# Patient Record
Sex: FEMALE | Race: WHITE | NOT HISPANIC OR LATINO | URBAN - METROPOLITAN AREA
[De-identification: names, ages, dates, MRNs, and addresses within clinical notes are randomized per-mention and may not be internally consistent; named-entity substitution may affect disease eponyms.]

---

## 2023-08-28 ENCOUNTER — INPATIENT (INPATIENT)
Facility: HOSPITAL | Age: 78
LOS: 10 days | Discharge: NURSING, REHAB OR LONG-TERM CARE | End: 2023-09-08
Attending: INTERNAL MEDICINE | Admitting: INTERNAL MEDICINE
Payer: MEDICARE

## 2023-08-28 VITALS
WEIGHT: 89.95 LBS | HEIGHT: 65 IN | HEART RATE: 73 BPM | OXYGEN SATURATION: 98 % | RESPIRATION RATE: 18 BRPM | DIASTOLIC BLOOD PRESSURE: 97 MMHG | SYSTOLIC BLOOD PRESSURE: 152 MMHG | TEMPERATURE: 98 F

## 2023-08-28 DIAGNOSIS — R91.8 OTHER NONSPECIFIC ABNORMAL FINDING OF LUNG FIELD: ICD-10-CM

## 2023-08-28 DIAGNOSIS — E87.6 HYPOKALEMIA: ICD-10-CM

## 2023-08-28 DIAGNOSIS — N39.0 URINARY TRACT INFECTION, SITE NOT SPECIFIED: ICD-10-CM

## 2023-08-28 DIAGNOSIS — E83.52 HYPERCALCEMIA: ICD-10-CM

## 2023-08-28 DIAGNOSIS — R74.01 ELEVATION OF LEVELS OF LIVER TRANSAMINASE LEVELS: ICD-10-CM

## 2023-08-28 DIAGNOSIS — S72.002A FRACTURE OF UNSPECIFIED PART OF NECK OF LEFT FEMUR, INITIAL ENCOUNTER FOR CLOSED FRACTURE: ICD-10-CM

## 2023-08-28 DIAGNOSIS — E87.0 HYPEROSMOLALITY AND HYPERNATREMIA: ICD-10-CM

## 2023-08-28 LAB
ALBUMIN SERPL ELPH-MCNC: 3.5 G/DL — SIGNIFICANT CHANGE UP (ref 3.3–5)
ALP SERPL-CCNC: 127 U/L — HIGH (ref 40–120)
ALT FLD-CCNC: 51 U/L — SIGNIFICANT CHANGE UP (ref 12–78)
ANION GAP SERPL CALC-SCNC: 8 MMOL/L — SIGNIFICANT CHANGE UP (ref 5–17)
APAP SERPL-MCNC: < 2 UG/ML (ref 10–30)
APPEARANCE UR: ABNORMAL
APTT BLD: 27.6 SEC — SIGNIFICANT CHANGE UP (ref 24.5–35.6)
AST SERPL-CCNC: 66 U/L — HIGH (ref 15–37)
BACTERIA # UR AUTO: ABNORMAL
BASOPHILS # BLD AUTO: 0.06 K/UL — SIGNIFICANT CHANGE UP (ref 0–0.2)
BASOPHILS NFR BLD AUTO: 0.4 % — SIGNIFICANT CHANGE UP (ref 0–2)
BILIRUB SERPL-MCNC: 0.8 MG/DL — SIGNIFICANT CHANGE UP (ref 0.2–1.2)
BILIRUB UR-MCNC: ABNORMAL
BUN SERPL-MCNC: 33 MG/DL — HIGH (ref 7–23)
CALCIUM SERPL-MCNC: 11.2 MG/DL — HIGH (ref 8.5–10.1)
CHLORIDE SERPL-SCNC: 117 MMOL/L — HIGH (ref 96–108)
CK SERPL-CCNC: 701 U/L — HIGH (ref 26–192)
CO2 SERPL-SCNC: 25 MMOL/L — SIGNIFICANT CHANGE UP (ref 22–31)
COLOR SPEC: YELLOW — SIGNIFICANT CHANGE UP
CREAT SERPL-MCNC: 0.74 MG/DL — SIGNIFICANT CHANGE UP (ref 0.5–1.3)
DIFF PNL FLD: ABNORMAL
EGFR: 84 ML/MIN/1.73M2 — SIGNIFICANT CHANGE UP
EOSINOPHIL # BLD AUTO: 0.08 K/UL — SIGNIFICANT CHANGE UP (ref 0–0.5)
EOSINOPHIL NFR BLD AUTO: 0.5 % — SIGNIFICANT CHANGE UP (ref 0–6)
EPI CELLS # UR: ABNORMAL
ETHANOL SERPL-MCNC: 12 MG/DL — HIGH (ref 0–10)
FLUAV AG NPH QL: SIGNIFICANT CHANGE UP
FLUBV AG NPH QL: SIGNIFICANT CHANGE UP
GLUCOSE SERPL-MCNC: 123 MG/DL — HIGH (ref 70–99)
GLUCOSE UR QL: NEGATIVE MG/DL — SIGNIFICANT CHANGE UP
HCT VFR BLD CALC: 47.4 % — HIGH (ref 34.5–45)
HGB BLD-MCNC: 15.3 G/DL — SIGNIFICANT CHANGE UP (ref 11.5–15.5)
IMM GRANULOCYTES NFR BLD AUTO: 0.5 % — SIGNIFICANT CHANGE UP (ref 0–0.9)
INR BLD: 0.9 RATIO — SIGNIFICANT CHANGE UP (ref 0.85–1.18)
KETONES UR-MCNC: ABNORMAL
LEUKOCYTE ESTERASE UR-ACNC: ABNORMAL
LYMPHOCYTES # BLD AUTO: 1.35 K/UL — SIGNIFICANT CHANGE UP (ref 1–3.3)
LYMPHOCYTES # BLD AUTO: 8 % — LOW (ref 13–44)
MAGNESIUM SERPL-MCNC: 2.6 MG/DL — SIGNIFICANT CHANGE UP (ref 1.6–2.6)
MCHC RBC-ENTMCNC: 28.4 PG — SIGNIFICANT CHANGE UP (ref 27–34)
MCHC RBC-ENTMCNC: 32.3 G/DL — SIGNIFICANT CHANGE UP (ref 32–36)
MCV RBC AUTO: 88.1 FL — SIGNIFICANT CHANGE UP (ref 80–100)
MONOCYTES # BLD AUTO: 1.47 K/UL — HIGH (ref 0–0.9)
MONOCYTES NFR BLD AUTO: 8.7 % — SIGNIFICANT CHANGE UP (ref 2–14)
NEUTROPHILS # BLD AUTO: 13.86 K/UL — HIGH (ref 1.8–7.4)
NEUTROPHILS NFR BLD AUTO: 81.9 % — HIGH (ref 43–77)
NITRITE UR-MCNC: NEGATIVE — SIGNIFICANT CHANGE UP
NRBC # BLD: 0 /100 WBCS — SIGNIFICANT CHANGE UP (ref 0–0)
PH UR: 6 — SIGNIFICANT CHANGE UP (ref 5–8)
PLATELET # BLD AUTO: 346 K/UL — SIGNIFICANT CHANGE UP (ref 150–400)
POTASSIUM SERPL-MCNC: 3.2 MMOL/L — LOW (ref 3.5–5.3)
POTASSIUM SERPL-SCNC: 3.2 MMOL/L — LOW (ref 3.5–5.3)
PROT SERPL-MCNC: 8.6 GM/DL — HIGH (ref 6–8.3)
PROT UR-MCNC: 100 MG/DL
PROTHROM AB SERPL-ACNC: 10.8 SEC — SIGNIFICANT CHANGE UP (ref 9.5–13)
RBC # BLD: 5.38 M/UL — HIGH (ref 3.8–5.2)
RBC # FLD: 13.8 % — SIGNIFICANT CHANGE UP (ref 10.3–14.5)
RBC CASTS # UR COMP ASSIST: ABNORMAL /HPF (ref 0–4)
SALICYLATES SERPL-MCNC: 3.3 MG/DL — SIGNIFICANT CHANGE UP (ref 2.8–20)
SARS-COV-2 RNA SPEC QL NAA+PROBE: SIGNIFICANT CHANGE UP
SODIUM SERPL-SCNC: 150 MMOL/L — HIGH (ref 135–145)
SP GR SPEC: 1.02 — SIGNIFICANT CHANGE UP (ref 1.01–1.02)
TROPONIN I, HIGH SENSITIVITY RESULT: 38.3 NG/L — SIGNIFICANT CHANGE UP
UROBILINOGEN FLD QL: 1 MG/DL
WBC # BLD: 16.9 K/UL — HIGH (ref 3.8–10.5)
WBC # FLD AUTO: 16.9 K/UL — HIGH (ref 3.8–10.5)
WBC UR QL: ABNORMAL

## 2023-08-28 PROCEDURE — 99285 EMERGENCY DEPT VISIT HI MDM: CPT

## 2023-08-28 PROCEDURE — 70450 CT HEAD/BRAIN W/O DYE: CPT | Mod: 26,MA

## 2023-08-28 PROCEDURE — 73502 X-RAY EXAM HIP UNI 2-3 VIEWS: CPT | Mod: 26,LT

## 2023-08-28 PROCEDURE — 27125 PARTIAL HIP REPLACEMENT: CPT | Mod: LT

## 2023-08-28 PROCEDURE — 99222 1ST HOSP IP/OBS MODERATE 55: CPT

## 2023-08-28 PROCEDURE — 93970 EXTREMITY STUDY: CPT | Mod: 26

## 2023-08-28 PROCEDURE — 72125 CT NECK SPINE W/O DYE: CPT | Mod: 26,MA

## 2023-08-28 PROCEDURE — 72192 CT PELVIS W/O DYE: CPT | Mod: 26,MA

## 2023-08-28 PROCEDURE — 73552 X-RAY EXAM OF FEMUR 2/>: CPT | Mod: 26,LT

## 2023-08-28 PROCEDURE — 27125 PARTIAL HIP REPLACEMENT: CPT | Mod: AS,LT

## 2023-08-28 PROCEDURE — 93010 ELECTROCARDIOGRAM REPORT: CPT

## 2023-08-28 PROCEDURE — 71045 X-RAY EXAM CHEST 1 VIEW: CPT | Mod: 26

## 2023-08-28 RX ORDER — POTASSIUM CHLORIDE 20 MEQ
40 PACKET (EA) ORAL ONCE
Refills: 0 | Status: COMPLETED | OUTPATIENT
Start: 2023-08-28 | End: 2023-08-28

## 2023-08-28 RX ORDER — CEFTRIAXONE 500 MG/1
1000 INJECTION, POWDER, FOR SOLUTION INTRAMUSCULAR; INTRAVENOUS ONCE
Refills: 0 | Status: COMPLETED | OUTPATIENT
Start: 2023-08-28 | End: 2023-08-28

## 2023-08-28 RX ORDER — SODIUM CHLORIDE 9 MG/ML
1000 INJECTION, SOLUTION INTRAVENOUS
Refills: 0 | Status: DISCONTINUED | OUTPATIENT
Start: 2023-08-28 | End: 2023-08-28

## 2023-08-28 RX ORDER — THIAMINE MONONITRATE (VIT B1) 100 MG
100 TABLET ORAL ONCE
Refills: 0 | Status: COMPLETED | OUTPATIENT
Start: 2023-08-28 | End: 2023-08-28

## 2023-08-28 RX ORDER — SODIUM CHLORIDE 9 MG/ML
1000 INJECTION INTRAMUSCULAR; INTRAVENOUS; SUBCUTANEOUS ONCE
Refills: 0 | Status: COMPLETED | OUTPATIENT
Start: 2023-08-28 | End: 2023-08-28

## 2023-08-28 RX ORDER — SODIUM CHLORIDE 9 MG/ML
1000 INJECTION, SOLUTION INTRAVENOUS
Refills: 0 | Status: DISCONTINUED | OUTPATIENT
Start: 2023-08-28 | End: 2023-08-29

## 2023-08-28 RX ORDER — MULTIVIT-MIN/FERROUS GLUCONATE 9 MG/15 ML
1 LIQUID (ML) ORAL DAILY
Refills: 0 | Status: DISCONTINUED | OUTPATIENT
Start: 2023-08-28 | End: 2023-09-08

## 2023-08-28 RX ORDER — FOLIC ACID 0.8 MG
1 TABLET ORAL DAILY
Refills: 0 | Status: DISCONTINUED | OUTPATIENT
Start: 2023-08-28 | End: 2023-09-08

## 2023-08-28 RX ORDER — MORPHINE SULFATE 50 MG/1
4 CAPSULE, EXTENDED RELEASE ORAL ONCE
Refills: 0 | Status: DISCONTINUED | OUTPATIENT
Start: 2023-08-28 | End: 2023-08-28

## 2023-08-28 RX ORDER — THIAMINE MONONITRATE (VIT B1) 100 MG
100 TABLET ORAL DAILY
Refills: 0 | Status: DISCONTINUED | OUTPATIENT
Start: 2023-08-29 | End: 2023-09-08

## 2023-08-28 RX ORDER — MORPHINE SULFATE 50 MG/1
2 CAPSULE, EXTENDED RELEASE ORAL EVERY 6 HOURS
Refills: 0 | Status: DISCONTINUED | OUTPATIENT
Start: 2023-08-28 | End: 2023-08-29

## 2023-08-28 RX ORDER — HEPARIN SODIUM 5000 [USP'U]/ML
5000 INJECTION INTRAVENOUS; SUBCUTANEOUS ONCE
Refills: 0 | Status: COMPLETED | OUTPATIENT
Start: 2023-08-28 | End: 2023-08-28

## 2023-08-28 RX ORDER — CEFTRIAXONE 500 MG/1
1000 INJECTION, POWDER, FOR SOLUTION INTRAMUSCULAR; INTRAVENOUS EVERY 24 HOURS
Refills: 0 | Status: DISCONTINUED | OUTPATIENT
Start: 2023-08-29 | End: 2023-08-30

## 2023-08-28 RX ORDER — ACETAMINOPHEN 500 MG
600 TABLET ORAL ONCE
Refills: 0 | Status: COMPLETED | OUTPATIENT
Start: 2023-08-28 | End: 2023-08-28

## 2023-08-28 RX ADMIN — HEPARIN SODIUM 5000 UNIT(S): 5000 INJECTION INTRAVENOUS; SUBCUTANEOUS at 21:14

## 2023-08-28 RX ADMIN — SODIUM CHLORIDE 1000 MILLILITER(S): 9 INJECTION INTRAMUSCULAR; INTRAVENOUS; SUBCUTANEOUS at 17:05

## 2023-08-28 RX ADMIN — Medication 240 MILLIGRAM(S): at 16:49

## 2023-08-28 RX ADMIN — Medication 600 MILLIGRAM(S): at 18:03

## 2023-08-28 RX ADMIN — MORPHINE SULFATE 4 MILLIGRAM(S): 50 CAPSULE, EXTENDED RELEASE ORAL at 17:05

## 2023-08-28 RX ADMIN — SODIUM CHLORIDE 1000 MILLILITER(S): 9 INJECTION INTRAMUSCULAR; INTRAVENOUS; SUBCUTANEOUS at 16:30

## 2023-08-28 RX ADMIN — CEFTRIAXONE 1000 MILLIGRAM(S): 500 INJECTION, POWDER, FOR SOLUTION INTRAMUSCULAR; INTRAVENOUS at 18:04

## 2023-08-28 RX ADMIN — Medication 600 MILLIGRAM(S): at 18:00

## 2023-08-28 RX ADMIN — SODIUM CHLORIDE 1000 MILLILITER(S): 9 INJECTION INTRAMUSCULAR; INTRAVENOUS; SUBCUTANEOUS at 18:04

## 2023-08-28 RX ADMIN — MORPHINE SULFATE 4 MILLIGRAM(S): 50 CAPSULE, EXTENDED RELEASE ORAL at 18:00

## 2023-08-28 RX ADMIN — Medication 40 MILLIEQUIVALENT(S): at 17:05

## 2023-08-28 RX ADMIN — CEFTRIAXONE 100 MILLIGRAM(S): 500 INJECTION, POWDER, FOR SOLUTION INTRAMUSCULAR; INTRAVENOUS at 17:00

## 2023-08-28 RX ADMIN — SODIUM CHLORIDE 70 MILLILITER(S): 9 INJECTION, SOLUTION INTRAVENOUS at 23:26

## 2023-08-28 RX ADMIN — SODIUM CHLORIDE 50 MILLILITER(S): 9 INJECTION, SOLUTION INTRAVENOUS at 18:55

## 2023-08-28 RX ADMIN — Medication 100 MILLIGRAM(S): at 23:20

## 2023-08-28 NOTE — ED PROVIDER NOTE - CLINICAL SUMMARY MEDICAL DECISION MAKING FREE TEXT BOX
Patient with weakness, FTT, left hip pain, not fit to reside alone.  VSS.  Pending labs, EKG, CT and xray imaging, plan for admission. Patient with weakness, FTT, left hip pain, not fit to reside alone.  VSS.  Pending labs, EKG, CT and xray imaging, plan for admission.    Admit note:  Patient with left hip fracture, ortho involved.  Not presently in withdrawal.  Needs medicine admission, labs suggestive of dehydration, needs further work up of AMS.  Patient is to be admitted to the hospital and the case was discussed with the admitting physician.  Any changes in plan, additional imaging/labs, and further work up will be at the discretion of the admitting physician. Per discussion with admitting physician, all necessary consults for admitted patients to be obtained by morning team unless patient requires emergent intervention or medical advice by specialist overnight.

## 2023-08-28 NOTE — ED PROVIDER NOTE - OBJECTIVE STATEMENT
Pertinent PMH/PSH/FHx/SHx and Review of Systems contained within:  Patient presents to the ED for altered mental status and failure to thrive.  Patient with daughter at bedside.  Per daughter, patient lives alone and she had not heard from her mother in 2 days.  Today she came to check on her mother and found her on the floor, patient unable to stand without assistance.  Patient was telling daughter about things that could not have happened and acting confused.  Per daughter patient is long time daily alcohol drinker but still functional.      Relevant PMHx/SHx/SOCHx/FAMH:  No other pmh per daughter or patient, does not go to physicians  +Smoker, +Daily alcohol use, no other drug use

## 2023-08-28 NOTE — ED ADULT NURSE NOTE - CHIEF COMPLAINT QUOTE
BIBEMS, as per ems staff states pt is disoriented when found on the floor today, last known well was Friday when found on the floor and since has been on the floor till today. as per patient, states slid and fell and hit head on Friday. poor historian. as per ems, staff unknown pmh

## 2023-08-28 NOTE — ED ADULT NURSE NOTE - OBJECTIVE STATEMENT
Patient is alert and oriented x2, daughter at bedside. As per daughter, patient is AOx4 at baseline. Daughter states she last spoke with patient on Saturday and patient did not seem confused. Patient lives alone. Daughter asked house staff to check on patient and was found on the floor. Patient states that she slipped and fell on Friday, hit back of the head. Denies LOC. Noted bilateral hip pain when patient was turned. S1 pressure ulcer on sacrum. Multiple abrasions on bilateral arms. Patient appears unkempt, dress soaked in urine. Daughter also reports that patient is a chronic smoker and has a history of ETOH abuse, stopped and resumed "lately". Daughter denies any PMH, says patient does not go to the doctor. Placed on cardiac monitor. Fall precautions in place. Safety maintained. Patient is alert and oriented x2, daughter at bedside. As per daughter, patient is AOx4 at baseline. Daughter states she last spoke with patient on Saturday and patient did not seem confused. Patient lives alone. Daughter asked house staff to check on patient and was found on the floor today. Patient states that she slipped and fell on Friday, hit back of the head. Denies LOC. Noted bilateral hip pain when patient was turned. S1 pressure ulcer on sacrum. Multiple abrasions on bilateral arms. Patient appears unkempt, dress soaked in urine. Daughter also reports that patient is a chronic smoker and has a history of ETOH abuse, stopped and resumed "lately". Daughter denies any PMH, says patient does not go to the doctor. Placed on cardiac monitor. Fall precautions in place. Safety maintained.

## 2023-08-28 NOTE — ED PROVIDER NOTE - PHYSICAL EXAMINATION
Gen: Alert, NAD, frail appearing  Head: NC, AT, EOMI, normal lids/conjunctiva  ENT: normal hearing, dry mucus membranes  Neck: +supple, no cervical tenderness, +Trachea midline  Pulm: Bilateral BS, normal resp effort, no wheeze/stridor/retractions  CV: RRR, no M/R/G, +dist pulses  Abd: soft, NT/ND, Negative [place] signs, +BS, no palpable masses  Mskel: no edema/erythema/cyanosis, left hip pain on movement   Skin: no rash, warm/dry  Neuro: AAOx2, no apparent sensory/motor deficits, coordination intact Gen: Alert, NAD, frail appearing  Head: NC, AT, EOMI, normal lids/conjunctiva  ENT: normal hearing, dry mucus membranes  Neck: +supple, no cervical tenderness, +Trachea midline  Pulm: Bilateral BS, normal resp effort, no wheeze/stridor/retractions  CV: RRR, no M/R/G, +dist pulses  Abd: soft, NT/ND, Negative [place] signs, +BS, no palpable masses  Mskel: no edema/erythema/cyanosis, left hip pain on movement   Skin: no rash, warm/dry  Neuro: AAOx2, no apparent sensory/motor deficits, coordination intact Gen: Alert, NAD, frail appearing  Head: NC, AT, EOMI, normal lids/conjunctiva  ENT: normal hearing, dry mucus membranes  Neck: +supple, no cervical tenderness, +Trachea midline  Pulm: Bilateral BS, normal resp effort, no wheeze/stridor/retractions  CV: RRR, no M/R/G, +dist pulses  Abd: soft, NT/ND, Negative [place] signs, +BS, no palpable masses  Mskel: no edema/erythema/cyanosis, left hip pain on movement   Skin: no rash, warm/dry  Neuro: AAOx2, no apparent sensory/motor deficits, coordination intact Gen: Alert, NAD, frail appearing  Head: NC, AT, EOMI, normal lids/conjunctiva  ENT: normal hearing, dry mucus membranes  Neck: +supple, no cervical tenderness, +Trachea midline  Pulm: Bilateral BS, normal resp effort, no wheeze/stridor/retractions  CV: RRR, no M/R/G, +dist pulses  Abd: soft, NT/ND, Negative [place] signs, +BS, no palpable masses  Mskel: no edema/erythema/cyanosis, left hip pain on movement   Skin: no rash, warm/dry  Neuro: AAOx2, no apparent sensory/motor deficits, coordination intact  CIWA 0 Gen: Alert, NAD, frail appearing  Head: NC, AT, EOMI, normal lids/conjunctiva  ENT: normal hearing, dry mucus membranes  Neck: +supple, no cervical tenderness, +Trachea midline  Pulm: Bilateral BS, normal resp effort, no wheeze/stridor/retractions  CV: RRR, no M/R/G, +dist pulses  Abd: soft, NT/ND, Negative [place] signs, +BS, no palpable masses  Mskel: no edema/erythema/cyanosis, left hip pain on movement   Skin: no rash, warm/dry  Neuro: AAOx2, no apparent sensory/motor deficits, coordination intact  CIWA 0 Gen: Alert, NAD, frail appearing  Head: NC, AT, EOMI, normal lids/conjunctiva  ENT: normal hearing, dry mucus membranes  Neck: +supple, no cervical tenderness, +Trachea midline  Pulm: Bilateral BS, normal resp effort, no wheeze/stridor/retractions  CV: RRR, no M/R/G, +dist pulses  Abd: soft, NT/ND, Negative [place] signs, +BS, no palpable masses  Mskel: no edema/erythema/cyanosis, left hip pain on movement   Skin: no rash, warm/dry  Neuro: AAOx2, no apparent sensory/motor deficits, coordination intact  CIWA 0

## 2023-08-28 NOTE — CONSULT NOTE ADULT - SUBJECTIVE AND OBJECTIVE BOX
Patient is a 77y female alcoholic, home ambulator without assistive devices who presents to [facility] ED w/ a c/o of L hip pain. Pt is an unreliable historian and was accompanied by her daughter/HCP. Patient's daughter confirms that the patient had a mechanical fall 3 days ago and was found by neighbor after the fall, after an indeterminate amount of time. Denies HT/LOC. States inability to walk immediately following the injury. Denies any numbness or tingling. Denies having any other pain elsewhere. Denies orthopedic history. No other orthopedic concerns at this time.    T(C): 36.5 (08-28-23 @ 19:37), Max: 36.7 (08-28-23 @ 12:26)  HR: 95 (08-28-23 @ 19:37) (73 - 95)  BP: 144/72 (08-28-23 @ 19:37) (125/51 - 152/97)  RR: 15 (08-28-23 @ 19:37) (15 - 18)  SpO2: 99% (08-28-23 @ 19:37) (98% - 99%)        FOUND ON FLR, WEAKNESS, DISORIENTED    FX OF HIP, CLOSED, LEFT, INITIAL ENCOUNTER; DEHYDRATION/ AMS    MEWS Score    Fracture of hip, closed, left, initial encounter    Dehydration    Altered mental status      Gen: NAD, Resting comfortably    L LE:  Skin intact, no erythema or ecchymosis  Externally and shortened leg  TTP by hip, nowhere else along RLE  Motor: + EHL/FHL/TA/GSC  +SILT: SPN/DPN/Renita/Saph/Tib  + DP  Compartments soft and compressible  No calf tenderness    Secondary Assessment:  NC/AT, NTTP of clavicles, NTTP of C-,T-,L-Spine,  UEs: NTTP of Shoulders, Elbows, Wrists, Hands; NT with AROM/PROM of Shoulders, Elbows, Wrists, Hands; AIN/PIN/Med/Uln/Msc/Rad/Ax intact  R LE: Able to SLR, NT with Log Roll, NT with Heel Strike, NTTP of Hip, Knee, Ankle, foot; NT with AROM/PROM of Hip, Knee, Ankle, foot; Q/H/Gsc/TA/EHL/FHL intact    Imaging:  XR L Hip: displaced subcapital femoral neck fracture  CT Pelvis: Left subcapital femoral neck fracture with displacement    A/P:     77F with a displaced L femoral neck fracture s/p mechanical fall 3 days ago.    Plan for L mikey vs. ISI tomorrow  NPO after midnight, except medications  IVF while NPO  One dose of Heparin tonight, then hold chemical DVT ppx for surgery  Please document necessary medical optimizations for surgery   FU BLLE US dopplers  Analgesia   CIWA protocol  NWB  Ice hip as tolerated  Medical recommendations appreciated   Discussed with Dr. Barragan, who is in agreement with above plan Patient is a 77y female alcoholic, home ambulator without assistive devices who presents to [facility] ED w/ a c/o of L hip pain. Pt is an unreliable historian and was accompanied by her daughter/HCP. Patient's daughter confirms that the patient had a mechanical fall 3 days ago and was found by neighbor after the fall, after an indeterminate amount of time. Denies HT/LOC. States inability to walk immediately following the injury. Denies any numbness or tingling. Denies having any other pain elsewhere. Denies orthopedic history. No other orthopedic concerns at this time.    T(C): 36.5 (08-28-23 @ 19:37), Max: 36.7 (08-28-23 @ 12:26)  HR: 95 (08-28-23 @ 19:37) (73 - 95)  BP: 144/72 (08-28-23 @ 19:37) (125/51 - 152/97)  RR: 15 (08-28-23 @ 19:37) (15 - 18)  SpO2: 99% (08-28-23 @ 19:37) (98% - 99%)        FOUND ON FLR, WEAKNESS, DISORIENTED    FX OF HIP, CLOSED, LEFT, INITIAL ENCOUNTER; DEHYDRATION/ AMS    MEWS Score    Fracture of hip, closed, left, initial encounter    Dehydration    Altered mental status      Gen: NAD, Resting comfortably    L LE:  Skin intact, no erythema or ecchymosis  Externally and shortened leg  TTP by hip, nowhere else along RLE  Motor: + EHL/FHL/TA/GSC  +SILT: SPN/DPN/Renita/Saph/Tib  + DP  Compartments soft and compressible  No calf tenderness    Secondary Assessment:  NC/AT, NTTP of clavicles, NTTP of C-,T-,L-Spine,  UEs: NTTP of Shoulders, Elbows, Wrists, Hands; NT with AROM/PROM of Shoulders, Elbows, Wrists, Hands; AIN/PIN/Med/Uln/Msc/Rad/Ax intact  R LE: Able to SLR, NT with Log Roll, NT with Heel Strike, NTTP of Hip, Knee, Ankle, foot; NT with AROM/PROM of Hip, Knee, Ankle, foot; Q/H/Gsc/TA/EHL/FHL intact    Imaging:  XR L Hip: displaced subcapital femoral neck fracture  CT Pelvis: Left subcapital femoral neck fracture with displacement    A/P:     77F with a displaced L femoral neck fracture s/p mechanical fall 3 days ago.    Plan for L mikey vs. ISI tomorrow  NPO after midnight, except medications  IVF while NPO  One dose of Heparin tonight, then hold chemical DVT ppx for surgery  Please document necessary medical optimizations for surgery   FU BLLE US dopplers  Analgesia   CIWA protocol  NWB  Ice hip as tolerated  Medical recommendations appreciated   Discussed with Dr. Barragan, who is in agreement with above plan Patient is a 77y female alcoholic, home ambulator without assistive devices who presents to [facility] ED w/ a c/o of L hip pain. Pt is an unreliable historian and was accompanied by her daughter/HCP. Patient's daughter confirms that the patient had a mechanical fall approximately 3 days ago and was found by neighbor after the fall, after an indeterminate amount of time. Denies Head strike/LOC. Denies AC use. States inability to walk immediately following the injury. Daughter says that in the last 6 months the patient has become more unsteady on her feet and has had falls though no fractures. Denies any numbness or tingling. Denies having any other pain elsewhere. Denies previous orthopedic history. No other orthopedic concerns at this time.    T(C): 36.5 (08-28-23 @ 19:37), Max: 36.7 (08-28-23 @ 12:26)  HR: 95 (08-28-23 @ 19:37) (73 - 95)  BP: 144/72 (08-28-23 @ 19:37) (125/51 - 152/97)  RR: 15 (08-28-23 @ 19:37) (15 - 18)  SpO2: 99% (08-28-23 @ 19:37) (98% - 99%)        FOUND ON FLR, WEAKNESS, DISORIENTED    FX OF HIP, CLOSED, LEFT, INITIAL ENCOUNTER; DEHYDRATION/ AMS    MEWS Score    Fracture of hip, closed, left, initial encounter    Dehydration    Altered mental status      Gen: Thin, NAD, Resting comfortably  LLE:  Skin intact, no erythema or ecchymosis  Externally and shortened leg  TTP by hip  NTTP at knee, ankle, or foot  Motor: + EHL/FHL/TA/GSC  +SILT: SPN/DPN/Renita/Saph/Tib  + DP  Compartments soft and compressible  No calf tenderness    Secondary Assessment:  NC/AT, NTTP of clavicles, NTTP of C-,T-,L-Spine,  UEs: NTTP of Shoulders, Elbows, Wrists, Hands; NT with AROM/PROM of Shoulders, Elbows, Wrists, Hands; AIN/PIN/Med/Uln/Msc/Rad/Ax intact  RLE: Able to SLR, NT with Log Roll, NT with Heel Strike, NTTP of Hip, Knee, Ankle, foot; NT with AROM/PROM of Hip, Knee, Ankle, foot; Q/H/Gsc/TA/EHL/FHL intact    Imaging:  XR L Hip: displaced subcapital femoral neck fracture  CT Pelvis: Left subcapital femoral neck fracture with displacement    A/P:     77F with a displaced L femoral neck fracture s/p mechanical fall 3 days ago.    Plan for L mikey vs. ISI tomorrow  NPO after midnight, except medications  IVF while NPO  One dose of Heparin tonight, then hold chemical DVT ppx for surgery  Please document necessary medical optimizations for surgery   FU BLLE US dopplers  Analgesia   CIWA protocol  NWB LLE  Ice hip as tolerated  Primary care per medical team   Discussed risk versus benefit of surgery with daughter and patient who voiced full understanding and wished to proceed with operative fixation   Discussed with Dr. Barragan, who is in agreement with above plan Patient is a 77y female alcoholic, home ambulator without assistive devices who presents to [facility] ED w/ a c/o of L hip pain. Pt is an unreliable historian and was accompanied by her daughter/HCP. Patient's daughter confirms that the patient had a mechanical fall approximately 3 days ago and was found by neighbor after the fall, after an indeterminate amount of time. Denies Head strike/LOC. Denies AC use. States inability to walk immediately following the injury. Daughter says that in the last 6 months the patient has become more unsteady on her feet and has had falls though no fractures. Denies any numbness or tingling. Denies having any other pain elsewhere. Denies previous orthopedic history. No other orthopedic concerns at this time.    T(C): 36.5 (08-28-23 @ 19:37), Max: 36.7 (08-28-23 @ 12:26)  HR: 95 (08-28-23 @ 19:37) (73 - 95)  BP: 144/72 (08-28-23 @ 19:37) (125/51 - 152/97)  RR: 15 (08-28-23 @ 19:37) (15 - 18)  SpO2: 99% (08-28-23 @ 19:37) (98% - 99%)        FOUND ON FLR, WEAKNESS, DISORIENTED    FX OF HIP, CLOSED, LEFT, INITIAL ENCOUNTER; DEHYDRATION/ AMS    MEWS Score    Fracture of hip, closed, left, initial encounter    Dehydration    Altered mental status      Gen: Thin, NAD, Resting comfortably  LLE:  Skin intact, no erythema or ecchymosis  Externally and shortened leg  TTP by hip  NTTP at knee, ankle, or foot  Motor: + EHL/FHL/TA/GSC  +SILT: SPN/DPN/Renita/Saph/Tib  + DP  Compartments soft and compressible  No calf tenderness    Secondary Assessment:  NC/AT, NTTP of clavicles, NTTP of C-,T-,L-Spine,  UEs: NTTP of Shoulders, Elbows, Wrists, Hands; NT with AROM/PROM of Shoulders, Elbows, Wrists, Hands; AIN/PIN/Med/Uln/Msc/Rad/Ax intact  RLE: Able to SLR, NT with Log Roll, NT with Heel Strike, NTTP of Hip, Knee, Ankle, foot; NT with AROM/PROM of Hip, Knee, Ankle, foot; Q/H/Gsc/TA/EHL/FHL intact    Imaging:  XR L Hip: displaced subcapital femoral neck fracture  CT Pelvis: Left subcapital femoral neck fracture with displacement    A/P:     77F with a displaced L femoral neck fracture s/p mechanical fall 3 days ago.    Plan for L mikey vs. ISI tomorrow  NPO after midnight, except medications  IVF while NPO  One dose of Heparin tonight, then hold chemical DVT ppx for surgery  Please document necessary medical optimizations for surgery   FU BLLE US dopplers  Analgesia   CIWA protocol  NWB LLE  Ice hip as tolerated  Primary care per medical team   Discussed risk versus benefit of surgery with daughter and patient who voiced full understanding and wished to proceed with operative fixation   Discussed with Dr. Barragan, who is in agreement with above plan Patient is a 77y female alcoholic, home ambulator without assistive devices who presents to [facility] ED w/ a c/o of L hip pain. Pt is an unreliable historian and was accompanied by her daughter/HCP. Patient's daughter confirms that the patient had a mechanical fall approximately 3 days ago and was found by neighbor after the fall, after an indeterminate amount of time. Denies Head strike/LOC. Denies AC use. States inability to walk immediately following the injury. Daughter says that in the last 6 months the patient has become more unsteady on her feet and has had falls though no fractures. Denies any numbness or tingling. Denies having any other pain elsewhere. Denies previous orthopedic history. No other orthopedic concerns at this time.    T(C): 36.5 (08-28-23 @ 19:37), Max: 36.7 (08-28-23 @ 12:26)  HR: 95 (08-28-23 @ 19:37) (73 - 95)  BP: 144/72 (08-28-23 @ 19:37) (125/51 - 152/97)  RR: 15 (08-28-23 @ 19:37) (15 - 18)  SpO2: 99% (08-28-23 @ 19:37) (98% - 99%)        FOUND ON FLR, WEAKNESS, DISORIENTED    FX OF HIP, CLOSED, LEFT, INITIAL ENCOUNTER; DEHYDRATION/ AMS    MEWS Score    Fracture of hip, closed, left, initial encounter    Dehydration    Altered mental status      Gen: Thin, NAD, Resting comfortably  LLE:  Skin intact, no erythema or ecchymosis  Externally and shortened leg  TTP by hip  NTTP at knee, ankle, or foot  Motor: + EHL/FHL/TA/GSC  +SILT: SPN/DPN/Renita/Saph/Tib  + DP  Compartments soft and compressible  No calf tenderness    Secondary Assessment:  NC/AT, NTTP of clavicles, NTTP of C-,T-,L-Spine,  UEs: NTTP of Shoulders, Elbows, Wrists, Hands; NT with AROM/PROM of Shoulders, Elbows, Wrists, Hands; AIN/PIN/Med/Uln/Msc/Rad/Ax intact  RLE: Able to SLR, NT with Log Roll, NT with Heel Strike, NTTP of Hip, Knee, Ankle, foot; NT with AROM/PROM of Hip, Knee, Ankle, foot; Q/H/Gsc/TA/EHL/FHL intact    Imaging:  XR L Hip: displaced subcapital femoral neck fracture  CT Pelvis: Left subcapital femoral neck fracture with displacement    A/P:     77F with a displaced L femoral neck fracture s/p mechanical fall 3 days ago.    Plan for L mikey vs. ISI tomorrow  NPO after midnight, except medications  IVF while NPO  One dose of Heparin tonight, then hold chemical DVT ppx for surgery  Please document necessary medical optimizations for surgery   FU BLLE US dopplers  Analgesia   CIWA protocol  NWB LLE  Ice hip as tolerated  Primary care per medical team   Discussed risk versus benefit of surgery with daughter and patient who voiced full understanding and wished to proceed with operative fixation   Discussed with Dr. Barragan, who is in agreement with above plan

## 2023-08-28 NOTE — ED ADULT NURSE NOTE - NSFALLRISKINTERV_ED_ALL_ED
Assistance OOB with selected safe patient handling equipment if applicable/Assistance with ambulation/Communicate fall risk and risk factors to all staff, patient, and family/Monitor gait and stability/Monitor for mental status changes and reorient to person, place, and time, as needed/Move patient closer to nursing station/within visual sight of ED staff/Provide visual cue: yellow wristband, yellow gown, etc/Reinforce activity limits and safety measures with patient and family/Toileting schedule using arm’s reach rule for commode and bathroom/Use of alarms - bed, stretcher, chair and/or video monitoring/Call bell, personal items and telephone in reach/Instruct patient to call for assistance before getting out of bed/chair/stretcher/Non-slip footwear applied when patient is off stretcher/[place] to call system/Physically safe environment - no spills, clutter or unnecessary equipment/Purposeful Proactive Rounding/Room/bathroom lighting operational, light cord in reach Assistance OOB with selected safe patient handling equipment if applicable/Assistance with ambulation/Communicate fall risk and risk factors to all staff, patient, and family/Monitor gait and stability/Monitor for mental status changes and reorient to person, place, and time, as needed/Move patient closer to nursing station/within visual sight of ED staff/Provide visual cue: yellow wristband, yellow gown, etc/Reinforce activity limits and safety measures with patient and family/Toileting schedule using arm’s reach rule for commode and bathroom/Use of alarms - bed, stretcher, chair and/or video monitoring/Call bell, personal items and telephone in reach/Instruct patient to call for assistance before getting out of bed/chair/stretcher/Non-slip footwear applied when patient is off stretcher/[place] to call system/Physically safe environment - no spills, clutter or unnecessary equipment/Purposeful Proactive Rounding/Room/bathroom lighting operational, light cord in reach Assistance OOB with selected safe patient handling equipment if applicable/Assistance with ambulation/Communicate fall risk and risk factors to all staff, patient, and family/Monitor gait and stability/Monitor for mental status changes and reorient to person, place, and time, as needed/Move patient closer to nursing station/within visual sight of ED staff/Provide visual cue: yellow wristband, yellow gown, etc/Reinforce activity limits and safety measures with patient and family/Toileting schedule using arm’s reach rule for commode and bathroom/Use of alarms - bed, stretcher, chair and/or video monitoring/Call bell, personal items and telephone in reach/Instruct patient to call for assistance before getting out of bed/chair/stretcher/Non-slip footwear applied when patient is off stretcher/[place] to call system/Physically safe environment - no spills, clutter or unnecessary equipment/Purposeful Proactive Rounding/Room/bathroom lighting operational, light cord in reach

## 2023-08-28 NOTE — ED ADULT NURSE REASSESSMENT NOTE - NS ED NURSE REASSESS COMMENT FT1
patient provided with applesauce and milk. Patient aware of NPO after midnight. NAD noted. Denies pain/discomfort at this time
Report received from GAYLE Nuñez. patient is a&ox3 with moments of confusion, npo after midnight. Patient with daughter and surgical team at bedside.  Patient denies pain/discomfort NAD noted. IV fluids in progress. Awaiting tele bed

## 2023-08-28 NOTE — H&P ADULT - HISTORY OF PRESENT ILLNESS
Patient is a 77F with no reported PMH who presents to the ED s/p fall.  Patient is currently AAOx2-3, unable to provide history.  History provided by daughter - Rica - at the bedside.  Patient brought to the ED s/p fall.  Patient lives alone and does not have an HHA.  Per daughter - she hadnt spoke to the patient for about two days and became worried when she would not answer the phone.  Patient found on the floor when daughter went to check on her.  Unclear how long patient was down for.  Per daughter - patient has had a significant change in her functional status over the last six months.  Patient much more homebound - noted to have ~30 lb wt loss, poor appetite, worsening gait.  Patient reportedly has refused MD evaluation in the past.  Patient also known to be a chronic alcoholic - unable to describe how much she drinks per day or when her last drink was.  Daughter denies history of seizures or prior hospitalizations related to alcohol.  Patient also smokes ~1 pack per day and has smoked for >25 years.  Patient complains of pain to the L hip, no other complaints.  Vitals stable, labs show hypernatremia and hypercalcemia.  Will admit to med surg.

## 2023-08-28 NOTE — ED PROVIDER NOTE - CARE PLAN
1 Principal Discharge DX:	Fracture of hip, closed, left, initial encounter  Secondary Diagnosis:	Dehydration  Secondary Diagnosis:	Altered mental status

## 2023-08-28 NOTE — H&P ADULT - NSHPLABSRESULTS_GEN_ALL_CORE
Recent Vitals  T(C): 36.5 (23 @ 19:37), Max: 36.7 (23 @ 12:26)  HR: 95 (23 @ 19:37) (73 - 95)  BP: 144/72 (23 @ 19:37) (125/51 - 152/97)  RR: 15 (23 @ 19:37) (15 - 18)  SpO2: 99% (23 @ 19:37) (98% - 99%)                        15.3   16.90 )-----------( 346      ( 28 Aug 2023 15:23 )             47.4         150<H>  |  117<H>  |  33<H>  ----------------------------<  123<H>  3.2<L>   |  25  |  0.74    Ca    11.2<H>      28 Aug 2023 15:23  Mg     2.6         TPro  8.6<H>  /  Alb  3.5  /  TBili  0.8  /  DBili  x   /  AST  66<H>  /  ALT  51  /  AlkPhos  127<H>      PT/INR - ( 28 Aug 2023 19:20 )   PT: 10.8 sec;   INR: 0.90 ratio         PTT - ( 28 Aug 2023 19:20 )  PTT:27.6 sec  LIVER FUNCTIONS - ( 28 Aug 2023 15:23 )  Alb: 3.5 g/dL / Pro: 8.6 gm/dL / ALK PHOS: 127 U/L / ALT: 51 U/L / AST: 66 U/L / GGT: x           Urinalysis Basic - ( 28 Aug 2023 21:02 )    Color: Yellow / Appearance: Slightly Turbid / S.020 / pH: x  Gluc: x / Ketone: Moderate  / Bili: Small / Urobili: 1 mg/dL   Blood: x / Protein: 100 mg/dL / Nitrite: Negative   Leuk Esterase: Moderate / RBC: 6-10 /HPF / WBC 11-25   Sq Epi: x / Non Sq Epi: x / Bacteria: Many        Home Medications:

## 2023-08-28 NOTE — H&P ADULT - NSHPPHYSICALEXAM_GEN_ALL_CORE
Physical exam:  General: frail female in no acute distress, resting comfortably  Head:  Atraumatic, Normocephalic  Eyes: EOMI, PERRLA, clear sclera  Neck: Supple, thyroid nontender, non enlarged  Cardio: S1/S2 +ve, regular rate and rhythm, no M/G/R  Resp: clear to ausculation bilaterally,   GI: abdomen soft, nontender, non distended, no guarding, BS +ve x 4  Ext: no significant pedal edema, refuses examination of LLE  Neuro: CN 2-12 intact, no significant motor or sensory deficits.  Skin: No lesions

## 2023-08-28 NOTE — H&P ADULT - PROBLEM SELECTOR PLAN 1
CT reveals L hip fracture.    Ortho on board - plan for procedure in the AM  morphine prn pain  DVT prop as per ortho     Due to age and comorbidities (significant smoking history, new lung mass, homebound, frailty) patient is a moderate risk for perioperative cardiovascular mortality but is likely currently optimized for procedure.

## 2023-08-28 NOTE — H&P ADULT - PROBLEM SELECTOR PLAN 7
likely due to chronic alcohol use  Thiamine, Folate, MVI daily   monitor for CIWA symptoms - currently 0  Consider wernicke encephalopathy ? - given IV thiamine

## 2023-08-28 NOTE — ED PROVIDER NOTE - NS ED MD DISPO DIVISION
[facility]

## 2023-08-28 NOTE — H&P ADULT - NSHPREVIEWOFSYSTEMS_GEN_ALL_CORE
Constitutional: no fever, chills, night sweats  Ears: no hearing changes or ear pain,   Nose: no nasal congestion, sinus pain, or rhinorrhea  Cardio: no chest pain, orthopnea, edema, or palpitations  Resp: no dyspnea, cough, wheezing  GI: no nausea, vomiting, diarrhea, constipation, hematochezia, or melena  : no dysuria, urinary frequency, hematuria  MSK: L leg pain positive.  No back pain, neck pain  Skin: no rash, pruritis   Neuro: no weakness, dizziness, lightheadedness, syncope   Heme/Lymph: no bruising or bleeding

## 2023-08-29 ENCOUNTER — TRANSCRIPTION ENCOUNTER (OUTPATIENT)
Age: 78
End: 2023-08-29

## 2023-08-29 LAB
ANION GAP SERPL CALC-SCNC: 3 MMOL/L — LOW (ref 5–17)
ANION GAP SERPL CALC-SCNC: 6 MMOL/L — SIGNIFICANT CHANGE UP (ref 5–17)
APTT BLD: 26.4 SEC — SIGNIFICANT CHANGE UP (ref 24.5–35.6)
APTT BLD: 28.4 SEC — SIGNIFICANT CHANGE UP (ref 24.5–35.6)
BUN SERPL-MCNC: 25 MG/DL — HIGH (ref 7–23)
BUN SERPL-MCNC: 33 MG/DL — HIGH (ref 7–23)
CALCIUM SERPL-MCNC: 10.2 MG/DL — HIGH (ref 8.5–10.1)
CALCIUM SERPL-MCNC: 8.8 MG/DL — SIGNIFICANT CHANGE UP (ref 8.5–10.1)
CHLORIDE SERPL-SCNC: 115 MMOL/L — HIGH (ref 96–108)
CHLORIDE SERPL-SCNC: 120 MMOL/L — HIGH (ref 96–108)
CO2 SERPL-SCNC: 22 MMOL/L — SIGNIFICANT CHANGE UP (ref 22–31)
CO2 SERPL-SCNC: 26 MMOL/L — SIGNIFICANT CHANGE UP (ref 22–31)
CREAT SERPL-MCNC: 0.46 MG/DL — LOW (ref 0.5–1.3)
CREAT SERPL-MCNC: 0.58 MG/DL — SIGNIFICANT CHANGE UP (ref 0.5–1.3)
CULTURE RESULTS: SIGNIFICANT CHANGE UP
EGFR: 94 ML/MIN/1.73M2 — SIGNIFICANT CHANGE UP
EGFR: 99 ML/MIN/1.73M2 — SIGNIFICANT CHANGE UP
GLUCOSE SERPL-MCNC: 102 MG/DL — HIGH (ref 70–99)
GLUCOSE SERPL-MCNC: 120 MG/DL — HIGH (ref 70–99)
HCT VFR BLD CALC: 38.1 % — SIGNIFICANT CHANGE UP (ref 34.5–45)
HCT VFR BLD CALC: 44.8 % — SIGNIFICANT CHANGE UP (ref 34.5–45)
HCV AB S/CO SERPL IA: 0.09 S/CO — SIGNIFICANT CHANGE UP (ref 0–0.99)
HCV AB SERPL-IMP: SIGNIFICANT CHANGE UP
HGB BLD-MCNC: 12.7 G/DL — SIGNIFICANT CHANGE UP (ref 11.5–15.5)
HGB BLD-MCNC: 14.3 G/DL — SIGNIFICANT CHANGE UP (ref 11.5–15.5)
INR BLD: 0.9 RATIO — SIGNIFICANT CHANGE UP (ref 0.85–1.18)
INR BLD: 0.94 RATIO — SIGNIFICANT CHANGE UP (ref 0.85–1.18)
MAGNESIUM SERPL-MCNC: 1.8 MG/DL — SIGNIFICANT CHANGE UP (ref 1.6–2.6)
MCHC RBC-ENTMCNC: 28.5 PG — SIGNIFICANT CHANGE UP (ref 27–34)
MCHC RBC-ENTMCNC: 29.4 PG — SIGNIFICANT CHANGE UP (ref 27–34)
MCHC RBC-ENTMCNC: 31.9 G/DL — LOW (ref 32–36)
MCHC RBC-ENTMCNC: 33.3 G/DL — SIGNIFICANT CHANGE UP (ref 32–36)
MCV RBC AUTO: 88.2 FL — SIGNIFICANT CHANGE UP (ref 80–100)
MCV RBC AUTO: 89.2 FL — SIGNIFICANT CHANGE UP (ref 80–100)
NRBC # BLD: 0 /100 WBCS — SIGNIFICANT CHANGE UP (ref 0–0)
PLATELET # BLD AUTO: 290 K/UL — SIGNIFICANT CHANGE UP (ref 150–400)
PLATELET # BLD AUTO: 333 K/UL — SIGNIFICANT CHANGE UP (ref 150–400)
POTASSIUM SERPL-MCNC: 2.8 MMOL/L — CRITICAL LOW (ref 3.5–5.3)
POTASSIUM SERPL-MCNC: 3.4 MMOL/L — LOW (ref 3.5–5.3)
POTASSIUM SERPL-SCNC: 2.8 MMOL/L — CRITICAL LOW (ref 3.5–5.3)
POTASSIUM SERPL-SCNC: 3.4 MMOL/L — LOW (ref 3.5–5.3)
PROTHROM AB SERPL-ACNC: 10.8 SEC — SIGNIFICANT CHANGE UP (ref 9.5–13)
PROTHROM AB SERPL-ACNC: 11.3 SEC — SIGNIFICANT CHANGE UP (ref 9.5–13)
RBC # BLD: 4.32 M/UL — SIGNIFICANT CHANGE UP (ref 3.8–5.2)
RBC # BLD: 5.02 M/UL — SIGNIFICANT CHANGE UP (ref 3.8–5.2)
RBC # FLD: 13.7 % — SIGNIFICANT CHANGE UP (ref 10.3–14.5)
RBC # FLD: 13.8 % — SIGNIFICANT CHANGE UP (ref 10.3–14.5)
SODIUM SERPL-SCNC: 144 MMOL/L — SIGNIFICANT CHANGE UP (ref 135–145)
SODIUM SERPL-SCNC: 148 MMOL/L — HIGH (ref 135–145)
SPECIMEN SOURCE: SIGNIFICANT CHANGE UP
WBC # BLD: 12.14 K/UL — HIGH (ref 3.8–10.5)
WBC # BLD: 15.58 K/UL — HIGH (ref 3.8–10.5)
WBC # FLD AUTO: 12.14 K/UL — HIGH (ref 3.8–10.5)
WBC # FLD AUTO: 15.58 K/UL — HIGH (ref 3.8–10.5)

## 2023-08-29 PROCEDURE — 73502 X-RAY EXAM HIP UNI 2-3 VIEWS: CPT | Mod: 26,LT

## 2023-08-29 PROCEDURE — 99233 SBSQ HOSP IP/OBS HIGH 50: CPT

## 2023-08-29 DEVICE — HEAD FEM 12/14 28MM PLUS5MM: Type: IMPLANTABLE DEVICE | Site: LEFT | Status: FUNCTIONAL

## 2023-08-29 DEVICE — STEM FEM ACTIS TAPER STD COLLAR 12/14 SZ 3: Type: IMPLANTABLE DEVICE | Site: LEFT | Status: FUNCTIONAL

## 2023-08-29 DEVICE — IMPLANTABLE DEVICE: Type: IMPLANTABLE DEVICE | Site: LEFT | Status: FUNCTIONAL

## 2023-08-29 RX ORDER — SODIUM CHLORIDE 9 MG/ML
1000 INJECTION, SOLUTION INTRAVENOUS
Refills: 0 | Status: DISCONTINUED | OUTPATIENT
Start: 2023-08-29 | End: 2023-08-29

## 2023-08-29 RX ORDER — CEFAZOLIN SODIUM 1 G
2000 VIAL (EA) INJECTION EVERY 8 HOURS
Refills: 0 | Status: COMPLETED | OUTPATIENT
Start: 2023-08-29 | End: 2023-08-30

## 2023-08-29 RX ORDER — POTASSIUM CHLORIDE 20 MEQ
20 PACKET (EA) ORAL
Refills: 0 | Status: DISCONTINUED | OUTPATIENT
Start: 2023-08-29 | End: 2023-08-29

## 2023-08-29 RX ORDER — POTASSIUM CHLORIDE 20 MEQ
20 PACKET (EA) ORAL
Refills: 0 | Status: COMPLETED | OUTPATIENT
Start: 2023-08-29 | End: 2023-08-29

## 2023-08-29 RX ORDER — HYDROMORPHONE HYDROCHLORIDE 2 MG/ML
0.5 INJECTION INTRAMUSCULAR; INTRAVENOUS; SUBCUTANEOUS
Refills: 0 | Status: DISCONTINUED | OUTPATIENT
Start: 2023-08-29 | End: 2023-08-29

## 2023-08-29 RX ORDER — MAGNESIUM SULFATE 500 MG/ML
1 VIAL (ML) INJECTION ONCE
Refills: 0 | Status: COMPLETED | OUTPATIENT
Start: 2023-08-29 | End: 2023-08-29

## 2023-08-29 RX ORDER — POTASSIUM CHLORIDE 20 MEQ
40 PACKET (EA) ORAL EVERY 4 HOURS
Refills: 0 | Status: COMPLETED | OUTPATIENT
Start: 2023-08-29 | End: 2023-08-29

## 2023-08-29 RX ORDER — ENOXAPARIN SODIUM 100 MG/ML
40 INJECTION SUBCUTANEOUS EVERY 24 HOURS
Refills: 0 | Status: DISCONTINUED | OUTPATIENT
Start: 2023-08-30 | End: 2023-09-08

## 2023-08-29 RX ORDER — POTASSIUM CHLORIDE 20 MEQ
40 PACKET (EA) ORAL EVERY 4 HOURS
Refills: 0 | Status: DISCONTINUED | OUTPATIENT
Start: 2023-08-29 | End: 2023-08-29

## 2023-08-29 RX ADMIN — Medication 40 MILLIEQUIVALENT(S): at 21:34

## 2023-08-29 RX ADMIN — Medication 50 MILLIEQUIVALENT(S): at 13:08

## 2023-08-29 RX ADMIN — Medication 100 MILLIGRAM(S): at 23:14

## 2023-08-29 RX ADMIN — SODIUM CHLORIDE 75 MILLILITER(S): 9 INJECTION, SOLUTION INTRAVENOUS at 18:02

## 2023-08-29 RX ADMIN — Medication 100 MILLIGRAM(S): at 11:09

## 2023-08-29 RX ADMIN — SODIUM CHLORIDE 100 MILLILITER(S): 9 INJECTION, SOLUTION INTRAVENOUS at 11:17

## 2023-08-29 RX ADMIN — Medication 20 MILLIEQUIVALENT(S): at 11:09

## 2023-08-29 RX ADMIN — Medication 40 MILLIEQUIVALENT(S): at 18:02

## 2023-08-29 RX ADMIN — Medication 20 MILLIEQUIVALENT(S): at 06:40

## 2023-08-29 RX ADMIN — Medication 50 MILLIEQUIVALENT(S): at 18:05

## 2023-08-29 RX ADMIN — Medication 100 GRAM(S): at 18:03

## 2023-08-29 NOTE — PATIENT PROFILE ADULT - FUNCTIONAL ASSESSMENT - BASIC MOBILITY 6.
2-calculated by average/Not able to assess (calculate score using [facility] averaging method)  2-calculated by average/Not able to assess (calculate score using [facility] averaging method)  2-calculated by average/Not able to assess (calculate score using [facility] averaging method)

## 2023-08-29 NOTE — PATIENT PROFILE ADULT - HOME ACCESSIBILITY CONCERNS
Was the patient seen in the last year in this department? Yes LOV 12/19/2018    Does patient have an active prescription for medications requested? Yes    Received Request Via: Pharmacy     VITAMIN D2 50,000 UNIT CAPSULE     none

## 2023-08-29 NOTE — PROGRESS NOTE ADULT - ASSESSMENT
77F with no reported PMH who presents to the ED s/p fall, admitted with left hip fracture and dehydration with electrolyte abnormalisties      # Hip fracture, left.    CT reveals L hip fracture.    ORIF today  morphine prn pain  DVT prop as per ortho       # UTI (urinary tract infection).   ·  Plan: WBCs and leuk esterase in urine, also pt c/o burning and frequency  c/w ceftriaxone  for now  Deescalate antibiotic when cultures return.     # Mass of upper lobe of right lung.   ·  Plan: CT chest to eval R apical mass after surgery or outpatient    # Hypernatremia.    Na 150--?144  likely due to dehydration (elevated CK as well)  c/w  half NS  trend labs.    # Hypercalcemia.    likely due to dehydration - trend labs.    # Hypokalemia.   replete    #: Transaminitis.   ·  Plan: likely due to chronic alcohol use  Thiamine, Folate, MVI daily   monitor for CIWA symptoms - currently 0   given IV thiamine, c/w PO thiamine and MVT/FA    dvt PPX post op

## 2023-08-29 NOTE — PROGRESS NOTE ADULT - SUBJECTIVE AND OBJECTIVE BOX
pt seen and examined by the bedside, c/o LLE pain, denied any other complaints.    Vital Signs Last 24 Hrs  T(C): 36.8 (29 Aug 2023 17:10), Max: 36.9 (29 Aug 2023 08:01)  T(F): 98.2 (29 Aug 2023 17:10), Max: 98.5 (29 Aug 2023 08:01)  HR: 93 (29 Aug 2023 17:17) (75 - 103)  BP: 158/67 (29 Aug 2023 17:10) (94/76 - 169/79)  RR: 18 (29 Aug 2023 17:10) (10 - 22)  SpO2: 96% (29 Aug 2023 17:10) (95% - 100%)   room air      Physical Exam:   General: frail female in no acute distress, resting comfortably  Head:  Atraumatic, Normocephalic  Eyes: EOMI, PERRLA, clear sclera  Neck: Supple, thyroid nontender, non enlarged  Cardio: S1/S2 +ve, regular rate and rhythm, no M/G/R  Resp: clear to ausculation bilaterally,   GI: abdomen soft, nontender, non distended, no guarding, BS +ve x 4  Ext: no significant pedal edema, ROM limited in LLE, RLE normal ROM  Neuro: CN 2-12 intact, no significant motor or sensory deficits.  Skin: No lesions      CBC:            12.7   12.14 )-----------( 290      ( 08-29-23 @ 10:10 )             38.1         Chem:         ( 08-29-23 @ 10:10 )    144  |  115<H>  |  25<H>  ----------------------------<  102<H>  2.8<LL>   |  26  |  0.46<L>        Liver Functions: ( 08-28-23 @ 15:23 )  Alb: 3.5 g/dL / Pro: 8.6 gm/dL / ALK PHOS: 127 U/L / ALT: 51 U/L / AST: 66 U/L / GGT: x              Type & Screen:

## 2023-08-29 NOTE — PROGRESS NOTE ADULT - ATTENDING COMMENTS
Agree with resident note and plan: 77F p/w L displaced femoral neck fx. Patient confused on interview but requires urgent fixation due to need to mobilize. Given patient alcohol use she is at higher risk of infection and dislocation, other risks include fx, failure of hardware, thromboembolic events, MI, stroke, loss of life or limb. Will plan to proceed with L mikey arthroplasty today

## 2023-08-29 NOTE — PROGRESS NOTE ADULT - SUBJECTIVE AND OBJECTIVE BOX
This is a 76y/o Female s/p Left hip hemiarthroplasty.  Pain is controlled. Pt feeling well. No nausea or vomiting.    LABS:                        12.7   12.14 )-----------( 290      ( 29 Aug 2023 10:10 )             38.1     08-29    144  |  115<H>  |  25<H>  ----------------------------<  102<H>  2.8<LL>   |  26  |  0.46<L>    Ca    8.8      29 Aug 2023 10:10  Mg     1.8     08-29    TPro  8.6<H>  /  Alb  3.5  /  TBili  0.8  /  DBili  x   /  AST  66<H>  /  ALT  51  /  AlkPhos  127<H>  08-28    PT/INR - ( 29 Aug 2023 10:10 )   PT: 11.3 sec;   INR: 0.94 ratio         PTT - ( 29 Aug 2023 10:10 )  PTT:26.4 sec  Urinalysis Basic - ( 29 Aug 2023 10:10 )    Color: x / Appearance: x / SG: x / pH: x  Gluc: 102 mg/dL / Ketone: x  / Bili: x / Urobili: x   Blood: x / Protein: x / Nitrite: x   Leuk Esterase: x / RBC: x / WBC x   Sq Epi: x / Non Sq Epi: x / Bacteria: x        VITAL SIGNS:  T(C): 36.8 (08-29-23 @ 17:10), Max: 36.9 (08-29-23 @ 08:01)  HR: 93 (08-29-23 @ 17:17) (75 - 96)  BP: 158/67 (08-29-23 @ 17:10) (94/76 - 169/79)  RR: 18 (08-29-23 @ 17:10) (10 - 22)  SpO2: 96% (08-29-23 @ 17:10) (95% - 100%)    Exam:  NAD AAOx1.  Dressing clean, dry and intact.  SCDs in place.  Calves are soft and nontender.  Moving all toes and ankle, +EHL/FHL/TA/GS.  SILT throughout.  DP pulses +.  abduction pillow in place    A/P:  Stable POD 0 s/p Left hip hemiarthroplasty.  -Analgesia  -Posterior precautions  -Ice application  -Prophylactic antibiotics  -DVT PE prophylaxis per primary, recommend lovenox POD1  -Incentive spirometry  -OOB PT WBAT         This is a 78y/o Female s/p Left hip hemiarthroplasty.  Pain is controlled. Pt feeling well. No nausea or vomiting.    LABS:                        12.7   12.14 )-----------( 290      ( 29 Aug 2023 10:10 )             38.1     08-29    144  |  115<H>  |  25<H>  ----------------------------<  102<H>  2.8<LL>   |  26  |  0.46<L>    Ca    8.8      29 Aug 2023 10:10  Mg     1.8     08-29    TPro  8.6<H>  /  Alb  3.5  /  TBili  0.8  /  DBili  x   /  AST  66<H>  /  ALT  51  /  AlkPhos  127<H>  08-28    PT/INR - ( 29 Aug 2023 10:10 )   PT: 11.3 sec;   INR: 0.94 ratio         PTT - ( 29 Aug 2023 10:10 )  PTT:26.4 sec  Urinalysis Basic - ( 29 Aug 2023 10:10 )    Color: x / Appearance: x / SG: x / pH: x  Gluc: 102 mg/dL / Ketone: x  / Bili: x / Urobili: x   Blood: x / Protein: x / Nitrite: x   Leuk Esterase: x / RBC: x / WBC x   Sq Epi: x / Non Sq Epi: x / Bacteria: x        VITAL SIGNS:  T(C): 36.8 (08-29-23 @ 17:10), Max: 36.9 (08-29-23 @ 08:01)  HR: 93 (08-29-23 @ 17:17) (75 - 96)  BP: 158/67 (08-29-23 @ 17:10) (94/76 - 169/79)  RR: 18 (08-29-23 @ 17:10) (10 - 22)  SpO2: 96% (08-29-23 @ 17:10) (95% - 100%)    Exam:  NAD AAOx1.  Dressing clean, dry and intact.  SCDs in place.  Calves are soft and nontender.  Moving all toes and ankle, +EHL/FHL/TA/GS.  SILT throughout.  DP pulses +.  abduction pillow in place    A/P:  Stable POD 0 s/p Left hip hemiarthroplasty.  -Analgesia  -Posterior precautions  -Ice application  -Prophylactic antibiotics  -DVT PE prophylaxis per primary, recommend lovenox POD1  -Incentive spirometry  -OOB PT WBAT

## 2023-08-29 NOTE — PROGRESS NOTE ADULT - SUBJECTIVE AND OBJECTIVE BOX
Subjective: Patient was seen and examined at bedside. Denies any new numbness or tingling. Denies any headaches, SOB, or n/v/d/c. Denies having any other pain elsewhere. No other orthopedic concerns at this time.    Vital Signs (24 Hrs):  T(C): 36.7 (08-29-23 @ 04:30), Max: 36.7 (08-28-23 @ 12:26)  HR: 84 (08-29-23 @ 04:30) (73 - 103)  BP: 151/65 (08-29-23 @ 04:30) (125/51 - 152/97)  RR: 18 (08-29-23 @ 04:30) (15 - 19)  SpO2: 96% (08-29-23 @ 04:30) (96% - 99%)  Wt(kg): --    LABS:                          14.3   15.58 )-----------( 333      ( 29 Aug 2023 01:25 )             44.8     08-29    148<H>  |  120<H>  |  33<H>  ----------------------------<  120<H>  3.4<L>   |  22  |  0.58    Ca    10.2<H>      29 Aug 2023 01:25  Mg     2.6     08-28    TPro  8.6<H>  /  Alb  3.5  /  TBili  0.8  /  DBili  x   /  AST  66<H>  /  ALT  51  /  AlkPhos  127<H>  08-28    LIVER FUNCTIONS - ( 28 Aug 2023 15:23 )  Alb: 3.5 g/dL / Pro: 8.6 gm/dL / ALK PHOS: 127 U/L / ALT: 51 U/L / AST: 66 U/L / GGT: x           PT/INR - ( 29 Aug 2023 01:25 )   PT: 10.8 sec;   INR: 0.90 ratio        PTT - ( 29 Aug 2023 01:25 )  PTT:28.4 sec    Gen: NAD, Resting comfortably    LLE:  Skin intact, no erythema or ecchymosis  Externally and shortened leg  TTP by hip, nowhere else along RLE  Motor: + EHL/FHL/TA/GSC  +SILT: SPN/DPN/Renita/Saph/Tib  + DP  Compartments soft and compressible  No calf tenderness    A/P:     77yFemale with L Femoral neck fracture s/p fall. Plan for OR today.    Plan for OR today with Dr. Barragan for a [facility] v [facility]  NPO after midnight, except medications  IVF while NPO  Medically optimized for surgery   Analgesia   NWB  Ice hip as tolerated  Medical recommendations appreciated   Discussed with Dr. Barragan, who is in agreement with above plan

## 2023-08-29 NOTE — PATIENT PROFILE ADULT - FALL HARM RISK - HARM RISK INTERVENTIONS
Assistance with ambulation/Assistance OOB with selected safe patient handling equipment/Communicate Risk of Fall with Harm to all staff/Reinforce activity limits and safety measures with patient and family/Tailored Fall Risk Interventions/Visual Cue: Yellow wristband and red socks/Bed in lowest position, wheels locked, appropriate side rails in place/Call bell, personal items and telephone in reach/Instruct patient to call for assistance before getting out of bed or chair/Non-slip footwear when patient is out of bed/[place] to call system/Physically safe environment - no spills, clutter or unnecessary equipment/Purposeful Proactive Rounding/Room/bathroom lighting operational, light cord in reach Assistance with ambulation/Assistance OOB with selected safe patient handling equipment/Communicate Risk of Fall with Harm to all staff/Reinforce activity limits and safety measures with patient and family/Tailored Fall Risk Interventions/Visual Cue: Yellow wristband and red socks/Bed in lowest position, wheels locked, appropriate side rails in place/Call bell, personal items and telephone in reach/Instruct patient to call for assistance before getting out of bed or chair/Non-slip footwear when patient is out of bed/[place] to call system/Physically safe environment - no spills, clutter or unnecessary equipment/Purposeful Proactive Rounding/Room/bathroom lighting operational, light cord in reach Assistance with ambulation/Assistance OOB with selected safe patient handling equipment/Communicate Risk of Fall with Harm to all staff/Reinforce activity limits and safety measures with patient and family/Tailored Fall Risk Interventions/Visual Cue: Yellow wristband and red socks/Bed in lowest position, wheels locked, appropriate side rails in place/Call bell, personal items and telephone in reach/Instruct patient to call for assistance before getting out of bed or chair/Non-slip footwear when patient is out of bed/[place] to call system/Physically safe environment - no spills, clutter or unnecessary equipment/Purposeful Proactive Rounding/Room/bathroom lighting operational, light cord in reach

## 2023-08-30 ENCOUNTER — TRANSCRIPTION ENCOUNTER (OUTPATIENT)
Age: 78
End: 2023-08-30

## 2023-08-30 LAB
ANION GAP SERPL CALC-SCNC: 4 MMOL/L — LOW (ref 5–17)
ANION GAP SERPL CALC-SCNC: 6 MMOL/L — SIGNIFICANT CHANGE UP (ref 5–17)
APTT BLD: 27 SEC — SIGNIFICANT CHANGE UP (ref 24.5–35.6)
BASOPHILS # BLD AUTO: 0.04 K/UL — SIGNIFICANT CHANGE UP (ref 0–0.2)
BASOPHILS NFR BLD AUTO: 0.3 % — SIGNIFICANT CHANGE UP (ref 0–2)
BUN SERPL-MCNC: 16 MG/DL — SIGNIFICANT CHANGE UP (ref 7–23)
BUN SERPL-MCNC: 19 MG/DL — SIGNIFICANT CHANGE UP (ref 7–23)
CALCIUM SERPL-MCNC: 8.8 MG/DL — SIGNIFICANT CHANGE UP (ref 8.5–10.1)
CALCIUM SERPL-MCNC: 9.2 MG/DL — SIGNIFICANT CHANGE UP (ref 8.5–10.1)
CHLORIDE SERPL-SCNC: 108 MMOL/L — SIGNIFICANT CHANGE UP (ref 96–108)
CHLORIDE SERPL-SCNC: 115 MMOL/L — HIGH (ref 96–108)
CO2 SERPL-SCNC: 25 MMOL/L — SIGNIFICANT CHANGE UP (ref 22–31)
CREAT SERPL-MCNC: 0.56 MG/DL — SIGNIFICANT CHANGE UP (ref 0.5–1.3)
CREAT SERPL-MCNC: 0.57 MG/DL — SIGNIFICANT CHANGE UP (ref 0.5–1.3)
EGFR: 94 ML/MIN/1.73M2 — SIGNIFICANT CHANGE UP
EGFR: 95 ML/MIN/1.73M2 — SIGNIFICANT CHANGE UP
EOSINOPHIL # BLD AUTO: 0.53 K/UL — HIGH (ref 0–0.5)
EOSINOPHIL NFR BLD AUTO: 4.2 % — SIGNIFICANT CHANGE UP (ref 0–6)
GLUCOSE BLDC GLUCOMTR-MCNC: 143 MG/DL — HIGH (ref 70–99)
GLUCOSE SERPL-MCNC: 103 MG/DL — HIGH (ref 70–99)
GLUCOSE SERPL-MCNC: 141 MG/DL — HIGH (ref 70–99)
HCT VFR BLD CALC: 39.5 % — SIGNIFICANT CHANGE UP (ref 34.5–45)
HGB BLD-MCNC: 12.6 G/DL — SIGNIFICANT CHANGE UP (ref 11.5–15.5)
HGB BLD-MCNC: 12.9 G/DL — SIGNIFICANT CHANGE UP (ref 11.5–15.5)
IMM GRANULOCYTES NFR BLD AUTO: 1.2 % — HIGH (ref 0–0.9)
INR BLD: 0.96 RATIO — SIGNIFICANT CHANGE UP (ref 0.85–1.18)
LACTATE SERPL-SCNC: 2.6 MMOL/L — HIGH (ref 0.7–2)
LYMPHOCYTES # BLD AUTO: 17.8 % — SIGNIFICANT CHANGE UP (ref 13–44)
LYMPHOCYTES # BLD AUTO: 2.24 K/UL — SIGNIFICANT CHANGE UP (ref 1–3.3)
MAGNESIUM SERPL-MCNC: 1.9 MG/DL — SIGNIFICANT CHANGE UP (ref 1.6–2.6)
MCHC RBC-ENTMCNC: 28.4 PG — SIGNIFICANT CHANGE UP (ref 27–34)
MCHC RBC-ENTMCNC: 28.7 PG — SIGNIFICANT CHANGE UP (ref 27–34)
MCHC RBC-ENTMCNC: 31.9 G/DL — LOW (ref 32–36)
MCHC RBC-ENTMCNC: 32.7 G/DL — SIGNIFICANT CHANGE UP (ref 32–36)
MCV RBC AUTO: 87.8 FL — SIGNIFICANT CHANGE UP (ref 80–100)
MCV RBC AUTO: 89.2 FL — SIGNIFICANT CHANGE UP (ref 80–100)
MONOCYTES # BLD AUTO: 1.37 K/UL — HIGH (ref 0–0.9)
MONOCYTES NFR BLD AUTO: 10.9 % — SIGNIFICANT CHANGE UP (ref 2–14)
NEUTROPHILS # BLD AUTO: 8.25 K/UL — HIGH (ref 1.8–7.4)
NEUTROPHILS NFR BLD AUTO: 65.6 % — SIGNIFICANT CHANGE UP (ref 43–77)
NRBC # BLD: 0 /100 WBCS — SIGNIFICANT CHANGE UP (ref 0–0)
PHOSPHATE SERPL-MCNC: 2 MG/DL — LOW (ref 2.5–4.5)
PLATELET # BLD AUTO: 272 K/UL — SIGNIFICANT CHANGE UP (ref 150–400)
PLATELET # BLD AUTO: 273 K/UL — SIGNIFICANT CHANGE UP (ref 150–400)
POTASSIUM SERPL-MCNC: 3.8 MMOL/L — SIGNIFICANT CHANGE UP (ref 3.5–5.3)
POTASSIUM SERPL-MCNC: 4.3 MMOL/L — SIGNIFICANT CHANGE UP (ref 3.5–5.3)
POTASSIUM SERPL-SCNC: 3.8 MMOL/L — SIGNIFICANT CHANGE UP (ref 3.5–5.3)
POTASSIUM SERPL-SCNC: 4.3 MMOL/L — SIGNIFICANT CHANGE UP (ref 3.5–5.3)
PROTHROM AB SERPL-ACNC: 11.4 SEC — SIGNIFICANT CHANGE UP (ref 9.5–13)
RBC # BLD: 4.43 M/UL — SIGNIFICANT CHANGE UP (ref 3.8–5.2)
RBC # BLD: 4.5 M/UL — SIGNIFICANT CHANGE UP (ref 3.8–5.2)
RBC # FLD: 13.4 % — SIGNIFICANT CHANGE UP (ref 10.3–14.5)
RBC # FLD: 13.5 % — SIGNIFICANT CHANGE UP (ref 10.3–14.5)
SODIUM SERPL-SCNC: 139 MMOL/L — SIGNIFICANT CHANGE UP (ref 135–145)
SODIUM SERPL-SCNC: 144 MMOL/L — SIGNIFICANT CHANGE UP (ref 135–145)
TROPONIN I, HIGH SENSITIVITY RESULT: 14.6 NG/L — SIGNIFICANT CHANGE UP
TSH SERPL-MCNC: 0.22 UIU/ML — LOW (ref 0.36–3.74)
WBC # BLD: 12.58 K/UL — HIGH (ref 3.8–10.5)
WBC # BLD: 13.92 K/UL — HIGH (ref 3.8–10.5)
WBC # FLD AUTO: 12.58 K/UL — HIGH (ref 3.8–10.5)
WBC # FLD AUTO: 13.92 K/UL — HIGH (ref 3.8–10.5)

## 2023-08-30 PROCEDURE — 93010 ELECTROCARDIOGRAM REPORT: CPT

## 2023-08-30 PROCEDURE — 99232 SBSQ HOSP IP/OBS MODERATE 35: CPT

## 2023-08-30 RX ORDER — METOPROLOL TARTRATE 50 MG
5 TABLET ORAL ONCE
Refills: 0 | Status: COMPLETED | OUTPATIENT
Start: 2023-08-30 | End: 2023-08-30

## 2023-08-30 RX ORDER — SODIUM CHLORIDE 9 MG/ML
1000 INJECTION INTRAMUSCULAR; INTRAVENOUS; SUBCUTANEOUS ONCE
Refills: 0 | Status: COMPLETED | OUTPATIENT
Start: 2023-08-30 | End: 2023-08-30

## 2023-08-30 RX ORDER — OXYCODONE AND ACETAMINOPHEN 5; 325 MG/1; MG/1
1 TABLET ORAL EVERY 6 HOURS
Refills: 0 | Status: DISCONTINUED | OUTPATIENT
Start: 2023-08-30 | End: 2023-09-06

## 2023-08-30 RX ORDER — ACETAMINOPHEN 500 MG
650 TABLET ORAL EVERY 6 HOURS
Refills: 0 | Status: DISCONTINUED | OUTPATIENT
Start: 2023-08-30 | End: 2023-09-08

## 2023-08-30 RX ORDER — CEFTRIAXONE 500 MG/1
1000 INJECTION, POWDER, FOR SOLUTION INTRAMUSCULAR; INTRAVENOUS EVERY 24 HOURS
Refills: 0 | Status: COMPLETED | OUTPATIENT
Start: 2023-08-30 | End: 2023-09-01

## 2023-08-30 RX ADMIN — Medication 100 MILLIGRAM(S): at 12:12

## 2023-08-30 RX ADMIN — ENOXAPARIN SODIUM 40 MILLIGRAM(S): 100 INJECTION SUBCUTANEOUS at 12:13

## 2023-08-30 RX ADMIN — OXYCODONE AND ACETAMINOPHEN 1 TABLET(S): 5; 325 TABLET ORAL at 22:37

## 2023-08-30 RX ADMIN — Medication 1 TABLET(S): at 12:13

## 2023-08-30 RX ADMIN — OXYCODONE AND ACETAMINOPHEN 1 TABLET(S): 5; 325 TABLET ORAL at 13:47

## 2023-08-30 RX ADMIN — OXYCODONE AND ACETAMINOPHEN 1 TABLET(S): 5; 325 TABLET ORAL at 14:45

## 2023-08-30 RX ADMIN — SODIUM CHLORIDE 1000 MILLILITER(S): 9 INJECTION INTRAMUSCULAR; INTRAVENOUS; SUBCUTANEOUS at 21:45

## 2023-08-30 RX ADMIN — OXYCODONE AND ACETAMINOPHEN 1 TABLET(S): 5; 325 TABLET ORAL at 21:38

## 2023-08-30 RX ADMIN — Medication 100 MILLIGRAM(S): at 05:56

## 2023-08-30 RX ADMIN — CEFTRIAXONE 100 MILLIGRAM(S): 500 INJECTION, POWDER, FOR SOLUTION INTRAMUSCULAR; INTRAVENOUS at 10:45

## 2023-08-30 RX ADMIN — Medication 1 MILLIGRAM(S): at 12:12

## 2023-08-30 RX ADMIN — Medication 5 MILLIGRAM(S): at 21:29

## 2023-08-30 NOTE — DISCHARGE NOTE PROVIDER - PROVIDER TOKENS
PROVIDER:[TOKEN:[[id number]],FOLLOWUP:[2 weeks]] PROVIDER:[TOKEN:[[id number]],FOLLOWUP:[2 weeks]] PROVIDER:[TOKEN:[[id number]],FOLLOWUP:[2 weeks]] PROVIDER:[TOKEN:[[id number]],FOLLOWUP:[2 weeks]],PROVIDER:[TOKEN:[[id number]]] PROVIDER:[TOKEN:[[id number]],FOLLOWUP:[2 weeks]],PROVIDER:[TOKEN:[[id number]]] PROVIDER:[TOKEN:[[id number]],FOLLOWUP:[2 weeks]],PROVIDER:[TOKEN:[[id number]]]

## 2023-08-30 NOTE — DISCHARGE NOTE PROVIDER - NSDCMRMEDTOKEN_GEN_ALL_CORE_FT
folic acid 1 mg oral tablet: 1 tab(s) orally once a day  Multiple Vitamins with Minerals oral tablet: 1 tab(s) orally once a day  oxyCODONE 5 mg oral tablet: 1 tab(s) orally every 4 hours As needed Moderate Pain (4 - 6)  polyethylene glycol 3350 oral powder for reconstitution: 17 gram(s) orally once a day  senna leaf extract oral tablet: 2 tab(s) orally once a day (at bedtime)  thiamine 100 mg oral tablet: 1 tab(s) orally once a day

## 2023-08-30 NOTE — PROGRESS NOTE ADULT - ASSESSMENT
77F with no reported PMH who presents to the ED s/p fall, admitted with left hip fracture and dehydration with electrolyte abnormalities      # Hip fracture, left.    CT reveals L hip fracture.    s/p left hemiarthroplasty on 08/29/23, POD #1  PERCOCET added prn for sever pain  tylenol prn for mild pain  follow up orthopedics  DVT prop with lovenox      # UTI (urinary tract infection).   ·  Plan: WBCs and leuk esterase in urine, also pt c/o burning and frequency  c/w ceftriaxone  for now  Deescalate antibiotic when cultures return.     # Mass of upper lobe of right lung.   ·  Plan: CT chest to eval R apical mass after surgery or outpatient    # Hypernatremia. RESOLVED  #Hypokalemia RESOLVED      # Hypercalcemia. RESOLVED      #: Transaminitis.   ·  Plan: likely due to chronic alcohol use  Thiamine, Folate, MVI daily   monitor for CIWA symptoms - currently 0   c/w PO thiamine and MVT/FA    dvt PPX with lovenox    pt refused PT eval today--> PENDING PT for placement

## 2023-08-30 NOTE — DISCHARGE NOTE PROVIDER - DETAILS OF MALNUTRITION DIAGNOSIS/DIAGNOSES
This patient has been assessed with a concern for Malnutrition and was treated during this hospitalization for the following Nutrition diagnosis/diagnoses:     -  08/31/2023: Severe protein-calorie malnutrition   -  08/31/2023: Underweight (BMI < 19)

## 2023-08-30 NOTE — DISCHARGE NOTE PROVIDER - NSDCCPCAREPLAN_GEN_ALL_CORE_FT
PRINCIPAL DISCHARGE DIAGNOSIS  Diagnosis: Fracture of hip, closed, left, initial encounter  Assessment and Plan of Treatment:       SECONDARY DISCHARGE DIAGNOSES  Diagnosis: Dehydration  Assessment and Plan of Treatment:     Diagnosis: Altered mental status  Assessment and Plan of Treatment:      PRINCIPAL DISCHARGE DIAGNOSIS  Diagnosis: Fracture of hip, closed, left, initial encounter  Assessment and Plan of Treatment: 77F with no reported PMH who presents to the ED s/p fall, admitted with left hip fracture and dehydration with electrolyte abnormalities  # Hip fracture, left.  s/p Left hip hemiarthroplasty-  morphine prn pain. DVT prop as per ortho   # UTI (urinary tract infection). s/p ceftriaxone     # Mass of upper lobe of right lung. Mets to Liver probably  ·  Plan: CT chest to eval R apical mass;   1. Right upper lobe nodules a 1.9 x 1.4 cm (spiculated) and 0.9 cm, suspicious for malignancy/metastases.  2. Emphysema.  3. Multiple hepatic hypodense lesions and masses suspicious for malignancy/metastases.  4. Indeterminate left adrenal nodule (2 cm).  pulmonary/onc o/b  pt and pt's dtrMohamud Strauss [phone] wishe to follow up w/oncology and pulmonary for further work up as outpt while in rehab.   # Hypernatremia. resolved. trend labs.  # Hypercalcemia.  likely due to dehydration - trend labs.  # Hypokalemia. replete  Severe protein-calorie malnutrition and Underweight (BMI < 19).  Diet Regular-Ensure Plus High Protein Cans or Servings Per Day:  1       Frequency:  Three Times a day  #: Transaminitis.   Plan: likely due to chronic alcohol use. Thiamine, Folate, MVI daily   Acute L ankle pain- XR neg. pain mgmt.   dvt PPX post op  Dispo- [facility]      SECONDARY DISCHARGE DIAGNOSES  Diagnosis: Dehydration  Assessment and Plan of Treatment:     Diagnosis: Altered mental status  Assessment and Plan of Treatment:      PRINCIPAL DISCHARGE DIAGNOSIS  Diagnosis: Fracture of hip, closed, left, initial encounter  Assessment and Plan of Treatment: 77F with no reported PMH who presents to the ED s/p fall, admitted with left hip fracture and dehydration with electrolyte abnormalities  # Hip fracture, left.  s/p Left hip hemiarthroplasty-  morphine prn pain. DVT prop as per ortho   # UTI (urinary tract infection). s/p ceftriaxone     # Mass of upper lobe of right lung. Mets to Liver probably  ·  Plan: CT chest to eval R apical mass;   1. Right upper lobe nodules a 1.9 x 1.4 cm (spiculated) and 0.9 cm, suspicious for malignancy/metastases.  2. Emphysema.  3. Multiple hepatic hypodense lesions and masses suspicious for malignancy/metastases.  4. Indeterminate left adrenal nodule (2 cm).  pulmonary/onc o/b  pt and pt's dtrMohamud Strauss [phone] wishe to follow up w/oncology and pulmonary for further work up as outpt while in rehab.   # Hypernatremia. resolved. trend labs.  # Hypercalcemia.  likely due to dehydration - trend labs.  # Hypokalemia. replete  Severe protein-calorie malnutrition and Underweight (BMI < 19).  Diet Regular-Ensure Plus High Protein Cans or Servings Per Day:  1       Frequency:  Three Times a day  #: Transaminitis.   Plan: likely due to chronic alcohol use. Thiamine, Folate, MVI daily   Acute L ankle pain- XR neg. pain mgmt.   dvt PPX post op  Dispo- [facility]      SECONDARY DISCHARGE DIAGNOSES  Diagnosis: Dehydration  Assessment and Plan of Treatment:     Diagnosis: Altered mental status  Assessment and Plan of Treatment:      PRINCIPAL DISCHARGE DIAGNOSIS  Diagnosis: Fracture of hip, closed, left, initial encounter  Assessment and Plan of Treatment: 77F with no reported PMH who presents to the ED s/p fall, admitted with left hip fracture and dehydration with electrolyte abnormalities  # Hip fracture, left.  s/p Left hip hemiarthroplasty-  morphine prn pain. DVT prop as per ortho   # UTI (urinary tract infection). s/p ceftriaxone     # Mass of upper lobe of right lung. Mets to Liver probably  ·  Plan: CT chest to eval R apical mass;   1. Right upper lobe nodules a 1.9 x 1.4 cm (spiculated) and 0.9 cm, suspicious for malignancy/metastases.  2. Emphysema.  3. Multiple hepatic hypodense lesions and masses suspicious for malignancy/metastases.  4. Indeterminate left adrenal nodule (2 cm).  pulmonary/onc o/b  pt and pt's dtrMohamud Strauss [phone] wishe to follow up w/oncology and pulmonary for further work up as outpt while in rehab.   # Hypernatremia. resolved. trend labs.  # Hypercalcemia.  likely due to dehydration - trend labs.  # Hypokalemia. replete  Severe protein-calorie malnutrition and Underweight (BMI < 19).  Diet Regular-Ensure Plus High Protein Cans or Servings Per Day:  1       Frequency:  Three Times a day  #: Transaminitis.   Plan: likely due to chronic alcohol use. Thiamine, Folate, MVI daily   Acute L ankle pain- XR neg. pain mgmt.   dvt PPX post op  Dispo- [facility]      SECONDARY DISCHARGE DIAGNOSES  Diagnosis: Dehydration  Assessment and Plan of Treatment:     Diagnosis: Altered mental status  Assessment and Plan of Treatment:

## 2023-08-30 NOTE — OCCUPATIONAL THERAPY INITIAL EVALUATION ADULT - LIVES WITH, PROFILE
Pt lives alone in a private house with 4 steps to enter equipped with bilateral hand rails that cannot be reached simultaneously . All living amenities are located on one level. The bathroom has a tub/shower combination, fixed shower head, grab bars, fixed shower head and standard toilet.

## 2023-08-30 NOTE — OCCUPATIONAL THERAPY INITIAL EVALUATION ADULT - PLANNED THERAPY INTERVENTIONS, OT EVAL
energy conservation techniques/ADL retraining/balance training/bed mobility training/parent/caregiver training.../ROM/strengthening/transfer training

## 2023-08-30 NOTE — PHYSICAL THERAPY INITIAL EVALUATION ADULT - ADDITIONAL COMMENTS
pt lives in a senior citizen housing, PLOF is indep in ADL and ambulation, pt has a fall and s/p left hip hemiarthroplasty. pt require max a x 2 for bed mob, pt declined functional assessment at this time. pt left comfortably in bed.

## 2023-08-30 NOTE — PROVIDER CONTACT NOTE (CHANGE IN STATUS NOTIFICATION) - SITUATION
Patient noted with SVT 's sustaining, patient is currently alert, oriented X2, denies chest pain, and palpitations, afebrile T99.6F rectal /72 RR21 K9Err15% on room air Patient noted with SVT 's sustaining, patient is currently alert, oriented X2, denies chest pain, and palpitations, afebrile T99.6F rectal /72 RR21 K3Neb49% on room air Patient noted with SVT 's sustaining, patient is currently alert, oriented X2, denies chest pain, and palpitations, afebrile T99.6F rectal /72 RR21 E5Qfq56% on room air

## 2023-08-30 NOTE — OCCUPATIONAL THERAPY INITIAL EVALUATION ADULT - ADDITIONAL COMMENTS
Prior to admission, pt was functioning in her roles, self sufficient & ambulating independently without any assistive devices. Presently, pt needs assistance functional mobility and self care tasks due to pain, weakness, stiffness and decreased ROM from left hip fracture /ISI.  Pt is right hand dominant and wears glasses for reading.

## 2023-08-30 NOTE — DISCHARGE NOTE PROVIDER - NSDCFUADDINST_GEN_ALL_CORE_FT
1.	Pain Control  2.	Walking with full weight bearing as tolerated, with assistive devices (walker/Cane as Needed)  3.	DVT Prophylaxis, per primary team.  4.	PT as needed  5.	Please call the office and make an appointment for staple removal in 14 days.  6.	Remove Dressing Post-Op Day 10-14, with Dry dressing and Daily Dressing Changes as Need.  7.	Ice/Elevate affected area as Needed  8.	Keep Dressing Clean and dry.

## 2023-08-30 NOTE — OCCUPATIONAL THERAPY INITIAL EVALUATION ADULT - PRECAUTIONS/LIMITATIONS, REHAB EVAL
Pt's mobility and ADL management is limited due to pain in left hip/cardiac precautions/fall precautions

## 2023-08-30 NOTE — OCCUPATIONAL THERAPY INITIAL EVALUATION ADULT - PERTINENT HX OF CURRENT PROBLEM, REHAB EVAL
Pt is a 78 y/o female who presented to ER due to s/p fall in the street.  Pt is diagnosed with displaced left subcapital femoral neck fracture with moderate adjacent swelling. as confirmed by imaging  Pt underwent s/p leeft ISI on 8/29/23 ands is allowed WBAT LLE. Pt is a 76 y/o female who presented to ER due to s/p fall in the street.  Pt is diagnosed with displaced left subcapital femoral neck fracture with moderate adjacent swelling. as confirmed by imaging  Pt underwent s/p leeft ISI on 8/29/23 ands is allowed WBAT LLE. Pt is a 78 y/o female who presented to ER due to s/p fall in the street.  Pt is diagnosed with displaced left subcapital femoral neck fracture with moderate adjacent swelling. as confirmed by imaging  Pt underwent s/p left ISI on 8/29/23 ands is allowed WBAT LLE. Pt is a 76 y/o female who presented to ER due to s/p fall in the street.  Pt is diagnosed with displaced left subcapital femoral neck fracture with moderate adjacent swelling. as confirmed by imaging  Pt underwent s/p left ISI on 8/29/23 ands is allowed WBAT LLE.

## 2023-08-30 NOTE — DISCHARGE NOTE PROVIDER - HOSPITAL COURSE
77F with no reported PMH who presents to the ED s/p fall, admitted with left hip fracture and dehydration with electrolyte abnormalities    # Hip fracture, left.  s/p Left hip hemiarthroplasty-  morphine prn pain. DVT prop as per ortho   # UTI (urinary tract infection). s/p ceftriaxone     # Mass of upper lobe of right lung. Mets to Liver probably  ·  Plan: CT chest to eval R apical mass;   1. Right upper lobe nodules a 1.9 x 1.4 cm (spiculated) and 0.9 cm, suspicious for malignancy/metastases.  2. Emphysema.  3. Multiple hepatic hypodense lesions and masses suspicious for malignancy/metastases.  4. Indeterminate left adrenal nodule (2 cm).  pulmonary/onc o/b  pt and pt's dtr. Rica [phone] wishe to follow up w/oncology and pulmonary for further work up as outpt while in rehab.   # Hypernatremia. resolved. trend labs.  # Hypercalcemia.  likely due to dehydration - trend labs.  # Hypokalemia. replete  Severe protein-calorie malnutrition and Underweight (BMI < 19).  Diet Regular-Ensure Plus High Protein Cans or Servings Per Day:  1       Frequency:  Three Times a day  #: Transaminitis.   Plan: likely due to chronic alcohol use. Thiamine, Folate, MVI daily   Acute L ankle pain- XR neg. pain mgmt.   dvt PPX post op  Dispo- MANPRE[facility] 77F with no reported PMH who presents to the ED s/p fall, admitted with left hip fracture and dehydration with electrolyte abnormalities    # Hip fracture, left.  s/p Left hip hemiarthroplasty-  morphine prn pain. DVT prop as per ortho   # UTI (urinary tract infection). s/p ceftriaxone     # Mass of upper lobe of right lung. Mets to Liver probably  ·  Plan: CT chest to eval R apical mass;   1. Right upper lobe nodules a 1.9 x 1.4 cm (spiculated) and 0.9 cm, suspicious for malignancy/metastases.  2. Emphysema.  3. Multiple hepatic hypodense lesions and masses suspicious for malignancy/metastases.  4. Indeterminate left adrenal nodule (2 cm).  pulmonary/onc o/b  pt and pt's dtr. Rica [phone] wishe to follow up w/oncology and pulmonary for further work up as outpt while in rehab.   # Hypernatremia. resolved. trend labs.  # Hypercalcemia.  likely due to dehydration - trend labs.  # Hypokalemia. replete  Severe protein-calorie malnutrition and Underweight (BMI < 19).  Diet Regular-Ensure Plus High Protein Cans or Servings Per Day:  1       Frequency:  Three Times a day  #: Transaminitis.   Plan: likely due to chronic alcohol use. Thiamine, Folate, MVI daily   Acute L ankle pain- XR neg. pain mgmt.   dvt PPX post op  Dispo- MANPREET 77F with no reported PMH who presents to the ED s/p fall, admitted with left hip fracture and dehydration with electrolyte abnormalities    # Hip fracture, left.  s/p Left hip hemiarthroplasty-  morphine prn pain. DVT prop as per ortho   # UTI (urinary tract infection). s/p ceftriaxone     # Mass of upper lobe of right lung. Mets to Liver probably  ·  Plan: CT chest to eval R apical mass;   1. Right upper lobe nodules a 1.9 x 1.4 cm (spiculated) and 0.9 cm, suspicious for malignancy/metastases.  2. Emphysema.  3. Multiple hepatic hypodense lesions and masses suspicious for malignancy/metastases.  4. Indeterminate left adrenal nodule (2 cm).  pulmonary/onc o/b  pt and pt's dtr. Rica [phone] wishe to follow up w/oncology and pulmonary for further work up as outpt while in rehab.   # Hypernatremia. resolved. trend labs.  # Hypercalcemia.  likely due to dehydration - trend labs.  # Hypokalemia. replete  Severe protein-calorie malnutrition and Underweight (BMI < 19).  Diet Regular-Ensure Plus High Protein Cans or Servings Per Day:  1       Frequency:  Three Times a day  #: Transaminitis.   Plan: likely due to chronic alcohol use. Thiamine, Folate, MVI daily   Acute L ankle pain- XR neg. pain mgmt.   dvt PPX post op  Dispo- [facility]

## 2023-08-30 NOTE — OCCUPATIONAL THERAPY INITIAL EVALUATION ADULT - FINE MOTOR COORDINATION, RIGHT HAND, MANIPULATE OBJECTS, OT EVAL
attempted but unable to perfrom secondary to pt. pain and severe fear of falling. Will reattempt at future session. normal performance

## 2023-08-30 NOTE — DISCHARGE NOTE PROVIDER - NPI NUMBER (FOR SYSADMIN USE ONLY) :
[[id number]] [[id number]] [[id number]] [[id number]],[[id number]] [[id number]],[[id number]] [[id number]],[[id number]]

## 2023-08-30 NOTE — OCCUPATIONAL THERAPY INITIAL EVALUATION ADULT - GENERAL OBSERVATIONS, REHAB EVAL
Pt was seen for initial OT consult, encountered in bed  on cardiac monitoring in NAD. Grade 1+ edema with stiffness and decreased ROM noted in LLE .Posterior THP were reviewed & maintained. Pt was AA&Ox4, cooperative with prodding  & followed commands. Pt c/o left knee pain due to left hip fracture which required ISI; this limits pt's activity tolerance ,balance, ADL management and functional mobility

## 2023-08-30 NOTE — PROGRESS NOTE ADULT - SUBJECTIVE AND OBJECTIVE BOX
pt seen and examined by the bedside, c/o LLE pain, denied any other complaints.    Vital Signs Last 24 Hrs  T(C): 36.1 (30 Aug 2023 10:47), Max: 37 (30 Aug 2023 09:41)  T(F): 97 (30 Aug 2023 10:47), Max: 98.6 (30 Aug 2023 09:41)  HR: 94 (30 Aug 2023 12:00) (76 - 100)  BP: 116/70 (30 Aug 2023 12:00) (94/76 - 168/125)  BP(mean): 97 (29 Aug 2023 17:10) (97 - 97)  RR: 17 (30 Aug 2023 10:47) (10 - 20)  SpO2: 96% (30 Aug 2023 12:00) (95% - 100%)  room air      Physical Exam:   General: frail female in no acute distress, resting comfortably  Head:  Atraumatic, Normocephalic  Eyes: EOMI, PERRLA, clear sclera  Neck: Supple, thyroid nontender, non enlarged  Cardio: S1/S2 +ve, regular rate and rhythm, no M/G/R  Resp: clear to ausculation bilaterally,   GI: abdomen soft, nontender, non distended, no guarding, BS +ve x 4  Ext: no significant pedal edema, LLE dressing in place, RLE normal ROM  Neuro: CN 2-12 intact, no significant motor or sensory deficits.  Skin: No lesions      Labs:  CBC:            12.6   13.92 )-----------( 273      ( 08-30-23 @ 09:29 )             39.5         Chem:         ( 08-30-23 @ 09:29 )    144  |  115<H>  |  19  ----------------------------<  103<H>  4.3   |  25  |  0.56

## 2023-08-30 NOTE — DISCHARGE NOTE PROVIDER - CARE PROVIDER_API CALL
Raheem Day  Orthopaedic Surgery  [address] [place] [address], [address]  [place] [address]  Phone: [phone]  Fax: [phone]  Follow Up Time: 2 weeks   Raheem Day  Orthopaedic Surgery  [address] [place] [address], [address]  [place] [address]  Phone: [phone]  Fax: [phone]  Follow Up Time: 2 weeks   Raheem Day  Orthopaedic Surgery  [address] [place] [address], [address]  [place] [address]  Phone: [phone]  Fax: [phone]  Follow Up Time: 2 weeks   Raheem Day  Orthopaedic Surgery  [address] [facility], [address]  [place] [address]  Phone: [phone]  Fax: [phone]  Follow Up Time: 2 weeks    Sterling Tang  Medical Oncology  [address] [facility], [address]  [place] [address]  Phone: [phone]  Fax: [phone]  Follow Up Time:    Raheem Day  Orthopaedic Surgery  [address] [facility], [address]  [place] [address]  Phone: [phone]  Fax: [phone]  Follow Up Time: 2 weeks    Sterling Tang  Medical Oncology  [address] [facility], [address]  [place] [address]  Phone: [phone]  Fax: [phone]  Follow Up Time:    Raheem Day  Orthopaedic Surgery  [address] [facility], [address]  [place] [address]  Phone: [phone]  Fax: [phone]  Follow Up Time: 2 weeks    Sterling Tang  Medical Oncology  [address] [facility], [address]  [place] [address]  Phone: [phone]  Fax: [phone]  Follow Up Time:

## 2023-08-30 NOTE — PROGRESS NOTE ADULT - SUBJECTIVE AND OBJECTIVE BOX
This is a 78y/o Female s/p Left hip hemiarthroplasty.  Pain is controlled. Pt feeling well. No nausea or vomiting.    LABS:                        12.7   12.14 )-----------( 290      ( 29 Aug 2023 10:10 )             38.1     08-29    144  |  115<H>  |  25<H>  ----------------------------<  102<H>  2.8<LL>   |  26  |  0.46<L>    Ca    8.8      29 Aug 2023 10:10  Mg     1.8     08-29    TPro  8.6<H>  /  Alb  3.5  /  TBili  0.8  /  DBili  x   /  AST  66<H>  /  ALT  51  /  AlkPhos  127<H>  08-28    PT/INR - ( 29 Aug 2023 10:10 )   PT: 11.3 sec;   INR: 0.94 ratio         PTT - ( 29 Aug 2023 10:10 )  PTT:26.4 sec  Urinalysis Basic - ( 29 Aug 2023 10:10 )    Color: x / Appearance: x / SG: x / pH: x  Gluc: 102 mg/dL / Ketone: x  / Bili: x / Urobili: x   Blood: x / Protein: x / Nitrite: x   Leuk Esterase: x / RBC: x / WBC x   Sq Epi: x / Non Sq Epi: x / Bacteria: x        VITAL SIGNS:  T(C): 36.8 (08-29-23 @ 17:10), Max: 36.9 (08-29-23 @ 08:01)  HR: 93 (08-29-23 @ 17:17) (75 - 96)  BP: 158/67 (08-29-23 @ 17:10) (94/76 - 169/79)  RR: 18 (08-29-23 @ 17:10) (10 - 22)  SpO2: 96% (08-29-23 @ 17:10) (95% - 100%)    Exam:  NAD AAOx1.  Dressing clean, dry and intact.  SCDs in place.  Calves are soft and nontender.  Moving all toes and ankle, +EHL/FHL/TA/GS.  SILT throughout.  DP pulses +.  abduction pillow in place    A/P:  Stable POD 0 s/p Left hip hemiarthroplasty.  -Analgesia  -Posterior precautions  -Ice application  -Prophylactic antibiotics  -DVT PE prophylaxis per primary, recommend lovenox POD1  -Incentive spirometry  -OOB PT WBAT  -DC dispo pending PT recs         This is a 76y/o Female s/p Left hip hemiarthroplasty.  Pain is controlled. Pt feeling well. No nausea or vomiting.    LABS:                        12.7   12.14 )-----------( 290      ( 29 Aug 2023 10:10 )             38.1     08-29    144  |  115<H>  |  25<H>  ----------------------------<  102<H>  2.8<LL>   |  26  |  0.46<L>    Ca    8.8      29 Aug 2023 10:10  Mg     1.8     08-29    TPro  8.6<H>  /  Alb  3.5  /  TBili  0.8  /  DBili  x   /  AST  66<H>  /  ALT  51  /  AlkPhos  127<H>  08-28    PT/INR - ( 29 Aug 2023 10:10 )   PT: 11.3 sec;   INR: 0.94 ratio         PTT - ( 29 Aug 2023 10:10 )  PTT:26.4 sec  Urinalysis Basic - ( 29 Aug 2023 10:10 )    Color: x / Appearance: x / SG: x / pH: x  Gluc: 102 mg/dL / Ketone: x  / Bili: x / Urobili: x   Blood: x / Protein: x / Nitrite: x   Leuk Esterase: x / RBC: x / WBC x   Sq Epi: x / Non Sq Epi: x / Bacteria: x        VITAL SIGNS:  T(C): 36.8 (08-29-23 @ 17:10), Max: 36.9 (08-29-23 @ 08:01)  HR: 93 (08-29-23 @ 17:17) (75 - 96)  BP: 158/67 (08-29-23 @ 17:10) (94/76 - 169/79)  RR: 18 (08-29-23 @ 17:10) (10 - 22)  SpO2: 96% (08-29-23 @ 17:10) (95% - 100%)    Exam:  NAD AAOx1.  Dressing clean, dry and intact.  SCDs in place.  Calves are soft and nontender.  Moving all toes and ankle, +EHL/FHL/TA/GS.  SILT throughout.  DP pulses +.  abduction pillow in place    A/P:  Stable POD 0 s/p Left hip hemiarthroplasty.  -Analgesia  -Posterior precautions  -Ice application  -Prophylactic antibiotics  -DVT PE prophylaxis per primary, recommend lovenox POD1  -Incentive spirometry  -OOB PT WBAT  -DC dispo pending PT recs

## 2023-08-31 LAB
ANION GAP SERPL CALC-SCNC: 6 MMOL/L — SIGNIFICANT CHANGE UP (ref 5–17)
BUN SERPL-MCNC: 11 MG/DL — SIGNIFICANT CHANGE UP (ref 7–23)
CALCIUM SERPL-MCNC: 8.1 MG/DL — LOW (ref 8.5–10.1)
CHLORIDE SERPL-SCNC: 106 MMOL/L — SIGNIFICANT CHANGE UP (ref 96–108)
CO2 SERPL-SCNC: 24 MMOL/L — SIGNIFICANT CHANGE UP (ref 22–31)
CREAT SERPL-MCNC: 0.37 MG/DL — LOW (ref 0.5–1.3)
EGFR: 104 ML/MIN/1.73M2 — SIGNIFICANT CHANGE UP
FT4I SERPL CALC-MCNC: 17.1 INDEX — HIGH (ref 4.4–11.4)
FT4I SERPL CALC-MCNC: 4.9 FTI% — HIGH (ref 1.4–4.8)
GLUCOSE SERPL-MCNC: 113 MG/DL — HIGH (ref 70–99)
HCT VFR BLD CALC: 35.7 % — SIGNIFICANT CHANGE UP (ref 34.5–45)
HGB BLD-MCNC: 11.7 G/DL — SIGNIFICANT CHANGE UP (ref 11.5–15.5)
LACTATE SERPL-SCNC: 1.4 MMOL/L — SIGNIFICANT CHANGE UP (ref 0.7–2)
MCHC RBC-ENTMCNC: 28.5 PG — SIGNIFICANT CHANGE UP (ref 27–34)
MCHC RBC-ENTMCNC: 32.8 G/DL — SIGNIFICANT CHANGE UP (ref 32–36)
MCV RBC AUTO: 87.1 FL — SIGNIFICANT CHANGE UP (ref 80–100)
NRBC # BLD: 0 /100 WBCS — SIGNIFICANT CHANGE UP (ref 0–0)
PLATELET # BLD AUTO: 244 K/UL — SIGNIFICANT CHANGE UP (ref 150–400)
POTASSIUM SERPL-MCNC: 3.4 MMOL/L — LOW (ref 3.5–5.3)
POTASSIUM SERPL-SCNC: 3.4 MMOL/L — LOW (ref 3.5–5.3)
RBC # BLD: 4.1 M/UL — SIGNIFICANT CHANGE UP (ref 3.8–5.2)
RBC # FLD: 13.4 % — SIGNIFICANT CHANGE UP (ref 10.3–14.5)
SODIUM SERPL-SCNC: 136 MMOL/L — SIGNIFICANT CHANGE UP (ref 135–145)
T3/T3 UPTAKE INDEX SERPL-RTO: 46 % — HIGH (ref 28–41)
T4 AB SER-ACNC: 10.7 UG/DL — SIGNIFICANT CHANGE UP (ref 4.6–12)
T4/T3 UPTAKE INDEX SERPL: 0.6 TBI — LOW (ref 0.8–1.3)
WBC # BLD: 11.6 K/UL — HIGH (ref 3.8–10.5)
WBC # FLD AUTO: 11.6 K/UL — HIGH (ref 3.8–10.5)

## 2023-08-31 PROCEDURE — 93010 ELECTROCARDIOGRAM REPORT: CPT

## 2023-08-31 PROCEDURE — 99232 SBSQ HOSP IP/OBS MODERATE 35: CPT

## 2023-08-31 RX ORDER — POTASSIUM CHLORIDE 20 MEQ
40 PACKET (EA) ORAL
Refills: 0 | Status: DISCONTINUED | OUTPATIENT
Start: 2023-08-31 | End: 2023-08-31

## 2023-08-31 RX ORDER — POTASSIUM CHLORIDE 20 MEQ
40 PACKET (EA) ORAL
Refills: 0 | Status: COMPLETED | OUTPATIENT
Start: 2023-08-31 | End: 2023-08-31

## 2023-08-31 RX ADMIN — OXYCODONE AND ACETAMINOPHEN 1 TABLET(S): 5; 325 TABLET ORAL at 17:44

## 2023-08-31 RX ADMIN — Medication 40 MILLIEQUIVALENT(S): at 17:46

## 2023-08-31 RX ADMIN — CEFTRIAXONE 100 MILLIGRAM(S): 500 INJECTION, POWDER, FOR SOLUTION INTRAMUSCULAR; INTRAVENOUS at 05:36

## 2023-08-31 RX ADMIN — Medication 1 MILLIGRAM(S): at 11:37

## 2023-08-31 RX ADMIN — OXYCODONE AND ACETAMINOPHEN 1 TABLET(S): 5; 325 TABLET ORAL at 18:44

## 2023-08-31 RX ADMIN — Medication 100 MILLIGRAM(S): at 11:37

## 2023-08-31 RX ADMIN — Medication 1 TABLET(S): at 11:47

## 2023-08-31 RX ADMIN — ENOXAPARIN SODIUM 40 MILLIGRAM(S): 100 INJECTION SUBCUTANEOUS at 11:37

## 2023-08-31 RX ADMIN — Medication 40 MILLIEQUIVALENT(S): at 11:36

## 2023-08-31 NOTE — DIETITIAN INITIAL EVALUATION ADULT - PERTINENT LABORATORY DATA
08-31    136  |  106  |  11  ----------------------------<  113<H>  3.4<L>   |  24  |  0.37<L>    Ca    8.1<L>      31 Aug 2023 07:55  Phos  2.0     08-30  Mg     1.9     08-30    POCT Blood Glucose.: 143 mg/dL (08-30-23)

## 2023-08-31 NOTE — RAPID RESPONSE TEAM SUMMARY - NSSITUATIONBACKGROUNDRRT_GEN_ALL_CORE
This is a 78yo F with no reported PMH who presented to the ED s/p fall, admitted with left hip fracture and dehydration with electrolyte abnormalities.  Pt is now s/p left hemiarthroplasty on 08/29/23.  Hospital course also notable for symptomatic UTI on cftx; metabolic disarray and transaminitis likely 2/2 h/o chronic alcohol use and incidental finding of RUL lung mass that will require further evaluation.    Gatitoigt RRT called by RN for sudden onset tachycardia to hr 190s.  Pt denied fever, chills, cp, sob, palpitations, dizziness or vision changes.  She did admit to pain at left hip surgical site.  ROS otherwise negative.

## 2023-08-31 NOTE — PROGRESS NOTE ADULT - SUBJECTIVE AND OBJECTIVE BOX
Patient was seen and examined at bedside. Patient is in mild/moderate pain. Denies any new numbness or tingling. Denies any headaches, SOB, or n/v/d/c. Denies having any other pain elsewhere.     Vital Signs (24 Hrs):  T(C): 36.6 (08-31-23 @ 04:15), Max: 37.6 (08-30-23 @ 21:28)  HR: 89 (08-30-23 @ 23:22) (88 - 190)  BP: 113/64 (08-31-23 @ 04:15) (113/64 - 150/72)  RR: 18 (08-31-23 @ 04:15) (17 - 21)  SpO2: 97% (08-31-23 @ 04:15) (94% - 98%)  Wt(kg): --    LABS:                          12.9   12.58 )-----------( 272      ( 30 Aug 2023 21:46 )             39.5     08-30    139  |  108  |  16  ----------------------------<  141<H>  3.8   |  25  |  0.57    Ca    8.8      30 Aug 2023 21:46  Phos  2.0     08-30  Mg     1.9     08-30        PT/INR - ( 30 Aug 2023 21:46 )   PT: 11.4 sec;   INR: 0.96 ratio         PTT - ( 30 Aug 2023 21:46 )  PTT:27.0 sec    Exam:  NAD AAOx1.  Dressing clean, dry and intact.  SCDs in place.  Calves are soft and nontender.  *Patient refused motor physical exam due to fatigue and pain, could not assess movement of toes or ankle at this time.  SILT throughout.  DP pulses +.  abduction pillow in place    A/p: A/P:  Stable POD 1 s/p Left hip hemiarthroplasty.  -Analgesia  -Posterior precautions  -Ice application  -Prophylactic antibiotics  -DVT PE prophylaxis per primary, lovenox 40 on POD1 today  -Incentive spirometry  -OOB PT WBAT  -DC dispo pending PT recs

## 2023-08-31 NOTE — DIETITIAN INITIAL EVALUATION ADULT - NSFNSGIIOFT_GEN_A_CORE
08-30-23 @ 07:01  -  08-31-23 @ 07:00  --------------------------------------------------------  OUT:    Stool (mL): 1 mL  Total OUT: 1 mL    Total NET: -1 mL

## 2023-08-31 NOTE — DIETITIAN NUTRITION RISK NOTIFICATION - TREATMENT: THE FOLLOWING DIET HAS BEEN RECOMMENDED
Diet, Regular:   Supplement Feeding Modality:  Oral  Ensure Plus High Protein Cans or Servings Per Day:  1       Frequency:  Three Times a day (08-31-23 @ 11:42) [Pending Verification By Attending]  Diet, Regular (08-29-23 @ 16:08) [Active]  Diet, NPO after Midnight:      NPO Start Date: 28-Aug-2023,   NPO Start Time: 23:59  Except Medications (08-28-23 @ 18:53) [Active]

## 2023-08-31 NOTE — PROGRESS NOTE ADULT - ASSESSMENT
77F with no reported PMH who presents to the ED s/p fall, admitted with left hip fracture and dehydration with electrolyte abnormalities      # Hip fracture, left.    CT reveals L hip fracture.    s/p left hemiarthroplasty on 08/29/23, POD #1  PERCOCET added prn for sever pain  tylenol prn for mild pain  follow up orthopedics  DVT prop with lovenox      # UTI (urinary tract infection).   ·  Plan: WBCs and leuk esterase in urine, also pt c/o burning and frequency  c/w ceftriaxone for now (started on 08/28/23)  urine cx negative, can dc on PO abx to complete the course     # Mass of upper lobe of right lung.    CT chest to eval R apical mass PENDING  Can f/u pulm outpatient based on CT results      #SVT episode x1 OVERNIGHT?   sinus tach on EKG  replete electrolyes adequately  obtain ECHO, if abnormal or repeated episodes of SVT, consider Cardiology eval then    #Hypokalemia 3.4-- repleted      # Hypernatremia. RESOLVED    # Hypercalcemia. RESOLVED      #: Transaminitis.   ·  Plan: likely due to chronic alcohol use  Thiamine, Folate, MVI daily   monitor for CIWA symptoms - currently 0   c/w PO thiamine and MVT/FA    dvt PPX with lovenox     PT eval -> PENDING PT for placement

## 2023-08-31 NOTE — CHART NOTE - NSCHARTNOTEFT_GEN_A_CORE
CC: Abnormal heart rate  This is a 78yo F with no reported PMH who presented to the ED s/p fall, admitted with left hip fracture and dehydration with electrolyte abnormalities.  Pt is now s/p left hemiarthroplasty on 08/29/23.  Hospital course also notable for symptomatic UTI on cftx; metabolic disarray and transaminitis likely 2/2 h/o chronic alcohol use and incidental finding of RUL lung mass that will require further evaluation.    Gatitoigt RRT called by RN for sudden onset tachycardia to hr 190s.  Pt denied fever, chills, cp, sob, palpitations, dizziness or vision changes.  She did admit to pain at left hip surgical site.      ROS otherwise negative.    T(C): , Max: 37.6 (08-30-23 @ 21:28)  HR: 89 (08-30-23 @ 23:22) (88 - 190)  BP: 126/81 (08-30-23 @ 23:22) (116/70 - 150/72)  RR: 18 (08-30-23 @ 23:22) (17 - 21)  SpO2: 94% (08-30-23 @ 23:22) (94% - 98%)    gen: pt afebrile, supine in bed, hesitant to move 2/2 pain.   CV: reg, tachy, occasional extra beat  lungs: cta bilat  abd: soft, ntnd, bsx4  gu: +primafit  skin: poor turgor, appears somewhat dehydrated  ext: left hip site c/d/i, no erythema or calor  neuro: aaox2    EKG (my read) Sinus tach 130bpm with frequent APCs, no ischemic changes    LABS:                        12.9   12.58 )-----------( 272      ( 30 Aug 2023 21:46 )             39.5     08-30    139  |  108  |  16  ----------------------------<  141<H>  3.8   |  25  |  0.57    Ca    8.8      30 Aug 2023 21:46  Phos  2.0     08-30  Mg     1.9     08-30    Urinalysis Basic - ( 30 Aug 2023 21:46 )    Color: x / Appearance: x / SG: x / pH: x  Gluc: 141 mg/dL / Ketone: x  / Bili: x / Urobili: x   Blood: x / Protein: x / Nitrite: x   Leuk Esterase: x / RBC: x / WBC x   Sq Epi: x / Non Sq Epi: x / Bacteria: x    PT/INR - ( 30 Aug 2023 21:46 )   PT: 11.4 sec;   INR: 0.96 ratio    PTT - ( 30 Aug 2023 21:46 )  PTT:27.0 sec    - TroponinI hsT: <-14.6, <-38.3    Lactate Trend  08-31 @ 00:55 Lactate:1.4   08-30 @ 21:46 Lactate:2.6     Thyroid Stimulating Hormone, Serum: 0.217 uIU/mL (08.30.23 @ 21:46)    CAPILLARY BLOOD GLUCOSE  POCT Blood Glucose.: 143 mg/dL (30 Aug 2023 21:23)      A/P:  This is a 75yoF h/o etoh admitted s/p fall now pod #1 left hip hemiarthroplasty. Tonight pt with episode of SVT to 190s, afebrile, asymptomatic.  - pt was tx with metoprolol 5mg ivpx 1 with NS ivf bolus x 1 and percocet as ordered for pain.   - subsequent tele monitoring c/w NSR 85bpm with frequent apcs  - tsh low, will check t3/t4 with aml  - will continue to monitor    Total time spent to complete patient's bedside assessment, physical examination, review medical chart including labs & imaging was more than 35 minutes CC: Abnormal heart rate  This is a 76yo F with no reported PMH who presented to the ED s/p fall, admitted with left hip fracture and dehydration with electrolyte abnormalities.  Pt is now s/p left hemiarthroplasty on 08/29/23.  Hospital course also notable for symptomatic UTI on cftx; metabolic disarray and transaminitis likely 2/2 h/o chronic alcohol use and incidental finding of RUL lung mass that will require further evaluation.    Gatitoigt RRT called by RN for sudden onset tachycardia to hr 190s.  Pt denied fever, chills, cp, sob, palpitations, dizziness or vision changes.  She did admit to pain at left hip surgical site.      ROS otherwise negative.    T(C): , Max: 37.6 (08-30-23 @ 21:28)  HR: 89 (08-30-23 @ 23:22) (88 - 190)  BP: 126/81 (08-30-23 @ 23:22) (116/70 - 150/72)  RR: 18 (08-30-23 @ 23:22) (17 - 21)  SpO2: 94% (08-30-23 @ 23:22) (94% - 98%)    gen: pt afebrile, supine in bed, hesitant to move 2/2 pain.   CV: reg, tachy, occasional extra beat  lungs: cta bilat  abd: soft, ntnd, bsx4  gu: +primafit  skin: poor turgor, appears somewhat dehydrated  ext: left hip site c/d/i, no erythema or calor  neuro: aaox2    EKG (my read) Sinus tach 130bpm with frequent APCs, no ischemic changes    LABS:                        12.9   12.58 )-----------( 272      ( 30 Aug 2023 21:46 )             39.5     08-30    139  |  108  |  16  ----------------------------<  141<H>  3.8   |  25  |  0.57    Ca    8.8      30 Aug 2023 21:46  Phos  2.0     08-30  Mg     1.9     08-30    Urinalysis Basic - ( 30 Aug 2023 21:46 )    Color: x / Appearance: x / SG: x / pH: x  Gluc: 141 mg/dL / Ketone: x  / Bili: x / Urobili: x   Blood: x / Protein: x / Nitrite: x   Leuk Esterase: x / RBC: x / WBC x   Sq Epi: x / Non Sq Epi: x / Bacteria: x    PT/INR - ( 30 Aug 2023 21:46 )   PT: 11.4 sec;   INR: 0.96 ratio    PTT - ( 30 Aug 2023 21:46 )  PTT:27.0 sec    - TroponinI hsT: <-14.6, <-38.3    Lactate Trend  08-31 @ 00:55 Lactate:1.4   08-30 @ 21:46 Lactate:2.6     Thyroid Stimulating Hormone, Serum: 0.217 uIU/mL (08.30.23 @ 21:46)    CAPILLARY BLOOD GLUCOSE  POCT Blood Glucose.: 143 mg/dL (30 Aug 2023 21:23)      A/P:  This is a 75yoF h/o etoh admitted s/p fall now pod #1 left hip hemiarthroplasty. Tonight pt with episode of SVT to 190s, afebrile, asymptomatic.  - pt was tx with metoprolol 5mg ivpx 1 with NS ivf bolus x 1 and percocet as ordered for pain.   - subsequent tele monitoring c/w NSR 85bpm with frequent apcs  - tsh low, will check t3/t4 with aml  - will continue to monitor    Total time spent to complete patient's bedside assessment, physical examination, review medical chart including labs & imaging was more than 35 minutes

## 2023-08-31 NOTE — RAPID RESPONSE TEAM SUMMARY - NSADDTLFINDINGSRRT_GEN_ALL_CORE
gen: pt afebrile, supine in bed, hesitant to move 2/2 pain.   CV: reg, tachy, occasional extra beat  lungs: cta bilat  abd: soft, ntnd, bsx4  gu: +primafit  skin: poor turgor, appears somewhat dehydrated  ext: left hip site c/d/i, no erythema or calor  neuro: aaox2  EKG (my read) Sinus tach 130bpm with frequent APCs, no ischemic changes

## 2023-08-31 NOTE — DIETITIAN INITIAL EVALUATION ADULT - OTHER INFO
Pt lives alone in senior housing apartment; has a supportive daughter who does the shopping.  Pt s/p left hip arthroplasty (8/29) due to fall at home. Per CT chest right lung mass found; recommend outpatient f/u. Pt denies any V/C/D; states she is a bit nauseas at time since the surgery; recommended she let RN/MD know. Pt confirms present wt of 90# is usual for her. Pt receptive to nutritional supplement; she is handling regular consistency food despite poor dentition.

## 2023-08-31 NOTE — PROGRESS NOTE ADULT - SUBJECTIVE AND OBJECTIVE BOX
Overnight pt had RRT for SVT of 190, resolved S/P metoprolol push   pt seen and examined by the bedside,  denied any other complaints.    Vital Signs Last 24 Hrs  T(C): 36.7 (31 Aug 2023 16:01), Max: 37.6 (30 Aug 2023 21:28)  T(F): 98.1 (31 Aug 2023 16:01), Max: 99.6 (30 Aug 2023 21:28)  HR: 91 (31 Aug 2023 16:01) (88 - 190)  BP: 107/72 (31 Aug 2023 16:01) (94/61 - 150/72)  RR: 17 (31 Aug 2023 16:01) (17 - 21)  SpO2: 96% (31 Aug 2023 16:01) (94% - 98%)   room air      Physical Exam:   General: frail female in no acute distress, resting comfortably  Head:  Atraumatic, Normocephalic  Eyes: EOMI, PERRLA, clear sclera  Neck: Supple, thyroid nontender, non enlarged  Cardio: S1/S2 +ve, regular rate and rhythm, no M/G/R  Resp: clear to ausculation bilaterally,   GI: abdomen soft, nontender, non distended, no guarding, BS +ve x 4  Ext: no significant pedal edema, LLE dressing in place, RLE normal ROM  Neuro: CN 2-12 intact, no significant motor or sensory deficits.  Skin: No lesions      Labs:  CBC:            11.7   11.60 )-----------( 244      ( 08-31-23 @ 07:55 )             35.7         Chem:         ( 08-31-23 @ 07:55 )    136  |  106  |  11  ----------------------------<  113<H>  3.4<L>   |  24  |  0.37<L>

## 2023-09-01 LAB
ANION GAP SERPL CALC-SCNC: 6 MMOL/L — SIGNIFICANT CHANGE UP (ref 5–17)
BUN SERPL-MCNC: 10 MG/DL — SIGNIFICANT CHANGE UP (ref 7–23)
CALCIUM SERPL-MCNC: 8.8 MG/DL — SIGNIFICANT CHANGE UP (ref 8.5–10.1)
CHLORIDE SERPL-SCNC: 107 MMOL/L — SIGNIFICANT CHANGE UP (ref 96–108)
CO2 SERPL-SCNC: 23 MMOL/L — SIGNIFICANT CHANGE UP (ref 22–31)
CREAT SERPL-MCNC: 0.36 MG/DL — LOW (ref 0.5–1.3)
EGFR: 105 ML/MIN/1.73M2 — SIGNIFICANT CHANGE UP
GLUCOSE SERPL-MCNC: 122 MG/DL — HIGH (ref 70–99)
HCT VFR BLD CALC: 39 % — SIGNIFICANT CHANGE UP (ref 34.5–45)
HGB BLD-MCNC: 13.2 G/DL — SIGNIFICANT CHANGE UP (ref 11.5–15.5)
MAGNESIUM SERPL-MCNC: 1.9 MG/DL — SIGNIFICANT CHANGE UP (ref 1.6–2.6)
MCHC RBC-ENTMCNC: 29.3 PG — SIGNIFICANT CHANGE UP (ref 27–34)
MCHC RBC-ENTMCNC: 33.8 G/DL — SIGNIFICANT CHANGE UP (ref 32–36)
MCV RBC AUTO: 86.7 FL — SIGNIFICANT CHANGE UP (ref 80–100)
NRBC # BLD: 0 /100 WBCS — SIGNIFICANT CHANGE UP (ref 0–0)
PHOSPHATE SERPL-MCNC: 2.2 MG/DL — LOW (ref 2.5–4.5)
PLATELET # BLD AUTO: 308 K/UL — SIGNIFICANT CHANGE UP (ref 150–400)
POTASSIUM SERPL-MCNC: 4.4 MMOL/L — SIGNIFICANT CHANGE UP (ref 3.5–5.3)
POTASSIUM SERPL-SCNC: 4.4 MMOL/L — SIGNIFICANT CHANGE UP (ref 3.5–5.3)
RBC # BLD: 4.5 M/UL — SIGNIFICANT CHANGE UP (ref 3.8–5.2)
RBC # FLD: 13.4 % — SIGNIFICANT CHANGE UP (ref 10.3–14.5)
SODIUM SERPL-SCNC: 136 MMOL/L — SIGNIFICANT CHANGE UP (ref 135–145)
WBC # BLD: 12.84 K/UL — HIGH (ref 3.8–10.5)
WBC # FLD AUTO: 12.84 K/UL — HIGH (ref 3.8–10.5)

## 2023-09-01 PROCEDURE — 71250 CT THORAX DX C-: CPT | Mod: 26

## 2023-09-01 PROCEDURE — 99233 SBSQ HOSP IP/OBS HIGH 50: CPT

## 2023-09-01 PROCEDURE — 93306 TTE W/DOPPLER COMPLETE: CPT | Mod: 26

## 2023-09-01 RX ORDER — SODIUM,POTASSIUM PHOSPHATES 278-250MG
1 POWDER IN PACKET (EA) ORAL ONCE
Refills: 0 | Status: DISCONTINUED | OUTPATIENT
Start: 2023-09-01 | End: 2023-09-08

## 2023-09-01 RX ADMIN — Medication 650 MILLIGRAM(S): at 13:38

## 2023-09-01 RX ADMIN — CEFTRIAXONE 100 MILLIGRAM(S): 500 INJECTION, POWDER, FOR SOLUTION INTRAMUSCULAR; INTRAVENOUS at 06:10

## 2023-09-01 RX ADMIN — ENOXAPARIN SODIUM 40 MILLIGRAM(S): 100 INJECTION SUBCUTANEOUS at 13:38

## 2023-09-01 RX ADMIN — Medication 1 MILLIGRAM(S): at 13:39

## 2023-09-01 RX ADMIN — OXYCODONE AND ACETAMINOPHEN 1 TABLET(S): 5; 325 TABLET ORAL at 08:55

## 2023-09-01 RX ADMIN — OXYCODONE AND ACETAMINOPHEN 1 TABLET(S): 5; 325 TABLET ORAL at 23:18

## 2023-09-01 RX ADMIN — OXYCODONE AND ACETAMINOPHEN 1 TABLET(S): 5; 325 TABLET ORAL at 17:16

## 2023-09-01 RX ADMIN — OXYCODONE AND ACETAMINOPHEN 1 TABLET(S): 5; 325 TABLET ORAL at 23:48

## 2023-09-01 RX ADMIN — Medication 650 MILLIGRAM(S): at 14:38

## 2023-09-01 RX ADMIN — Medication 100 MILLIGRAM(S): at 13:40

## 2023-09-01 RX ADMIN — OXYCODONE AND ACETAMINOPHEN 1 TABLET(S): 5; 325 TABLET ORAL at 09:55

## 2023-09-01 NOTE — PROGRESS NOTE ADULT - SUBJECTIVE AND OBJECTIVE BOX
*********incomplete************    Patient was seen and examined at bedside. Patient is in mild/moderate pain. Denies any new numbness or tingling. Denies any headaches, SOB, or n/v/d/c. Denies having any other pain elsewhere.     Vital Signs (24 Hrs):  T(C): 36.8 (08-31-23 @ 23:43), Max: 36.9 (08-31-23 @ 11:38)  HR: 88 (08-31-23 @ 23:43) (88 - 94)  BP: 108/70 (08-31-23 @ 23:43) (94/61 - 108/70)  RR: 17 (08-31-23 @ 23:43) (17 - 17)  SpO2: 95% (08-31-23 @ 23:43) (95% - 97%)  Wt(kg): --    LABS:                          11.7   11.60 )-----------( 244      ( 31 Aug 2023 07:55 )             35.7     08-31    136  |  106  |  11  ----------------------------<  113<H>  3.4<L>   |  24  |  0.37<L>    Ca    8.1<L>      31 Aug 2023 07:55  Phos  2.0     08-30  Mg     1.9     08-30        PT/INR - ( 30 Aug 2023 21:46 )   PT: 11.4 sec;   INR: 0.96 ratio         PTT - ( 30 Aug 2023 21:46 )  PTT:27.0 sec    Exam:  NAD AAOx1.  Dressing clean, dry and intact.  SCDs in place.  Calves are soft and nontender.  *Patient refused motor physical exam due to fatigue and pain, could not assess movement of toes or ankle at this time.  SILT throughout.  DP pulses +.  abduction pillow in place    A/p: A/P:  Stable POD 3 s/p Left hip hemiarthroplasty.  -Analgesia  -Posterior precautions  -Ice application  -Prophylactic antibiotics  -DVT PE prophylaxis per primary, lovenox 40  -Incentive spirometry  -OOB PT WBAT  -DC dispo: PT recs MANPREET Patient was seen and examined at bedside. Patient is in mild/moderate pain. Denies any new numbness or tingling. Denies any headaches, SOB, or n/v/d/c. Denies having any other pain elsewhere.     Vital Signs (24 Hrs):  T(C): 36.8 (08-31-23 @ 23:43), Max: 36.9 (08-31-23 @ 11:38)  HR: 88 (08-31-23 @ 23:43) (88 - 94)  BP: 108/70 (08-31-23 @ 23:43) (94/61 - 108/70)  RR: 17 (08-31-23 @ 23:43) (17 - 17)  SpO2: 95% (08-31-23 @ 23:43) (95% - 97%)  Wt(kg): --    LABS:                          11.7   11.60 )-----------( 244      ( 31 Aug 2023 07:55 )             35.7     08-31    136  |  106  |  11  ----------------------------<  113<H>  3.4<L>   |  24  |  0.37<L>    Ca    8.1<L>      31 Aug 2023 07:55  Phos  2.0     08-30  Mg     1.9     08-30        PT/INR - ( 30 Aug 2023 21:46 )   PT: 11.4 sec;   INR: 0.96 ratio         PTT - ( 30 Aug 2023 21:46 )  PTT:27.0 sec    Exam:  NAD AAOx1.  Dressing clean, dry and intact.  SCDs in place.  Calves are soft and nontender.  Moving all toes and ankle, +EHL/FHL/TA/GS. ROM of LLE limited due to pain  SILT throughout.  DP pulses +.  abduction pillow in place    A/p:  Stable POD 3 s/p Left hip hemiarthroplasty.  -Analgesia  -Posterior precautions  -Ice application  -Prophylactic antibiotics  -DVT PE prophylaxis per primary, lovenox 40  -Incentive spirometry  -OOB PT WBAT  -DC dispo: PT recs MANPREET

## 2023-09-02 LAB
ANION GAP SERPL CALC-SCNC: 6 MMOL/L — SIGNIFICANT CHANGE UP (ref 5–17)
BUN SERPL-MCNC: 11 MG/DL — SIGNIFICANT CHANGE UP (ref 7–23)
CALCIUM SERPL-MCNC: 8.8 MG/DL — SIGNIFICANT CHANGE UP (ref 8.5–10.1)
CHLORIDE SERPL-SCNC: 106 MMOL/L — SIGNIFICANT CHANGE UP (ref 96–108)
CO2 SERPL-SCNC: 24 MMOL/L — SIGNIFICANT CHANGE UP (ref 22–31)
CREAT SERPL-MCNC: 0.4 MG/DL — LOW (ref 0.5–1.3)
EGFR: 103 ML/MIN/1.73M2 — SIGNIFICANT CHANGE UP
GLUCOSE SERPL-MCNC: 111 MG/DL — HIGH (ref 70–99)
HCT VFR BLD CALC: 37.2 % — SIGNIFICANT CHANGE UP (ref 34.5–45)
HGB BLD-MCNC: 12.1 G/DL — SIGNIFICANT CHANGE UP (ref 11.5–15.5)
MCHC RBC-ENTMCNC: 28.6 PG — SIGNIFICANT CHANGE UP (ref 27–34)
MCHC RBC-ENTMCNC: 32.5 G/DL — SIGNIFICANT CHANGE UP (ref 32–36)
MCV RBC AUTO: 87.9 FL — SIGNIFICANT CHANGE UP (ref 80–100)
NRBC # BLD: 0 /100 WBCS — SIGNIFICANT CHANGE UP (ref 0–0)
PLATELET # BLD AUTO: 312 K/UL — SIGNIFICANT CHANGE UP (ref 150–400)
POTASSIUM SERPL-MCNC: 4.1 MMOL/L — SIGNIFICANT CHANGE UP (ref 3.5–5.3)
POTASSIUM SERPL-SCNC: 4.1 MMOL/L — SIGNIFICANT CHANGE UP (ref 3.5–5.3)
RBC # BLD: 4.23 M/UL — SIGNIFICANT CHANGE UP (ref 3.8–5.2)
RBC # FLD: 13.5 % — SIGNIFICANT CHANGE UP (ref 10.3–14.5)
SODIUM SERPL-SCNC: 136 MMOL/L — SIGNIFICANT CHANGE UP (ref 135–145)
WBC # BLD: 12.97 K/UL — HIGH (ref 3.8–10.5)
WBC # FLD AUTO: 12.97 K/UL — HIGH (ref 3.8–10.5)

## 2023-09-02 PROCEDURE — 99233 SBSQ HOSP IP/OBS HIGH 50: CPT

## 2023-09-02 RX ADMIN — OXYCODONE AND ACETAMINOPHEN 1 TABLET(S): 5; 325 TABLET ORAL at 18:16

## 2023-09-02 RX ADMIN — ENOXAPARIN SODIUM 40 MILLIGRAM(S): 100 INJECTION SUBCUTANEOUS at 13:07

## 2023-09-02 RX ADMIN — Medication 650 MILLIGRAM(S): at 20:14

## 2023-09-02 RX ADMIN — Medication 650 MILLIGRAM(S): at 13:58

## 2023-09-02 RX ADMIN — OXYCODONE AND ACETAMINOPHEN 1 TABLET(S): 5; 325 TABLET ORAL at 17:42

## 2023-09-02 RX ADMIN — OXYCODONE AND ACETAMINOPHEN 1 TABLET(S): 5; 325 TABLET ORAL at 10:22

## 2023-09-02 RX ADMIN — Medication 1 MILLIGRAM(S): at 13:07

## 2023-09-02 RX ADMIN — Medication 650 MILLIGRAM(S): at 12:58

## 2023-09-02 RX ADMIN — Medication 1 TABLET(S): at 13:07

## 2023-09-02 RX ADMIN — Medication 100 MILLIGRAM(S): at 13:06

## 2023-09-02 RX ADMIN — OXYCODONE AND ACETAMINOPHEN 1 TABLET(S): 5; 325 TABLET ORAL at 11:22

## 2023-09-02 RX ADMIN — OXYCODONE AND ACETAMINOPHEN 1 TABLET(S): 5; 325 TABLET ORAL at 18:42

## 2023-09-02 NOTE — PROGRESS NOTE ADULT - ASSESSMENT
77F with no reported PMH who presents to the ED s/p fall, admitted with left hip fracture and dehydration with electrolyte abnormalisties      # Hip fracture, left.  s/p Left hip hemiarthroplasty  morphine prn pain  DVT prop as per ortho     # UTI (urinary tract infection).   ·  Plan: WBCs and leuk esterase in urine, also pt c/o burning and frequency  c/w ceftriaxone  for now  Deescalate antibiotic when cultures return.     # Mass of upper lobe of right lung. Mets to Liver probably  ·  Plan: CT chest to eval R apical mass;   1. Right upper lobe nodules a 1.9 x 1.4 cm (spiculated) and 0.9 cm, suspicious for malignancy/metastases.  2. Emphysema.  3. Multiple hepatic hypodense lesions and masses suspicious for malignancy/metastases.  4. Indeterminate left adrenal nodule (2 cm).  pulmonary eval    # Hypernatremia.    Na 150--?144  likely due to dehydration (elevated CK as well)  c/w  half NS  trend labs.    # Hypercalcemia.    likely due to dehydration - trend labs.    # Hypokalemia.   replete    #: Transaminitis.   ·  Plan: likely due to chronic alcohol use  Thiamine, Folate, MVI daily   monitor for CIWA symptoms - currently 0   given IV thiamine, c/w PO thiamine and MVT/FA    dvt PPX post op     77F with no reported PMH who presents to the ED s/p fall, admitted with left hip fracture and dehydration with electrolyte abnormalisties      # Hip fracture, left.  s/p Left hip hemiarthroplasty  morphine prn pain  DVT prop as per ortho     # UTI (urinary tract infection).   ·  Plan: WBCs and leuk esterase in urine, also pt c/o burning and frequency  c/w ceftriaxone  for now  Deescalate antibiotic when cultures return.     # Mass of upper lobe of right lung. Mets to Liver probably  ·  Plan: CT chest to eval R apical mass;   1. Right upper lobe nodules a 1.9 x 1.4 cm (spiculated) and 0.9 cm, suspicious for malignancy/metastases.  2. Emphysema.  3. Multiple hepatic hypodense lesions and masses suspicious for malignancy/metastases.  4. Indeterminate left adrenal nodule (2 cm).  consider pulmonary/onc eval  I discussed w/pt and explained CT chest results and asked to speak with her dtr. But pt does not want me to call her dtr at this time as dtr is in [facility] for her vacation. also pt does not want to pursue any biopsy or other imaging studies at this time. Wants to think and discuss with her family.     # Hypernatremia.    Na 150--?144  likely due to dehydration (elevated CK as well)  c/w  half NS  trend labs.    # Hypercalcemia.    likely due to dehydration - trend labs.    # Hypokalemia.   replete    #: Transaminitis.   ·  Plan: likely due to chronic alcohol use  Thiamine, Folate, MVI daily   monitor for CIWA symptoms - currently 0   given IV thiamine, c/w PO thiamine and MVT/FA    dvt PPX post op     77F with no reported PMH who presents to the ED s/p fall, admitted with left hip fracture and dehydration with electrolyte abnormalisties      # Hip fracture, left.  s/p Left hip hemiarthroplasty  morphine prn pain  DVT prop as per ortho     # UTI (urinary tract infection).   ·  Plan: WBCs and leuk esterase in urine, also pt c/o burning and frequency  c/w ceftriaxone  for now  Deescalate antibiotic when cultures return.     # Mass of upper lobe of right lung. Mets to Liver probably  ·  Plan: CT chest to eval R apical mass;   1. Right upper lobe nodules a 1.9 x 1.4 cm (spiculated) and 0.9 cm, suspicious for malignancy/metastases.  2. Emphysema.  3. Multiple hepatic hypodense lesions and masses suspicious for malignancy/metastases.  4. Indeterminate left adrenal nodule (2 cm).  consider pulmonary/onc eval  I discussed w/pt and explained CT chest results and asked to speak with her dtr. But pt does not want me to call her dtr at this time as dtr is in [facility] for her vacation. also pt does not want to pursue any biopsy or other imaging studies at this time. Wants to think and discuss with her family.     # Hypernatremia.    Na 150--?144  likely due to dehydration (elevated CK as well)  c/w  half NS  trend labs.    # Hypercalcemia.    likely due to dehydration - trend labs.    # Hypokalemia.   replete    #: Transaminitis.   ·  Plan: likely due to chronic alcohol use  Thiamine, Folate, MVI daily   monitor for CIWA symptoms - currently 0   given IV thiamine, c/w PO thiamine and MVT/FA    dvt PPX post op     77F with no reported PMH who presents to the ED s/p fall, admitted with left hip fracture and dehydration with electrolyte abnormalisties      # Hip fracture, left.  s/p Left hip hemiarthroplasty  morphine prn pain  DVT prop as per ortho     # UTI (urinary tract infection).   ·  Plan: WBCs and leuk esterase in urine, also pt c/o burning and frequency  c/w ceftriaxone  for now  Deescalate antibiotic when cultures return.     # Mass of upper lobe of right lung. Mets to Liver probably  ·  Plan: CT chest to eval R apical mass;   1. Right upper lobe nodules a 1.9 x 1.4 cm (spiculated) and 0.9 cm, suspicious for malignancy/metastases.  2. Emphysema.  3. Multiple hepatic hypodense lesions and masses suspicious for malignancy/metastases.  4. Indeterminate left adrenal nodule (2 cm).  consider pulmonary/onc eval  I discussed w/pt and explained CT chest results and asked to speak with her dtr. But pt does not want me to call her dtr at this time as dtr is in [facility] for her vacation. also pt does not want to pursue any biopsy or other imaging studies at this time. Wants to think and discuss with her family.     # Hypernatremia.    Na 150--?144  likely due to dehydration (elevated CK as well)  c/w  half NS  trend labs.    # Hypercalcemia.    likely due to dehydration - trend labs.    # Hypokalemia.   replete    #: Transaminitis.   ·  Plan: likely due to chronic alcohol use  Thiamine, Folate, MVI daily   monitor for CIWA symptoms - currently 0   given IV thiamine, c/w PO thiamine and MVT/FA    dvt PPX post op

## 2023-09-02 NOTE — PROGRESS NOTE ADULT - SUBJECTIVE AND OBJECTIVE BOX
Patient is a 77y old  Female who presents with a chief complaint of s/p fall (02 Sep 2023 07:31)      OVERNIGHT EVENTS:  Patients seen and examined at bedside this morning. No acute events overnight.    REVIEW OF SYSTEMS: denies chest pain/SOB, diaphoresis, no F/C, cough, dizziness, headache, blurry vision, nausea, vomiting, abdominal pain. All others review of systems negative     MEDICATIONS  (STANDING):  enoxaparin Injectable 40 milliGRAM(s) SubCutaneous every 24 hours  folic acid 1 milliGRAM(s) Oral daily  multivitamin/minerals 1 Tablet(s) Oral daily  potassium phosphate / sodium phosphate Powder (PHOS-NaK) 1 Packet(s) Oral once  thiamine 100 milliGRAM(s) Oral daily    MEDICATIONS  (PRN):  acetaminophen     Tablet .. 650 milliGRAM(s) Oral every 6 hours PRN Mild Pain (1 - 3)  oxycodone    5 mG/acetaminophen 325 mG 1 Tablet(s) Oral every 6 hours PRN Moderate Pain (4 - 6)      Allergies    No Known Allergies    Intolerances        T(F): 98 (09-02-23 @ 12:21), Max: 98.9 (09-01-23 @ 16:13)  HR: 86 (09-02-23 @ 12:21) (86 - 100)  BP: 110/64 (09-02-23 @ 12:21) (110/64 - 124/72)  RR: 17 (09-02-23 @ 12:21) (17 - 18)  SpO2: 96% (09-02-23 @ 12:21) (95% - 99%)  Wt(kg): --    PHYSICAL EXAM:  GENERAL: NAD  HEAD:  Atraumatic, Normocephalic  EYES: PERRLA, conjunctiva and sclera clear  ENMT: Moist mucous membranes  NECK: Supple, No JVD, Normal thyroid  NERVOUS SYSTEM:  Alert & Awake  CHEST/LUNG: Clear to percussion bilaterally;   HEART: Regular rate and rhythm;   ABDOMEN: Soft, Nontender, Nondistended; Bowel sounds present  EXTREMITIES:  no edema BL LE  SKIN: moist    LABS:                        12.1   12.97 )-----------( 312      ( 02 Sep 2023 07:05 )             37.2     09-02    136  |  106  |  11  ----------------------------<  111<H>  4.1   |  24  |  0.40<L>    Ca    8.8      02 Sep 2023 07:05  Phos  2.2     09-01  Mg     1.9     09-01        Urinalysis Basic - ( 02 Sep 2023 07:05 )    Color: x / Appearance: x / SG: x / pH: x  Gluc: 111 mg/dL / Ketone: x  / Bili: x / Urobili: x   Blood: x / Protein: x / Nitrite: x   Leuk Esterase: x / RBC: x / WBC x   Sq Epi: x / Non Sq Epi: x / Bacteria: x      Cultures;   CAPILLARY BLOOD GLUCOSE        Lipid panel:           RADIOLOGY & ADDITIONAL TESTS:    Imaging Personally Reviewed:  [x ] YES      Consultant(s) Notes Reviewed:  [x ] YES     Care Discussed with [x ] Consultants [X ] Patient [ ] Family  [x ]    [x ]  Other; RN

## 2023-09-02 NOTE — PROGRESS NOTE ADULT - SUBJECTIVE AND OBJECTIVE BOX
Patient was seen and examined at bedside. Patient is in mild/moderate pain. Denies any new numbness or tingling. Denies any headaches, SOB, or n/v/d/c. Denies having any other pain elsewhere.     Vital Signs (24 Hrs):  T(C): 37.1 (09-02-23 @ 05:15), Max: 37.2 (09-01-23 @ 16:13)  HR: 97 (09-02-23 @ 05:15) (87 - 100)  BP: 124/72 (09-02-23 @ 05:15) (102/63 - 124/72)  RR: 18 (09-02-23 @ 05:15) (18 - 18)  SpO2: 99% (09-02-23 @ 05:15) (95% - 99%)  Wt(kg): --    LABS:                          13.2   12.84 )-----------( 308      ( 01 Sep 2023 10:40 )             39.0     09-01    136  |  107  |  10  ----------------------------<  122<H>  4.4   |  23  |  0.36<L>    Ca    8.8      01 Sep 2023 05:40  Phos  2.2     09-01  Mg     1.9     09-01      Exam:  Gen: NAD AAOx1    LLE:  Dressing clean, dry and intact.  SCDs in place.  Calves are soft and nontender.  Moving all toes and ankle, +EHL/FHL/TA/GS. ROM of LLE limited due to pain  SILT throughout.  DP pulses +.  abduction pillow in place    A/p:  Stable POD 4 s/p Left hip hemiarthroplasty    -Analgesia  -Posterior hip precautions  -Ice application  -DVT PE prophylaxis per primary, lovenox 40  -Incentive spirometry  -OOB PT WBAT  -DC dispo: PT recs MANPREET

## 2023-09-03 LAB
ANION GAP SERPL CALC-SCNC: 5 MMOL/L — SIGNIFICANT CHANGE UP (ref 5–17)
ANION GAP SERPL CALC-SCNC: 9 MMOL/L — SIGNIFICANT CHANGE UP (ref 5–17)
BUN SERPL-MCNC: 12 MG/DL — SIGNIFICANT CHANGE UP (ref 7–23)
BUN SERPL-MCNC: 14 MG/DL — SIGNIFICANT CHANGE UP (ref 7–23)
CALCIUM SERPL-MCNC: 8.7 MG/DL — SIGNIFICANT CHANGE UP (ref 8.5–10.1)
CALCIUM SERPL-MCNC: 8.9 MG/DL — SIGNIFICANT CHANGE UP (ref 8.5–10.1)
CHLORIDE SERPL-SCNC: 103 MMOL/L — SIGNIFICANT CHANGE UP (ref 96–108)
CO2 SERPL-SCNC: 23 MMOL/L — SIGNIFICANT CHANGE UP (ref 22–31)
CO2 SERPL-SCNC: 29 MMOL/L — SIGNIFICANT CHANGE UP (ref 22–31)
CREAT SERPL-MCNC: 0.44 MG/DL — LOW (ref 0.5–1.3)
CREAT SERPL-MCNC: 0.46 MG/DL — LOW (ref 0.5–1.3)
EGFR: 100 ML/MIN/1.73M2 — SIGNIFICANT CHANGE UP
EGFR: 99 ML/MIN/1.73M2 — SIGNIFICANT CHANGE UP
GLUCOSE SERPL-MCNC: 116 MG/DL — HIGH (ref 70–99)
GLUCOSE SERPL-MCNC: 135 MG/DL — HIGH (ref 70–99)
HCT VFR BLD CALC: 36.4 % — SIGNIFICANT CHANGE UP (ref 34.5–45)
HGB BLD-MCNC: 11.9 G/DL — SIGNIFICANT CHANGE UP (ref 11.5–15.5)
MAGNESIUM SERPL-MCNC: 2 MG/DL — SIGNIFICANT CHANGE UP (ref 1.6–2.6)
MCHC RBC-ENTMCNC: 28.7 PG — SIGNIFICANT CHANGE UP (ref 27–34)
MCHC RBC-ENTMCNC: 32.7 G/DL — SIGNIFICANT CHANGE UP (ref 32–36)
MCV RBC AUTO: 87.7 FL — SIGNIFICANT CHANGE UP (ref 80–100)
NRBC # BLD: 0 /100 WBCS — SIGNIFICANT CHANGE UP (ref 0–0)
PHOSPHATE SERPL-MCNC: 3.3 MG/DL — SIGNIFICANT CHANGE UP (ref 2.5–4.5)
PLATELET # BLD AUTO: 378 K/UL — SIGNIFICANT CHANGE UP (ref 150–400)
POTASSIUM SERPL-MCNC: 3.8 MMOL/L — SIGNIFICANT CHANGE UP (ref 3.5–5.3)
POTASSIUM SERPL-MCNC: 4 MMOL/L — SIGNIFICANT CHANGE UP (ref 3.5–5.3)
POTASSIUM SERPL-SCNC: 3.8 MMOL/L — SIGNIFICANT CHANGE UP (ref 3.5–5.3)
POTASSIUM SERPL-SCNC: 4 MMOL/L — SIGNIFICANT CHANGE UP (ref 3.5–5.3)
RBC # BLD: 4.15 M/UL — SIGNIFICANT CHANGE UP (ref 3.8–5.2)
RBC # FLD: 13.4 % — SIGNIFICANT CHANGE UP (ref 10.3–14.5)
SODIUM SERPL-SCNC: 135 MMOL/L — SIGNIFICANT CHANGE UP (ref 135–145)
SODIUM SERPL-SCNC: 137 MMOL/L — SIGNIFICANT CHANGE UP (ref 135–145)
WBC # BLD: 11.98 K/UL — HIGH (ref 3.8–10.5)
WBC # FLD AUTO: 11.98 K/UL — HIGH (ref 3.8–10.5)

## 2023-09-03 PROCEDURE — 99233 SBSQ HOSP IP/OBS HIGH 50: CPT

## 2023-09-03 RX ORDER — POTASSIUM CHLORIDE 20 MEQ
20 PACKET (EA) ORAL ONCE
Refills: 0 | Status: COMPLETED | OUTPATIENT
Start: 2023-09-03 | End: 2023-09-03

## 2023-09-03 RX ADMIN — Medication 1 TABLET(S): at 12:08

## 2023-09-03 RX ADMIN — ENOXAPARIN SODIUM 40 MILLIGRAM(S): 100 INJECTION SUBCUTANEOUS at 12:07

## 2023-09-03 RX ADMIN — Medication 650 MILLIGRAM(S): at 20:40

## 2023-09-03 RX ADMIN — Medication 650 MILLIGRAM(S): at 12:12

## 2023-09-03 RX ADMIN — Medication 1 MILLIGRAM(S): at 12:08

## 2023-09-03 RX ADMIN — Medication 650 MILLIGRAM(S): at 20:01

## 2023-09-03 RX ADMIN — Medication 20 MILLIEQUIVALENT(S): at 23:51

## 2023-09-03 RX ADMIN — Medication 100 MILLIGRAM(S): at 12:08

## 2023-09-03 RX ADMIN — OXYCODONE AND ACETAMINOPHEN 1 TABLET(S): 5; 325 TABLET ORAL at 11:09

## 2023-09-03 RX ADMIN — OXYCODONE AND ACETAMINOPHEN 1 TABLET(S): 5; 325 TABLET ORAL at 23:51

## 2023-09-03 RX ADMIN — OXYCODONE AND ACETAMINOPHEN 1 TABLET(S): 5; 325 TABLET ORAL at 12:09

## 2023-09-03 NOTE — PROGRESS NOTE ADULT - ASSESSMENT
77F with no reported PMH who presents to the ED s/p fall, admitted with left hip fracture and dehydration with electrolyte abnormalisties    # Hip fracture, left.  s/p Left hip hemiarthroplasty  morphine prn pain  DVT prop as per ortho     # UTI (urinary tract infection).   ·  Plan: WBCs and leuk esterase in urine, also pt c/o burning and frequency  c/w ceftriaxone  for now  Deescalate antibiotic when cultures return.     # Mass of upper lobe of right lung. Mets to Liver probably  ·  Plan: CT chest to eval R apical mass;   1. Right upper lobe nodules a 1.9 x 1.4 cm (spiculated) and 0.9 cm, suspicious for malignancy/metastases.  2. Emphysema.  3. Multiple hepatic hypodense lesions and masses suspicious for malignancy/metastases.  4. Indeterminate left adrenal nodule (2 cm).  consider pulmonary/onc eval  I discussed w/pt and explained CT chest results and asked to speak with her dtr. But pt does not want me to call her dtr at this time as dtr is in [facility] for her vacation. also pt does not want to pursue any biopsy or other imaging studies at this time. Wants to think and discuss with her family.     # Hypernatremia.    Na 150--?144  likely due to dehydration (elevated CK as well)  c/w  half NS  trend labs.    # Hypercalcemia.    likely due to dehydration - trend labs.    # Hypokalemia.   replete    #: Transaminitis.   ·  Plan: likely due to chronic alcohol use  Thiamine, Folate, MVI daily   monitor for CIWA symptoms - currently 0   given IV thiamine, c/w PO thiamine and MVT/FA    dvt PPX post op     77F with no reported PMH who presents to the ED s/p fall, admitted with left hip fracture and dehydration with electrolyte abnormalisties    # Hip fracture, left.  s/p Left hip hemiarthroplasty  morphine prn pain  DVT prop as per ortho     # UTI (urinary tract infection).   ·  Plan: WBCs and leuk esterase in urine, also pt c/o burning and frequency  c/w ceftriaxone  for now  Deescalate antibiotic when cultures return.     # Mass of upper lobe of right lung. Mets to Liver probably  ·  Plan: CT chest to eval R apical mass;   1. Right upper lobe nodules a 1.9 x 1.4 cm (spiculated) and 0.9 cm, suspicious for malignancy/metastases.  2. Emphysema.  3. Multiple hepatic hypodense lesions and masses suspicious for malignancy/metastases.  4. Indeterminate left adrenal nodule (2 cm).  consider pulmonary/onc eval  I discussed w/pt and explained CT chest results and asked to speak with her dtr. But pt does not want me to call her dtr at this time as dtr is in [facility] for her vacation. also pt does not want to pursue any biopsy or other imaging studies at this time. Wants to think and discuss with her family.     # Hypernatremia.    Na 150--?144  likely due to dehydration (elevated CK as well)  c/w  half NS  trend labs.    # Hypercalcemia.    likely due to dehydration - trend labs.    # Hypokalemia.   replete    #: Transaminitis.   ·  Plan: likely due to chronic alcohol use  Thiamine, Folate, MVI daily   monitor for CIWA symptoms - currently 0   given IV thiamine, c/w PO thiamine and MVT/FA    dvt PPX post op     77F with no reported PMH who presents to the ED s/p fall, admitted with left hip fracture and dehydration with electrolyte abnormalisties    # Hip fracture, left.  s/p Left hip hemiarthroplasty  morphine prn pain  DVT prop as per ortho     # UTI (urinary tract infection).   ·  Plan: WBCs and leuk esterase in urine, also pt c/o burning and frequency  c/w ceftriaxone  for now  Deescalate antibiotic when cultures return.     # Mass of upper lobe of right lung. Mets to Liver probably  ·  Plan: CT chest to eval R apical mass;   1. Right upper lobe nodules a 1.9 x 1.4 cm (spiculated) and 0.9 cm, suspicious for malignancy/metastases.  2. Emphysema.  3. Multiple hepatic hypodense lesions and masses suspicious for malignancy/metastases.  4. Indeterminate left adrenal nodule (2 cm).  consider pulmonary/onc eval  I discussed w/pt and explained CT chest results and asked to speak with her dtr. But pt does not want me to call her dtr at this time as dtr is in [facility] for her vacation. also pt does not want to pursue any biopsy or other imaging studies at this time. Wants to think and discuss with her family.     # Hypernatremia.    Na 150--?144  likely due to dehydration (elevated CK as well)  c/w  half NS  trend labs.    # Hypercalcemia.    likely due to dehydration - trend labs.    # Hypokalemia.   replete    #: Transaminitis.   ·  Plan: likely due to chronic alcohol use  Thiamine, Folate, MVI daily   monitor for CIWA symptoms - currently 0   given IV thiamine, c/w PO thiamine and MVT/FA    dvt PPX post op

## 2023-09-03 NOTE — PROGRESS NOTE ADULT - SUBJECTIVE AND OBJECTIVE BOX
Patient is a 77y old  Female who presents with a chief complaint of s/p fall (03 Sep 2023 07:27)      OVERNIGHT EVENTS:  Patients seen and examined at bedside this morning. No acute events overnight.    REVIEW OF SYSTEMS: denies chest pain/SOB, diaphoresis, no F/C, cough, dizziness, headache, blurry vision, nausea, vomiting, abdominal pain. All others review of systems negative     MEDICATIONS  (STANDING):  enoxaparin Injectable 40 milliGRAM(s) SubCutaneous every 24 hours  folic acid 1 milliGRAM(s) Oral daily  multivitamin/minerals 1 Tablet(s) Oral daily  potassium phosphate / sodium phosphate Powder (PHOS-NaK) 1 Packet(s) Oral once  thiamine 100 milliGRAM(s) Oral daily    MEDICATIONS  (PRN):  acetaminophen     Tablet .. 650 milliGRAM(s) Oral every 6 hours PRN Mild Pain (1 - 3)  oxycodone    5 mG/acetaminophen 325 mG 1 Tablet(s) Oral every 6 hours PRN Moderate Pain (4 - 6)      Allergies    No Known Allergies    Intolerances        T(F): 98.6 (09-03-23 @ 11:00), Max: 99 (09-02-23 @ 23:53)  HR: 90 (09-03-23 @ 11:00) (84 - 94)  BP: 114/70 (09-03-23 @ 11:00) (104/64 - 121/83)  RR: 18 (09-03-23 @ 11:00) (18 - 18)  SpO2: 98% (09-03-23 @ 11:00) (96% - 98%)  Wt(kg): --    PHYSICAL EXAM:  GENERAL: NAD  HEAD:  Atraumatic, Normocephalic  EYES: PERRLA, conjunctiva and sclera clear  ENMT: Moist mucous membranes  NECK: Supple, No JVD, Normal thyroid  NERVOUS SYSTEM:  Alert & Awake  CHEST/LUNG: Clear to percussion bilaterally;   HEART: Regular rate and rhythm;   ABDOMEN: Soft, Nontender, Nondistended; Bowel sounds present  EXTREMITIES:  no edema BL LE  SKIN: moist    LABS:                        11.9   11.98 )-----------( 378      ( 03 Sep 2023 07:02 )             36.4     09-03    135  |  103  |  12  ----------------------------<  116<H>  4.0   |  23  |  0.44<L>    Ca    8.7      03 Sep 2023 07:02        Urinalysis Basic - ( 03 Sep 2023 07:02 )    Color: x / Appearance: x / SG: x / pH: x  Gluc: 116 mg/dL / Ketone: x  / Bili: x / Urobili: x   Blood: x / Protein: x / Nitrite: x   Leuk Esterase: x / RBC: x / WBC x   Sq Epi: x / Non Sq Epi: x / Bacteria: x      Cultures;   CAPILLARY BLOOD GLUCOSE        Lipid panel:           RADIOLOGY & ADDITIONAL TESTS:    Imaging Personally Reviewed:  [x ] YES      Consultant(s) Notes Reviewed:  [x ] YES     Care Discussed with [x ] Consultants [X ] Patient [ ] Family  [x ]    [x ]  Other; RN

## 2023-09-03 NOTE — PROGRESS NOTE ADULT - SUBJECTIVE AND OBJECTIVE BOX
Patient was seen and examined at bedside. Patient is in mild/moderate pain. Denies any new numbness or tingling. Denies any headaches, SOB, or n/v/d/c. Continues to have left ankle pain. Pr reports unable to walk with PT 2/2 hip/ankle pain    Vital Signs Last 24 Hrs  T(C): 36.7 (03 Sep 2023 05:17), Max: 37.2 (02 Sep 2023 23:53)  T(F): 98 (03 Sep 2023 05:17), Max: 99 (02 Sep 2023 23:53)  HR: 94 (03 Sep 2023 05:17) (84 - 94)  BP: 121/83 (03 Sep 2023 05:17) (104/64 - 121/83)  BP(mean): --  RR: 18 (03 Sep 2023 05:17) (17 - 18)  SpO2: 97% (03 Sep 2023 05:17) (96% - 98%)    Parameters below as of 03 Sep 2023 05:17  Patient On (Oxygen Delivery Method): room air                          11.9   11.98 )-----------( 378      ( 03 Sep 2023 07:02 )             36.4       09-02    136  |  106  |  11  ----------------------------<  111<H>  4.1   |  24  |  0.40<L>    Ca    8.8      02 Sep 2023 07:05              Exam:  Gen: NAD    LLE:  Dressing clean, dry and intact.  SCDs in place.  Calves are soft and nontender.  Moving all toes and ankle, +EHL/FHL/GS. ROM of LLE limited due to pain  TA firing, unable full ROM dorsiflexion   SILT throughout.  DP pulses +.  abduction pillow in place    A/p:  Stable POD 5 s/p Left hip hemiarthroplasty    -Analgesia  -Posterior hip precautions  - XR L ankle  -Ice application  -DVT PE prophylaxis per primary, lovenox 40  -Incentive spirometry  -OOB PT WBAT  -DC dispo: PT recs MANPREET

## 2023-09-04 LAB
ANION GAP SERPL CALC-SCNC: 4 MMOL/L — LOW (ref 5–17)
BUN SERPL-MCNC: 14 MG/DL — SIGNIFICANT CHANGE UP (ref 7–23)
CALCIUM SERPL-MCNC: 9.1 MG/DL — SIGNIFICANT CHANGE UP (ref 8.5–10.1)
CHLORIDE SERPL-SCNC: 107 MMOL/L — SIGNIFICANT CHANGE UP (ref 96–108)
CO2 SERPL-SCNC: 26 MMOL/L — SIGNIFICANT CHANGE UP (ref 22–31)
CREAT SERPL-MCNC: 0.46 MG/DL — LOW (ref 0.5–1.3)
EGFR: 99 ML/MIN/1.73M2 — SIGNIFICANT CHANGE UP
GLUCOSE SERPL-MCNC: 115 MG/DL — HIGH (ref 70–99)
HCT VFR BLD CALC: 36.6 % — SIGNIFICANT CHANGE UP (ref 34.5–45)
HGB BLD-MCNC: 11.8 G/DL — SIGNIFICANT CHANGE UP (ref 11.5–15.5)
MCHC RBC-ENTMCNC: 28.4 PG — SIGNIFICANT CHANGE UP (ref 27–34)
MCHC RBC-ENTMCNC: 32.2 G/DL — SIGNIFICANT CHANGE UP (ref 32–36)
MCV RBC AUTO: 88 FL — SIGNIFICANT CHANGE UP (ref 80–100)
NRBC # BLD: 0 /100 WBCS — SIGNIFICANT CHANGE UP (ref 0–0)
PLATELET # BLD AUTO: 442 K/UL — HIGH (ref 150–400)
POTASSIUM SERPL-MCNC: 4.1 MMOL/L — SIGNIFICANT CHANGE UP (ref 3.5–5.3)
POTASSIUM SERPL-SCNC: 4.1 MMOL/L — SIGNIFICANT CHANGE UP (ref 3.5–5.3)
RBC # BLD: 4.16 M/UL — SIGNIFICANT CHANGE UP (ref 3.8–5.2)
RBC # FLD: 13.5 % — SIGNIFICANT CHANGE UP (ref 10.3–14.5)
SODIUM SERPL-SCNC: 137 MMOL/L — SIGNIFICANT CHANGE UP (ref 135–145)
WBC # BLD: 16.95 K/UL — HIGH (ref 3.8–10.5)
WBC # FLD AUTO: 16.95 K/UL — HIGH (ref 3.8–10.5)

## 2023-09-04 PROCEDURE — 99232 SBSQ HOSP IP/OBS MODERATE 35: CPT

## 2023-09-04 RX ADMIN — Medication 1 MILLIGRAM(S): at 11:30

## 2023-09-04 RX ADMIN — Medication 100 MILLIGRAM(S): at 11:29

## 2023-09-04 RX ADMIN — OXYCODONE AND ACETAMINOPHEN 1 TABLET(S): 5; 325 TABLET ORAL at 00:20

## 2023-09-04 RX ADMIN — Medication 650 MILLIGRAM(S): at 19:57

## 2023-09-04 RX ADMIN — ENOXAPARIN SODIUM 40 MILLIGRAM(S): 100 INJECTION SUBCUTANEOUS at 11:29

## 2023-09-04 RX ADMIN — Medication 650 MILLIGRAM(S): at 20:27

## 2023-09-04 RX ADMIN — Medication 650 MILLIGRAM(S): at 14:03

## 2023-09-04 RX ADMIN — Medication 1 TABLET(S): at 11:29

## 2023-09-04 RX ADMIN — Medication 30 MILLILITER(S): at 01:02

## 2023-09-04 RX ADMIN — Medication 650 MILLIGRAM(S): at 12:53

## 2023-09-04 NOTE — PROGRESS NOTE ADULT - SUBJECTIVE AND OBJECTIVE BOX
Patient is a 77y old  Female who presents with a chief complaint of s/p fall (03 Sep 2023 16:06)      OVERNIGHT EVENTS:  Patients seen and examined at bedside this morning. No acute events overnight.    REVIEW OF SYSTEMS: denies chest pain/SOB, diaphoresis, no F/C, cough, dizziness, headache, blurry vision, nausea, vomiting, abdominal pain. All others review of systems negative     MEDICATIONS  (STANDING):  enoxaparin Injectable 40 milliGRAM(s) SubCutaneous every 24 hours  folic acid 1 milliGRAM(s) Oral daily  multivitamin/minerals 1 Tablet(s) Oral daily  potassium phosphate / sodium phosphate Powder (PHOS-NaK) 1 Packet(s) Oral once  thiamine 100 milliGRAM(s) Oral daily    MEDICATIONS  (PRN):  acetaminophen     Tablet .. 650 milliGRAM(s) Oral every 6 hours PRN Mild Pain (1 - 3)  oxycodone    5 mG/acetaminophen 325 mG 1 Tablet(s) Oral every 6 hours PRN Moderate Pain (4 - 6)      Allergies    No Known Allergies    Intolerances        T(F): 98.7 (09-04-23 @ 11:19), Max: 98.7 (09-04-23 @ 11:19)  HR: 80 (09-04-23 @ 11:19) (80 - 90)  BP: 103/61 (09-04-23 @ 11:19) (103/61 - 112/70)  RR: 17 (09-04-23 @ 11:19) (17 - 17)  SpO2: 95% (09-04-23 @ 11:19) (95% - 97%)  Wt(kg): --    PHYSICAL EXAM:  GENERAL: NAD  HEAD:  Atraumatic, Normocephalic  EYES: PERRLA, conjunctiva and sclera clear  ENMT: Moist mucous membranes  NECK: Supple, No JVD, Normal thyroid  NERVOUS SYSTEM:  Alert & Awake  CHEST/LUNG: Clear to percussion bilaterally;   HEART: Regular rate and rhythm;   ABDOMEN: Soft, Nontender, Nondistended; Bowel sounds present  EXTREMITIES:  no edema BL LE  SKIN: moist    LABS:                        11.8   16.95 )-----------( 442      ( 04 Sep 2023 07:30 )             36.6     09-04    137  |  107  |  14  ----------------------------<  115<H>  4.1   |  26  |  0.46<L>    Ca    9.1      04 Sep 2023 07:30  Phos  3.3     09-03  Mg     2.0     09-03        Urinalysis Basic - ( 04 Sep 2023 07:30 )    Color: x / Appearance: x / SG: x / pH: x  Gluc: 115 mg/dL / Ketone: x  / Bili: x / Urobili: x   Blood: x / Protein: x / Nitrite: x   Leuk Esterase: x / RBC: x / WBC x   Sq Epi: x / Non Sq Epi: x / Bacteria: x      Cultures;   CAPILLARY BLOOD GLUCOSE        Lipid panel:           RADIOLOGY & ADDITIONAL TESTS:    Imaging Personally Reviewed:  [x ] YES      Consultant(s) Notes Reviewed:  [x ] YES     Care Discussed with [x ] Consultants [X ] Patient [ ] Family  [x ]    [x ]  Other; RN

## 2023-09-04 NOTE — PROGRESS NOTE ADULT - ASSESSMENT
77F with no reported PMH who presents to the ED s/p fall, admitted with left hip fracture and dehydration with electrolyte abnormalisties    # Hip fracture, left.  s/p Left hip hemiarthroplasty  morphine prn pain  DVT prop as per ortho     # UTI (urinary tract infection).   ·  Plan: WBCs and leuk esterase in urine, also pt c/o burning and frequency  c/w ceftriaxone  for now  Deescalate antibiotic when cultures return.     # Mass of upper lobe of right lung. Mets to Liver probably  ·  Plan: CT chest to eval R apical mass;   1. Right upper lobe nodules a 1.9 x 1.4 cm (spiculated) and 0.9 cm, suspicious for malignancy/metastases.  2. Emphysema.  3. Multiple hepatic hypodense lesions and masses suspicious for malignancy/metastases.  4. Indeterminate left adrenal nodule (2 cm).  consider pulmonary/onc eval  I discussed w/pt and explained CT chest results and asked to speak with her dtr. But pt does not want me to call her dtr at this time as dtr is in [facility] for her vacation. also pt does not want to pursue any biopsy or other imaging studies at this time. Wants to think and discuss with her family.     # Hypernatremia.    Na 150--?144  likely due to dehydration (elevated CK as well)  c/w  half NS  trend labs.    # Hypercalcemia.    likely due to dehydration - trend labs.    # Hypokalemia.   replete    #: Transaminitis.   ·  Plan: likely due to chronic alcohol use  Thiamine, Folate, MVI daily   monitor for CIWA symptoms - currently 0   given IV thiamine, c/w PO thiamine and MVT/FA    dvt PPX post op  many attempt to reach out dtr [place] [phone], failed. no options for voicemail. will try again   77F with no reported PMH who presents to the ED s/p fall, admitted with left hip fracture and dehydration with electrolyte abnormalisties    # Hip fracture, left.  s/p Left hip hemiarthroplasty  morphine prn pain  DVT prop as per ortho     # UTI (urinary tract infection).   ·  Plan: WBCs and leuk esterase in urine, also pt c/o burning and frequency  c/w ceftriaxone  for now  Deescalate antibiotic when cultures return.     # Mass of upper lobe of right lung. Mets to Liver probably  ·  Plan: CT chest to eval R apical mass;   1. Right upper lobe nodules a 1.9 x 1.4 cm (spiculated) and 0.9 cm, suspicious for malignancy/metastases.  2. Emphysema.  3. Multiple hepatic hypodense lesions and masses suspicious for malignancy/metastases.  4. Indeterminate left adrenal nodule (2 cm).  consider pulmonary/onc eval  I discussed w/pt and explained CT chest results and asked to speak with her dtr. But pt does not want me to call her dtr at this time as dtr is in [facility] for her vacation. also pt does not want to pursue any biopsy or other imaging studies at this time. Wants to think and discuss with her family.     # Hypernatremia.    Na 150--?144  likely due to dehydration (elevated CK as well)  c/w  half NS  trend labs.    # Hypercalcemia.    likely due to dehydration - trend labs.    # Hypokalemia.   replete    #: Transaminitis.   ·  Plan: likely due to chronic alcohol use  Thiamine, Folate, MVI daily   monitor for CIWA symptoms - currently 0   given IV thiamine, c/w PO thiamine and MVT/FA    dvt PPX post op  many attempt to reach out dtr [place] [phone], failed. no options for voicemail. will try again   77F with no reported PMH who presents to the ED s/p fall, admitted with left hip fracture and dehydration with electrolyte abnormalisties    # Hip fracture, left.  s/p Left hip hemiarthroplasty  morphine prn pain  DVT prop as per ortho     # UTI (urinary tract infection).   ·  Plan: WBCs and leuk esterase in urine, also pt c/o burning and frequency  c/w ceftriaxone  for now  Deescalate antibiotic when cultures return.     # Mass of upper lobe of right lung. Mets to Liver probably  ·  Plan: CT chest to eval R apical mass;   1. Right upper lobe nodules a 1.9 x 1.4 cm (spiculated) and 0.9 cm, suspicious for malignancy/metastases.  2. Emphysema.  3. Multiple hepatic hypodense lesions and masses suspicious for malignancy/metastases.  4. Indeterminate left adrenal nodule (2 cm).  consider pulmonary/onc eval  I discussed w/pt and explained CT chest results and asked to speak with her dtr. But pt does not want me to call her dtr at this time as dtr is in [facility] for her vacation. also pt does not want to pursue any biopsy or other imaging studies at this time. Wants to think and discuss with her family.     # Hypernatremia.    Na 150--?144  likely due to dehydration (elevated CK as well)  c/w  half NS  trend labs.    # Hypercalcemia.    likely due to dehydration - trend labs.    # Hypokalemia.   replete    #: Transaminitis.   ·  Plan: likely due to chronic alcohol use  Thiamine, Folate, MVI daily   monitor for CIWA symptoms - currently 0   given IV thiamine, c/w PO thiamine and MVT/FA    dvt PPX post op  many attempt to reach out dtr [place] [phone], failed. no options for voicemail. will try again

## 2023-09-05 PROBLEM — Z00.00 ENCOUNTER FOR PREVENTIVE HEALTH EXAMINATION: Status: ACTIVE | Noted: 2023-09-05

## 2023-09-05 LAB
ANION GAP SERPL CALC-SCNC: 5 MMOL/L — SIGNIFICANT CHANGE UP (ref 5–17)
BUN SERPL-MCNC: 12 MG/DL — SIGNIFICANT CHANGE UP (ref 7–23)
CALCIUM SERPL-MCNC: 9.1 MG/DL — SIGNIFICANT CHANGE UP (ref 8.5–10.1)
CHLORIDE SERPL-SCNC: 103 MMOL/L — SIGNIFICANT CHANGE UP (ref 96–108)
CO2 SERPL-SCNC: 29 MMOL/L — SIGNIFICANT CHANGE UP (ref 22–31)
CREAT SERPL-MCNC: 0.41 MG/DL — LOW (ref 0.5–1.3)
EGFR: 102 ML/MIN/1.73M2 — SIGNIFICANT CHANGE UP
GLUCOSE BLDC GLUCOMTR-MCNC: 116 MG/DL — HIGH (ref 70–99)
GLUCOSE SERPL-MCNC: 107 MG/DL — HIGH (ref 70–99)
HCT VFR BLD CALC: 35.5 % — SIGNIFICANT CHANGE UP (ref 34.5–45)
HGB BLD-MCNC: 11.5 G/DL — SIGNIFICANT CHANGE UP (ref 11.5–15.5)
MCHC RBC-ENTMCNC: 28.5 PG — SIGNIFICANT CHANGE UP (ref 27–34)
MCHC RBC-ENTMCNC: 32.4 G/DL — SIGNIFICANT CHANGE UP (ref 32–36)
MCV RBC AUTO: 87.9 FL — SIGNIFICANT CHANGE UP (ref 80–100)
NRBC # BLD: 0 /100 WBCS — SIGNIFICANT CHANGE UP (ref 0–0)
PLATELET # BLD AUTO: 504 K/UL — HIGH (ref 150–400)
POTASSIUM SERPL-MCNC: 4.1 MMOL/L — SIGNIFICANT CHANGE UP (ref 3.5–5.3)
POTASSIUM SERPL-SCNC: 4.1 MMOL/L — SIGNIFICANT CHANGE UP (ref 3.5–5.3)
RBC # BLD: 4.04 M/UL — SIGNIFICANT CHANGE UP (ref 3.8–5.2)
RBC # FLD: 13.5 % — SIGNIFICANT CHANGE UP (ref 10.3–14.5)
SODIUM SERPL-SCNC: 137 MMOL/L — SIGNIFICANT CHANGE UP (ref 135–145)
WBC # BLD: 14.19 K/UL — HIGH (ref 3.8–10.5)
WBC # FLD AUTO: 14.19 K/UL — HIGH (ref 3.8–10.5)

## 2023-09-05 PROCEDURE — 99223 1ST HOSP IP/OBS HIGH 75: CPT

## 2023-09-05 PROCEDURE — 99233 SBSQ HOSP IP/OBS HIGH 50: CPT

## 2023-09-05 RX ORDER — SENNA PLUS 8.6 MG/1
2 TABLET ORAL AT BEDTIME
Refills: 0 | Status: DISCONTINUED | OUTPATIENT
Start: 2023-09-05 | End: 2023-09-08

## 2023-09-05 RX ORDER — POLYETHYLENE GLYCOL 3350 17 G/17G
17 POWDER, FOR SOLUTION ORAL DAILY
Refills: 0 | Status: DISCONTINUED | OUTPATIENT
Start: 2023-09-05 | End: 2023-09-08

## 2023-09-05 RX ORDER — OXYCODONE HYDROCHLORIDE 5 MG/1
5 TABLET ORAL EVERY 4 HOURS
Refills: 0 | Status: DISCONTINUED | OUTPATIENT
Start: 2023-09-05 | End: 2023-09-08

## 2023-09-05 RX ADMIN — Medication 1 TABLET(S): at 11:42

## 2023-09-05 RX ADMIN — Medication 650 MILLIGRAM(S): at 19:05

## 2023-09-05 RX ADMIN — Medication 100 MILLIGRAM(S): at 11:42

## 2023-09-05 RX ADMIN — OXYCODONE AND ACETAMINOPHEN 1 TABLET(S): 5; 325 TABLET ORAL at 06:31

## 2023-09-05 RX ADMIN — OXYCODONE HYDROCHLORIDE 5 MILLIGRAM(S): 5 TABLET ORAL at 22:25

## 2023-09-05 RX ADMIN — Medication 1 MILLIGRAM(S): at 11:42

## 2023-09-05 RX ADMIN — Medication 650 MILLIGRAM(S): at 19:35

## 2023-09-05 RX ADMIN — ENOXAPARIN SODIUM 40 MILLIGRAM(S): 100 INJECTION SUBCUTANEOUS at 11:42

## 2023-09-05 RX ADMIN — OXYCODONE AND ACETAMINOPHEN 1 TABLET(S): 5; 325 TABLET ORAL at 06:01

## 2023-09-05 RX ADMIN — OXYCODONE HYDROCHLORIDE 5 MILLIGRAM(S): 5 TABLET ORAL at 22:55

## 2023-09-05 NOTE — PROGRESS NOTE ADULT - SUBJECTIVE AND OBJECTIVE BOX
Patient is a 77y old  Female who presents with a chief complaint of s/p fall (05 Sep 2023 09:49)      OVERNIGHT EVENTS:  Patients seen and examined at bedside this morning. No acute events overnight.    REVIEW OF SYSTEMS: denies chest pain/SOB, diaphoresis, no F/C, cough, dizziness, headache, blurry vision, nausea, vomiting, abdominal pain. All others review of systems negative     MEDICATIONS  (STANDING):  enoxaparin Injectable 40 milliGRAM(s) SubCutaneous every 24 hours  folic acid 1 milliGRAM(s) Oral daily  multivitamin/minerals 1 Tablet(s) Oral daily  polyethylene glycol 3350 17 Gram(s) Oral daily  potassium phosphate / sodium phosphate Powder (PHOS-NaK) 1 Packet(s) Oral once  senna 2 Tablet(s) Oral at bedtime  thiamine 100 milliGRAM(s) Oral daily    MEDICATIONS  (PRN):  acetaminophen     Tablet .. 650 milliGRAM(s) Oral every 6 hours PRN Mild Pain (1 - 3)  oxycodone    5 mG/acetaminophen 325 mG 1 Tablet(s) Oral every 6 hours PRN Moderate Pain (4 - 6)  oxyCODONE    IR 5 milliGRAM(s) Oral every 4 hours PRN Moderate Pain (4 - 6)      Allergies    No Known Allergies    Intolerances        T(F): 98.3 (09-05-23 @ 10:33), Max: 98.3 (09-05-23 @ 10:33)  HR: 82 (09-05-23 @ 10:33) (80 - 83)  BP: 112/68 (09-05-23 @ 10:33) (110/61 - 119/64)  RR: 17 (09-05-23 @ 10:33) (17 - 18)  SpO2: 96% (09-05-23 @ 10:33) (95% - 96%)  Wt(kg): --    PHYSICAL EXAM:  GENERAL: NAD  HEAD:  Atraumatic, Normocephalic  EYES: PERRLA, conjunctiva and sclera clear  ENMT: Moist mucous membranes  NECK: Supple, No JVD, Normal thyroid  NERVOUS SYSTEM:  Alert & Awake  CHEST/LUNG: Clear to percussion bilaterally;   HEART: Regular rate and rhythm;   ABDOMEN: Soft, Nontender, Nondistended; Bowel sounds present  EXTREMITIES:  no edema BL LE  SKIN: moist    LABS:                        11.5   14.19 )-----------( 504      ( 05 Sep 2023 05:32 )             35.5     09-05    137  |  103  |  12  ----------------------------<  107<H>  4.1   |  29  |  0.41<L>    Ca    9.1      05 Sep 2023 05:32  Phos  3.3     09-03  Mg     2.0     09-03        Urinalysis Basic - ( 05 Sep 2023 05:32 )    Color: x / Appearance: x / SG: x / pH: x  Gluc: 107 mg/dL / Ketone: x  / Bili: x / Urobili: x   Blood: x / Protein: x / Nitrite: x   Leuk Esterase: x / RBC: x / WBC x   Sq Epi: x / Non Sq Epi: x / Bacteria: x      Cultures;   CAPILLARY BLOOD GLUCOSE      POCT Blood Glucose.: 116 mg/dL (05 Sep 2023 07:52)    Lipid panel:           RADIOLOGY & ADDITIONAL TESTS:    Imaging Personally Reviewed:  [x ] YES      Consultant(s) Notes Reviewed:  [x ] YES     Care Discussed with [x ] Consultants [X ] Patient [ ] Family  [x ]    [x ]  Other; RN

## 2023-09-05 NOTE — CONSULT NOTE ADULT - SUBJECTIVE AND OBJECTIVE BOX
Hematology Consult Note    HPI:  Patient is a 77F with no reported PMH who presents to the ED s/p fall.  Patient is currently AAOx2-3, unable to provide history.  History provided by daughter - Rica - at the bedside.  Patient brought to the ED s/p fall.  Patient lives alone and does not have an HHA.  Per daughter - she hadnt spoke to the patient for about two days and became worried when she would not answer the phone.  Patient found on the floor when daughter went to check on her.  Unclear how long patient was down for.  Per daughter - patient has had a significant change in her functional status over the last six months.  Patient much more homebound - noted to have ~30 lb wt loss, poor appetite, worsening gait.  Patient reportedly has refused MD evaluation in the past.  Patient also known to be a chronic alcoholic - unable to describe how much she drinks per day or when her last drink was.  Daughter denies history of seizures or prior hospitalizations related to alcohol.  Patient also smokes ~1 pack per day and has smoked for >25 years.  Patient complains of pain to the L hip, no other complaints.  Vitals stable, labs show hypernatremia and hypercalcemia.  Will admit to med surg.  (28 Aug 2023 21:49)      Allergies    No Known Allergies    Intolerances        MEDICATIONS  (STANDING):  enoxaparin Injectable 40 milliGRAM(s) SubCutaneous every 24 hours  folic acid 1 milliGRAM(s) Oral daily  multivitamin/minerals 1 Tablet(s) Oral daily  potassium phosphate / sodium phosphate Powder (PHOS-NaK) 1 Packet(s) Oral once  thiamine 100 milliGRAM(s) Oral daily    MEDICATIONS  (PRN):  acetaminophen     Tablet .. 650 milliGRAM(s) Oral every 6 hours PRN Mild Pain (1 - 3)  oxycodone    5 mG/acetaminophen 325 mG 1 Tablet(s) Oral every 6 hours PRN Moderate Pain (4 - 6)      PAST MEDICAL & SURGICAL HISTORY:  Tobacco abuse          FAMILY HISTORY:  FH: HTN (hypertension)        SOCIAL HISTORY: No EtOH, no tobacco    REVIEW OF SYSTEMS:    CONSTITUTIONAL: c/o weight loss (30 lbs) and some weakness   EYES/ENT: No visual changes;  No vertigo or throat pain   NECK: No pain or stiffness  RESPIRATORY: No cough, wheezing, hemoptysis; No shortness of breath  CARDIOVASCULAR: No chest pain or palpitations  GASTROINTESTINAL: No abdominal or epigastric pain. No nausea, vomiting, or hematemesis; No diarrhea or constipation. No melena or hematochezia.  GENITOURINARY: No dysuria, frequency or hematuria  NEUROLOGICAL: No numbness or weakness  SKIN: No itching, burning, rashes, or lesions   All other review of systems is negative unless indicated above.        T(F): 98 (09-05-23 @ 05:09), Max: 98.7 (09-04-23 @ 11:19)  HR: 82 (09-05-23 @ 05:09)  BP: 119/64 (09-05-23 @ 05:09)  RR: 18 (09-05-23 @ 05:09)  SpO2: 96% (09-05-23 @ 05:09)  Wt(kg): --    GENERAL: NAD, thin female   HEAD:  Atraumatic, Normocephalic  EYES: EOMI, PERRLA, conjunctiva and sclera clear  NECK: Supple, No JVD  CHEST/LUNG: diminished b/l   HEART: Regular rate and rhythm; No murmurs, rubs, or gallops  ABDOMEN: Soft, Nontender, Nondistended; Bowel sounds present  EXTREMITIES:  2+ Peripheral Pulses, No clubbing, cyanosis, or edema  NEUROLOGY: non-focal  SKIN: left hip incision noted w/ some ecchymosis. No rashes or lesions                          11.5   14.19 )-----------( 504      ( 05 Sep 2023 05:32 )             35.5       09-05    137  |  103  |  12  ----------------------------<  107<H>  4.1   |  29  |  0.41<L>    Ca    9.1      05 Sep 2023 05:32  Phos  3.3     09-03  Mg     2.0     09-03        < from: CT Chest No Cont (09.01.23 @ 12:15) >  ACC: [id number] EXAM:  CT CHEST   ORDERED BY: DEEPALI TORRES     PROCEDURE DATE:  09/01/2023          INTERPRETATION:  CLINICAL INFORMATION: Right lung mass    COMPARISON: Cervical spine CT dated 8/28/2023    CONTRAST/COMPLICATIONS:  IV Contrast: NONE  Oral Contrast: NONE  Complications: None reported at time of study completion    PROCEDURE:  CT of the Chest was performed.  Sagittal and coronal reformats were performed.    FINDINGS:    LUNGS AND AIRWAYS: Patent central airways.  Emphysema. Right apical   spiculated nodule measuring 1.9 x 1.4 cm (images 6:22). Additional right   upper lobe nodule measuring 0.9 cm (image 6:42). Dependent atelectasis.  PLEURA: No pleural effusion.  MEDIASTINUM AND PREETHI: No lymphadenopathy.  VESSELS: Atherosclerotic calcifications of thoracic aorta and coronary   arteries.  HEART: Heart size is normal. No pericardial effusion.  CHEST WALL AND LOWER NECK: Within normal limits.  VISUALIZED UPPER ABDOMEN: Multiple hepatic hypodense lesions and masses,   largest measuring 4.2 x 3.9 cm at the hepatic dome and 4.5 x 3.5 cm in   the posterior right hepatic lobe. 2 cm indeterminate left adrenal nodule.   Right renal hypodensities, possibly cysts.  BONES: Degenerative changes.    IMPRESSION:    1. Right upper lobe nodules a 1.9 x 1.4 cm (spiculated) and 0.9 cm,   suspicious for malignancy/metastases.  2. Emphysema.  3. Multiple hepatic hypodense lesions and masses suspicious for   malignancy/metastases.  4. Indeterminate left adrenal nodule (2 cm).        --- End of Report ---             Hematology Consult Note    HPI:  Patient is a 77F with no reported PMH who presents to the ED s/p fall.  Patient is currently AAOx2-3, unable to provide history.  History provided by daughter - Rica - at the bedside.  Patient brought to the ED s/p fall.  Patient lives alone and does not have an HHA.  Per daughter - she hadnt spoke to the patient for about two days and became worried when she would not answer the phone.  Patient found on the floor when daughter went to check on her.  Unclear how long patient was down for.  Per daughter - patient has had a significant change in her functional status over the last six months.  Patient much more homebound - noted to have ~30 lb wt loss, poor appetite, worsening gait.  Patient reportedly has refused MD evaluation in the past.  Patient also known to be a chronic alcoholic - unable to describe how much she drinks per day or when her last drink was.  Daughter denies history of seizures or prior hospitalizations related to alcohol.  Patient also smokes ~1 pack per day and has smoked for >25 years.  Patient complains of pain to the L hip, no other complaints.  Vitals stable, labs show hypernatremia and hypercalcemia.  Will admit to med surg.  (28 Aug 2023 21:49)      Allergies    No Known Allergies    Intolerances        MEDICATIONS  (STANDING):  enoxaparin Injectable 40 milliGRAM(s) SubCutaneous every 24 hours  folic acid 1 milliGRAM(s) Oral daily  multivitamin/minerals 1 Tablet(s) Oral daily  potassium phosphate / sodium phosphate Powder (PHOS-NaK) 1 Packet(s) Oral once  thiamine 100 milliGRAM(s) Oral daily    MEDICATIONS  (PRN):  acetaminophen     Tablet .. 650 milliGRAM(s) Oral every 6 hours PRN Mild Pain (1 - 3)  oxycodone    5 mG/acetaminophen 325 mG 1 Tablet(s) Oral every 6 hours PRN Moderate Pain (4 - 6)      PAST MEDICAL & SURGICAL HISTORY:  Tobacco abuse          FAMILY HISTORY:  FH: HTN (hypertension)        SOCIAL HISTORY: No EtOH, no tobacco    REVIEW OF SYSTEMS:    CONSTITUTIONAL: c/o weight loss (30 lbs) and some weakness   EYES/ENT: No visual changes;  No vertigo or throat pain   NECK: No pain or stiffness  RESPIRATORY: No cough, wheezing, hemoptysis; No shortness of breath  CARDIOVASCULAR: No chest pain or palpitations  GASTROINTESTINAL: No abdominal or epigastric pain. No nausea, vomiting, or hematemesis; No diarrhea or constipation. No melena or hematochezia.  GENITOURINARY: No dysuria, frequency or hematuria  NEUROLOGICAL: No numbness or weakness  SKIN: No itching, burning, rashes, or lesions   All other review of systems is negative unless indicated above.        T(F): 98 (09-05-23 @ 05:09), Max: 98.7 (09-04-23 @ 11:19)  HR: 82 (09-05-23 @ 05:09)  BP: 119/64 (09-05-23 @ 05:09)  RR: 18 (09-05-23 @ 05:09)  SpO2: 96% (09-05-23 @ 05:09)  Wt(kg): --    GENERAL: NAD, thin female   HEAD:  Atraumatic, Normocephalic  EYES: EOMI, PERRLA, conjunctiva and sclera clear  NECK: Supple, No JVD  CHEST/LUNG: diminished b/l   HEART: Regular rate and rhythm; No murmurs, rubs, or gallops  ABDOMEN: Soft, Nontender, Nondistended; Bowel sounds present  EXTREMITIES:  2+ Peripheral Pulses, No clubbing, cyanosis, or edema  NEUROLOGY: non-focal  SKIN: left hip incision noted w/ some ecchymosis. No rashes or lesions                          11.5   14.19 )-----------( 504      ( 05 Sep 2023 05:32 )             35.5       09-05    137  |  103  |  12  ----------------------------<  107<H>  4.1   |  29  |  0.41<L>    Ca    9.1      05 Sep 2023 05:32  Phos  3.3     09-03  Mg     2.0     09-03        < from: CT Chest No Cont (09.01.23 @ 12:15) >  ACC: [id number] EXAM:  CT CHEST   ORDERED BY: DEEPALI TORRES     PROCEDURE DATE:  09/01/2023          INTERPRETATION:  CLINICAL INFORMATION: Right lung mass    COMPARISON: Cervical spine CT dated 8/28/2023    CONTRAST/COMPLICATIONS:  IV Contrast: NONE  Oral Contrast: NONE  Complications: None reported at time of study completion    PROCEDURE:  CT of the Chest was performed.  Sagittal and coronal reformats were performed.    FINDINGS:    LUNGS AND AIRWAYS: Patent central airways.  Emphysema. Right apical   spiculated nodule measuring 1.9 x 1.4 cm (images 6:22). Additional right   upper lobe nodule measuring 0.9 cm (image 6:42). Dependent atelectasis.  PLEURA: No pleural effusion.  MEDIASTINUM AND PREETHI: No lymphadenopathy.  VESSELS: Atherosclerotic calcifications of thoracic aorta and coronary   arteries.  HEART: Heart size is normal. No pericardial effusion.  CHEST WALL AND LOWER NECK: Within normal limits.  VISUALIZED UPPER ABDOMEN: Multiple hepatic hypodense lesions and masses,   largest measuring 4.2 x 3.9 cm at the hepatic dome and 4.5 x 3.5 cm in   the posterior right hepatic lobe. 2 cm indeterminate left adrenal nodule.   Right renal hypodensities, possibly cysts.  BONES: Degenerative changes.    IMPRESSION:    1. Right upper lobe nodules a 1.9 x 1.4 cm (spiculated) and 0.9 cm,   suspicious for malignancy/metastases.  2. Emphysema.  3. Multiple hepatic hypodense lesions and masses suspicious for   malignancy/metastases.  4. Indeterminate left adrenal nodule (2 cm).        --- End of Report ---             Hematology Consult Note    HPI:  Patient is a 77F with no reported PMH who presents to the ED s/p fall.  Patient is currently AAOx2-3, unable to provide history.  History provided by daughter - Rica - at the bedside.  Patient brought to the ED s/p fall.  Patient lives alone and does not have an HHA.  Per daughter - she hadnt spoke to the patient for about two days and became worried when she would not answer the phone.  Patient found on the floor when daughter went to check on her.  Unclear how long patient was down for.  Per daughter - patient has had a significant change in her functional status over the last six months.  Patient much more homebound - noted to have ~30 lb wt loss, poor appetite, worsening gait.  Patient reportedly has refused MD evaluation in the past.  Patient also known to be a chronic alcoholic - unable to describe how much she drinks per day or when her last drink was.  Daughter denies history of seizures or prior hospitalizations related to alcohol.  Patient also smokes ~1 pack per day and has smoked for >25 years.  Patient complains of pain to the L hip, no other complaints.  Vitals stable, labs show hypernatremia and hypercalcemia.  Will admit to med surg.  (28 Aug 2023 21:49)      Allergies    No Known Allergies    Intolerances        MEDICATIONS  (STANDING):  enoxaparin Injectable 40 milliGRAM(s) SubCutaneous every 24 hours  folic acid 1 milliGRAM(s) Oral daily  multivitamin/minerals 1 Tablet(s) Oral daily  potassium phosphate / sodium phosphate Powder (PHOS-NaK) 1 Packet(s) Oral once  thiamine 100 milliGRAM(s) Oral daily    MEDICATIONS  (PRN):  acetaminophen     Tablet .. 650 milliGRAM(s) Oral every 6 hours PRN Mild Pain (1 - 3)  oxycodone    5 mG/acetaminophen 325 mG 1 Tablet(s) Oral every 6 hours PRN Moderate Pain (4 - 6)      PAST MEDICAL & SURGICAL HISTORY:  Tobacco abuse          FAMILY HISTORY:  FH: HTN (hypertension)        SOCIAL HISTORY: No EtOH, no tobacco    REVIEW OF SYSTEMS:    CONSTITUTIONAL: c/o weight loss (30 lbs) and some weakness   EYES/ENT: No visual changes;  No vertigo or throat pain   NECK: No pain or stiffness  RESPIRATORY: No cough, wheezing, hemoptysis; No shortness of breath  CARDIOVASCULAR: No chest pain or palpitations  GASTROINTESTINAL: No abdominal or epigastric pain. No nausea, vomiting, or hematemesis; No diarrhea or constipation. No melena or hematochezia.  GENITOURINARY: No dysuria, frequency or hematuria  NEUROLOGICAL: No numbness or weakness  SKIN: No itching, burning, rashes, or lesions   All other review of systems is negative unless indicated above.        T(F): 98 (09-05-23 @ 05:09), Max: 98.7 (09-04-23 @ 11:19)  HR: 82 (09-05-23 @ 05:09)  BP: 119/64 (09-05-23 @ 05:09)  RR: 18 (09-05-23 @ 05:09)  SpO2: 96% (09-05-23 @ 05:09)  Wt(kg): --    GENERAL: NAD, thin female   HEAD:  Atraumatic, Normocephalic  EYES: EOMI, PERRLA, conjunctiva and sclera clear  NECK: Supple, No JVD  CHEST/LUNG: diminished b/l   HEART: Regular rate and rhythm; No murmurs, rubs, or gallops  ABDOMEN: Soft, Nontender, Nondistended; Bowel sounds present  EXTREMITIES:  2+ Peripheral Pulses, No clubbing, cyanosis, or edema  NEUROLOGY: non-focal  SKIN: left hip incision noted w/ some ecchymosis. No rashes or lesions                          11.5   14.19 )-----------( 504      ( 05 Sep 2023 05:32 )             35.5       09-05    137  |  103  |  12  ----------------------------<  107<H>  4.1   |  29  |  0.41<L>    Ca    9.1      05 Sep 2023 05:32  Phos  3.3     09-03  Mg     2.0     09-03        < from: CT Chest No Cont (09.01.23 @ 12:15) >  ACC: [id number] EXAM:  CT CHEST   ORDERED BY: DEEPALI TORRES     PROCEDURE DATE:  09/01/2023          INTERPRETATION:  CLINICAL INFORMATION: Right lung mass    COMPARISON: Cervical spine CT dated 8/28/2023    CONTRAST/COMPLICATIONS:  IV Contrast: NONE  Oral Contrast: NONE  Complications: None reported at time of study completion    PROCEDURE:  CT of the Chest was performed.  Sagittal and coronal reformats were performed.    FINDINGS:    LUNGS AND AIRWAYS: Patent central airways.  Emphysema. Right apical   spiculated nodule measuring 1.9 x 1.4 cm (images 6:22). Additional right   upper lobe nodule measuring 0.9 cm (image 6:42). Dependent atelectasis.  PLEURA: No pleural effusion.  MEDIASTINUM AND PREETHI: No lymphadenopathy.  VESSELS: Atherosclerotic calcifications of thoracic aorta and coronary   arteries.  HEART: Heart size is normal. No pericardial effusion.  CHEST WALL AND LOWER NECK: Within normal limits.  VISUALIZED UPPER ABDOMEN: Multiple hepatic hypodense lesions and masses,   largest measuring 4.2 x 3.9 cm at the hepatic dome and 4.5 x 3.5 cm in   the posterior right hepatic lobe. 2 cm indeterminate left adrenal nodule.   Right renal hypodensities, possibly cysts.  BONES: Degenerative changes.    IMPRESSION:    1. Right upper lobe nodules a 1.9 x 1.4 cm (spiculated) and 0.9 cm,   suspicious for malignancy/metastases.  2. Emphysema.  3. Multiple hepatic hypodense lesions and masses suspicious for   malignancy/metastases.  4. Indeterminate left adrenal nodule (2 cm).        --- End of Report ---

## 2023-09-05 NOTE — CONSULT NOTE ADULT - SUBJECTIVE AND OBJECTIVE BOX
HPI:  Patient is a 77F with no reported PMH who presents to the ED s/p fall.  Patient is currently AAOx2-3, unable to provide history.  History provided by daughter - Rica - at the bedside.  Patient brought to the ED s/p fall.  Patient lives alone and does not have an HHA.  Per daughter - she hadnt spoke to the patient for about two days and became worried when she would not answer the phone.  Patient found on the floor when daughter went to check on her.  Unclear how long patient was down for.  Per daughter - patient has had a significant change in her functional status over the last six months.  Patient much more homebound - noted to have ~30 lb wt loss, poor appetite, worsening gait.  Patient reportedly has refused MD evaluation in the past.  Patient also known to be a chronic alcoholic - unable to describe how much she drinks per day or when her last drink was.  Daughter denies history of seizures or prior hospitalizations related to alcohol.  Patient also smokes ~1 pack per day and has smoked for >25 years.  Patient complains of pain to the L hip, no other complaints.  Vitals stable, labs show hypernatremia and hypercalcemia.  Will admit to med surg.  (28 Aug 2023 21:49)  ----------------------------    Found to have L hip fracture s/p L hip hemiarthroplasty with ortho on 8/29.  Hypernatremia improved s/p IVF hydration  Incidentally noted on imaging performed for fall work up to have underlying emphysema with Right apical spiculated nodule measuring 1.9 x 1.4 cm  and additional right upper lobe nodule measuring 0.9 cm. There is also multiple hepatic lesions and masses, largest measuring 4.2 x 3.9 cm at the hepatic dome and 4.5 x 3.5 cm in the posterior right hepatic lobe. Of note there is also a 2 cm indeterminate left adrenal nodule      Pt is an active smoker approx 1PPD  No cough, no SOB, no hemoptysis reported  + weight loss and poor appetite last few months    complaining of left leg and hip pain  she reports she is afraid to move the leg due to pain      Allergies  No Known Allergies      MEDICATIONS  (STANDING):  enoxaparin Injectable 40 milliGRAM(s) SubCutaneous every 24 hours  folic acid 1 milliGRAM(s) Oral daily  multivitamin/minerals 1 Tablet(s) Oral daily  polyethylene glycol 3350 17 Gram(s) Oral daily  potassium phosphate / sodium phosphate Powder (PHOS-NaK) 1 Packet(s) Oral once  senna 2 Tablet(s) Oral at bedtime  thiamine 100 milliGRAM(s) Oral daily    MEDICATIONS  (PRN):  acetaminophen     Tablet .. 650 milliGRAM(s) Oral every 6 hours PRN Mild Pain (1 - 3)  oxyCODONE    IR 5 milliGRAM(s) Oral every 4 hours PRN Moderate Pain (4 - 6)        Drug Dosing Weight  Height (cm): 165.1 (28 Aug 2023 12:26)  Weight (kg): 40.8 (29 Aug 2023 12:40)  BMI (kg/m2): 15 (29 Aug 2023 12:40)  BSA (m2): 1.41 (29 Aug 2023 12:40)    PAST MEDICAL & SURGICAL HISTORY:  Tobacco abuse          FAMILY HISTORY:  FH: HTN (hypertension)        SOCIAL HISTORY:  1PPD smoker x25 years  + ETOH  lives alone          REVIEW OF SYSTEMS:  CONSTITUTIONAL: No fever, + weight loss, no chills  EYES: No eye pain, visual disturbances, or discharge  ENMT:  No difficulty hearing, tinnitus, vertigo; No sinus or throat pain  NECK: No pain or stiffness  BREASTS: No pain, masses, or nipple discharge  RESPIRATORY: No cough, wheezing, hemoptysis; No shortness of breath  CARDIOVASCULAR: No chest pain, palpitations, dizziness, or leg swelling  GASTROINTESTINAL: No abdominal or epigastric pain. No nausea, vomiting, or hematemesis; No diarrhea. No melena or hematochezia.  GENITOURINARY: No dysuria, frequency, hematuria, or incontinence  NEUROLOGICAL: No headaches, memory loss, loss of strength, numbness, or tremors  SKIN: No itching, burning, rashes, or lesions   LYMPH NODES: No enlarged glands  ENDOCRINE: No heat or cold intolerance; No hair loss  MUSCULOSKELETAL: No joint pain or swelling; + left leg/hip pain  HEME/LYMPH: No easy bruising, or bleeding gums  ALLERGY AND IMMUNOLOGIC: No hives or eczema          Vital Signs Last 24 Hrs  T(F): 98.3 (09-05-23 @ 10:33), Max: 98.3 (09-05-23 @ 10:33)  HR: 82 (09-05-23 @ 10:33) (80 - 83)  BP: 112/68 (09-05-23 @ 10:33) (110/61 - 119/64)  RR: 17 (09-05-23 @ 10:33) (17 - 18)  SpO2: 96% (09-05-23 @ 10:33) (95% - 96%)    Patient On (Oxygen Delivery Method): room air          PHYSICAL EXAM:  GENERAL: NAD, thin, cachectic elderly female  HEAD:  Atraumatic, Normocephalic  EYES: EOMI, conjunctiva and sclera clear  ENMT: No tonsillar erythema, exudates, Moist mucous membranes, No lesions, upper dentures  NECK: Supple, No JVD  NERVOUS SYSTEM:  Alert & Oriented X3, Good concentration  CHEST/LUNG: Clear to auscultation bilaterally; No rales, rhonchi, wheezing  HEART: Regular rate and rhythm  ABDOMEN: Soft, Nontender, Nondistended; Bowel sounds present  EXTREMITIES:  2+ Peripheral Pulses, No clubbing, cyanosis, or edema, L foot/leg internally rotated        LABS:                 11.5   14.19 )-----------( 504      ( 05 Sep 2023 05:32 )             35.5     09-05    137  |  103  |  12  ----------------------------<  107<H>  4.1   |  29  |  0.41<L>    Ca    9.1      05 Sep 2023 05:32  Phos  3.3     09-03  Mg     2.0     09-03      ECHO, US:  < from: TTE Echo Complete w/ Contrast w/ Doppler (09.01.23 @ 12:20) >  Left Ventricle: Endocardial visualization was enhanced with intravenous   echo contrast. Normal left ventricular size and wall thicknesses, with   normal systolic and diastolic function. The left ventricle was not well   visualized.  Left ventricular ejection fraction, by visual estimation, is 60 to 65%.  Right Ventricle: Normal right ventricular size and function.  Left Atrium: The left atrium is normal in size.  Right Atrium: The right atrium is normal in size.  Pericardium: There is no evidence of pericardial effusion.  Mitral Valve: Structurally normal mitral valve, with normal leaflet   excursion. The mitral valve is not well seen.  Tricuspid Valve: The tricuspid valve is not well seen. No tricuspid   regurgitation is visualized.  Aortic Valve: The aortic valve was not well visualized. No evidence of   aortic valve regurgitation is seen.  Pulmonic Valve: The pulmonic valve was not well visualized. No indication   of pulmonic valve regurgitation.  Aorta: The aortic root and ascending aorta are structurally normal, with   no evidence of dilitation. The aorta was not well visualized.  Pulmonary Artery: The pulmonary artery is not well seen.      Summary:   1. Left ventricular ejection fraction, by visual estimation, is 60 to 65%.   2. Endocardial visualization was enhanced with intravenous echo contrast.    ---------------------------  < from: US Duplex Venous Lower Ext Complete, Bilateral (08.28.23 @ 20:44) >  No evidence of deep venous thrombosis in either lower extremity                RADIOLOGY:      < from: CT Chest No Cont (09.01.23 @ 12:15) >  1. Right upper lobe nodules a 1.9 x 1.4 cm (spiculated) and 0.9 cm,   suspicious for malignancy/metastases.  2. Emphysema.  3. Multiple hepatic hypodense lesions and masses suspicious for   malignancy/metastases.  4. Indeterminate left adrenal nodule (2 cm).  ------------------------------  < from: Xray Pelvis AP only (08.28.23 @ 18:34) >  There is a displaced left subcapital femoral neck fracture with moderate   adjacent swelling    ----------------------------------  < from: Xray Hip 2-3 Views Intraoperative, Left (08.29.23 @ 17:07) >  Status post left hip hemiarthroplasty with anatomic alignment   postsurgicalchanges.

## 2023-09-05 NOTE — CONSULT NOTE ADULT - TIME BILLING
review chart, records, blood work, vitals, medications, imaging, direct patient care, d/w hospitalist

## 2023-09-05 NOTE — CONSULT NOTE ADULT - ASSESSMENT
77F active smoker 1/2PPD presents s/p fall with L hip fracture. s/p Left hip hemiarthroplasty 8/29. Incidentally found during trauma/fall work up to have RUL spiculated lung lesion and RUL nodule and liver lesions concerning for metastatic disease    - CT finding results discussed with patient  - she is amendable for work up of underlying lung lesions and hepatic lesions  - she has been informed that she may have underlying cancer with spread and we do not have tissue diagnosis to know what kind of cancer she may have  - pt able to verbally reiterate what was discussed with her regarding abnormal findings in her lung and concern for cancer "I know, I have spots on my lung and I know what it is, it's cancer"  - pt to discuss with daughter how she would like to proceed with obtaining dx  - will need tissue bx  - at the present time pt states she "just wants to recover from surgery and the fall"  - d/w hospitalist who will reach out to daughter as well  - can follow at [address] [place] [address] [address] [place] Tel [phone] outpatient  - she also has underlying emphysema likely from her long smoking history  - nicotine patch for smoking hx  - albuterol prn SOB, she has no respiratory complaints of SOB at present time  - pain control, PT, ortho follow up     77F active smoker 1/2PPD presents s/p fall with L hip fracture. s/p Left hip hemiarthroplasty 8/29. Incidentally found during trauma/fall work up to have RUL spiculated lung lesion and RUL nodule and liver lesions concerning for metastatic disease    - CT finding results discussed with patient  - she is amendable for work up of underlying lung lesions and hepatic lesions  - she has been informed that she may have underlying cancer with spread and we do not have tissue diagnosis to know what kind of cancer she may have  - pt able to verbally reiterate what was discussed with her regarding abnormal findings in her lung and concern for cancer "I know, I have spots on my lung and I know what it is, it's cancer"  - pt to discuss with daughter how she would like to proceed with obtaining dx  - will need tissue bx  - at the present time pt states she "just wants to recover from surgery and the fall"  - d/w hospitalist who will reach out to daughter as well  - can follow at [address] [place] [address] [address] [place] Tel [phone] outpatient  - she also has underlying emphysema likely from her long smoking history  - nicotine patch for smoking hx  - albuterol prn SOB, she has no respiratory complaints of SOB at present time  - pain control, PT, ortho follow up     77F active smoker 1/2PPD presents s/p fall with L hip fracture. s/p Left hip hemiarthroplasty 8/29. Incidentally found during trauma/fall work up to have RUL spiculated lung lesion and RUL nodule and liver lesions concerning for metastatic disease    - CT finding results discussed with patient  - she is amendable for work up of underlying lung lesions and hepatic lesions  - she has been informed that she may have underlying cancer with spread and we do not have tissue diagnosis to know what kind of cancer she may have  - pt able to verbally reiterate what was discussed with her regarding abnormal findings in her lung and concern for cancer "I know, I have spots on my lung and I know what it is, it's cancer"  - pt to discuss with daughter how she would like to proceed with obtaining dx  - will need tissue bx  - at the present time pt states she "just wants to recover from surgery and the fall"  - d/w hospitalist who will reach out to daughter as well  - can follow at [address] [place] [address] [address] [place] Tel [phone] outpatient  - she also has underlying emphysema likely from her long smoking history  - nicotine patch for smoking hx  - albuterol prn SOB, she has no respiratory complaints of SOB at present time  - pain control, PT, ortho follow up

## 2023-09-05 NOTE — CONSULT NOTE ADULT - ASSESSMENT
77F with no reported PMH (no OP f/.u) who presents to the ED s/p fall. Found to have L hip fx s/p Hemiarthroplasty 8/29. Upon w/u for OR clearance found to have lung nodules.    #Lung Nodules  -patient is present smoker 1 PPD/ 20+ years, hx ETOH, no outside following. Patient admits to 30 lbs weight loss w/ some night sweats, denies fever or chills.  -patient presneted to [facility] ER when found down for unknown time s/p fall w/ hip FX. Upon OR clearance patient found to have lung nodules.  -CT C (9/1) showing Right upper lobe nodules a 1.9 x 1.4 cm (spiculated) and 0.9 cm, suspicious for malignancy/metastases, Emphysema, Multiple hepatic hypodense lesions and masses suspicious for malignancy/metastases, Indeterminate left adrenal nodule (2 cm).  -discussed findings w/ patient as well as Hospitalist Dr. Atkinson patient states she wishes to pursue further imagining/ w/u OP, stated she knows its likely cancer but does not wish to pursue BX inpatient, but states she would be open to tx if it doesn't make her feel to ill.   -patient currently recovering from L hip fx s/p Hemiarthroplasty 8/29.  -Rec palliative eval.   -patient will need heme/onc f/u OP for pet scan/ BX.  -referred to [facility] [address] [place] [address]· [phone] who will contact patient for f/u       Thank you for the referral.   Please call with any questions [phone]  Above reviewed with Attending Dr. Clyde CHEN/[facility]  [address] [facility], [address], [place], [place]  [phone]  *Note not finalized until signed by Attending Physician         77F with no reported PMH (no OP f/.u) who presents to the ED s/p fall. Found to have L hip fx s/p Hemiarthroplasty 8/29. Upon w/u for OR clearance found to have lung nodules.    #Lung Nodules  -patient is present smoker 1 PPD/ 20+ years, hx ETOH, no outside following. Patient admits to 30 lbs weight loss w/ some night sweats, denies fever or chills.  -patient presneted to [facility] ER when found down for unknown time s/p fall w/ hip FX. Upon OR clearance patient found to have lung nodules.  -CT C (9/1) showing Right upper lobe nodules a 1.9 x 1.4 cm (spiculated) and 0.9 cm, suspicious for malignancy/metastases, Emphysema, Multiple hepatic hypodense lesions and masses suspicious for malignancy/metastases, Indeterminate left adrenal nodule (2 cm).  -discussed findings w/ patient as well as Hospitalist Dr. Atkinson patient states she wishes to pursue further imagining/ w/u OP, stated she knows its likely cancer but does not wish to pursue BX inpatient, but states she would be open to tx if it doesn't make her feel to ill.   -patient currently recovering from L hip fx s/p Hemiarthroplasty 8/29.  -Rec palliative eval.   -patient will need heme/onc f/u OP for pet scan/ BX.  -referred to [facility] [address] [place] [address]· [phone] who will contact patient for f/u       Thank you for the referral.   Please call with any questions [phone]  Above reviewed with Attending Dr. Clyde CHEN/[facility]  [address] [facility], [address], [place], [place]  [phone]  *Note not finalized until signed by Attending Physician         77F with no reported PMH (no OP f/.u) who presents to the ED s/p fall. Found to have L hip fx s/p Hemiarthroplasty 8/29. Upon w/u for OR clearance found to have lung nodules.    #Lung Nodules  -patient is present smoker 1 PPD/ 20+ years, hx ETOH, no outside following. Patient admits to 30 lbs weight loss w/ some night sweats, denies fever or chills.  -patient presneted to [facility] ER when found down for unknown time s/p fall w/ hip FX. Upon OR clearance patient found to have lung nodules.  -CT C (9/1) showing Right upper lobe nodules a 1.9 x 1.4 cm (spiculated) and 0.9 cm, suspicious for malignancy/metastases, Emphysema, Multiple hepatic hypodense lesions and masses suspicious for malignancy/metastases, Indeterminate left adrenal nodule (2 cm).  -discussed findings w/ patient as well as Hospitalist Dr. Atkinson patient states she wishes to pursue further imagining/ w/u OP, stated she knows its likely cancer but does not wish to pursue BX inpatient, but states she would be open to tx if it doesn't make her feel to ill.   -patient currently recovering from L hip fx s/p Hemiarthroplasty 8/29.  -Rec palliative eval.   -patient will need heme/onc f/u OP for pet scan/ BX.  -referred to [facility] [address] [place] [address]· [phone] who will contact patient for f/u       Thank you for the referral.   Please call with any questions [phone]  Above reviewed with Attending Dr. Clyde CHEN/[facility]On[facility]  [address] [facility], [address], [place], [place]  [phone]  *Note not finalized until signed by Attending Physician         77F with no reported PMH (no OP f/.u) who presents to the ED s/p fall. Found to have L hip fx s/p Hemiarthroplasty 8/29. Upon w/u for OR clearance found to have lung nodules.    #Lung Nodules  -patient is present smoker 1 PPD/ 20+ years, hx ETOH, no outside following. Patient admits to 30 lbs weight loss w/ some night sweats, denies fever or chills.  -patient presneted to [facility] ER when found down for unknown time s/p fall w/ hip FX. Upon OR clearance patient found to have lung nodules.  -CT C (9/1) showing Right upper lobe nodules a 1.9 x 1.4 cm (spiculated) and 0.9 cm, suspicious for malignancy/metastases, Emphysema, Multiple hepatic hypodense lesions and masses suspicious for malignancy/metastases, Indeterminate left adrenal nodule (2 cm).  -discussed findings w/ patient as well as Hospitalist Dr. Atkinson patient states she wishes to pursue further imagining/ w/u OP, stated she knows its likely cancer but does not wish to pursue BX inpatient, but states she would be open to tx if it doesn't make her feel to ill.   -patient currently recovering from L hip fx s/p Hemiarthroplasty 8/29.  -if patient agrees to inpatient w/u would rec CT A/P for staging and IR consult for liver BX otherwise can f/u OP  -Rec palliative eval.   -patient will need heme/onc f/u OP for pet scan/ BX.  -referred to [facility] [address] [place] [address]· [phone] who will contact patient for f/u       Thank you for the referral.   Please call with any questions [phone]  Above reviewed with Attending Dr. Clyde CHEN[facility]On[facility]  [address] [facility], [address], [place], [place]  [phone]  *Note not finalized until signed by Attending Physician         77F with no reported PMH (no OP f/.u) who presents to the ED s/p fall. Found to have L hip fx s/p Hemiarthroplasty 8/29. Upon w/u for OR clearance found to have lung nodules.    #Lung Nodules  -patient is present smoker 1 PPD/ 20+ years, hx ETOH, no outside following. Patient admits to 30 lbs weight loss w/ some night sweats, denies fever or chills.  -patient presneted to [facility] ER when found down for unknown time s/p fall w/ hip FX. Upon OR clearance patient found to have lung nodules.  -CT C (9/1) showing Right upper lobe nodules a 1.9 x 1.4 cm (spiculated) and 0.9 cm, suspicious for malignancy/metastases, Emphysema, Multiple hepatic hypodense lesions and masses suspicious for malignancy/metastases, Indeterminate left adrenal nodule (2 cm).  -discussed findings w/ patient as well as Hospitalist Dr. Atkinson patient states she wishes to pursue further imagining/ w/u OP, stated she knows its likely cancer but does not wish to pursue BX inpatient, but states she would be open to tx if it doesn't make her feel to ill.   -patient currently recovering from L hip fx s/p Hemiarthroplasty 8/29.  -if patient agrees to inpatient w/u would rec CT A/P for staging and IR consult for liver BX otherwise can f/u OP  -Rec palliative eval.   -patient will need heme/onc f/u OP for pet scan/ BX.  -referred to [facility] [address] [place] [address]· [phone] who will contact patient for f/u       Thank you for the referral.   Please call with any questions [phone]  Above reviewed with Attending Dr. Clyde CHEN[facility]  [address] [facility], [address], [place], [place]  [phone]  *Note not finalized until signed by Attending Physician         77F with no reported PMH (no OP f/.u) who presents to the ED s/p fall. Found to have L hip fx s/p Hemiarthroplasty 8/29. Upon w/u for OR clearance found to have lung nodules.    #Lung Nodules  -patient is present smoker 1 PPD/ 20+ years, hx ETOH, no outside following. Patient admits to 30 lbs weight loss w/ some night sweats, denies fever or chills.  -patient presneted to [facility] ER when found down for unknown time s/p fall w/ hip FX. Upon OR clearance patient found to have lung nodules.  -CT C (9/1) showing Right upper lobe nodules a 1.9 x 1.4 cm (spiculated) and 0.9 cm, suspicious for malignancy/metastases, Emphysema, Multiple hepatic hypodense lesions and masses suspicious for malignancy/metastases, Indeterminate left adrenal nodule (2 cm).  -discussed findings w/ patient as well as Hospitalist Dr. Atkinson patient states she wishes to pursue further imagining/ w/u OP, stated she knows its likely cancer but does not wish to pursue BX inpatient, but states she would be open to tx if it doesn't make her feel to ill.   -patient currently recovering from L hip fx s/p Hemiarthroplasty 8/29.  -if patient agrees to inpatient w/u would rec CT A/P for staging and IR consult for liver BX otherwise can f/u OP  -Rec palliative eval.   -patient will need heme/onc f/u OP for pet scan/ BX.  -referred to [facility] [address] [place] [address]· [phone] who will contact patient for f/u       Thank you for the referral.   Please call with any questions [phone]  Above reviewed with Attending Dr. Clyde CHEN/[facility] Hem[facility]On[facility]  [address] [facility], [address], [place], [place]  [phone]  *Note not finalized until signed by Attending Physician         77F with no reported PMH (no OP f/.u) who presents to the ED s/p fall. Found to have L hip fx s/p Hemiarthroplasty 8/29. Upon w/u for OR clearance found to have lung nodules.    #Lung Nodules  -patient is present smoker 1 PPD/ 20+ years, hx ETOH, no outside following. Patient admits to 30 lbs weight loss w/ some night sweats, denies fever or chills.  -patient presneted to [facility] ER when found down for unknown time s/p fall w/ hip FX. Upon OR clearance patient found to have lung nodules.  -CT C (9/1) showing Right upper lobe nodules a 1.9 x 1.4 cm (spiculated) and 0.9 cm, suspicious for malignancy/metastases, Emphysema, Multiple hepatic hypodense lesions and masses suspicious for malignancy/metastases, Indeterminate left adrenal nodule (2 cm).  -discussed findings w/ patient as well as Hospitalist Dr. Atkinson patient states she wishes to pursue further imagining/ w/u OP, stated she knows its likely cancer but does not wish to pursue BX inpatient, but states she would be open to tx if it doesn't make her feel to ill.   -patient currently recovering from L hip fx s/p Hemiarthroplasty 8/29.  -if patient changes her mind and agrees to inpatient w/u would rec CT A/P for staging and IR consult for liver BX otherwise can f/u OP  -Rec palliative eval.   -patient will need heme/onc f/u OP for pet scan/ BX.  -[facility] [address] [address]· [phone] who will contact patient for f/u       Thank you for the referral.   Please call with any questions [phone]  Above reviewed with Attending Dr. Clyde CHEN/[facility] Hem/Onc  [address] [facility], [address], [place], [place]  [phone]  *Note not finalized until signed by Attending Physician         77F with no reported PMH (no OP f/.u) who presents to the ED s/p fall. Found to have L hip fx s/p Hemiarthroplasty 8/29. Upon w/u for OR clearance found to have lung nodules.    #Lung Nodules  -patient is present smoker 1 PPD/ 20+ years, hx ETOH, no outside following. Patient admits to 30 lbs weight loss w/ some night sweats, denies fever or chills.  -patient presneted to [facility] ER when found down for unknown time s/p fall w/ hip FX. Upon OR clearance patient found to have lung nodules.  -CT C (9/1) showing Right upper lobe nodules a 1.9 x 1.4 cm (spiculated) and 0.9 cm, suspicious for malignancy/metastases, Emphysema, Multiple hepatic hypodense lesions and masses suspicious for malignancy/metastases, Indeterminate left adrenal nodule (2 cm).  -discussed findings w/ patient as well as Hospitalist Dr. Atkinson patient states she wishes to pursue further imagining/ w/u OP, stated she knows its likely cancer but does not wish to pursue BX inpatient, but states she would be open to tx if it doesn't make her feel to ill.   -patient currently recovering from L hip fx s/p Hemiarthroplasty 8/29.  -if patient changes her mind and agrees to inpatient w/u would rec CT A/P for staging and IR consult for liver BX otherwise can f/u OP  -Rec palliative eval.   -patient will need heme/onc f/u OP for pet scan/ BX.  -referred to [facility] [address] [place] [address]· [phone] who will contact patient for f/u       Thank you for the referral.   Please call with any questions [phone]  Above reviewed with Attending Dr. Clyde CHEN/[facility] Hem/Onc  [address] [facility], [address], [place], [place]  [phone]  *Note not finalized until signed by Attending Physician         77F with no reported PMH (no OP f/.u) who presents to the ED s/p fall. Found to have L hip fx s/p Hemiarthroplasty 8/29. Upon w/u for OR clearance found to have lung nodules.    #Lung Nodules  -patient is present smoker 1 PPD/ 20+ years, hx ETOH, no outside following. Patient admits to 30 lbs weight loss w/ some night sweats, denies fever or chills.  -patient presneted to [facility] ER when found down for unknown time s/p fall w/ hip FX. Upon OR clearance patient found to have lung nodules.  -CT C (9/1) showing Right upper lobe nodules a 1.9 x 1.4 cm (spiculated) and 0.9 cm, suspicious for malignancy/metastases, Emphysema, Multiple hepatic hypodense lesions and masses suspicious for malignancy/metastases, Indeterminate left adrenal nodule (2 cm).  -discussed findings w/ patient as well as Hospitalist Dr. Atkinson patient states she wishes to pursue further imagining/ w/u OP, stated she knows its likely cancer but does not wish to pursue BX inpatient, but states she would be open to tx if it doesn't make her feel to ill.   -patient currently recovering from L hip fx s/p Hemiarthroplasty 8/29.  -if patient changes her mind and agrees to inpatient w/u would rec CT A/P for staging and IR consult for liver BX otherwise can f/u OP  -Rec palliative eval.   -patient will need heme/onc f/u OP for pet scan/ BX.  -referred to [facility] [address] [place] [address]· [phone] who will contact patient for f/u       Thank you for the referral.   Please call with any questions [phone]  Above reviewed with Attending Dr. Clyde CHEN/[facility] Hem/Onc  [address] [facility], [address], [place], [place]  [phone]  *Note not finalized until signed by Attending Physician

## 2023-09-05 NOTE — PROGRESS NOTE ADULT - ASSESSMENT
77F with no reported PMH who presents to the ED s/p fall, admitted with left hip fracture and dehydration with electrolyte abnormalisties    # Hip fracture, left.  s/p Left hip hemiarthroplasty  morphine prn pain  DVT prop as per ortho     # UTI (urinary tract infection).   ·  Plan: WBCs and leuk esterase in urine, also pt c/o burning and frequency  c/w ceftriaxone  for now  Deescalate antibiotic when cultures return.     # Mass of upper lobe of right lung. Mets to Liver probably  ·  Plan: CT chest to eval R apical mass;   1. Right upper lobe nodules a 1.9 x 1.4 cm (spiculated) and 0.9 cm, suspicious for malignancy/metastases.  2. Emphysema.  3. Multiple hepatic hypodense lesions and masses suspicious for malignancy/metastases.  4. Indeterminate left adrenal nodule (2 cm).  consider pulmonary/onc eval  I asked the pt again today to speak with her dtr. But pt declined. seen by oncology- wants to pursue work up as outpt    # Hypernatremia.    Na 150--?144  likely due to dehydration (elevated CK as well)  c/w  half NS  trend labs.    # Hypercalcemia.    likely due to dehydration - trend labs.    # Hypokalemia.   replete    #: Transaminitis.   ·  Plan: likely due to chronic alcohol use  Thiamine, Folate, MVI daily   monitor for CIWA symptoms - currently 0   given IV thiamine, c/w PO thiamine and MVT/FA    Acute L ankle pain- refusing XR. pain mgmt. risk/benefit and alternate discussed.     dvt PPX post op  many attempt to reach out dtr [place] [phone], failed. no options for voicemail. will try again   77F with no reported PMH who presents to the ED s/p fall, admitted with left hip fracture and dehydration with electrolyte abnormalisties    # Hip fracture, left.  s/p Left hip hemiarthroplasty  morphine prn pain  DVT prop as per ortho     # UTI (urinary tract infection).   ·  Plan: WBCs and leuk esterase in urine, also pt c/o burning and frequency  c/w ceftriaxone  for now  Deescalate antibiotic when cultures return.     # Mass of upper lobe of right lung. Mets to Liver probably  ·  Plan: CT chest to eval R apical mass;   1. Right upper lobe nodules a 1.9 x 1.4 cm (spiculated) and 0.9 cm, suspicious for malignancy/metastases.  2. Emphysema.  3. Multiple hepatic hypodense lesions and masses suspicious for malignancy/metastases.  4. Indeterminate left adrenal nodule (2 cm).  consider pulmonary/onc eval  I asked the pt again today to speak with her dtr. But pt declined. seen by oncology- wants to pursue work up as outpt    # Hypernatremia.    Na 150--?144  likely due to dehydration (elevated CK as well)  c/w  half NS  trend labs.    # Hypercalcemia.    likely due to dehydration - trend labs.    # Hypokalemia.   replete    #: Transaminitis.   ·  Plan: likely due to chronic alcohol use  Thiamine, Folate, MVI daily   monitor for CIWA symptoms - currently 0   given IV thiamine, c/w PO thiamine and MVT/FA    Acute L ankle pain- refusing XR. pain mgmt. risk/benefit and alternate discussed.     dvt PPX post op  many attempt to reach out dtr [place] [phone], failed. no options for voicemail. will try again   77F with no reported PMH who presents to the ED s/p fall, admitted with left hip fracture and dehydration with electrolyte abnormalisties    # Hip fracture, left.  s/p Left hip hemiarthroplasty  morphine prn pain  DVT prop as per ortho     # UTI (urinary tract infection).   ·  Plan: WBCs and leuk esterase in urine, also pt c/o burning and frequency  c/w ceftriaxone  for now  Deescalate antibiotic when cultures return.     # Mass of upper lobe of right lung. Mets to Liver probably  ·  Plan: CT chest to eval R apical mass;   1. Right upper lobe nodules a 1.9 x 1.4 cm (spiculated) and 0.9 cm, suspicious for malignancy/metastases.  2. Emphysema.  3. Multiple hepatic hypodense lesions and masses suspicious for malignancy/metastases.  4. Indeterminate left adrenal nodule (2 cm).  consider pulmonary/onc eval  I asked the pt again today to speak with her dtr. But pt declined. seen by oncology- wants to pursue work up as outpt    # Hypernatremia.    Na 150--?144  likely due to dehydration (elevated CK as well)  c/w  half NS  trend labs.    # Hypercalcemia.    likely due to dehydration - trend labs.    # Hypokalemia.   replete    #: Transaminitis.   ·  Plan: likely due to chronic alcohol use  Thiamine, Folate, MVI daily   monitor for CIWA symptoms - currently 0   given IV thiamine, c/w PO thiamine and MVT/FA    Acute L ankle pain- refusing XR. pain mgmt. risk/benefit and alternate discussed.     dvt PPX post op  many attempt to reach out dtr [place] [phone], failed. no options for voicemail. will try again

## 2023-09-06 LAB
ANION GAP SERPL CALC-SCNC: 8 MMOL/L — SIGNIFICANT CHANGE UP (ref 5–17)
BUN SERPL-MCNC: 12 MG/DL — SIGNIFICANT CHANGE UP (ref 7–23)
CALCIUM SERPL-MCNC: 9.2 MG/DL — SIGNIFICANT CHANGE UP (ref 8.5–10.1)
CHLORIDE SERPL-SCNC: 104 MMOL/L — SIGNIFICANT CHANGE UP (ref 96–108)
CO2 SERPL-SCNC: 26 MMOL/L — SIGNIFICANT CHANGE UP (ref 22–31)
CREAT SERPL-MCNC: 0.45 MG/DL — LOW (ref 0.5–1.3)
EGFR: 100 ML/MIN/1.73M2 — SIGNIFICANT CHANGE UP
GLUCOSE SERPL-MCNC: 101 MG/DL — HIGH (ref 70–99)
HCT VFR BLD CALC: 35.5 % — SIGNIFICANT CHANGE UP (ref 34.5–45)
HGB BLD-MCNC: 11.4 G/DL — LOW (ref 11.5–15.5)
MCHC RBC-ENTMCNC: 28.1 PG — SIGNIFICANT CHANGE UP (ref 27–34)
MCHC RBC-ENTMCNC: 32.1 G/DL — SIGNIFICANT CHANGE UP (ref 32–36)
MCV RBC AUTO: 87.7 FL — SIGNIFICANT CHANGE UP (ref 80–100)
NRBC # BLD: 0 /100 WBCS — SIGNIFICANT CHANGE UP (ref 0–0)
PLATELET # BLD AUTO: 524 K/UL — HIGH (ref 150–400)
POTASSIUM SERPL-MCNC: 3.8 MMOL/L — SIGNIFICANT CHANGE UP (ref 3.5–5.3)
POTASSIUM SERPL-SCNC: 3.8 MMOL/L — SIGNIFICANT CHANGE UP (ref 3.5–5.3)
RBC # BLD: 4.05 M/UL — SIGNIFICANT CHANGE UP (ref 3.8–5.2)
RBC # FLD: 13.6 % — SIGNIFICANT CHANGE UP (ref 10.3–14.5)
SODIUM SERPL-SCNC: 138 MMOL/L — SIGNIFICANT CHANGE UP (ref 135–145)
WBC # BLD: 13.22 K/UL — HIGH (ref 3.8–10.5)
WBC # FLD AUTO: 13.22 K/UL — HIGH (ref 3.8–10.5)

## 2023-09-06 PROCEDURE — 99233 SBSQ HOSP IP/OBS HIGH 50: CPT

## 2023-09-06 PROCEDURE — 99222 1ST HOSP IP/OBS MODERATE 55: CPT

## 2023-09-06 PROCEDURE — 99232 SBSQ HOSP IP/OBS MODERATE 35: CPT

## 2023-09-06 RX ORDER — ALPRAZOLAM 0.25 MG
1 TABLET ORAL ONCE
Refills: 0 | Status: DISCONTINUED | OUTPATIENT
Start: 2023-09-06 | End: 2023-09-06

## 2023-09-06 RX ORDER — OXYCODONE HYDROCHLORIDE 5 MG/1
10 TABLET ORAL ONCE
Refills: 0 | Status: DISCONTINUED | OUTPATIENT
Start: 2023-09-06 | End: 2023-09-06

## 2023-09-06 RX ADMIN — Medication 1 TABLET(S): at 12:17

## 2023-09-06 RX ADMIN — POLYETHYLENE GLYCOL 3350 17 GRAM(S): 17 POWDER, FOR SOLUTION ORAL at 12:19

## 2023-09-06 RX ADMIN — OXYCODONE HYDROCHLORIDE 10 MILLIGRAM(S): 5 TABLET ORAL at 16:43

## 2023-09-06 RX ADMIN — OXYCODONE AND ACETAMINOPHEN 1 TABLET(S): 5; 325 TABLET ORAL at 09:35

## 2023-09-06 RX ADMIN — ENOXAPARIN SODIUM 40 MILLIGRAM(S): 100 INJECTION SUBCUTANEOUS at 12:18

## 2023-09-06 RX ADMIN — OXYCODONE AND ACETAMINOPHEN 1 TABLET(S): 5; 325 TABLET ORAL at 00:21

## 2023-09-06 RX ADMIN — OXYCODONE HYDROCHLORIDE 10 MILLIGRAM(S): 5 TABLET ORAL at 17:40

## 2023-09-06 RX ADMIN — OXYCODONE HYDROCHLORIDE 5 MILLIGRAM(S): 5 TABLET ORAL at 12:18

## 2023-09-06 RX ADMIN — OXYCODONE AND ACETAMINOPHEN 1 TABLET(S): 5; 325 TABLET ORAL at 08:37

## 2023-09-06 RX ADMIN — OXYCODONE AND ACETAMINOPHEN 1 TABLET(S): 5; 325 TABLET ORAL at 00:51

## 2023-09-06 RX ADMIN — Medication 1 MILLIGRAM(S): at 12:18

## 2023-09-06 RX ADMIN — Medication 1 MILLIGRAM(S): at 18:38

## 2023-09-06 RX ADMIN — OXYCODONE HYDROCHLORIDE 5 MILLIGRAM(S): 5 TABLET ORAL at 13:09

## 2023-09-06 RX ADMIN — Medication 100 MILLIGRAM(S): at 12:19

## 2023-09-06 NOTE — BH CONSULTATION LIAISON ASSESSMENT NOTE - NSBHCHARTREVIEWINVESTIGATE_PSY_A_CORE FT
Right apical spiculated nodule measuring 1.9 x 1.4 cm (images 6:22). Additional right upper lobe nodule measuring 0.9 cm (image 6:42). Dependent atelectasis. Multiple hepatic hypodense lesions and masses suspicious for malignancy/metastases.

## 2023-09-06 NOTE — PROGRESS NOTE ADULT - ASSESSMENT
77F active smoker 1/2PPD presents s/p fall with L hip fracture. s/p Left hip hemiarthroplasty 8/29. Incidentally found during trauma/fall work up to have RUL spiculated lung lesion and RUL nodule and liver lesions concerning for metastatic disease    - long discussion with patient  - she is amendable for work up of underlying lung lesion and hepatic lesions  - she has been informed that she may have underlying cancer with spread and we do not have tissue diagnosis to know what kind of cancer she may have  - pt able to verbally reiterate what was discussed with her regarding abnormal findings in her lung and concern for cancer "I know, I have spots on my lung and I know what it is, it's cancer"  - she however states she does not want to pursue further work up or biopsy inpaitnet and would rather have this done outpatient and states her daughter is aware and she has spoken to her and will have her daughter bring her to whatever appointment necessary outpatient  - "it's just been too much since the fall"  - d/w hospitalist for outpatient work up  - primary team has been speaking with daughter  - can follow at [address] [place] [address] [address] [place] Tel [phone] outpatient. Follow up with Dr. Juwan Laws interventional pulmonary  - she also has underlying emphysema likely from her long smoking history  - nicotine patch  - albuterol prn SOB, she has no respiratory complaints of SOB at present time  - pain control, PT, ortho follow up 77F active smoker 1/2PPD presents s/p fall with L hip fracture. s/p Left hip hemiarthroplasty 8/29. Incidentally found during trauma/fall work up to have RUL spiculated lung lesion and RUL nodule and liver lesions concerning for metastatic disease    - long discussion with patient  - she is amendable for work up of underlying lung lesion and hepatic lesions  - she has been informed that she may have underlying cancer with spread and we do not have tissue diagnosis to know what kind of cancer she may have  - pt able to verbally reiterate what was discussed with her regarding abnormal findings in her lung and concern for cancer "I know, I have spots on my lung and I know what it is, it's cancer"  - she however states she does not want to pursue further work up or biopsy inpaitnet and would rather have this done outpatient and states her daughter is aware and she has spoken to her and will have her daughter bring her to whatever appointment necessary outpatient  - "it's just been too much since the fall"  - d/w hospitalist for outpatient work up  - primary team has been speaking with daughter  - can follow at [address] [place] [address] [address] [place] Tel [phone] outpatient. Follow up with Dr. Juwan Laws interventional pulmonary  - she also has underlying emphysema likely from her long smoking history  - nicotine patch  - albuterol prn SOB, she has no respiratory complaints of SOB at present time  - pain control, PT, ortho follow up 77F active smoker 1/2PPD presents s/p fall with L hip fracture. s/p Left hip hemiarthroplasty 8/29. Incidentally found during trauma/fall work up to have RUL spiculated lung lesion and RUL nodule and liver lesions concerning for metastatic disease    - long discussion with patient  - she is amendable for work up of underlying lung lesion and hepatic lesions  - she has been informed that she may have underlying cancer with spread and we do not have tissue diagnosis to know what kind of cancer she may have  - pt able to verbally reiterate what was discussed with her regarding abnormal findings in her lung and concern for cancer "I know, I have spots on my lung and I know what it is, it's cancer"  - she however states she does not want to pursue further work up or biopsy inpaitnet and would rather have this done outpatient and states her daughter is aware and she has spoken to her and will have her daughter bring her to whatever appointment necessary outpatient  - "it's just been too much since the fall"  - d/w hospitalist for outpatient work up  - primary team has been speaking with daughter  - can follow at [address] [place] [address] [address] [place] Tel [phone] outpatient. Follow up with Dr. Juwan Laws interventional pulmonary  - she also has underlying emphysema likely from her long smoking history  - nicotine patch  - albuterol prn SOB, she has no respiratory complaints of SOB at present time  - pain control, PT, ortho follow up

## 2023-09-06 NOTE — BH CONSULTATION LIAISON ASSESSMENT NOTE - HPI (INCLUDE ILLNESS QUALITY, SEVERITY, DURATION, TIMING, CONTEXT, MODIFYING FACTORS, ASSOCIATED SIGNS AND SYMPTOMS)
78 yo female, single, noncaregiver, lives alone in a senior housing complex, no A services, has an adult daughter, hx of poor outpatient attendance with follow up, reported hx of Alcohol Abuse with unclear complications, smokes ~1 pack per day and has smoked for >25 years, who presented to the ED on 8/28/23 s/p fall (daughter found her laying on floor) and found to have left hip fracture, dehydration & electrolyte abnormalities.  Pt s/p left hemiarthroplasty on 08/29/23, with hospital course notable for symptomatic UTI, metabolic disarray and transaminitis likely 2/2 h/o chronic alcohol use and incidental finding of RUL lung nodules and suspicious hepatic lesions concerning for metastasis. Patient's daughter reported Patient had a significant change in her functional status over the last six months, became more homebound, estimated unintended ~30 lb weight loss, poor appetite, worsening gait/increased ambulation difficulties. Seen by Hem-Onc: Patient deferred biopsy to outpatient; was referred heme/onc f/u OP for pet scan/ BX.    CVM: [facility] outpatient clinic intake x2 in 1987 (Pt never followed up again); psychiatric admission to [facility] 5/19/1987-5/21/1987 (no details available) 76 yo female, single, noncaregiver, lives alone in a senior housing complex, no A services, has an adult daughter, hx of poor outpatient attendance with follow up, reported hx of Alcohol Abuse with unclear complications, smokes ~1 pack per day and has smoked for >25 years, who presented to the ED on 8/28/23 s/p fall (daughter found her laying on floor) and found to have left hip fracture, dehydration & electrolyte abnormalities.  Pt s/p left hemiarthroplasty on 08/29/23, with hospital course notable for symptomatic UTI, metabolic disarray and transaminitis likely 2/2 h/o chronic alcohol use and incidental finding of RUL lung nodules and suspicious hepatic lesions concerning for metastasis. Patient's daughter reported Patient had a significant change in her functional status over the last six months, became more homebound, estimated unintended ~30 lb weight loss, poor appetite, worsening gait/increased ambulation difficulties. Seen by Hem-Onc: Patient deferred biopsy to outpatient; was referred heme/onc f/u OP for pet scan/ BX.    CVM: [facility] outpatient clinic intake x2 in 1987 (Pt never followed up again); psychiatric admission to [facility] 5/19/1987-5/21/1987 (no details available) 76 yo female, single, noncaregiver, lives alone in a senior housing complex, no A services, has an adult daughter, hx of poor outpatient attendance with follow up, reported hx of Alcohol Abuse with unclear complications, smokes ~1 pack per day and has smoked for >25 years, who presented to the ED on 8/28/23 s/p fall (daughter found her laying on floor) and found to have left hip fracture, dehydration & electrolyte abnormalities.  Pt s/p left hemiarthroplasty on 08/29/23, with hospital course notable for symptomatic UTI, metabolic disarray and transaminitis likely 2/2 h/o chronic alcohol use and incidental finding of RUL lung nodules and suspicious hepatic lesions concerning for metastasis. Patient's daughter reported Patient had a significant change in her functional status over the last six months, became more homebound, estimated unintended ~30 lb weight loss, poor appetite, worsening gait/increased ambulation difficulties. Seen by Hem-Onc: Patient deferred biopsy to outpatient; was referred heme/onc f/u OP for pet scan/ BX.    CVM: [facility] outpatient clinic intake x2 in 1987 (Pt never followed up again); psychiatric admission to [facility] 5/19/1987-5/21/1987 (no details available) 76 yo  female, single, noncaregiver, lives alone in a senior housing complex, no A services, has an adult daughter, hx of poor outpatient attendance with follow up, reported hx of Alcohol Abuse with unclear complications, smokes ~1 pack per day and has smoked for >25 years, who presented to the ED on 8/28/23 s/p fall (daughter found her laying on floor) and found to have left hip fracture, dehydration & electrolyte abnormalities.  Pt s/p left hemiarthroplasty on 08/29/23, with hospital course notable for symptomatic UTI, metabolic disarray and transaminitis likely 2/2 h/o chronic alcohol use and incidental finding of RUL lung nodules and suspicious hepatic lesions concerning for metastasis. Patient's daughter reported Patient had a significant change in her functional status over the last six months, became more homebound, estimated unintended ~30 lb weight loss, poor appetite, worsening gait/increased ambulation difficulties. Seen by Hem-Onc: Patient deferred biopsy to outpatient; was referred heme/onc f/u OP for pet scan/ BX.    EXAM: calm, cooperative, pleasant, polite, engages fully. Patient gives consistent narrative. ++ currently complains of severe left foot pain (cannot move it, warm to touch, some blush discoloration on dorsum and around ankles), states she hurt it when she fell on it and the pain has not gone away.     CVM: [facility] outpatient clinic intake x2 in 1987 (Pt never followed up again); psychiatric admission to [facility] 5/19/1987-5/21/1987 (no details available) 76 yo  female, single, noncaregiver, lives alone in a senior housing complex, no A services, has an adult daughter, hx of poor outpatient attendance with follow up, reported hx of Alcohol Abuse with unclear complications, smokes ~1 pack per day and has smoked for >25 years, who presented to the ED on 8/28/23 s/p fall (daughter found her laying on floor) and found to have left hip fracture, dehydration & electrolyte abnormalities.  Pt s/p left hemiarthroplasty on 08/29/23, with hospital course notable for symptomatic UTI, metabolic disarray and transaminitis likely 2/2 h/o chronic alcohol use and incidental finding of RUL lung nodules and suspicious hepatic lesions concerning for metastasis. Patient's daughter reported Patient had a significant change in her functional status over the last six months, became more homebound, estimated unintended ~30 lb weight loss, poor appetite, worsening gait/increased ambulation difficulties. Seen by Hem-Onc: Patient deferred biopsy to outpatient; was referred heme/onc f/u OP for pet scan/ BX.    EXAM: calm, cooperative, pleasant, polite, engages fully. Patient gives consistent narrative. ++ currently complains of severe left foot pain (cannot move it, warm to touch, some blush discoloration on dorsum and around ankles), states she hurt it when she fell on it and the pain has not gone away.     CVM: [facility] outpatient clinic intake x2 in 1987 (Pt never followed up again); psychiatric admission to [facility] 5/19/1987-5/21/1987 (no details available) 78 yo  female, single, noncaregiver, lives alone in a senior housing complex, no A services, has an adult daughter, hx of poor outpatient attendance with follow up, reported hx of Alcohol Abuse with unclear complications, smokes ~1 pack per day and has smoked for >25 years, who presented to the ED on 8/28/23 s/p fall (daughter found her laying on floor) and found to have left hip fracture, dehydration & electrolyte abnormalities.  Pt s/p left hemiarthroplasty on 08/29/23, with hospital course notable for symptomatic UTI, metabolic disarray and transaminitis likely 2/2 h/o chronic alcohol use and incidental finding of RUL lung nodules and suspicious hepatic lesions concerning for metastasis. Patient's daughter reported Patient had a significant change in her functional status over the last six months, became more homebound, estimated unintended ~30 lb weight loss, poor appetite, worsening gait/increased ambulation difficulties. Seen by Hem-Onc: Patient deferred biopsy to outpatient; was referred heme/onc f/u OP for pet scan/ BX.    EXAM: calm, cooperative, pleasant, polite, engages fully. Patient gives consistent narrative. ++ currently complains of severe left foot pain (cannot move it, warm to touch, some blush discoloration on dorsum and around ankles), states she hurt it when she fell on it and the pain has not gone away.     CVM: [facility] outpatient clinic intake x2 in 1987 (Pt never followed up again); psychiatric admission to [facility] 5/19/1987-5/21/1987 (no details available) 76 yo  female, single, noncaregiver, lives alone in a senior housing complex, no A services, has an adult daughter, hx of poor outpatient attendance with follow up, reported hx of Alcohol Abuse with unclear complications, smokes ~1 pack per day and has smoked for >25 years, who presented to the ED on 8/28/23 s/p fall (daughter found her laying on floor) and found to have left hip fracture, dehydration & electrolyte abnormalities.  Pt s/p left hemiarthroplasty on 08/29/23, with hospital course notable for symptomatic UTI, metabolic disarray and transaminitis likely 2/2 h/o chronic alcohol use and incidental finding of RUL lung nodules and suspicious hepatic lesions concerning for metastasis. Patient's daughter reported Patient had a significant change in her functional status over the last six months, became more homebound, estimated unintended ~30 lb weight loss, poor appetite, worsening gait/increased ambulation difficulties. Seen by Hem-Onc: Patient deferred biopsy to outpatient; was referred heme/onc f/u OP for pet scan/ BX.    EXAM: calm, cooperative, pleasant, polite, engages fully. Patient gives consistent narrative. ++ currently complains of severe left foot pain (cannot move it, warm to touch, some blush discoloration on dorsum and around ankles), states she hurt it when she fell on it and the pain has not gone away. (imaging on [place] of only hip, chest, femur, had, spine)    CVM: [facility] outpatient clinic intake x2 in 1987 (Pt never followed up again); psychiatric admission to [facility] 5/19/1987-5/21/1987 (no details available) 76 yo  female, single, noncaregiver, lives alone in a senior housing complex, no A services, has an adult daughter, hx of poor outpatient attendance with follow up, reported hx of Alcohol Abuse with unclear complications, smokes ~1 pack per day and has smoked for >25 years, who presented to the ED on 8/28/23 s/p fall (daughter found her laying on floor) and found to have left hip fracture, dehydration & electrolyte abnormalities.  Pt s/p left hemiarthroplasty on 08/29/23, with hospital course notable for symptomatic UTI, metabolic disarray and transaminitis likely 2/2 h/o chronic alcohol use and incidental finding of RUL lung nodules and suspicious hepatic lesions concerning for metastasis. Patient's daughter reported Patient had a significant change in her functional status over the last six months, became more homebound, estimated unintended ~30 lb weight loss, poor appetite, worsening gait/increased ambulation difficulties. Seen by Hem-Onc: Patient deferred biopsy to outpatient; was referred heme/onc f/u OP for pet scan/ BX.    EXAM: calm, cooperative, pleasant, polite, engages fully. Patient gives consistent narrative. ++ currently complains of severe left foot pain (cannot move it, warm to touch, some blush discoloration on dorsum and around ankles), states she hurt it when she fell on it and the pain has not gone away. (imaging on [place] of only hip, chest, femur, had, spine)    CVM: [facility] outpatient clinic intake x2 in 1987 (Pt never followed up again); psychiatric admission to [facility] 5/19/1987-5/21/1987 (no details available) 78 yo  female, single, noncaregiver, lives alone in a senior housing complex, no A services, has an adult daughter, hx of poor outpatient attendance with follow up, reported hx of Alcohol Abuse with unclear complications, smokes ~1 pack per day and has smoked for >25 years, who presented to the ED on 8/28/23 s/p fall (daughter found her laying on floor) and found to have left hip fracture, dehydration & electrolyte abnormalities.  Pt s/p left hemiarthroplasty on 08/29/23, with hospital course notable for symptomatic UTI, metabolic disarray and transaminitis likely 2/2 h/o chronic alcohol use and incidental finding of RUL lung nodules and suspicious hepatic lesions concerning for metastasis. Patient's daughter reported Patient had a significant change in her functional status over the last six months, became more homebound, estimated unintended ~30 lb weight loss, poor appetite, worsening gait/increased ambulation difficulties. Seen by Hem-Onc: Patient deferred biopsy to outpatient; was referred heme/onc f/u OP for pet scan/ BX.    EXAM: calm, cooperative, pleasant, polite, engages fully. Patient gives consistent narrative. ++ currently complains of severe left foot pain (cannot move it, warm to touch, some blush discoloration on dorsum and around ankles), states she hurt it when she fell on it and the pain has not gone away. (imaging on [place] of only hip, chest, femur, had, spine)    CVM: [facility] outpatient clinic intake x2 in 1987 (Pt never followed up again); psychiatric admission to [facility] 5/19/1987-5/21/1987 (no details available)

## 2023-09-06 NOTE — PROGRESS NOTE ADULT - SUBJECTIVE AND OBJECTIVE BOX
24 hr events:  still has complaints of L leg pain  refused repeat x ray/imaging  no SOB, no cough  on RA oxygenating well  no other complaints  no acute events overnight      ## ROS:  no fever, no chills  no HA, no dizziness  no visual changes, no auditory changes  no sore throat, no sinus congestion  no SOB, no cough  no chest pain, no palpitations  no abdominal pain, no N/V/D  no dysuria, no hematuria  L leg pain  no swelling  no rashes, no pruritis  + generalized weakness        ## Labs:  CBC:                        11.4   13.22 )-----------( 524      ( 06 Sep 2023 06:05 )             35.5     Chem:  09-06    138  |  104  |  12  ----------------------------<  101<H>  3.8   |  26  |  0.45<L>    Ca    9.2      06 Sep 2023 06:05      Culture - Urine (08.28.23 @ 21:02)    Specimen Source: Clean Catch Clean Catch (Midstream)   Culture Results: <10,000 CFU/mL Normal Urogenital Tashia      ## Imaging:  CXR < from: Xray Chest 1 View- PORTABLE-Urgent (Xray Chest 1 View- PORTABLE-Urgent .) (08.28.23 @ 16:01) >  No acute pulmonary disease.    ------------------------  < from: CT Chest No Cont (09.01.23 @ 12:15) >  1. Right upper lobe nodules a 1.9 x 1.4 cm (spiculated) and 0.9 cm,   suspicious for malignancy/metastases.  2. Emphysema.  3. Multiple hepatic hypodense lesions and masses suspicious for   malignancy/metastases.  4. Indeterminate left adrenal nodule (2 cm).    ----------------------  < from: Xray Pelvis AP only (08.28.23 @ 18:34) >  There is a displaced left subcapital femoral neck fracture with moderate   adjacent swelling.    -------------------------  < from: CT Pelvis No Cont (08.28.23 @ 15:59) >    OSSEOUS STRUCTURES: There is a left subcapital femoral neck fracture. The   distal fracture fragment is displaced superiorly. No dislocation is   noted. There is mild narrowing the hip joint spaces bilaterally. Mild   productive change at the sacroiliac joints. Degenerative changes of lower   lumbar spine most prominent at the left L4-5 and L5-S1 facet joints.  SYNOVIUM/ JOINT FLUID: There is a small left hip effusion.  TENDONS: The tendons are intact. No full-thickness tear or retraction   noted.  MUSCLES: No intramuscular hematoma.  NEUROVASCULAR STRUCTURES: Mild vascular calcifications are noted.  INTRAPELVIC SOFT TISSUES: No abnormality.  SUBCUTANEOUS SOFT TISSUES: There is mild soft tissue swelling about the   left hip.    3-D reformatted imaging confirms these findings.    IMPRESSION:    Left subcapital femoral neck fracture with displacement.    ----------------------------  < from: CT Head No Cont (08.28.23 @ 15:58) >  Scoliosis is noted. Also there are subluxations with anterolisthesis at   C3-4 and C4-5, as well as C7-T1.    C1/C2  :  The anterior and posterior arches of C1 appear to be intact.   There is no C1-C2 subluxation. No odontoid fracture is noted. Base of C2   appears to be intact    The mid and lower cervical vertebral bodiesappear to be intact. No upper   thoracic fracture is noted.    Degenerative changes are diffusely noted that. Facet arthrosis is most   prominent on the left at C2-3, bilaterally at C3-4, and bilaterally at   C4-5.    Spondylotic changes are more prominent at C5-6 and C6-7.    The right upper lobe multilobulated mass is suggested. CT imaging of the   chest recommended for further assessment.  BRAIN  IMPRESSION:    1)  moderate volume loss and involutional changes, with no acute   abnormality suggested.  2)  no intracerebral hemorrhage, contusion, or extracerebral hemorrhagic   collections identified.    CERVICAL IMPRESSION:    Multilevel degenerative changes. No acute fracture identified.    Possible incidental multinodular mass in the right lung apex, measuring   approximately 2 x 1.5 cm. CT imaging of the chest recommended for further   assessment, along with further clinical correlation.          ## Medications:  enoxaparin Injectable 40 milliGRAM(s) SubCutaneous every 24 hours    polyethylene glycol 3350 17 Gram(s) Oral daily  senna 2 Tablet(s) Oral at bedtime    acetaminophen     Tablet .. 650 milliGRAM(s) Oral every 6 hours PRN  LORazepam   Injectable 2 milliGRAM(s) IV Push once  oxyCODONE    IR 5 milliGRAM(s) Oral every 4 hours PRN      ## Vitals:  Vital Signs Last 24 Hrs  T(C): 37.4 (06 Sep 2023 11:07), Max: 37.4 (06 Sep 2023 11:07)  T(F): 99.3 (06 Sep 2023 11:07), Max: 99.3 (06 Sep 2023 11:07)  HR: 85 (06 Sep 2023 11:07) (79 - 87)  BP: 117/76 (06 Sep 2023 11:07) (117/72 - 121/75)  RR: 17 (06 Sep 2023 11:07) (17 - 18)  SpO2: 97% (06 Sep 2023 11:07) (94% - 98%)    Parameters below as of 06 Sep 2023 11:07  Patient On (Oxygen Delivery Method): room air          ## P/E:  Gen: thin, cachectic, elderly female, lying comfortably in bed in no apparent distress  HEENT: NC/AT, EOMI, sclera white  Resp: CTA B/L no wheeze, rhonchi  CVS: RRR  Abd: soft NT/ND +BS  Ext: no c/c/e, L  to palpation and slightly internally rotated, pt did not allow for movement of left leg with concerns of worsening leg pain  Neuro: A&Ox3

## 2023-09-06 NOTE — PROGRESS NOTE ADULT - SUBJECTIVE AND OBJECTIVE BOX
Patient is a 77y old  Female who presents with a chief complaint of s/p fall (05 Sep 2023 15:42)      OVERNIGHT EVENTS:  Patients seen and examined at bedside this morning. No acute events overnight.    REVIEW OF SYSTEMS: denies chest pain/SOB, diaphoresis, no F/C, cough, dizziness, headache, blurry vision, nausea, vomiting, abdominal pain. All others review of systems negative     MEDICATIONS  (STANDING):  enoxaparin Injectable 40 milliGRAM(s) SubCutaneous every 24 hours  folic acid 1 milliGRAM(s) Oral daily  multivitamin/minerals 1 Tablet(s) Oral daily  polyethylene glycol 3350 17 Gram(s) Oral daily  potassium phosphate / sodium phosphate Powder (PHOS-NaK) 1 Packet(s) Oral once  senna 2 Tablet(s) Oral at bedtime  thiamine 100 milliGRAM(s) Oral daily    MEDICATIONS  (PRN):  acetaminophen     Tablet .. 650 milliGRAM(s) Oral every 6 hours PRN Mild Pain (1 - 3)  oxycodone    5 mG/acetaminophen 325 mG 1 Tablet(s) Oral every 6 hours PRN Moderate Pain (4 - 6)  oxyCODONE    IR 5 milliGRAM(s) Oral every 4 hours PRN Moderate Pain (4 - 6)      Allergies    No Known Allergies    Intolerances        T(F): 99.3 (09-06-23 @ 11:07), Max: 99.3 (09-06-23 @ 11:07)  HR: 85 (09-06-23 @ 11:07) (79 - 87)  BP: 117/76 (09-06-23 @ 11:07) (117/72 - 121/75)  RR: 17 (09-06-23 @ 11:07) (17 - 18)  SpO2: 97% (09-06-23 @ 11:07) (94% - 98%)  Wt(kg): --    PHYSICAL EXAM:  GENERAL: NAD  HEAD:  Atraumatic, Normocephalic  EYES: PERRLA, conjunctiva and sclera clear  ENMT: Moist mucous membranes  NECK: Supple, No JVD, Normal thyroid  NERVOUS SYSTEM:  Alert & Awake  CHEST/LUNG: Clear to percussion bilaterally;   HEART: Regular rate and rhythm;   ABDOMEN: Soft, Nontender, Nondistended; Bowel sounds present  EXTREMITIES:  no edema BL LE  SKIN: moist    LABS:                        11.4   13.22 )-----------( 524      ( 06 Sep 2023 06:05 )             35.5     09-06    138  |  104  |  12  ----------------------------<  101<H>  3.8   |  26  |  0.45<L>    Ca    9.2      06 Sep 2023 06:05        Urinalysis Basic - ( 06 Sep 2023 06:05 )    Color: x / Appearance: x / SG: x / pH: x  Gluc: 101 mg/dL / Ketone: x  / Bili: x / Urobili: x   Blood: x / Protein: x / Nitrite: x   Leuk Esterase: x / RBC: x / WBC x   Sq Epi: x / Non Sq Epi: x / Bacteria: x      Cultures;   CAPILLARY BLOOD GLUCOSE        Lipid panel:           RADIOLOGY & ADDITIONAL TESTS:    Imaging Personally Reviewed:  [x ] YES      Consultant(s) Notes Reviewed:  [x ] YES     Care Discussed with [x ] Consultants [X ] Patient [ ] Family  [x ]    [x ]  Other; RN

## 2023-09-06 NOTE — BH CONSULTATION LIAISON ASSESSMENT NOTE - NSBHCONSULTRECOMMENDOTHER_PSY_A_CORE FT
STAT xray of left foot/ankle, + ice packs + pain meds (ordered)  STAT xray of left foot/ankle, + ice packs + pain meds (ordered).... imaging on [place] only of hip, chest, femur, had, spine STAT xray of left foot/ankle, + ice packs + pain meds (ordered).... imaging on [place] only of hip, chest, femur, had, spine STAT xray of left foot/ankle, + ice packs + pain meds (ordered).... imaging on [place] only of hip, chest, femur, had, spine

## 2023-09-06 NOTE — BH CONSULTATION LIAISON ASSESSMENT NOTE - NSBHCHARTREVIEWLAB_PSY_A_CORE FT
09-06    138  |  104  |  12  ----------------------------<  101<H>  3.8   |  26  |  0.45<L>    Ca    9.2      06 Sep 2023 06:05

## 2023-09-06 NOTE — BH CONSULTATION LIAISON ASSESSMENT NOTE - RISK ASSESSMENT
Chronic risk factors: age > 65yrs, hx of alcohol misuse (extent unknown), did not seem to have withdrawal sxs during stay. Remote psych hx - 1 admission in 1989, single. Protective factors: female gender, denies suicide attempts; no self-injurious behavior; no hx of aggression/violence; no legal issues; motivated for help; articulate; stable domicile, has family support; access to health services. No acute risk factors identified

## 2023-09-06 NOTE — PROGRESS NOTE ADULT - ASSESSMENT
77F with no reported PMH who presents to the ED s/p fall, admitted with left hip fracture and dehydration with electrolyte abnormalisties    # Hip fracture, left.  s/p Left hip hemiarthroplasty  morphine prn pain  DVT prop as per ortho     # UTI (urinary tract infection).   ·  Plan: WBCs and leuk esterase in urine, also pt c/o burning and frequency  c/w ceftriaxone  for now  Deescalate antibiotic when cultures return.     # Mass of upper lobe of right lung. Mets to Liver probably  ·  Plan: CT chest to eval R apical mass;   1. Right upper lobe nodules a 1.9 x 1.4 cm (spiculated) and 0.9 cm, suspicious for malignancy/metastases.  2. Emphysema.  3. Multiple hepatic hypodense lesions and masses suspicious for malignancy/metastases.  4. Indeterminate left adrenal nodule (2 cm).  consider pulmonary/onc eval  I spoke with pt's dtr. [place] [phone] at length. as per dtr, pt told her regarding lung nodule and likely it is cancer. as per dtr, pt has not seen her pcp many years even when dtr makes appt, she called office and cancels. Will have psych eval for medical capacity. Although I jeri pt has early stage of dementia and some depression component is there. Dtr wishes her to follow up w/oncology and pulmonary for further work up as outpt while in rehab.     # Hypernatremia. resolved. trend labs.  # Hypercalcemia.  likely due to dehydration - trend labs.  # Hypokalemia. replete  Severe protein-calorie malnutrition and Underweight (BMI < 19).  Diet Regular-  Supplement Feeding Modality:  Oral  Ensure Plus High Protein Cans or Servings Per Day:  1       Frequency:  Three Times a day  #: Transaminitis.   Plan: likely due to chronic alcohol use. Thiamine, Folate, MVI daily   Acute L ankle pain- refusing XR. pain mgmt. risk/benefit and alternate discussed.   dvt PPX post op  Dispo- MANPREET   77F with no reported PMH who presents to the ED s/p fall, admitted with left hip fracture and dehydration with electrolyte abnormalisties    # Hip fracture, left.  s/p Left hip hemiarthroplasty  morphine prn pain  DVT prop as per ortho     # UTI (urinary tract infection).   ·  Plan: WBCs and leuk esterase in urine, also pt c/o burning and frequency  c/w ceftriaxone  for now  Deescalate antibiotic when cultures return.     # Mass of upper lobe of right lung. Mets to Liver probably  ·  Plan: CT chest to eval R apical mass;   1. Right upper lobe nodules a 1.9 x 1.4 cm (spiculated) and 0.9 cm, suspicious for malignancy/metastases.  2. Emphysema.  3. Multiple hepatic hypodense lesions and masses suspicious for malignancy/metastases.  4. Indeterminate left adrenal nodule (2 cm).  consider pulmonary/onc eval  I spoke with pt's dtr. [place] [phone] at length. as per dtr, pt told her regarding lung nodule and likely it is cancer. as per dtr, pt has not seen her pcp many years even when dtr makes appt, she called office and cancels. Will have psych eval for medical capacity. Although I jeri pt has early stage of dementia and some depression component is there. Dtr wishes her to follow up w/oncology and pulmonary for further work up as outpt while in rehab.     # Hypernatremia. resolved. trend labs.  # Hypercalcemia.  likely due to dehydration - trend labs.  # Hypokalemia. replete  Severe protein-calorie malnutrition and Underweight (BMI < 19).  Diet Regular-  Supplement Feeding Modality:  Oral  Ensure Plus High Protein Cans or Servings Per Day:  1       Frequency:  Three Times a day  #: Transaminitis.   Plan: likely due to chronic alcohol use. Thiamine, Folate, MVI daily   Acute L ankle pain- refusing XR. pain mgmt. risk/benefit and alternate discussed.   dvt PPX post op  Dispo- MANPREET   77F with no reported PMH who presents to the ED s/p fall, admitted with left hip fracture and dehydration with electrolyte abnormalisties    # Hip fracture, left.  s/p Left hip hemiarthroplasty  morphine prn pain  DVT prop as per ortho     # UTI (urinary tract infection).   ·  Plan: WBCs and leuk esterase in urine, also pt c/o burning and frequency  c/w ceftriaxone  for now  Deescalate antibiotic when cultures return.     # Mass of upper lobe of right lung. Mets to Liver probably  ·  Plan: CT chest to eval R apical mass;   1. Right upper lobe nodules a 1.9 x 1.4 cm (spiculated) and 0.9 cm, suspicious for malignancy/metastases.  2. Emphysema.  3. Multiple hepatic hypodense lesions and masses suspicious for malignancy/metastases.  4. Indeterminate left adrenal nodule (2 cm).  consider pulmonary/onc eval  I spoke with pt's dtr. [place] [phone] at length. as per dtr, pt told her regarding lung nodule and likely it is cancer. as per dtr, pt has not seen her pcp many years even when dtr makes appt, she called office and cancels. Will have psych eval for medical capacity. Although I jeri pt has early stage of dementia and some depression component is there. Dtr wishes her to follow up w/oncology and pulmonary for further work up as outpt while in rehab.     # Hypernatremia. resolved. trend labs.  # Hypercalcemia.  likely due to dehydration - trend labs.  # Hypokalemia. replete  Severe protein-calorie malnutrition and Underweight (BMI < 19).  Diet Regular-  Supplement Feeding Modality:  Oral  Ensure Plus High Protein Cans or Servings Per Day:  1       Frequency:  Three Times a day  #: Transaminitis.   Plan: likely due to chronic alcohol use. Thiamine, Folate, MVI daily   Acute L ankle pain- refusing XR. pain mgmt. risk/benefit and alternate discussed.   dvt PPX post op  Dispo- MANPREET

## 2023-09-06 NOTE — BH CONSULTATION LIAISON ASSESSMENT NOTE - NSBHMSEEYE_PSY_A_CORE
Impression: S/P YAG PC OS - 7 Days. Encounter for surgical aftercare following surgery on a sense organ  Z48.810. Plan: Doing well.   New glasses Rx given
Good

## 2023-09-06 NOTE — BH CONSULTATION LIAISON ASSESSMENT NOTE - NSBHCHARTREVIEWVS_PSY_A_CORE FT
Vital Signs Last 24 Hrs  T(C): 37.4 (06 Sep 2023 11:07), Max: 37.4 (06 Sep 2023 11:07)  T(F): 99.3 (06 Sep 2023 11:07), Max: 99.3 (06 Sep 2023 11:07)  HR: 85 (06 Sep 2023 11:07) (79 - 87)  BP: 117/76 (06 Sep 2023 11:07) (117/72 - 121/75)  BP(mean): --  RR: 17 (06 Sep 2023 11:07) (17 - 18)  SpO2: 97% (06 Sep 2023 11:07) (94% - 98%)    Parameters below as of 06 Sep 2023 11:07  Patient On (Oxygen Delivery Method): room air

## 2023-09-06 NOTE — CHART NOTE - NSCHARTNOTEFT_GEN_A_CORE
Writer was called by Radiology Department stating that Patient sent away 2 transports on two separate occasions who came to take her for xray because "she did not feel like it." This is in direct contradiction to how Patient presented earlier on assessment where she was reporting distressing left foot pain and allowed physical exam which was notable for discoloration, tenderness to touch and inability to move whatsoever. Patient was able to express concern and need to "have it looked at" and was in agreement with emergent imaging. Given the interval events, along with [place] documentation of consecutive days of refusing xray all indicate that Patient's mental status has changed. Namely, Patient is manifesting impaired judgment and insight. Given that Patient sustained a significant enough fall to suffer a hip fracture requiring surgery and has ongoing left foot complaints w/ significant findings on exam, it is imperative that an xray of her left foot be performed urgently. A potential fracture has to be ruled out and given the time elapsed already since Pt's fall, no further delay can be reasonably incurred. Therefore, Patient at this time does NOT have capacity to refuse a STAT left foot xray.   - Ativan 2mg IVP x 1 dose may be used for agitation, anxiousness if needed Writer was called by Radiology Department stating that Patient sent away 2 transports on two separate occasions who came to take her for xray because "she did not feel like it." This is in direct contradiction to how Patient presented earlier on assessment where she was reporting distressing left foot pain and allowed physical exam which was notable for discoloration, tenderness to touch and inability to move whatsoever. Patient was able to express concern and need to "have it looked at" and was in agreement with emergent imaging. Given the interval events, along with [place] documentation of consecutive days of refusing xray all indicate that Patient's mental status has changed. Namely, Patient is manifesting impaired judgment and insight. Given that Patient sustained a significant enough fall to suffer a hip fracture requiring surgery and has ongoing left foot complaints w/ significant findings on exam, it is imperative that an xray of her left foot be performed urgently. A potential fracture has to be ruled out and given the time elapsed already since Pt's fall, no further delay can be reasonably incurred. Therefore, Patient at this time does NOT have capacity to refuse a STAT left foot xray.   - Ativan 2mg IVP x 1 dose may be used for agitation, anxiousness if needed Writer was called by Radiology Department stating that Patient sent away 2 transports on two separate occasions who came to take her for xray because "she did not feel like it." This is in direct contradiction to how Patient presented earlier on assessment where she was reporting distressing left foot pain and allowed physical exam which was notable for discoloration, tenderness to touch and inability to move whatsoever. Patient was able to express concern and need to "have it looked at" and was in agreement with emergent imaging. Given the interval events, along with [place] documentation of consecutive days of refusing xray all indicate that Patient's mental status has changed. Namely, Patient is manifesting impaired judgment and insight. Given that Patient sustained a significant enough fall to suffer a hip fracture requiring surgery and has ongoing left foot complaints w/ significant findings on exam, it is imperative that an xray of her left foot be performed urgently. A potential fracture has to be ruled out and given the time elapsed already since Pt's fall, no further delay can be reasonably incurred. Therefore, Patient at this time does NOT have capacity to refuse a STAT left foot xray.   - Ativan 2mg IVP x 1 dose may be used for agitation, anxiousness if needed Writer was called by Radiology Department stating that Patient sent away 2 transports on two separate occasions who came to take her for xray because "she did not feel like it." This is in direct contradiction to how Patient presented earlier on assessment where she was reporting distressing left foot pain and allowed physical exam which was notable for discoloration, tenderness to touch and inability to move whatsoever. Patient was able to express concern and need to "have it looked at" and was in agreement with emergent imaging. Given the interval events, along with [place] documentation of consecutive days of refusing xray without any reasonable explanation given, all indicate that Patient's mental status has changed. Namely, Patient is manifesting impaired judgment and insight. Given that Patient sustained a significant enough fall to suffer a hip fracture requiring surgery and has ongoing left foot complaints w/ significant findings on exam, it is imperative that an xray of her left foot be performed urgently. A potential fracture has to be ruled out and given the time elapsed already since Pt's fall, no further delay can be reasonably incurred. Therefore, Patient at this time does NOT have capacity to refuse a STAT left foot xray.   - Ativan 2mg IVP x 1 dose may be used for agitation, anxiousness if needed Writer was called by Radiology Department stating that Patient sent away 2 transports on two separate occasions who came to take her for xray because "she did not feel like it." This is in direct contradiction to how Patient presented earlier on assessment where she was reporting distressing left foot pain and allowed physical exam which was notable for discoloration, tenderness to touch and inability to move whatsoever. Patient was able to express concern and need to "have it looked at" and was in agreement with emergent imaging. Given the interval events, along with [place] documentation of consecutive days of refusing xray without any reasonable explanation given, all indicate that Patient's mental status has changed. Namely, Patient is manifesting impaired judgment and insight. Given that Patient sustained a significant enough fall to suffer a hip fracture requiring surgery and has ongoing left foot complaints w/ significant findings on exam, it is imperative that an xray of her left foot be performed urgently. A potential fracture has to be ruled out and given the time elapsed already since Pt's fall, no further delay can be reasonably incurred. Therefore, Patient at this time does NOT have capacity to refuse a STAT left foot xray.   - Ativan 2mg IVP x 1 dose may be used for agitation, anxiousness if needed Writer was called by Radiology Department stating that Patient sent away 2 transports on two separate occasions who came to take her for xray because "she did not feel like it." This is in direct contradiction to how Patient presented earlier on assessment where she was reporting distressing left foot pain and allowed physical exam which was notable for discoloration, tenderness to touch and inability to move whatsoever. Patient was able to express concern and need to "have it looked at" and was in agreement with emergent imaging. Given the interval events, along with [place] documentation of consecutive days of refusing xray without any reasonable explanation given, all indicate that Patient's mental status has changed. Namely, Patient is manifesting impaired judgment and insight. Given that Patient sustained a significant enough fall to suffer a hip fracture requiring surgery and has ongoing left foot complaints w/ significant findings on exam, it is imperative that an xray of her left foot be performed urgently. A potential fracture has to be ruled out and given the time elapsed already since Pt's fall, no further delay can be reasonably incurred. Therefore, Patient at this time does NOT have capacity to refuse a STAT left foot xray.   - Ativan 2mg IVP x 1 dose may be used for agitation, anxiousness if needed

## 2023-09-06 NOTE — BH CONSULTATION LIAISON ASSESSMENT NOTE - CURRENT MEDICATION
MEDICATIONS  (STANDING):  enoxaparin Injectable 40 milliGRAM(s) SubCutaneous every 24 hours  folic acid 1 milliGRAM(s) Oral daily  multivitamin/minerals 1 Tablet(s) Oral daily  polyethylene glycol 3350 17 Gram(s) Oral daily  potassium phosphate / sodium phosphate Powder (PHOS-NaK) 1 Packet(s) Oral once  senna 2 Tablet(s) Oral at bedtime  thiamine 100 milliGRAM(s) Oral daily    MEDICATIONS  (PRN):  acetaminophen     Tablet .. 650 milliGRAM(s) Oral every 6 hours PRN Mild Pain (1 - 3)  oxycodone    5 mG/acetaminophen 325 mG 1 Tablet(s) Oral every 6 hours PRN Moderate Pain (4 - 6)  oxyCODONE    IR 5 milliGRAM(s) Oral every 4 hours PRN Moderate Pain (4 - 6)

## 2023-09-07 LAB
ANION GAP SERPL CALC-SCNC: 6 MMOL/L — SIGNIFICANT CHANGE UP (ref 5–17)
BUN SERPL-MCNC: 12 MG/DL — SIGNIFICANT CHANGE UP (ref 7–23)
CALCIUM SERPL-MCNC: 9 MG/DL — SIGNIFICANT CHANGE UP (ref 8.5–10.1)
CHLORIDE SERPL-SCNC: 105 MMOL/L — SIGNIFICANT CHANGE UP (ref 96–108)
CO2 SERPL-SCNC: 26 MMOL/L — SIGNIFICANT CHANGE UP (ref 22–31)
CREAT SERPL-MCNC: 0.4 MG/DL — LOW (ref 0.5–1.3)
EGFR: 103 ML/MIN/1.73M2 — SIGNIFICANT CHANGE UP
GLUCOSE SERPL-MCNC: 103 MG/DL — HIGH (ref 70–99)
HCT VFR BLD CALC: 36 % — SIGNIFICANT CHANGE UP (ref 34.5–45)
HGB BLD-MCNC: 11.5 G/DL — SIGNIFICANT CHANGE UP (ref 11.5–15.5)
MCHC RBC-ENTMCNC: 28.3 PG — SIGNIFICANT CHANGE UP (ref 27–34)
MCHC RBC-ENTMCNC: 31.9 G/DL — LOW (ref 32–36)
MCV RBC AUTO: 88.5 FL — SIGNIFICANT CHANGE UP (ref 80–100)
NRBC # BLD: 0 /100 WBCS — SIGNIFICANT CHANGE UP (ref 0–0)
PLATELET # BLD AUTO: 546 K/UL — HIGH (ref 150–400)
POTASSIUM SERPL-MCNC: 3.7 MMOL/L — SIGNIFICANT CHANGE UP (ref 3.5–5.3)
POTASSIUM SERPL-SCNC: 3.7 MMOL/L — SIGNIFICANT CHANGE UP (ref 3.5–5.3)
RBC # BLD: 4.07 M/UL — SIGNIFICANT CHANGE UP (ref 3.8–5.2)
RBC # FLD: 13.4 % — SIGNIFICANT CHANGE UP (ref 10.3–14.5)
SODIUM SERPL-SCNC: 137 MMOL/L — SIGNIFICANT CHANGE UP (ref 135–145)
WBC # BLD: 13.91 K/UL — HIGH (ref 3.8–10.5)
WBC # FLD AUTO: 13.91 K/UL — HIGH (ref 3.8–10.5)

## 2023-09-07 PROCEDURE — 99231 SBSQ HOSP IP/OBS SF/LOW 25: CPT

## 2023-09-07 PROCEDURE — 73610 X-RAY EXAM OF ANKLE: CPT | Mod: 26,LT

## 2023-09-07 PROCEDURE — 99232 SBSQ HOSP IP/OBS MODERATE 35: CPT

## 2023-09-07 RX ADMIN — OXYCODONE HYDROCHLORIDE 5 MILLIGRAM(S): 5 TABLET ORAL at 07:29

## 2023-09-07 RX ADMIN — POLYETHYLENE GLYCOL 3350 17 GRAM(S): 17 POWDER, FOR SOLUTION ORAL at 13:34

## 2023-09-07 RX ADMIN — OXYCODONE HYDROCHLORIDE 5 MILLIGRAM(S): 5 TABLET ORAL at 22:34

## 2023-09-07 RX ADMIN — OXYCODONE HYDROCHLORIDE 5 MILLIGRAM(S): 5 TABLET ORAL at 10:45

## 2023-09-07 RX ADMIN — Medication 2 MILLIGRAM(S): at 10:33

## 2023-09-07 RX ADMIN — SENNA PLUS 2 TABLET(S): 8.6 TABLET ORAL at 22:34

## 2023-09-07 RX ADMIN — OXYCODONE HYDROCHLORIDE 5 MILLIGRAM(S): 5 TABLET ORAL at 18:34

## 2023-09-07 RX ADMIN — Medication 1 TABLET(S): at 13:35

## 2023-09-07 RX ADMIN — OXYCODONE HYDROCHLORIDE 5 MILLIGRAM(S): 5 TABLET ORAL at 06:24

## 2023-09-07 RX ADMIN — OXYCODONE HYDROCHLORIDE 5 MILLIGRAM(S): 5 TABLET ORAL at 10:27

## 2023-09-07 RX ADMIN — OXYCODONE HYDROCHLORIDE 5 MILLIGRAM(S): 5 TABLET ORAL at 18:11

## 2023-09-07 RX ADMIN — ENOXAPARIN SODIUM 40 MILLIGRAM(S): 100 INJECTION SUBCUTANEOUS at 13:31

## 2023-09-07 RX ADMIN — Medication 650 MILLIGRAM(S): at 18:54

## 2023-09-07 RX ADMIN — Medication 1 MILLIGRAM(S): at 13:34

## 2023-09-07 RX ADMIN — Medication 100 MILLIGRAM(S): at 13:36

## 2023-09-07 RX ADMIN — Medication 650 MILLIGRAM(S): at 19:22

## 2023-09-07 RX ADMIN — Medication 650 MILLIGRAM(S): at 09:37

## 2023-09-07 RX ADMIN — Medication 650 MILLIGRAM(S): at 08:38

## 2023-09-07 RX ADMIN — OXYCODONE HYDROCHLORIDE 5 MILLIGRAM(S): 5 TABLET ORAL at 23:30

## 2023-09-07 NOTE — PROGRESS NOTE ADULT - SUBJECTIVE AND OBJECTIVE BOX
Patient is a 77y old  Female who presents with a chief complaint of s/p fall (07 Sep 2023 09:05)      OVERNIGHT EVENTS:  Patients seen and examined at bedside this morning. No acute events overnight.    REVIEW OF SYSTEMS: denies chest pain/SOB, diaphoresis, no F/C, cough, dizziness, headache, blurry vision, nausea, vomiting, abdominal pain. All others review of systems negative     MEDICATIONS  (STANDING):  enoxaparin Injectable 40 milliGRAM(s) SubCutaneous every 24 hours  folic acid 1 milliGRAM(s) Oral daily  multivitamin/minerals 1 Tablet(s) Oral daily  polyethylene glycol 3350 17 Gram(s) Oral daily  potassium phosphate / sodium phosphate Powder (PHOS-NaK) 1 Packet(s) Oral once  senna 2 Tablet(s) Oral at bedtime  thiamine 100 milliGRAM(s) Oral daily    MEDICATIONS  (PRN):  acetaminophen     Tablet .. 650 milliGRAM(s) Oral every 6 hours PRN Mild Pain (1 - 3)  oxyCODONE    IR 5 milliGRAM(s) Oral every 4 hours PRN Moderate Pain (4 - 6)      Allergies    No Known Allergies    Intolerances        T(F): 98.4 (09-07-23 @ 14:49), Max: 98.4 (09-07-23 @ 14:49)  HR: 75 (09-07-23 @ 14:49) (75 - 86)  BP: 112/71 (09-07-23 @ 14:49) (99/63 - 116/68)  RR: 18 (09-07-23 @ 14:49) (17 - 18)  SpO2: 96% (09-07-23 @ 14:49) (93% - 97%)  Wt(kg): --    PHYSICAL EXAM:  GENERAL: NAD  HEAD:  Atraumatic, Normocephalic  EYES: PERRLA, conjunctiva and sclera clear  ENMT: Moist mucous membranes  NECK: Supple, No JVD, Normal thyroid  NERVOUS SYSTEM:  Alert & Awake  CHEST/LUNG: Clear to percussion bilaterally;   HEART: Regular rate and rhythm;   ABDOMEN: Soft, Nontender, Nondistended; Bowel sounds present  EXTREMITIES:  no edema BL LE  SKIN: moist    LABS:                        11.5   13.91 )-----------( 546      ( 07 Sep 2023 06:50 )             36.0     09-07    137  |  105  |  12  ----------------------------<  103<H>  3.7   |  26  |  0.40<L>    Ca    9.0      07 Sep 2023 06:50        Urinalysis Basic - ( 07 Sep 2023 06:50 )    Color: x / Appearance: x / SG: x / pH: x  Gluc: 103 mg/dL / Ketone: x  / Bili: x / Urobili: x   Blood: x / Protein: x / Nitrite: x   Leuk Esterase: x / RBC: x / WBC x   Sq Epi: x / Non Sq Epi: x / Bacteria: x      Cultures;   CAPILLARY BLOOD GLUCOSE        Lipid panel:           RADIOLOGY & ADDITIONAL TESTS:    Imaging Personally Reviewed:  [x ] YES      Consultant(s) Notes Reviewed:  [x ] YES     Care Discussed with [x ] Consultants [X ] Patient [ ] Family  [x ]    [x ]  Other; RN

## 2023-09-07 NOTE — PROGRESS NOTE ADULT - PROVIDER SPECIALTY LIST ADULT
Orthopedics
Orthopedics
Hospitalist
Orthopedics
Pulmonology
Pulmonology
Hospitalist
Hospitalist
Orthopedics
Orthopedics
Heme/Onc
Hospitalist

## 2023-09-07 NOTE — PROGRESS NOTE ADULT - NUTRITIONAL ASSESSMENT
This patient has been assessed with a concern for Malnutrition and has been determined to have a diagnosis/diagnoses of Severe protein-calorie malnutrition and Underweight (BMI < 19).    This patient is being managed with:   Diet Regular-  Supplement Feeding Modality:  Oral  Ensure Plus High Protein Cans or Servings Per Day:  1       Frequency:  Three Times a day  Entered: Aug 31 2023 11:42AM    Diet Regular-  Entered: Aug 29 2023  4:08PM    Diet NPO after Midnight-     NPO Start Date: 28-Aug-2023   NPO Start Time: 23:59  Except Medications  Entered: Aug 28 2023  6:51PM    The following pending diet order is being considered for treatment of Severe protein-calorie malnutrition and Underweight (BMI < 19):null
This patient has been assessed with a concern for Malnutrition and has been determined to have a diagnosis/diagnoses of Severe protein-calorie malnutrition and Underweight (BMI < 19).    This patient is being managed with:   Diet Regular-  Supplement Feeding Modality:  Oral  Ensure Plus High Protein Cans or Servings Per Day:  1       Frequency:  Three Times a day  Entered: Aug 31 2023 11:42AM  

## 2023-09-07 NOTE — PROGRESS NOTE ADULT - ASSESSMENT
77F active smoker 1/2PPD presents s/p fall with L hip fracture. s/p Left hip hemiarthroplasty 8/29. Incidentally found during trauma/fall work up to have RUL spiculated lung lesion and RUL nodule and liver lesions concerning for metastatic disease      - pt is amendable for work up of underlying lung lesion and hepatic lesions  - she has been informed that she may have underlying cancer with spread and we do not have tissue diagnosis to know what kind of cancer she may have  - pt able to verbally reiterate what was discussed with her regarding abnormal findings in her lung and concern for cancer "I know, I have spots on my lung and I know what it is, it's cancer"  - she however states she does not want to pursue further work up or biopsy inpatient and would rather have this done outpatient and states her daughter is aware and she has spoken to her and will have her daughter bring her to whatever appointment necessary outpatient  - d/w hospitalist for outpatient work up  - primary team has been speaking with daughter  - can follow at [address] [place] [address] [address] [place] Tel [phone] outpatient. Follow up with Dr. Juwan Laws interventional pulmonary  - she also has underlying emphysema likely from her long smoking history  - nicotine patch  - albuterol prn SOB, she has no respiratory complaints of SOB at present time  - pain control, PT, ortho follow up  - L hip feels a little firm and full, surgical site clean and intact without ecchymosis but tender to palpation. H/H stable  - please reconsult pulmonary for further assistance if needed. no acute pulmonary issues at present time 77F active smoker 1/2PPD presents s/p fall with L hip fracture. s/p Left hip hemiarthroplasty 8/29. Incidentally found during trauma/fall work up to have RUL spiculated lung lesion and RUL nodule and liver lesions concerning for metastatic disease      - pt is amendable for work up of underlying lung lesion and hepatic lesions  - she has been informed that she may have underlying cancer with spread and we do not have tissue diagnosis to know what kind of cancer she may have  - pt able to verbally reiterate what was discussed with her regarding abnormal findings in her lung and concern for cancer "I know, I have spots on my lung and I know what it is, it's cancer"  - she however states she does not want to pursue further work up or biopsy inpatient and would rather have this done outpatient and states her daughter is aware and she has spoken to her and will have her daughter bring her to whatever appointment necessary outpatient  - d/w hospitalist for outpatient work up  - primary team has been speaking with daughter  - can follow at [address] [place] [address] [address] [place] Tel [phone] outpatient. Follow up with Dr. Juwan Laws interventional pulmonary  - she also has underlying emphysema likely from her long smoking history  - nicotine patch  - albuterol prn SOB, she has no respiratory complaints of SOB at present time  - pain control, PT, ortho follow up  - L hip feels a little firm and full, surgical site clean and intact without ecchymosis but tender to palpation. H/H stable  - please reconsult pulmonary for further assistance if needed. no acute pulmonary issues at present time 77F active smoker 1/2PPD presents s/p fall with L hip fracture. s/p Left hip hemiarthroplasty 8/29. Incidentally found during trauma/fall work up to have RUL spiculated lung lesion and RUL nodule and liver lesions concerning for metastatic disease      - pt is amendable for work up of underlying lung lesion and hepatic lesions  - she has been informed that she may have underlying cancer with spread and we do not have tissue diagnosis to know what kind of cancer she may have  - pt able to verbally reiterate what was discussed with her regarding abnormal findings in her lung and concern for cancer "I know, I have spots on my lung and I know what it is, it's cancer"  - she however states she does not want to pursue further work up or biopsy inpatient and would rather have this done outpatient and states her daughter is aware and she has spoken to her and will have her daughter bring her to whatever appointment necessary outpatient  - d/w hospitalist for outpatient work up  - primary team has been speaking with daughter  - can follow at [address] [place] [address] [address] [place] Tel [phone] outpatient. Follow up with Dr. Juwan Laws interventional pulmonary  - she also has underlying emphysema likely from her long smoking history  - nicotine patch  - albuterol prn SOB, she has no respiratory complaints of SOB at present time  - pain control, PT, ortho follow up  - L hip feels a little firm and full, surgical site clean and intact without ecchymosis but tender to palpation. H/H stable  - please reconsult pulmonary for further assistance if needed. no acute pulmonary issues at present time

## 2023-09-07 NOTE — BH CONSULTATION LIAISON PROGRESS NOTE - NSBHCHARTREVIEWVS_PSY_A_CORE FT
Vital Signs Last 24 Hrs  T(C): 36.9 (07 Sep 2023 14:49), Max: 36.9 (07 Sep 2023 14:49)  T(F): 98.4 (07 Sep 2023 14:49), Max: 98.4 (07 Sep 2023 14:49)  HR: 75 (07 Sep 2023 14:49) (75 - 86)  BP: 112/71 (07 Sep 2023 14:49) (99/63 - 116/68)  BP(mean): --  RR: 18 (07 Sep 2023 14:49) (17 - 18)  SpO2: 96% (07 Sep 2023 14:49) (93% - 96%)    Parameters below as of 07 Sep 2023 14:49  Patient On (Oxygen Delivery Method): room air

## 2023-09-07 NOTE — PROGRESS NOTE ADULT - TIME BILLING
Patient encounter, exam F-2-F, medical decision making , chart review and documentation

## 2023-09-07 NOTE — BH CONSULTATION LIAISON PROGRESS NOTE - CURRENT MEDICATION
MEDICATIONS  (STANDING):  enoxaparin Injectable 40 milliGRAM(s) SubCutaneous every 24 hours  folic acid 1 milliGRAM(s) Oral daily  multivitamin/minerals 1 Tablet(s) Oral daily  polyethylene glycol 3350 17 Gram(s) Oral daily  potassium phosphate / sodium phosphate Powder (PHOS-NaK) 1 Packet(s) Oral once  senna 2 Tablet(s) Oral at bedtime  thiamine 100 milliGRAM(s) Oral daily    MEDICATIONS  (PRN):  acetaminophen     Tablet .. 650 milliGRAM(s) Oral every 6 hours PRN Mild Pain (1 - 3)  oxyCODONE    IR 5 milliGRAM(s) Oral every 4 hours PRN Moderate Pain (4 - 6)

## 2023-09-07 NOTE — BH CONSULTATION LIAISON PROGRESS NOTE - NSBHFUPINTERVALHXFT_PSY_A_CORE
After much back and worth, staff encouragement, Patient finally cooperated with left foot xray - mild degenerative change with no fracture, dislocation or radiographic soft tissue abnormality. Pain likely due to bruising after Patient landed on it when she fell - ice pack, elevate, pain medication PRN.

## 2023-09-07 NOTE — BH CONSULTATION LIAISON PROGRESS NOTE - NSBHCONSULTRECOMMENDOTHER_PSY_A_CORE FT
9/6/23: STAT xray of left foot/ankle, + ice packs + pain meds (ordered).... imaging on [place] only of hip, chest, femur, had, spine  9/7/23: left foot/ankle xray - mild degenerative change with no fracture. Pain likely due to bruising after Patient landed on it when she fell - ice pack, elevate, pain medication PRN suffices.  9/6/23: STAT xray of left foot/ankle, + ice packs + pain meds (ordered).... imaging on [place] only of hip, chest, femur, had, spine  9/7/23: left foot/ankle xray - mild degenerative change with no fracture. Pain likely due to bruising after Patient landed on it when she fell - ice pack, elevate, pain medication PRN suffices.  9/6/23: STAT xray of left foot/ankle, + ice packs + pain meds (ordered).... imaging on [place] only of hip, chest, femur, had, spine  9/7/23: left foot/ankle xray - mild degenerative change with no fracture. Pain likely due to bruising after Patient landed on it when she fell - ice pack, elevate, pain medication PRN suffices.  9/6/23: STAT xray of left foot/ankle, + ice packs + pain meds (ordered).... imaging on [place] only of hip, chest, femur, had, spine  9/7/23: left foot/ankle xray - mild degenerative change with no fracture. Pain likely due to bruising after Patient landed on it when she fell - ice pack, elevate, pain medication PRN suffices.   - Patient otherwise compliant with medications and most treatments  - capacity is fluid, changes over time and a capacity evaluation answers one question at a time and considers the imminent risk to Patient and invasiveness, painfulness, characteristic of proposed treatment  9/6/23: STAT xray of left foot/ankle, + ice packs + pain meds (ordered).... imaging on [place] only of hip, chest, femur, had, spine  9/7/23: left foot/ankle xray - mild degenerative change with no fracture. Pain likely due to bruising after Patient landed on it when she fell - ice pack, elevate, pain medication PRN suffices.   - Patient otherwise compliant with medications and most treatments  - capacity is fluid, changes over time and a capacity evaluation answers one question at a time and considers the imminent risk to Patient and invasiveness, painfulness, characteristic of proposed treatment  9/6/23: STAT xray of left foot/ankle, + ice packs + pain meds (ordered).... imaging on [place] only of hip, chest, femur, had, spine  9/7/23: left foot/ankle xray - mild degenerative change with no fracture. Pain likely due to bruising after Patient landed on it when she fell - ice pack, elevate, pain medication PRN suffices.   - Patient otherwise compliant with medications and most treatments  - capacity is fluid, changes over time and a capacity evaluation answers one question at a time and considers the imminent risk to Patient and invasiveness, painfulness, characteristic of proposed treatment

## 2023-09-07 NOTE — PROGRESS NOTE ADULT - SUBJECTIVE AND OBJECTIVE BOX
24 hr events:  still has complaints of L leg pain (unchcanged) "it hurts from my hip to my ankle"  pt self removed her lower extremity wedge pillow "It's not comfortable"  went for imaging of her ankle this morning  no SOB, no cough  on RA oxygenating well  no acute events overnight      ## ROS:  no fever, no chills  no HA, no dizziness  no visual changes, no auditory changes  no sore throat, no sinus congestion  no SOB, no cough  no chest pain, no palpitations  no abdominal pain, no N/V/D  no dysuria, no hematuria  L leg pain  no swelling  no rashes, no pruritis  + generalized weakness        ## Labs:  CBC:                        11.5   13.91 )-----------( 546      ( 07 Sep 2023 06:50 )             36.0     09-07    137  |  105  |  12  ----------------------------<  103<H>  3.7   |  26  |  0.40<L>    Ca    9.0      07 Sep 2023 06:50        Culture - Urine (08.28.23 @ 21:02)    Specimen Source: Clean Catch Clean Catch (Midstream)   Culture Results: <10,000 CFU/mL Normal Urogenital Tashia      ## Imaging:  CXR < from: Xray Chest 1 View- PORTABLE-Urgent (Xray Chest 1 View- PORTABLE-Urgent .) (08.28.23 @ 16:01) >  No acute pulmonary disease.    ------------------------  < from: CT Chest No Cont (09.01.23 @ 12:15) >  1. Right upper lobe nodules a 1.9 x 1.4 cm (spiculated) and 0.9 cm,   suspicious for malignancy/metastases.  2. Emphysema.  3. Multiple hepatic hypodense lesions and masses suspicious for   malignancy/metastases.  4. Indeterminate left adrenal nodule (2 cm).    ----------------------  < from: Xray Pelvis AP only (08.28.23 @ 18:34) >  There is a displaced left subcapital femoral neck fracture with moderate   adjacent swelling.    -------------------------  < from: CT Pelvis No Cont (08.28.23 @ 15:59) >    OSSEOUS STRUCTURES: There is a left subcapital femoral neck fracture. The   distal fracture fragment is displaced superiorly. No dislocation is   noted. There is mild narrowing the hip joint spaces bilaterally. Mild   productive change at the sacroiliac joints. Degenerative changes of lower   lumbar spine most prominent at the left L4-5 and L5-S1 facet joints.  SYNOVIUM/ JOINT FLUID: There is a small left hip effusion.  TENDONS: The tendons are intact. No full-thickness tear or retraction   noted.  MUSCLES: No intramuscular hematoma.  NEUROVASCULAR STRUCTURES: Mild vascular calcifications are noted.  INTRAPELVIC SOFT TISSUES: No abnormality.  SUBCUTANEOUS SOFT TISSUES: There is mild soft tissue swelling about the   left hip.    3-D reformatted imaging confirms these findings.    IMPRESSION:    Left subcapital femoral neck fracture with displacement.    ----------------------------  < from: CT Head No Cont (08.28.23 @ 15:58) >  Scoliosis is noted. Also there are subluxations with anterolisthesis at   C3-4 and C4-5, as well as C7-T1.    C1/C2  :  The anterior and posterior arches of C1 appear to be intact.   There is no C1-C2 subluxation. No odontoid fracture is noted. Base of C2   appears to be intact    The mid and lower cervical vertebral bodiesappear to be intact. No upper   thoracic fracture is noted.    Degenerative changes are diffusely noted that. Facet arthrosis is most   prominent on the left at C2-3, bilaterally at C3-4, and bilaterally at   C4-5.    Spondylotic changes are more prominent at C5-6 and C6-7.    The right upper lobe multilobulated mass is suggested. CT imaging of the   chest recommended for further assessment.  BRAIN  IMPRESSION:    1)  moderate volume loss and involutional changes, with no acute   abnormality suggested.  2)  no intracerebral hemorrhage, contusion, or extracerebral hemorrhagic   collections identified.    CERVICAL IMPRESSION:    Multilevel degenerative changes. No acute fracture identified.    Possible incidental multinodular mass in the right lung apex, measuring   approximately 2 x 1.5 cm. CT imaging of the chest recommended for further   assessment, along with further clinical correlation.      -------------------------  < from: Xray Ankle Complete 3 Views, Left (09.07.23 @ 11:28) >  Mild degenerative change with no fracture, dislocation or   radiographic soft tissue abnormality.    IMPRESSION:  No fracture          ## Medications:  enoxaparin Injectable 40 milliGRAM(s) SubCutaneous every 24 hours    polyethylene glycol 3350 17 Gram(s) Oral daily  senna 2 Tablet(s) Oral at bedtime    acetaminophen     Tablet .. 650 milliGRAM(s) Oral every 6 hours PRN  LORazepam   Injectable 2 milliGRAM(s) IV Push once  oxyCODONE    IR 5 milliGRAM(s) Oral every 4 hours PRN      ## Vitals:  Vital Signs Last 24 Hrs  T(F): 98.4 (09-07-23 @ 14:49), Max: 98.4 (09-07-23 @ 14:49)  HR: 75 (09-07-23 @ 14:49) (75 - 86)  BP: 112/71 (09-07-23 @ 14:49) (99/63 - 116/68)  RR: 18 (09-07-23 @ 14:49) (17 - 18)  SpO2: 96% (09-07-23 @ 14:49) (93% - 97%)    Patient On (Oxygen Delivery Method): room air          ## P/E:  Gen: thin, cachectic, elderly female, lying comfortably in bed in no apparent distress  HEENT: NC/AT, EOMI, sclera white  Resp: CTA B/L no wheeze, rhonchi  CVS: RRR  Abd: soft NT/ND +BS  Ext: no c/c/e, L  to palpation and slightly internally rotated, L hip surgical site clean and intact but overall L hip feels a bit full/firm ? underlying post surgical hematoma, no visible ecchymosis (Hb stable 11 range)  Neuro: A&Ox3

## 2023-09-07 NOTE — PROGRESS NOTE ADULT - ASSESSMENT
77F with no reported PMH who presents to the ED s/p fall, admitted with left hip fracture and dehydration with electrolyte abnormalities    # Hip fracture, left.  s/p Left hip hemiarthroplasty-  morphine prn pain. DVT prop as per ortho   # UTI (urinary tract infection). s/p ceftriaxone     # Mass of upper lobe of right lung. Mets to Liver probably  ·  Plan: CT chest to eval R apical mass;   1. Right upper lobe nodules a 1.9 x 1.4 cm (spiculated) and 0.9 cm, suspicious for malignancy/metastases.  2. Emphysema.  3. Multiple hepatic hypodense lesions and masses suspicious for malignancy/metastases.  4. Indeterminate left adrenal nodule (2 cm).  pulmonary/onc o/b  pt and pt's dtr. [place] [phone] wishe to follow up w/oncology and pulmonary for further work up as outpt while in rehab.   # Hypernatremia. resolved. trend labs.  # Hypercalcemia.  likely due to dehydration - trend labs.  # Hypokalemia. replete  Severe protein-calorie malnutrition and Underweight (BMI < 19).  Diet Regular-  Supplement Feeding Modality:  Oral  Ensure Plus High Protein Cans or Servings Per Day:  1       Frequency:  Three Times a day  #: Transaminitis.   Plan: likely due to chronic alcohol use. Thiamine, Folate, MVI daily   Acute L ankle pain- XR neg. pain mgmt.   dvt PPX post op  Dispo- MANPREET   77F with no reported PMH who presents to the ED s/p fall, admitted with left hip fracture and dehydration with electrolyte abnormalities    # Hip fracture, left.  s/p Left hip hemiarthroplasty-  morphine prn pain. DVT prop as per ortho   # UTI (urinary tract infection). s/p ceftriaxone     # Mass of upper lobe of right lung. Mets to Liver probably  ·  Plan: CT chest to eval R apical mass;   1. Right upper lobe nodules a 1.9 x 1.4 cm (spiculated) and 0.9 cm, suspicious for malignancy/metastases.  2. Emphysema.  3. Multiple hepatic hypodense lesions and masses suspicious for malignancy/metastases.  4. Indeterminate left adrenal nodule (2 cm).  pulmonary/onc o/b  pt and pt's dtr. [place] [phone] wishe to follow up w/oncology and pulmonary for further work up as outpt while in rehab.   # Hypernatremia. resolved. trend labs.  # Hypercalcemia.  likely due to dehydration - trend labs.  # Hypokalemia. replete  Severe protein-calorie malnutrition and Underweight (BMI < 19).  Diet Regular-  Supplement Feeding Modality:  Oral  Ensure Plus High Protein Cans or Servings Per Day:  1       Frequency:  Three Times a day  #: Transaminitis.   Plan: likely due to chronic alcohol use. Thiamine, Folate, MVI daily   Acute L ankle pain- XR neg. pain mgmt.   dvt PPX post op  Dispo- MANPREET   77F with no reported PMH who presents to the ED s/p fall, admitted with left hip fracture and dehydration with electrolyte abnormalities    # Hip fracture, left.  s/p Left hip hemiarthroplasty-  morphine prn pain. DVT prop as per ortho   # UTI (urinary tract infection). s/p ceftriaxone     # Mass of upper lobe of right lung. Mets to Liver probably  ·  Plan: CT chest to eval R apical mass;   1. Right upper lobe nodules a 1.9 x 1.4 cm (spiculated) and 0.9 cm, suspicious for malignancy/metastases.  2. Emphysema.  3. Multiple hepatic hypodense lesions and masses suspicious for malignancy/metastases.  4. Indeterminate left adrenal nodule (2 cm).  pulmonary/onc o/b  pt and pt's dtr. [place] [phone] wishe to follow up w/oncology and pulmonary for further work up as outpt while in rehab.   # Hypernatremia. resolved. trend labs.  # Hypercalcemia.  likely due to dehydration - trend labs.  # Hypokalemia. replete  Severe protein-calorie malnutrition and Underweight (BMI < 19).  Diet Regular-  Supplement Feeding Modality:  Oral  Ensure Plus High Protein Cans or Servings Per Day:  1       Frequency:  Three Times a day  #: Transaminitis.   Plan: likely due to chronic alcohol use. Thiamine, Folate, MVI daily   Acute L ankle pain- XR neg. pain mgmt.   dvt PPX post op  Dispo- MANPREET

## 2023-09-07 NOTE — BH CONSULTATION LIAISON PROGRESS NOTE - NSBHCHARTREVIEWLAB_PSY_A_CORE FT
09-07    137  |  105  |  12  ----------------------------<  103<H>  3.7   |  26  |  0.40<L>    Ca    9.0      07 Sep 2023 06:50

## 2023-09-07 NOTE — BH CONSULTATION LIAISON PROGRESS NOTE - NSBHATTESTBILLING_PSY_A_CORE
[id number] OBS or IP - low complexity OR 25-34 mins [id number] OBS or IP - low complexity OR 25-34 mins [id number] OBS or IP - low complexity OR 25-34 mins

## 2023-09-07 NOTE — PROGRESS NOTE ADULT - REASON FOR ADMISSION
OLIVER Mejia
s/p fall

## 2023-09-07 NOTE — PROGRESS NOTE ADULT - ASSESSMENT
77F with no reported PMH (no OP f/.u) who presents to the ED s/p fall. Found to have L hip fx s/p Hemiarthroplasty 8/29. Upon w/u for OR clearance found to have lung nodules.    #Lung Nodules  -patient is present smoker 1 PPD/ 20+ years, hx ETOH, no outside following. Patient admits to 30 lbs weight loss w/ some night sweats, denies fever or chills.  -patient presneted to [facility] ER when found down for unknown time s/p fall w/ hip FX. Upon OR clearance patient found to have lung nodules.  -CT C (9/1) showing Right upper lobe nodules a 1.9 x 1.4 cm (spiculated) and 0.9 cm, suspicious for malignancy/metastases, Emphysema, Multiple hepatic hypodense lesions and masses suspicious for malignancy/metastases, Indeterminate left adrenal nodule (2 cm).  -discussed findings w/ patient as well as Hospitalist Dr. Atkinson patient states she wishes to pursue further imagining/ w/u OP, stated she knows its likely cancer but does not wish to pursue BX inpatient, but states she would be open to tx if it doesn't make her feel to ill.   -patient currently recovering from L hip fx s/p Hemiarthroplasty 8/29  -Rec palliative eval.   -patient will need heme/onc f/u OP for pet scan/ BX.  -referred to [facility] [address] [place] [address]· [phone] who will contact patient /daughter for f/u       Will sign off for now, Please reconsult if needed, Thank You   Please call with any questions [phone]  Above reviewed with Attending Dr. Clyde CHEN/[facility] Hem/Onc  [address] [facility], [address], [place], [place]  [phone]  *Note not finalized until signed by Attending Physician         77F with no reported PMH (no OP f/.u) who presents to the ED s/p fall. Found to have L hip fx s/p Hemiarthroplasty 8/29. Upon w/u for OR clearance found to have lung nodules.    #Lung Nodules  -patient is present smoker 1 PPD/ 20+ years, hx ETOH, no outside following. Patient admits to 30 lbs weight loss w/ some night sweats, denies fever or chills.  -patient presneted to [facility] ER when found down for unknown time s/p fall w/ hip FX. Upon OR clearance patient found to have lung nodules.  -CT C (9/1) showing Right upper lobe nodules a 1.9 x 1.4 cm (spiculated) and 0.9 cm, suspicious for malignancy/metastases, Emphysema, Multiple hepatic hypodense lesions and masses suspicious for malignancy/metastases, Indeterminate left adrenal nodule (2 cm).  -discussed findings w/ patient as well as Hospitalist Dr. Atkinson patient states she wishes to pursue further imagining/ w/u OP, stated she knows its likely cancer but does not wish to pursue BX inpatient, but states she would be open to tx if it doesn't make her feel to ill.   -patient currently recovering from L hip fx s/p Hemiarthroplasty 8/29  -Rec palliative eval.   -patient will need heme/onc f/u OP for pet scan/ BX.  -referred to [facility] [address] [place] [address]· [phone] who will contact patient /daughter for f/u       Will sign off for now, Please reconsult if needed, Thank You   Please call with any questions [phone]  Above reviewed with Attending Dr. Clyde CHEN/[facility] Hem/Onc  [address] [facility], [address], [place], [place]  [phone]  *Note not finalized until signed by Attending Physician         77F with no reported PMH (no OP f/.u) who presents to the ED s/p fall. Found to have L hip fx s/p Hemiarthroplasty 8/29. Upon w/u for OR clearance found to have lung nodules.    #Lung Nodules  -patient is present smoker 1 PPD/ 20+ years, hx ETOH, no outside following. Patient admits to 30 lbs weight loss w/ some night sweats, denies fever or chills.  -patient presneted to [facility] ER when found down for unknown time s/p fall w/ hip FX. Upon OR clearance patient found to have lung nodules.  -CT C (9/1) showing Right upper lobe nodules a 1.9 x 1.4 cm (spiculated) and 0.9 cm, suspicious for malignancy/metastases, Emphysema, Multiple hepatic hypodense lesions and masses suspicious for malignancy/metastases, Indeterminate left adrenal nodule (2 cm).  -discussed findings w/ patient as well as Hospitalist Dr. Atkinson patient states she wishes to pursue further imagining/ w/u OP, stated she knows its likely cancer but does not wish to pursue BX inpatient, but states she would be open to tx if it doesn't make her feel to ill.   -patient currently recovering from L hip fx s/p Hemiarthroplasty 8/29  -Rec palliative eval.   -patient will need heme/onc f/u OP for pet scan/ BX.  -referred to [facility] [address] [place] [address]· [phone] who will contact patient /daughter for f/u       Will sign off for now, Please reconsult if needed, Thank You   Please call with any questions [phone]  Above reviewed with Attending Dr. Clyde CHEN/[facility] Hem/Onc  [address] [facility], [address], [place], [place]  [phone]  *Note not finalized until signed by Attending Physician

## 2023-09-07 NOTE — PROGRESS NOTE ADULT - SUBJECTIVE AND OBJECTIVE BOX
Heme/Onc Progress note    INTERVAL HPI/OVERNIGHT EVENTS:  Patient S&E at bedside. c/o L ankle pain, all teams aware, patient refused x ray,     VITAL SIGNS:  T(F): 97.8 (09-07-23 @ 04:30)  HR: 76 (09-07-23 @ 04:30)  BP: 115/59 (09-07-23 @ 04:30)  RR: 17 (09-07-23 @ 04:30)  SpO2: 93% (09-07-23 @ 04:30)  Wt(kg): --    PHYSICAL EXAM:    Constitutional: left ankle pain, frustrated shes unable to walk   Eyes: EOMI, sclera non-icteric  Neck: supple, no masses, no JVD  Respiratory: diminished b/l   Cardiovascular: RRR, no M/R/G  Gastrointestinal: soft, NTND, no masses palpable, + BS,  Extremities: no c/c/e  Neurological: AAOx3      MEDICATIONS  (STANDING):  enoxaparin Injectable 40 milliGRAM(s) SubCutaneous every 24 hours  folic acid 1 milliGRAM(s) Oral daily  LORazepam   Injectable 2 milliGRAM(s) IV Push once  multivitamin/minerals 1 Tablet(s) Oral daily  polyethylene glycol 3350 17 Gram(s) Oral daily  potassium phosphate / sodium phosphate Powder (PHOS-NaK) 1 Packet(s) Oral once  senna 2 Tablet(s) Oral at bedtime  thiamine 100 milliGRAM(s) Oral daily    MEDICATIONS  (PRN):  acetaminophen     Tablet .. 650 milliGRAM(s) Oral every 6 hours PRN Mild Pain (1 - 3)  oxyCODONE    IR 5 milliGRAM(s) Oral every 4 hours PRN Moderate Pain (4 - 6)      Allergies    No Known Allergies    Intolerances        LABS:                        11.5   13.91 )-----------( 546      ( 07 Sep 2023 06:50 )             36.0     09-07    137  |  105  |  12  ----------------------------<  103<H>  3.7   |  26  |  0.40<L>    Ca    9.0      07 Sep 2023 06:50        Urinalysis Basic - ( 07 Sep 2023 06:50 )    Color: x / Appearance: x / SG: x / pH: x  Gluc: 103 mg/dL / Ketone: x  / Bili: x / Urobili: x   Blood: x / Protein: x / Nitrite: x   Leuk Esterase: x / RBC: x / WBC x   Sq Epi: x / Non Sq Epi: x / Bacteria: x        RADIOLOGY & ADDITIONAL TESTS:  Studies reviewed.    ASSESSMENT & PLAN:

## 2023-09-08 ENCOUNTER — TRANSCRIPTION ENCOUNTER (OUTPATIENT)
Age: 78
End: 2023-09-08

## 2023-09-08 VITALS
OXYGEN SATURATION: 97 % | DIASTOLIC BLOOD PRESSURE: 65 MMHG | SYSTOLIC BLOOD PRESSURE: 123 MMHG | HEART RATE: 80 BPM | RESPIRATION RATE: 18 BRPM | TEMPERATURE: 99 F

## 2023-09-08 LAB
ANION GAP SERPL CALC-SCNC: 7 MMOL/L — SIGNIFICANT CHANGE UP (ref 5–17)
BUN SERPL-MCNC: 12 MG/DL — SIGNIFICANT CHANGE UP (ref 7–23)
CALCIUM SERPL-MCNC: 9.1 MG/DL — SIGNIFICANT CHANGE UP (ref 8.5–10.1)
CHLORIDE SERPL-SCNC: 104 MMOL/L — SIGNIFICANT CHANGE UP (ref 96–108)
CO2 SERPL-SCNC: 26 MMOL/L — SIGNIFICANT CHANGE UP (ref 22–31)
CREAT SERPL-MCNC: 0.5 MG/DL — SIGNIFICANT CHANGE UP (ref 0.5–1.3)
EGFR: 97 ML/MIN/1.73M2 — SIGNIFICANT CHANGE UP
GLUCOSE SERPL-MCNC: 108 MG/DL — HIGH (ref 70–99)
HCT VFR BLD CALC: 35.8 % — SIGNIFICANT CHANGE UP (ref 34.5–45)
HGB BLD-MCNC: 11.5 G/DL — SIGNIFICANT CHANGE UP (ref 11.5–15.5)
MCHC RBC-ENTMCNC: 28.3 PG — SIGNIFICANT CHANGE UP (ref 27–34)
MCHC RBC-ENTMCNC: 32.1 G/DL — SIGNIFICANT CHANGE UP (ref 32–36)
MCV RBC AUTO: 88 FL — SIGNIFICANT CHANGE UP (ref 80–100)
NRBC # BLD: 0 /100 WBCS — SIGNIFICANT CHANGE UP (ref 0–0)
PLATELET # BLD AUTO: 588 K/UL — HIGH (ref 150–400)
POTASSIUM SERPL-MCNC: 3.8 MMOL/L — SIGNIFICANT CHANGE UP (ref 3.5–5.3)
POTASSIUM SERPL-SCNC: 3.8 MMOL/L — SIGNIFICANT CHANGE UP (ref 3.5–5.3)
RAPID RVP RESULT: SIGNIFICANT CHANGE UP
RBC # BLD: 4.07 M/UL — SIGNIFICANT CHANGE UP (ref 3.8–5.2)
RBC # FLD: 13.3 % — SIGNIFICANT CHANGE UP (ref 10.3–14.5)
SARS-COV-2 RNA SPEC QL NAA+PROBE: SIGNIFICANT CHANGE UP
SODIUM SERPL-SCNC: 137 MMOL/L — SIGNIFICANT CHANGE UP (ref 135–145)
WBC # BLD: 13.18 K/UL — HIGH (ref 3.8–10.5)
WBC # FLD AUTO: 13.18 K/UL — HIGH (ref 3.8–10.5)

## 2023-09-08 PROCEDURE — 99239 HOSP IP/OBS DSCHRG MGMT >30: CPT

## 2023-09-08 RX ORDER — POLYETHYLENE GLYCOL 3350 17 G/17G
17 POWDER, FOR SOLUTION ORAL
Qty: 0 | Refills: 0 | DISCHARGE
Start: 2023-09-08

## 2023-09-08 RX ORDER — MULTIVIT-MIN/FERROUS GLUCONATE 9 MG/15 ML
1 LIQUID (ML) ORAL
Qty: 0 | Refills: 0 | DISCHARGE
Start: 2023-09-08

## 2023-09-08 RX ORDER — THIAMINE MONONITRATE (VIT B1) 100 MG
1 TABLET ORAL
Qty: 0 | Refills: 0 | DISCHARGE
Start: 2023-09-08

## 2023-09-08 RX ORDER — OXYCODONE HYDROCHLORIDE 5 MG/1
1 TABLET ORAL
Qty: 0 | Refills: 0 | DISCHARGE
Start: 2023-09-08

## 2023-09-08 RX ORDER — SENNA PLUS 8.6 MG/1
2 TABLET ORAL
Qty: 0 | Refills: 0 | DISCHARGE
Start: 2023-09-08

## 2023-09-08 RX ORDER — FOLIC ACID 0.8 MG
1 TABLET ORAL
Qty: 0 | Refills: 0 | DISCHARGE
Start: 2023-09-08

## 2023-09-08 RX ADMIN — Medication 650 MILLIGRAM(S): at 00:41

## 2023-09-08 RX ADMIN — Medication 650 MILLIGRAM(S): at 01:45

## 2023-09-08 RX ADMIN — POLYETHYLENE GLYCOL 3350 17 GRAM(S): 17 POWDER, FOR SOLUTION ORAL at 13:06

## 2023-09-08 RX ADMIN — Medication 650 MILLIGRAM(S): at 09:57

## 2023-09-08 RX ADMIN — ENOXAPARIN SODIUM 40 MILLIGRAM(S): 100 INJECTION SUBCUTANEOUS at 12:21

## 2023-09-08 RX ADMIN — Medication 650 MILLIGRAM(S): at 08:39

## 2023-09-08 RX ADMIN — Medication 100 MILLIGRAM(S): at 12:21

## 2023-09-08 RX ADMIN — OXYCODONE HYDROCHLORIDE 5 MILLIGRAM(S): 5 TABLET ORAL at 17:08

## 2023-09-08 RX ADMIN — Medication 1 MILLIGRAM(S): at 12:21

## 2023-09-08 RX ADMIN — OXYCODONE HYDROCHLORIDE 5 MILLIGRAM(S): 5 TABLET ORAL at 05:27

## 2023-09-08 RX ADMIN — Medication 1 TABLET(S): at 12:23

## 2023-09-08 RX ADMIN — OXYCODONE HYDROCHLORIDE 5 MILLIGRAM(S): 5 TABLET ORAL at 15:38

## 2023-09-08 NOTE — DISCHARGE NOTE NURSING/CASE MANAGEMENT/SOCIAL WORK - NSDCPEFALRISK_GEN_ALL_CORE
For information on Fall & Injury Prevention, visit: [email or web address]/[email or web address]-[email or web address]-health-[email or web address]-mobility OR  [email or web address]://[email or web address].[email or web address]/[email or web address]-prevention-tips-to-avoid-injury OR  https://www.cdc.gov/steadi/patient.html For information on Fall & Injury Prevention, visit: https://www.[facility]/news/fall-prevention-protects-and-maintains-health-and-mobility OR  https://www.[facility]/news/fall-prevention-tips-to-avoid-injury OR  https://www.cdc.gov/steadi/patient.html For information on Fall & Injury Prevention, visit: https://www.[facility]/news/fall-prevention-protects-and-maintains-health-and-mobility OR  https://www.[facility]/news/fall-prevention-tips-to-avoid-injury OR  https://www.cdc.gov/steadi/patient.html

## 2023-09-08 NOTE — DISCHARGE NOTE NURSING/CASE MANAGEMENT/SOCIAL WORK - PATIENT PORTAL LINK FT
You can access the FollowMyHealth Patient Portal offered by [facility] by registering at the following website: [email or web address]. By joining [employer]’s FollowMyHealth portal, you will also be able to view your health information using other applications (apps) compatible with our system. You can access the FollowMyHealth Patient Portal offered by [facility] by registering at the following website: [email or web address]. By joining [email or web address]’s FollowMyHealth portal, you will also be able to view your health information using other applications (apps) compatible with our system. You can access the FollowMyHealth Patient Portal offered by [facility] by registering at the following website: [email or web address]. By joining [employer]’s FollowMyHealth portal, you will also be able to view your health information using other applications (apps) compatible with our system.

## 2023-09-15 DIAGNOSIS — E43 UNSPECIFIED SEVERE PROTEIN-CALORIE MALNUTRITION: ICD-10-CM

## 2023-09-15 DIAGNOSIS — F17.210 NICOTINE DEPENDENCE, CIGARETTES, UNCOMPLICATED: ICD-10-CM

## 2023-09-15 DIAGNOSIS — E83.52 HYPERCALCEMIA: ICD-10-CM

## 2023-09-15 DIAGNOSIS — C34.90 MALIGNANT NEOPLASM OF UNSPECIFIED PART OF UNSPECIFIED BRONCHUS OR LUNG: ICD-10-CM

## 2023-09-15 DIAGNOSIS — M25.571 PAIN IN RIGHT ANKLE AND JOINTS OF RIGHT FOOT: ICD-10-CM
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
## 2023-09-15 DIAGNOSIS — F10.20 ALCOHOL DEPENDENCE, UNCOMPLICATED: ICD-10-CM

## 2023-09-15 DIAGNOSIS — E87.6 HYPOKALEMIA: ICD-10-CM

## 2023-09-15 DIAGNOSIS — S72.092A OTHER FRACTURE OF HEAD AND NECK OF LEFT FEMUR, INITIAL ENCOUNTER FOR CLOSED FRACTURE: ICD-10-CM

## 2023-09-15 DIAGNOSIS — J43.9 EMPHYSEMA, UNSPECIFIED: ICD-10-CM

## 2023-09-15 DIAGNOSIS — E87.0 HYPEROSMOLALITY AND HYPERNATREMIA: ICD-10-CM

## 2023-09-15 DIAGNOSIS — N39.0 URINARY TRACT INFECTION, SITE NOT SPECIFIED: ICD-10-CM

## 2023-09-15 DIAGNOSIS — I49.1 ATRIAL PREMATURE DEPOLARIZATION: ICD-10-CM
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
## 2023-09-15 DIAGNOSIS — C78.7 SECONDARY MALIGNANT NEOPLASM OF LIVER AND INTRAHEPATIC BILE DUCT: ICD-10-CM

## 2023-09-15 DIAGNOSIS — E86.0 DEHYDRATION: ICD-10-CM

## 2023-09-15 DIAGNOSIS — I47.1 SUPRAVENTRICULAR TACHYCARDIA: ICD-10-CM

## 2023-09-15 DIAGNOSIS — R62.7 ADULT FAILURE TO THRIVE: ICD-10-CM

## 2023-09-15 DIAGNOSIS — Z53.20 PROCEDURE AND TREATMENT NOT CARRIED OUT BECAUSE OF PATIENT'S DECISION FOR UNSPECIFIED REASONS: ICD-10-CM

## 2023-09-20 ENCOUNTER — APPOINTMENT (OUTPATIENT)
Dept: ORTHOPEDIC SURGERY | Facility: CLINIC | Age: 78
End: 2023-09-20

## 2023-09-27 ENCOUNTER — APPOINTMENT (OUTPATIENT)
Dept: ORTHOPEDIC SURGERY | Facility: CLINIC | Age: 78
End: 2023-09-27
Payer: MEDICARE

## 2023-09-27 VITALS — BODY MASS INDEX: 17.85 KG/M2 | HEIGHT: 62 IN | WEIGHT: 97 LBS

## 2023-09-27 DIAGNOSIS — M25.552 PAIN IN LEFT HIP: ICD-10-CM

## 2023-09-27 PROCEDURE — 99024 POSTOP FOLLOW-UP VISIT: CPT

## 2023-09-27 RX ORDER — MULTIVITAMIN
TABLET ORAL
Refills: 0 | Status: ACTIVE | COMMUNITY

## 2023-09-27 RX ORDER — ACETAMINOPHEN 325 MG/1
325 TABLET ORAL
Refills: 0 | Status: ACTIVE | COMMUNITY

## 2023-09-27 RX ORDER — NICOTINE 14MG/24HR
14 PATCH, TRANSDERMAL 24 HOURS TRANSDERMAL
Refills: 0 | Status: ACTIVE | COMMUNITY

## 2023-09-27 RX ORDER — POLYETHYLENE GLYCOL 3350 17 G/17G
POWDER, FOR SOLUTION ORAL
Refills: 0 | Status: ACTIVE | COMMUNITY

## 2023-09-27 RX ORDER — SENNOSIDES 8.6 MG/1
TABLET ORAL
Refills: 0 | Status: ACTIVE | COMMUNITY

## 2023-10-23 ENCOUNTER — NON-APPOINTMENT (OUTPATIENT)
Age: 78
End: 2023-10-23

## 2023-10-23 ENCOUNTER — APPOINTMENT (OUTPATIENT)
Dept: PULMONOLOGY | Facility: CLINIC | Age: 78
End: 2023-10-23

## 2023-10-25 ENCOUNTER — APPOINTMENT (OUTPATIENT)
Dept: ORTHOPEDIC SURGERY | Facility: CLINIC | Age: 78
End: 2023-10-25
Payer: MEDICARE

## 2023-10-25 VITALS — BODY MASS INDEX: 17.85 KG/M2 | WEIGHT: 97 LBS | HEIGHT: 62 IN

## 2023-10-25 DIAGNOSIS — Z96.642 PRESENCE OF LEFT ARTIFICIAL HIP JOINT: ICD-10-CM

## 2023-10-25 DIAGNOSIS — Z96.649 PRESENCE OF UNSPECIFIED ARTIFICIAL HIP JOINT: ICD-10-CM

## 2023-10-25 PROCEDURE — 99214 OFFICE O/P EST MOD 30 MIN: CPT

## 2023-10-25 PROCEDURE — 99024 POSTOP FOLLOW-UP VISIT: CPT

## 2023-10-30 ENCOUNTER — APPOINTMENT (OUTPATIENT)
Dept: PULMONOLOGY | Facility: CLINIC | Age: 78
End: 2023-10-30
Payer: MEDICARE

## 2023-10-30 VITALS
SYSTOLIC BLOOD PRESSURE: 118 MMHG | HEIGHT: 62 IN | WEIGHT: 96 LBS | HEART RATE: 82 BPM | DIASTOLIC BLOOD PRESSURE: 70 MMHG | TEMPERATURE: 97.6 F | RESPIRATION RATE: 15 BRPM | OXYGEN SATURATION: 95 % | BODY MASS INDEX: 17.66 KG/M2

## 2023-10-30 DIAGNOSIS — R91.1 SOLITARY PULMONARY NODULE: ICD-10-CM

## 2023-10-30 PROCEDURE — 99205 OFFICE O/P NEW HI 60 MIN: CPT | Mod: 25

## 2023-10-30 PROCEDURE — 99407 BEHAV CHNG SMOKING > 10 MIN: CPT

## 2023-10-30 PROCEDURE — 36415 COLL VENOUS BLD VENIPUNCTURE: CPT

## 2023-10-31 LAB
APTT BLD: 34 SEC
BASOPHILS # BLD AUTO: 0.1 K/UL
BASOPHILS NFR BLD AUTO: 1.3 %
EOSINOPHIL # BLD AUTO: 0.44 K/UL
EOSINOPHIL NFR BLD AUTO: 5.8 %
HCT VFR BLD CALC: 40.6 %
HGB BLD-MCNC: 12.3 G/DL
IMM GRANULOCYTES NFR BLD AUTO: 0.4 %
INR PPP: 0.92 RATIO
LYMPHOCYTES # BLD AUTO: 2.03 K/UL
LYMPHOCYTES NFR BLD AUTO: 26.9 %
MAN DIFF?: NORMAL
MCHC RBC-ENTMCNC: 26.2 PG
MCHC RBC-ENTMCNC: 30.3 GM/DL
MCV RBC AUTO: 86.4 FL
MONOCYTES # BLD AUTO: 0.76 K/UL
MONOCYTES NFR BLD AUTO: 10.1 %
NEUTROPHILS # BLD AUTO: 4.19 K/UL
NEUTROPHILS NFR BLD AUTO: 55.5 %
PLATELET # BLD AUTO: 359 K/UL
PT BLD: 10.4 SEC
RBC # BLD: 4.7 M/UL
RBC # FLD: 13.8 %
WBC # FLD AUTO: 7.55 K/UL

## 2023-11-02 ENCOUNTER — NON-APPOINTMENT (OUTPATIENT)
Age: 78
End: 2023-11-02

## 2023-11-02 ENCOUNTER — RX CHANGE (OUTPATIENT)
Age: 78
End: 2023-11-02

## 2023-11-02 RX ORDER — NICOTINE POLACRILEX 4 MG/1
4 LOZENGE ORAL
Qty: 1 | Refills: 3 | Status: ACTIVE | COMMUNITY
Start: 2023-11-02 | End: 1900-01-01

## 2023-11-07 LAB
ALBUMIN SERPL ELPH-MCNC: 4.2 G/DL
ALP BLD-CCNC: 131 U/L
ALT SERPL-CCNC: 10 U/L
ANION GAP SERPL CALC-SCNC: 12 MMOL/L
AST SERPL-CCNC: 34 U/L
BILIRUB SERPL-MCNC: 0.3 MG/DL
BUN SERPL-MCNC: 12 MG/DL
CALCIUM SERPL-MCNC: 10.2 MG/DL
CHLORIDE SERPL-SCNC: 102 MMOL/L
CO2 SERPL-SCNC: 24 MMOL/L
CREAT SERPL-MCNC: 0.53 MG/DL
EGFR: 95 ML/MIN/1.73M2
GLUCOSE SERPL-MCNC: 90 MG/DL
POTASSIUM SERPL-SCNC: 4.7 MMOL/L
PROT SERPL-MCNC: 7.3 G/DL
SODIUM SERPL-SCNC: 138 MMOL/L

## 2023-11-15 ENCOUNTER — NON-APPOINTMENT (OUTPATIENT)
Age: 78
End: 2023-11-15

## 2023-11-20 ENCOUNTER — APPOINTMENT (OUTPATIENT)
Dept: PULMONOLOGY | Facility: CLINIC | Age: 78
End: 2023-11-20
Payer: MEDICARE

## 2023-11-20 DIAGNOSIS — R93.89 ABNORMAL FINDINGS ON DIAGNOSTIC IMAGING OF OTHER SPECIFIED BODY STRUCTURES: ICD-10-CM

## 2023-11-20 DIAGNOSIS — H05.89 OTHER DISORDERS OF ORBIT: ICD-10-CM

## 2023-11-20 PROCEDURE — 99214 OFFICE O/P EST MOD 30 MIN: CPT | Mod: 95

## 2023-11-28 ENCOUNTER — OUTPATIENT (OUTPATIENT)
Dept: OUTPATIENT SERVICES | Facility: HOSPITAL | Age: 78
LOS: 1 days | Discharge: HOME | End: 2023-11-28

## 2023-11-28 ENCOUNTER — OUTPATIENT (OUTPATIENT)
Dept: OUTPATIENT SERVICES | Facility: HOSPITAL | Age: 78
LOS: 1 days | End: 2023-11-28
Payer: MEDICARE

## 2023-11-28 VITALS
RESPIRATION RATE: 16 BRPM | TEMPERATURE: 98 F | OXYGEN SATURATION: 98 % | DIASTOLIC BLOOD PRESSURE: 81 MMHG | HEART RATE: 76 BPM | WEIGHT: 100.09 LBS | HEIGHT: 60 IN | SYSTOLIC BLOOD PRESSURE: 123 MMHG

## 2023-11-28 DIAGNOSIS — Z98.890 OTHER SPECIFIED POSTPROCEDURAL STATES: Chronic | ICD-10-CM

## 2023-11-28 DIAGNOSIS — K76.9 LIVER DISEASE, UNSPECIFIED: ICD-10-CM

## 2023-11-28 DIAGNOSIS — K76.82 HEPATIC ENCEPHALOPATHY: ICD-10-CM

## 2023-11-28 PROCEDURE — 93010 ELECTROCARDIOGRAM REPORT: CPT

## 2023-11-28 NOTE — H&P PST ADULT - NSICDXPASTMEDICALHX_GEN_ALL_CORE_FT
PAST MEDICAL HISTORY:  Alcohol abuse     H/O fracture of hip     History of fracture due to fall     Liver lesion     Lung nodules     Tobacco abuse

## 2023-11-28 NOTE — H&P PST ADULT - HISTORY OF PRESENT ILLNESS
78 yrs old female who had a fall in Aug 2023 and fractured her left hip and had multiple testing for it before having partial hip replacement in Aug 2023. The diagnostic test showed lung nodules and liver lesions.   Pt presents for preop eval ro have Ct guided liver biopsy on 12/5/23. 78 yrs old female who had a fall in Aug 2023 and fractured her left hip and had multiple testing for it before having partial hip replacement in Aug 2023. The diagnostic test showed lung nodules and liver lesions.   Pt presents for preop eval ro have CT guided liver mass biopsy on 12/5/23.

## 2023-11-28 NOTE — H&P PST ADULT - CIGARETTES, NUMBER OF YRS
[FreeTextEntry1] : 79 y/o F with PMHx HTN, HLD off statin due to adverse effect, T2DM (on metformin), glaucoma, and OA presenting for follow-up after being in hypertensive urgency at last appointment 3 weeks ago. At that time, patient had ran out of her BP meds and presented with blood pressure of 195/104. Was given clonidine 0.1mg and stat EKG was done, which did not demonstrate any evidence of ischemia. Today, BP is 134/86. Patient feels well and denies any new complaints.  50

## 2023-11-28 NOTE — H&P PST ADULT - OTHER CARE PROVIDERS
Dr. Rosales - Pulmonologist - [phone] Dr. Rosales - Pulmonologist - [phone] Dr. Rosales - Pulmonologist - [phone]

## 2023-11-28 NOTE — H&P PST ADULT - ASSESSMENT
Pt presents for preop eval ro have Ct guided liver biopsy on 12/5/23.   Pt presents for preop eval ro have CT guided liver mass biopsy on 12/5/23.

## 2023-11-28 NOTE — H&P PST ADULT - MUSCULOSKELETAL
details… slightly unsteady gait, pain in left hip/normal/ROM intact/decreased ROM/decreased ROM due to pain/normal gait/strength 5/5 bilateral upper extremities/strength 5/5 bilateral lower extremities

## 2023-11-28 NOTE — H&P PST ADULT - NSANTHOSAYNRD_GEN_A_CORE
never tested/No. SHAE screening performed.  STOP BANG Legend: 0-2 = LOW Risk; 3-4 = INTERMEDIATE Risk; 5-8 = HIGH Risk

## 2023-11-28 NOTE — H&P PST ADULT - PROBLEM SELECTOR PLAN 1
Pt presents for preop eval ro have Ct guided liver biopsy on 12/5/23.  labs done on 10/30/23 in chart. ekg in chart.  Preop instructions provided to pt.  Famotidine and chlorhexidine scrubs provided to pt with instructions. Pt presents for preop eval ro have CT guided liver mass biopsy on 12/5/23.  labs done on 10/30/23 in chart. ekg in chart.  Preop instructions provided to pt.  Famotidine and chlorhexidine scrubs provided to pt with instructions.

## 2023-11-28 NOTE — H&P PST ADULT - ATTENDING COMMENTS
12/5/23  - Patient confirmed holding NSAIDS for 7+ days.     - The procedure, its risks/benefits/alternatives were discussed with the patient in prior consultation as well as immediately before the procedure, and witnessed informed consent was obtained. Patient verbalizes understanding.

## 2023-11-28 NOTE — H&P PST ADULT - NEGATIVE GENERAL GENITOURINARY SYMPTOMS
h/o UTI in Aug 2023/no hematuria/no renal colic/no flank pain L/no flank pain R/no incontinence/no dysuria

## 2023-11-30 ENCOUNTER — APPOINTMENT (OUTPATIENT)
Dept: INTERVENTIONAL RADIOLOGY/VASCULAR | Facility: CLINIC | Age: 78
End: 2023-11-30
Payer: MEDICARE

## 2023-11-30 VITALS — WEIGHT: 100 LBS | BODY MASS INDEX: 18.4 KG/M2 | HEIGHT: 62 IN

## 2023-11-30 PROCEDURE — 99203 OFFICE O/P NEW LOW 30 MIN: CPT | Mod: 95

## 2023-11-30 RX ORDER — FOLIC ACID 1 MG/1
1 TABLET ORAL
Refills: 0 | Status: COMPLETED | COMMUNITY
End: 2023-11-30

## 2023-12-05 ENCOUNTER — RESULT REVIEW (OUTPATIENT)
Age: 78
End: 2023-12-05

## 2023-12-05 ENCOUNTER — OUTPATIENT (OUTPATIENT)
Dept: OUTPATIENT SERVICES | Facility: HOSPITAL | Age: 78
LOS: 1 days | End: 2023-12-05
Payer: MEDICARE

## 2023-12-05 DIAGNOSIS — K76.9 LIVER DISEASE, UNSPECIFIED: ICD-10-CM

## 2023-12-05 DIAGNOSIS — Z98.890 OTHER SPECIFIED POSTPROCEDURAL STATES: Chronic | ICD-10-CM

## 2023-12-05 PROCEDURE — 88307 TISSUE EXAM BY PATHOLOGIST: CPT | Mod: 26

## 2023-12-05 PROCEDURE — 88333 PATH CONSLTJ SURG CYTO XM 1: CPT | Mod: 26,59

## 2023-12-05 PROCEDURE — 88342 IMHCHEM/IMCYTCHM 1ST ANTB: CPT | Mod: 26

## 2023-12-05 PROCEDURE — 88173 CYTOPATH EVAL FNA REPORT: CPT | Mod: 26

## 2023-12-05 PROCEDURE — 88360 TUMOR IMMUNOHISTOCHEM/MANUAL: CPT | Mod: 26,59

## 2023-12-05 PROCEDURE — 47000 NEEDLE BIOPSY OF LIVER PERQ: CPT

## 2023-12-05 PROCEDURE — 77012 CT SCAN FOR NEEDLE BIOPSY: CPT | Mod: 26

## 2023-12-05 PROCEDURE — 88341 IMHCHEM/IMCYTCHM EA ADD ANTB: CPT | Mod: 26

## 2023-12-05 RX ORDER — ONDANSETRON 8 MG/1
4 TABLET, FILM COATED ORAL ONCE
Refills: 0 | Status: DISCONTINUED | OUTPATIENT
Start: 2023-12-05 | End: 2023-12-19

## 2023-12-05 RX ORDER — FENTANYL CITRATE 50 UG/ML
50 INJECTION INTRAVENOUS
Refills: 0 | Status: DISCONTINUED | OUTPATIENT
Start: 2023-12-05 | End: 2023-12-05

## 2023-12-05 NOTE — PROCEDURE NOTE - PLAN
- Recovery in IRS for 4 hours, then home if patient remains HD stable.   - F/u final pathology results.

## 2023-12-05 NOTE — PRE PROCEDURE NOTE - PRE PROCEDURE EVALUATION
Interventional Radiology Pre-Procedure Note    Diagnosis/Indication: Patient is a 78y old female with lung nodules and multiple liver masses. Liver mass biopsy was requested.     PAST MEDICAL & SURGICAL HISTORY:  Tobacco abuse  Alcohol abuse  Lung nodules  Liver lesion  H/O fracture of hip  History of fracture due to fall  History of repair of hip fracture       Allergies: penicillins (Unknown)      LABS: Recent labs reviewed (10/30/23).      Procedure/ risks/ benefits/ alternatives were discussed with the patient in prior consultation as well immediately before the procedure, and witnessed informed consent was obtained. The patient verbalizes understanding.

## 2023-12-05 NOTE — PROCEDURE NOTE - PROCEDURE FINDINGS AND DETAILS
Multiple hepatic masses again identified and one of the masses in the right hepatic lobe biopsied. 3 core specimens obtained and deemed adequate by the cytopathology technician in attendance. Official report to follow.

## 2023-12-06 RX ORDER — ACETAMINOPHEN 500 MG
2 TABLET ORAL
Refills: 0 | DISCHARGE

## 2023-12-11 DIAGNOSIS — R91.8 OTHER NONSPECIFIC ABNORMAL FINDING OF LUNG FIELD: ICD-10-CM

## 2023-12-11 DIAGNOSIS — R16.0 HEPATOMEGALY, NOT ELSEWHERE CLASSIFIED: ICD-10-CM

## 2023-12-11 LAB
NON-GYNECOLOGICAL CYTOLOGY STUDY: SIGNIFICANT CHANGE UP
NON-GYNECOLOGICAL CYTOLOGY STUDY: SIGNIFICANT CHANGE UP

## 2023-12-12 ENCOUNTER — NON-APPOINTMENT (OUTPATIENT)
Age: 78
End: 2023-12-12

## 2023-12-12 ENCOUNTER — APPOINTMENT (OUTPATIENT)
Dept: RADIATION ONCOLOGY | Facility: CLINIC | Age: 78
End: 2023-12-12

## 2023-12-13 PROBLEM — F10.10 ALCOHOL ABUSE, UNCOMPLICATED: Chronic | Status: ACTIVE | Noted: 2023-11-28

## 2023-12-13 PROBLEM — Z87.81 PERSONAL HISTORY OF (HEALED) TRAUMATIC FRACTURE: Chronic | Status: ACTIVE | Noted: 2023-11-28

## 2023-12-13 PROBLEM — Z72.0 TOBACCO USE: Chronic | Status: ACTIVE | Noted: 2023-08-28

## 2023-12-13 PROBLEM — R91.8 OTHER NONSPECIFIC ABNORMAL FINDING OF LUNG FIELD: Chronic | Status: ACTIVE | Noted: 2023-11-28

## 2023-12-13 PROBLEM — K76.9 LIVER DISEASE, UNSPECIFIED: Chronic | Status: ACTIVE | Noted: 2023-11-28

## 2023-12-18 ENCOUNTER — OUTPATIENT (OUTPATIENT)
Dept: OUTPATIENT SERVICES | Facility: HOSPITAL | Age: 78
LOS: 1 days | Discharge: HOME | End: 2023-12-18

## 2023-12-18 DIAGNOSIS — C34.90 MALIGNANT NEOPLASM OF UNSPECIFIED PART OF UNSPECIFIED BRONCHUS OR LUNG: ICD-10-CM

## 2023-12-18 DIAGNOSIS — Z98.890 OTHER SPECIFIED POSTPROCEDURAL STATES: Chronic | ICD-10-CM

## 2023-12-19 ENCOUNTER — APPOINTMENT (OUTPATIENT)
Dept: HEMATOLOGY ONCOLOGY | Facility: CLINIC | Age: 78
End: 2023-12-19
Payer: MEDICARE

## 2023-12-19 DIAGNOSIS — J43.2 CENTRILOBULAR EMPHYSEMA: ICD-10-CM

## 2023-12-19 PROCEDURE — 99205 OFFICE O/P NEW HI 60 MIN: CPT | Mod: 95

## 2023-12-19 NOTE — HISTORY OF PRESENT ILLNESS
[Home] : at home, [unfilled] , at the time of the visit. [Medical Office: ([facility])___] : at the medical office located in  [Family Member] : family member [Disease: _____________________] : Disease: [unfilled] [M: ___] : M[unfilled] [AJCC Stage: ____] : AJCC Stage: [unfilled] [de-identified] : Ms. Spencer is a pleasant 78-year-old smoking woman (still smoking a cigarette per week, up to 2 packs for 60 years) who presented with a fall and hip fracture in September 2023 and on further work it was found to have a right upper lobe nodule. She was seen by Dr Laws who ordered PET/CT which revealed multifocal hepatic metastases and a skull base metastasis. Pt did not have a PCP prior to all this and had not had a screening CT.   12/5/23 - Liver biopsy - POSITIVE FOR MALIGNANT CELLS. Consistent with metastatic small cell carcinoma.  11/27/23 - PET CT Multifocal hepatic metastases, the largest with necrosis and extra capsular extension and our lead peritoneal seeding. Upper abdominal lymph nodes, multifocal osteolytic pulmonary and left adrenal metastases, left orbital fossa mass eroding the skull base with concern for intracranial extension.  During today's visit, pt c/o nausea all the time. She has left eye vision loss. She has loss of appetite and lost about 10 lbs over few months.  Pt is yet to get brain MRI  [de-identified] : small cell lung cancer

## 2023-12-19 NOTE — CONSULT LETTER
[Dear  ___] : Dear  [unfilled], [Consult Letter:] : I had the pleasure of evaluating your patient, [unfilled]. [Please see my note below.] : Please see my note below. [Consult Closing:] : Thank you very much for allowing me to participate in the care of this patient.  If you have any questions, please do not hesitate to contact me. [Sincerely,] : Sincerely, [DrMohamud  ___] : Dr. LOPEZ [FreeTextEntry2] : Dr. Juwan Laws [FreeTextEntry3] : Sadie Boyd MD

## 2023-12-19 NOTE — PHYSICAL EXAM
[Restricted in physically strenuous activity but ambulatory and able to carry out work of a light or sedentary nature] : Status 1- Restricted in physically strenuous activity but ambulatory and able to carry out work of a light or sedentary nature, e.g., light house work, office work [Cachectic] : cachectic [Normal] : affect appropriate

## 2023-12-19 NOTE — REVIEW OF SYSTEMS
[Fatigue] : fatigue [Recent Change In Weight] : ~T recent weight change [Shortness Of Breath] : shortness of breath [Cough] : cough [SOB on Exertion] : shortness of breath during exertion [Diarrhea: Grade 0] : Diarrhea: Grade 0 [Negative] : Allergic/Immunologic

## 2023-12-19 NOTE — ASSESSMENT
[FreeTextEntry1] : 77 yo w, smoker, with newly diagnosed small cell lung cancer, mets to orbit and liver I reviewed the EMR in its entirety including notes from other providers, labs, path, and scan reports I reviewed imaging studies independently Discussed the diagnosis of small cell lung cancer Pt is cachectic and with PS of 1-2. I discussed palliative intent of tx I discussed cancer-directed tx as well as BSC/hospice Pt opted for chemo Discussed carboplatin AUC 4, etoposide 65 mg/m2 days 1-3, tecentriq and Cosela We discussed the regimen in detail including potential benefits and AEs in detail. We answered all questions. we went over schedule and other pertinent details. Pt provided verbal consent pt is a fall risk, has increasing symptoms and is deconditioned She will benefit from home care services for home safety assessment, education, home PT/OT Will alert SW regarding transportation needs and possibilities.  [Future Reassessment of Pain Scale] : Future reassessment of pain scale    [Medication(s)] : Medication(s) [Palliative] : Goals of care discussed with patient: Palliative [Palliative Care Plan] : Patient was apprised of incurable nature of disease.  Hospice and Palliative care options discussed. [With Patient/Caregiver] : With Patient/Caregiver [AdvancecareDate] : 12/19/23 [Designated Health Care Proxy] : Designated Health Care Proxy [Name: ___] : Name: [unfilled] [Relationship: ___] : Relationship: [unfilled]

## 2023-12-20 ENCOUNTER — NON-APPOINTMENT (OUTPATIENT)
Age: 78
End: 2023-12-20

## 2023-12-29 PROBLEM — R93.89 ABNORMAL CHEST CT: Status: ACTIVE | Noted: 2023-10-22

## 2024-01-03 RX ORDER — MELOXICAM 15 MG/1
1 TABLET ORAL
Refills: 0 | DISCHARGE

## 2024-01-04 ENCOUNTER — APPOINTMENT (OUTPATIENT)
Dept: HEMATOLOGY ONCOLOGY | Facility: CLINIC | Age: 79
End: 2024-01-04

## 2024-01-04 ENCOUNTER — APPOINTMENT (OUTPATIENT)
Dept: INFUSION THERAPY | Facility: HOSPITAL | Age: 79
End: 2024-01-04

## 2024-01-05 ENCOUNTER — APPOINTMENT (OUTPATIENT)
Dept: INFUSION THERAPY | Facility: HOSPITAL | Age: 79
End: 2024-01-05

## 2024-01-06 ENCOUNTER — APPOINTMENT (OUTPATIENT)
Dept: INFUSION THERAPY | Facility: HOSPITAL | Age: 79
End: 2024-01-06

## 2024-01-08 NOTE — DISCUSSION/SUMMARY
[FreeTextEntry1] : The patients most recent CT scan was reviewed by my independently and my interpretation is stated below:

## 2024-01-08 NOTE — REVIEW OF SYSTEMS
[Cough] : cough [Sputum] : sputum [SOB on Exertion] : sob on exertion [Negative] : Endocrine [Dyspnea] : no dyspnea [TextBox_122] : Vision changes/diplopia

## 2024-01-08 NOTE — ASSESSMENT
[FreeTextEntry1] : In summary this is a 78-year-old female actively smoking who has a lung nodule which is concerning for primary lung malignancy.  Patient was offered biopsy, but prior to that had a PET scan. PET scan is showing significant burden of extrathoracic disease. We discussed this extensively with daughter and patient. This is likely Stage IV malignancy of lung origin. We discussed the role of tissue biopsy. At this time, best approach would be IR guided liver biopsy. Referral was provided to Dr. Smith who will arrange biopsy. In addition, given vision changes and orbital lesion, we will refer her to rad onc as she may need local therapy given changes. It will be important to determine the etiology. We discussed the case with Dr. Howell from rad onc, and a brain MRI is recommended to evaluate extent prior to treatment. All questions were answered. We discussed depending on pathology, if Stage IV lung ca, treatment will likely be palliative. All questions were answered. Symptomatic management will continue.  Continue medical management of emphysema.

## 2024-01-08 NOTE — REASON FOR VISIT
[Consultation] : a consultation [Home] : at home, [unfilled] , at the time of the visit. [Medical Office: ([facility])___] : at the medical office located in  [Other:____] : [unfilled] [Patient] : the patient [FreeTextEntry4] :  Daughter [TextBox_44] : IP consult for lung mass [TextBox_13] : Dr. Ni

## 2024-01-08 NOTE — PHYSICAL EXAM
[No Acute Distress] : no acute distress [Oriented x3] : oriented x3 [TextBox_2] : Limited exam - Tele visit

## 2024-01-08 NOTE — HISTORY OF PRESENT ILLNESS
[Current] : current [TextBox_4] :  Interventional Pulmonology Consultation/Visit Note  78-year-old female who is currently smoking who recently presented to the hospital with mechanical fall leading to hip fracture s/p hemiarthroplasty.  Patient during the hospital work-up was noted to have emphysema as well as a suspicious right upper lobe lung nodule.  The patient was discharged with outpatient follow-up.  We have had previous appointments with the patient which were canceled because of lack of transportation and patient's ability to make it to the clinic.  Today she presents for evaluation with her daughter.  In terms of symptoms she has productive mucus.  She continues to smoke and is looking for help to quit smoking.  She is concerned about the nodule but is not sure if she would want treatment such as surgery but is willing to undergo diagnostic procedures to see which treatments are possible

## 2024-01-18 ENCOUNTER — APPOINTMENT (OUTPATIENT)
Dept: HEMATOLOGY ONCOLOGY | Facility: CLINIC | Age: 79
End: 2024-01-18
Payer: MEDICARE

## 2024-01-18 ENCOUNTER — RESULT REVIEW (OUTPATIENT)
Age: 79
End: 2024-01-18

## 2024-01-18 ENCOUNTER — APPOINTMENT (OUTPATIENT)
Dept: INFUSION THERAPY | Facility: HOSPITAL | Age: 79
End: 2024-01-18

## 2024-01-18 ENCOUNTER — APPOINTMENT (OUTPATIENT)
Dept: HEMATOLOGY ONCOLOGY | Facility: CLINIC | Age: 79
End: 2024-01-18

## 2024-01-18 ENCOUNTER — NON-APPOINTMENT (OUTPATIENT)
Age: 79
End: 2024-01-18

## 2024-01-18 LAB
ALBUMIN SERPL ELPH-MCNC: 4.1 G/DL — SIGNIFICANT CHANGE UP (ref 3.3–5)
ALP SERPL-CCNC: 150 U/L — HIGH (ref 40–120)
ALT FLD-CCNC: 11 U/L — SIGNIFICANT CHANGE UP (ref 10–45)
ANION GAP SERPL CALC-SCNC: 11 MMOL/L — SIGNIFICANT CHANGE UP (ref 5–17)
AST SERPL-CCNC: 45 U/L — HIGH (ref 10–40)
BASOPHILS # BLD AUTO: 0.1 K/UL — SIGNIFICANT CHANGE UP (ref 0–0.2)
BASOPHILS NFR BLD AUTO: 1.1 % — SIGNIFICANT CHANGE UP (ref 0–2)
BILIRUB SERPL-MCNC: 0.2 MG/DL — SIGNIFICANT CHANGE UP (ref 0.2–1.2)
BUN SERPL-MCNC: 11 MG/DL — SIGNIFICANT CHANGE UP (ref 7–23)
CALCIUM SERPL-MCNC: 11.3 MG/DL — HIGH (ref 8.4–10.5)
CHLORIDE SERPL-SCNC: 102 MMOL/L — SIGNIFICANT CHANGE UP (ref 96–108)
CO2 SERPL-SCNC: 26 MMOL/L — SIGNIFICANT CHANGE UP (ref 22–31)
CREAT SERPL-MCNC: 0.61 MG/DL — SIGNIFICANT CHANGE UP (ref 0.5–1.3)
EGFR: 92 ML/MIN/1.73M2 — SIGNIFICANT CHANGE UP
EOSINOPHIL # BLD AUTO: 0.53 K/UL — HIGH (ref 0–0.5)
EOSINOPHIL NFR BLD AUTO: 5.9 % — SIGNIFICANT CHANGE UP (ref 0–6)
GLUCOSE SERPL-MCNC: 113 MG/DL — HIGH (ref 70–99)
HCT VFR BLD CALC: 38.3 % — SIGNIFICANT CHANGE UP (ref 34.5–45)
HGB BLD-MCNC: 12.6 G/DL — SIGNIFICANT CHANGE UP (ref 11.5–15.5)
IMM GRANULOCYTES NFR BLD AUTO: 0.3 % — SIGNIFICANT CHANGE UP (ref 0–0.9)
LYMPHOCYTES # BLD AUTO: 2.24 K/UL — SIGNIFICANT CHANGE UP (ref 1–3.3)
LYMPHOCYTES # BLD AUTO: 25.1 % — SIGNIFICANT CHANGE UP (ref 13–44)
MCHC RBC-ENTMCNC: 26.1 PG — LOW (ref 27–34)
MCHC RBC-ENTMCNC: 32.9 G/DL — SIGNIFICANT CHANGE UP (ref 32–36)
MCV RBC AUTO: 79.3 FL — LOW (ref 80–100)
MONOCYTES # BLD AUTO: 0.86 K/UL — SIGNIFICANT CHANGE UP (ref 0–0.9)
MONOCYTES NFR BLD AUTO: 9.6 % — SIGNIFICANT CHANGE UP (ref 2–14)
NEUTROPHILS # BLD AUTO: 5.18 K/UL — SIGNIFICANT CHANGE UP (ref 1.8–7.4)
NEUTROPHILS NFR BLD AUTO: 58 % — SIGNIFICANT CHANGE UP (ref 43–77)
NRBC # BLD: 0 /100 WBCS — SIGNIFICANT CHANGE UP (ref 0–0)
PLATELET # BLD AUTO: 332 K/UL — SIGNIFICANT CHANGE UP (ref 150–400)
POTASSIUM SERPL-MCNC: 4.2 MMOL/L — SIGNIFICANT CHANGE UP (ref 3.5–5.3)
POTASSIUM SERPL-SCNC: 4.2 MMOL/L — SIGNIFICANT CHANGE UP (ref 3.5–5.3)
PROT SERPL-MCNC: 8.4 G/DL — HIGH (ref 6–8.3)
RBC # BLD: 4.83 M/UL — SIGNIFICANT CHANGE UP (ref 3.8–5.2)
RBC # FLD: 14.2 % — SIGNIFICANT CHANGE UP (ref 10.3–14.5)
SODIUM SERPL-SCNC: 138 MMOL/L — SIGNIFICANT CHANGE UP (ref 135–145)
WBC # BLD: 8.94 K/UL — SIGNIFICANT CHANGE UP (ref 3.8–10.5)
WBC # FLD AUTO: 8.94 K/UL — SIGNIFICANT CHANGE UP (ref 3.8–10.5)

## 2024-01-18 PROCEDURE — 99214 OFFICE O/P EST MOD 30 MIN: CPT

## 2024-01-19 ENCOUNTER — APPOINTMENT (OUTPATIENT)
Dept: INFUSION THERAPY | Facility: HOSPITAL | Age: 79
End: 2024-01-19

## 2024-01-19 ENCOUNTER — RESULT REVIEW (OUTPATIENT)
Age: 79
End: 2024-01-19

## 2024-01-19 DIAGNOSIS — Z51.11 ENCOUNTER FOR ANTINEOPLASTIC CHEMOTHERAPY: ICD-10-CM

## 2024-01-19 DIAGNOSIS — R11.2 NAUSEA WITH VOMITING, UNSPECIFIED: ICD-10-CM

## 2024-01-19 LAB
HBV CORE AB SER-ACNC: SIGNIFICANT CHANGE UP
HBV SURFACE AB SER-ACNC: SIGNIFICANT CHANGE UP
HBV SURFACE AG SER-ACNC: SIGNIFICANT CHANGE UP
HCV AB S/CO SERPL IA: 0.11 S/CO — SIGNIFICANT CHANGE UP (ref 0–0.99)
HCV AB SERPL-IMP: SIGNIFICANT CHANGE UP
TSH SERPL-MCNC: 0.91 UIU/ML — SIGNIFICANT CHANGE UP (ref 0.27–4.2)

## 2024-01-20 ENCOUNTER — APPOINTMENT (OUTPATIENT)
Dept: INFUSION THERAPY | Facility: HOSPITAL | Age: 79
End: 2024-01-20

## 2024-01-20 LAB
APPEARANCE UR: CLEAR — SIGNIFICANT CHANGE UP
BILIRUB UR-MCNC: NEGATIVE — SIGNIFICANT CHANGE UP
COLOR SPEC: YELLOW — SIGNIFICANT CHANGE UP
DIFF PNL FLD: NEGATIVE — SIGNIFICANT CHANGE UP
GLUCOSE UR QL: NEGATIVE MG/DL — SIGNIFICANT CHANGE UP
KETONES UR-MCNC: NEGATIVE MG/DL — SIGNIFICANT CHANGE UP
LEUKOCYTE ESTERASE UR-ACNC: NEGATIVE — SIGNIFICANT CHANGE UP
NITRITE UR-MCNC: NEGATIVE — SIGNIFICANT CHANGE UP
PH UR: 7 — SIGNIFICANT CHANGE UP (ref 5–8)
PROT UR-MCNC: NEGATIVE MG/DL — SIGNIFICANT CHANGE UP
SP GR SPEC: 1 — SIGNIFICANT CHANGE UP (ref 1–1.03)
UROBILINOGEN FLD QL: 0.2 MG/DL — SIGNIFICANT CHANGE UP (ref 0.2–1)

## 2024-01-21 LAB
CULTURE RESULTS: SIGNIFICANT CHANGE UP
SPECIMEN SOURCE: SIGNIFICANT CHANGE UP

## 2024-01-22 ENCOUNTER — NON-APPOINTMENT (OUTPATIENT)
Age: 79
End: 2024-01-22

## 2024-01-25 ENCOUNTER — APPOINTMENT (OUTPATIENT)
Dept: RADIATION ONCOLOGY | Facility: CLINIC | Age: 79
End: 2024-01-25
Payer: MEDICARE

## 2024-01-25 PROCEDURE — 99204 OFFICE O/P NEW MOD 45 MIN: CPT

## 2024-01-29 NOTE — REVIEW OF SYSTEMS
[FreeTextEntry3] : LEFT eye visual disturbance  [FreeTextEntry9] : stiff neck attributes to sleep a pattern. [de-identified] : periods of confusion/forgetfulness

## 2024-01-29 NOTE — HISTORY OF PRESENT ILLNESS
[Home] : at home, [unfilled] , at the time of the visit. [Medical Office: ([facility])___] : at the medical office located in  [FreeTextEntry1] : This is a 79 y/o F smoking woman who presented with a fall and LEFT hip fracture and on further work up for emphysema during her hospitalization she was found to have a right upper lobe nodule. She was seen by Dr. Laws who ordered pet CT which revealed multifocal hepatic metastases and a skull base metastasis.   PREVIOUS RADIATION: NO 11/27/23 - PET CT  Multifocal hepatic metastases, the largest with necrosis and extra capsular extension and our lead peritoneal seeding. Upper abdominal lymph nodes, multifocal osteolytic pulmonary and left adrenal metastases, left orbital fosamax eroding the skull base with concern for intracranial extension  12/5/23 - Liver biopsy - Liver biopsy - POSITIVE FOR MALIGNANT CELLS. Consistent with metastatic small cell carcinoma.  Established care with Dr. Boyd on Carboplatin Etoposide, Tecentriq and Cosela (initiated 1/2024)  Reports nausea since initiating chemo  + with diminished vison LEFT "I see black". Some trouble with gait/dizziness described as being wobbly .Underwent a partial LEFT HIP replacement in September 2023 at [facility] and is recovering well. Per daughter she is with periods of confusion and forgetfulness. Denies  HA/unilateral extremity weakness. No issues with speech or comprehension.  MRI brain w w/o contrast 1/2/2024 IMPRESSION:  1. Cerebral atrophy and chronic microvascular ischemic disease. 2. Rounded foci of FLAIR hyperintensity including one cortically based in the right frontal lobe warrant a follow-up MRI of the brain with and without contrast as they are concerning for metastatic disease. We will attempt to recall the patient for additional images. Consider correlation to prior studies if available. 3. Abnormal FLAIR signal in the greater wing of the left sphenoid and region of previously seen 2 cm lytic mass with FDG uptake. The patient returned for additional postcontrast axial T1-weighted images. On the additional views there is a 7 mm rim-enhancing lesion in the right posterior frontal lobe. There is an additional 4 mm rim-enhancing lesion also in the posterior right frontal lobe. There is a 5 mm rim-enhancing lesion in the left parieto-occipital lobe. These are consistent with metastatic disease. There is an additional punctate focus of enhancement in the right occipital lobe image 35 series 23 which is difficult to characterize given its small size.  There is a 1.5 cm abnormal enhancement in the greater wing of the left sphenoid and an area of a previously identified 2 cm lytic mass with FDG uptake.

## 2024-01-30 DIAGNOSIS — C79.31 SECONDARY MALIGNANT NEOPLASM OF BRAIN: ICD-10-CM

## 2024-02-01 ENCOUNTER — OUTPATIENT (OUTPATIENT)
Dept: OUTPATIENT SERVICES | Facility: HOSPITAL | Age: 79
LOS: 1 days | Discharge: HOME | End: 2024-02-01
Payer: MEDICARE

## 2024-02-01 DIAGNOSIS — Z98.890 OTHER SPECIFIED POSTPROCEDURAL STATES: Chronic | ICD-10-CM

## 2024-02-08 ENCOUNTER — RESULT REVIEW (OUTPATIENT)
Age: 79
End: 2024-02-08

## 2024-02-08 ENCOUNTER — NON-APPOINTMENT (OUTPATIENT)
Age: 79
End: 2024-02-08

## 2024-02-08 ENCOUNTER — APPOINTMENT (OUTPATIENT)
Dept: INFUSION THERAPY | Facility: HOSPITAL | Age: 79
End: 2024-02-08

## 2024-02-08 ENCOUNTER — APPOINTMENT (OUTPATIENT)
Dept: HEMATOLOGY ONCOLOGY | Facility: CLINIC | Age: 79
End: 2024-02-08

## 2024-02-08 LAB
ALBUMIN SERPL ELPH-MCNC: 3.9 G/DL — SIGNIFICANT CHANGE UP (ref 3.3–5)
ALP SERPL-CCNC: 181 U/L — HIGH (ref 40–120)
ALT FLD-CCNC: 11 U/L — SIGNIFICANT CHANGE UP (ref 10–45)
ANION GAP SERPL CALC-SCNC: 13 MMOL/L — SIGNIFICANT CHANGE UP (ref 5–17)
AST SERPL-CCNC: 35 U/L — SIGNIFICANT CHANGE UP (ref 10–40)
BASOPHILS # BLD AUTO: 0.07 K/UL — SIGNIFICANT CHANGE UP (ref 0–0.2)
BASOPHILS NFR BLD AUTO: 1.2 % — SIGNIFICANT CHANGE UP (ref 0–2)
BILIRUB SERPL-MCNC: 0.2 MG/DL — SIGNIFICANT CHANGE UP (ref 0.2–1.2)
BUN SERPL-MCNC: 9 MG/DL — SIGNIFICANT CHANGE UP (ref 7–23)
CALCIUM SERPL-MCNC: 10 MG/DL — SIGNIFICANT CHANGE UP (ref 8.4–10.5)
CHLORIDE SERPL-SCNC: 101 MMOL/L — SIGNIFICANT CHANGE UP (ref 96–108)
CO2 SERPL-SCNC: 22 MMOL/L — SIGNIFICANT CHANGE UP (ref 22–31)
CREAT SERPL-MCNC: 0.51 MG/DL — SIGNIFICANT CHANGE UP (ref 0.5–1.3)
EGFR: 96 ML/MIN/1.73M2 — SIGNIFICANT CHANGE UP
EOSINOPHIL # BLD AUTO: 0.04 K/UL — SIGNIFICANT CHANGE UP (ref 0–0.5)
EOSINOPHIL NFR BLD AUTO: 0.7 % — SIGNIFICANT CHANGE UP (ref 0–6)
GLUCOSE SERPL-MCNC: 152 MG/DL — HIGH (ref 70–99)
HCT VFR BLD CALC: 36.7 % — SIGNIFICANT CHANGE UP (ref 34.5–45)
HGB BLD-MCNC: 12.1 G/DL — SIGNIFICANT CHANGE UP (ref 11.5–15.5)
IMM GRANULOCYTES NFR BLD AUTO: 1.4 % — HIGH (ref 0–0.9)
LYMPHOCYTES # BLD AUTO: 1.29 K/UL — SIGNIFICANT CHANGE UP (ref 1–3.3)
LYMPHOCYTES # BLD AUTO: 22.6 % — SIGNIFICANT CHANGE UP (ref 13–44)
MCHC RBC-ENTMCNC: 25.6 PG — LOW (ref 27–34)
MCHC RBC-ENTMCNC: 33 G/DL — SIGNIFICANT CHANGE UP (ref 32–36)
MCV RBC AUTO: 77.6 FL — LOW (ref 80–100)
MONOCYTES # BLD AUTO: 0.18 K/UL — SIGNIFICANT CHANGE UP (ref 0–0.9)
MONOCYTES NFR BLD AUTO: 3.2 % — SIGNIFICANT CHANGE UP (ref 2–14)
NEUTROPHILS # BLD AUTO: 4.05 K/UL — SIGNIFICANT CHANGE UP (ref 1.8–7.4)
NEUTROPHILS NFR BLD AUTO: 70.9 % — SIGNIFICANT CHANGE UP (ref 43–77)
NRBC # BLD: 0 /100 WBCS — SIGNIFICANT CHANGE UP (ref 0–0)
PLATELET # BLD AUTO: 496 K/UL — HIGH (ref 150–400)
POTASSIUM SERPL-MCNC: 4.3 MMOL/L — SIGNIFICANT CHANGE UP (ref 3.5–5.3)
POTASSIUM SERPL-SCNC: 4.3 MMOL/L — SIGNIFICANT CHANGE UP (ref 3.5–5.3)
PROT SERPL-MCNC: 8 G/DL — SIGNIFICANT CHANGE UP (ref 6–8.3)
RBC # BLD: 4.73 M/UL — SIGNIFICANT CHANGE UP (ref 3.8–5.2)
RBC # FLD: 14 % — SIGNIFICANT CHANGE UP (ref 10.3–14.5)
SODIUM SERPL-SCNC: 136 MMOL/L — SIGNIFICANT CHANGE UP (ref 135–145)
WBC # BLD: 5.71 K/UL — SIGNIFICANT CHANGE UP (ref 3.8–10.5)
WBC # FLD AUTO: 5.71 K/UL — SIGNIFICANT CHANGE UP (ref 3.8–10.5)

## 2024-02-09 ENCOUNTER — NON-APPOINTMENT (OUTPATIENT)
Age: 79
End: 2024-02-09

## 2024-02-09 ENCOUNTER — APPOINTMENT (OUTPATIENT)
Dept: HEMATOLOGY ONCOLOGY | Facility: CLINIC | Age: 79
End: 2024-02-09
Payer: MEDICARE

## 2024-02-09 ENCOUNTER — APPOINTMENT (OUTPATIENT)
Dept: INFUSION THERAPY | Facility: HOSPITAL | Age: 79
End: 2024-02-09

## 2024-02-09 DIAGNOSIS — K76.9 LIVER DISEASE, UNSPECIFIED: ICD-10-CM

## 2024-02-09 DIAGNOSIS — E86.0 DEHYDRATION: ICD-10-CM

## 2024-02-09 LAB — TSH SERPL-MCNC: 0.4 UIU/ML — SIGNIFICANT CHANGE UP (ref 0.27–4.2)

## 2024-02-09 PROCEDURE — 99214 OFFICE O/P EST MOD 30 MIN: CPT

## 2024-02-09 PROCEDURE — G2211 COMPLEX E/M VISIT ADD ON: CPT

## 2024-02-09 NOTE — REVIEW OF SYSTEMS
[Fatigue] : fatigue [Recent Change In Weight] : ~T recent weight change [Shortness Of Breath] : shortness of breath [Cough] : cough [SOB on Exertion] : shortness of breath during exertion [Diarrhea: Grade 0] : Diarrhea: Grade 0 [Confused] : confusion [Dizziness] : dizziness [Difficulty Walking] : difficulty walking [Negative] : Allergic/Immunologic

## 2024-02-10 ENCOUNTER — APPOINTMENT (OUTPATIENT)
Dept: INFUSION THERAPY | Facility: HOSPITAL | Age: 79
End: 2024-02-10

## 2024-02-12 ENCOUNTER — NON-APPOINTMENT (OUTPATIENT)
Age: 79
End: 2024-02-12

## 2024-02-14 ENCOUNTER — APPOINTMENT (OUTPATIENT)
Dept: NEUROSURGERY | Facility: CLINIC | Age: 79
End: 2024-02-14

## 2024-02-14 ENCOUNTER — OUTPATIENT (OUTPATIENT)
Dept: OUTPATIENT SERVICES | Facility: HOSPITAL | Age: 79
LOS: 1 days | End: 2024-02-14
Payer: MEDICARE

## 2024-02-14 ENCOUNTER — APPOINTMENT (OUTPATIENT)
Dept: MRI IMAGING | Facility: IMAGING CENTER | Age: 79
End: 2024-02-14

## 2024-02-14 DIAGNOSIS — Z98.890 OTHER SPECIFIED POSTPROCEDURAL STATES: Chronic | ICD-10-CM

## 2024-02-14 DIAGNOSIS — C79.31 SECONDARY MALIGNANT NEOPLASM OF BRAIN: ICD-10-CM

## 2024-02-14 PROCEDURE — A9585: CPT

## 2024-02-14 PROCEDURE — 77334 RADIATION TREATMENT AID(S): CPT | Mod: 26

## 2024-02-14 PROCEDURE — 77295 3-D RADIOTHERAPY PLAN: CPT | Mod: 26

## 2024-02-14 PROCEDURE — 77290 THER RAD SIMULAJ FIELD CPLX: CPT | Mod: 26

## 2024-02-14 PROCEDURE — 76498 UNLISTED MR PROCEDURE: CPT

## 2024-02-14 PROCEDURE — 77300 RADIATION THERAPY DOSE PLAN: CPT | Mod: 26

## 2024-02-14 PROCEDURE — 77263 THER RADIOLOGY TX PLNG CPLX: CPT

## 2024-02-15 ENCOUNTER — OUTPATIENT (OUTPATIENT)
Dept: OUTPATIENT SERVICES | Facility: HOSPITAL | Age: 79
LOS: 1 days | Discharge: HOME | End: 2024-02-15

## 2024-02-15 DIAGNOSIS — C34.90 MALIGNANT NEOPLASM OF UNSPECIFIED PART OF UNSPECIFIED BRONCHUS OR LUNG: ICD-10-CM

## 2024-02-15 DIAGNOSIS — Z98.890 OTHER SPECIFIED POSTPROCEDURAL STATES: Chronic | ICD-10-CM

## 2024-02-22 RX ORDER — ACETAMINOPHEN 325 MG/1
325 TABLET ORAL
Qty: 0 | Refills: 0 | Status: COMPLETED | OUTPATIENT
Start: 2024-02-22

## 2024-02-29 ENCOUNTER — APPOINTMENT (OUTPATIENT)
Dept: HEMATOLOGY ONCOLOGY | Facility: CLINIC | Age: 79
End: 2024-02-29

## 2024-03-07 ENCOUNTER — RESULT REVIEW (OUTPATIENT)
Age: 79
End: 2024-03-07

## 2024-03-07 ENCOUNTER — APPOINTMENT (OUTPATIENT)
Dept: INFUSION THERAPY | Facility: HOSPITAL | Age: 79
End: 2024-03-07

## 2024-03-07 ENCOUNTER — APPOINTMENT (OUTPATIENT)
Dept: HEMATOLOGY ONCOLOGY | Facility: CLINIC | Age: 79
End: 2024-03-07

## 2024-03-07 ENCOUNTER — NON-APPOINTMENT (OUTPATIENT)
Age: 79
End: 2024-03-07

## 2024-03-07 LAB
ALBUMIN SERPL ELPH-MCNC: 3.9 G/DL — SIGNIFICANT CHANGE UP (ref 3.3–5)
ALP SERPL-CCNC: 142 U/L — HIGH (ref 40–120)
ALT FLD-CCNC: 7 U/L — LOW (ref 10–45)
ANION GAP SERPL CALC-SCNC: 11 MMOL/L — SIGNIFICANT CHANGE UP (ref 5–17)
AST SERPL-CCNC: 21 U/L — SIGNIFICANT CHANGE UP (ref 10–40)
BASOPHILS # BLD AUTO: 0.15 K/UL — SIGNIFICANT CHANGE UP (ref 0–0.2)
BASOPHILS NFR BLD AUTO: 1.4 % — SIGNIFICANT CHANGE UP (ref 0–2)
BILIRUB SERPL-MCNC: 0.3 MG/DL — SIGNIFICANT CHANGE UP (ref 0.2–1.2)
BUN SERPL-MCNC: 12 MG/DL — SIGNIFICANT CHANGE UP (ref 7–23)
CALCIUM SERPL-MCNC: 9.7 MG/DL — SIGNIFICANT CHANGE UP (ref 8.4–10.5)
CHLORIDE SERPL-SCNC: 104 MMOL/L — SIGNIFICANT CHANGE UP (ref 96–108)
CO2 SERPL-SCNC: 21 MMOL/L — LOW (ref 22–31)
CREAT SERPL-MCNC: 0.5 MG/DL — SIGNIFICANT CHANGE UP (ref 0.5–1.3)
EGFR: 97 ML/MIN/1.73M2 — SIGNIFICANT CHANGE UP
EOSINOPHIL # BLD AUTO: 0.61 K/UL — HIGH (ref 0–0.5)
EOSINOPHIL NFR BLD AUTO: 5.7 % — SIGNIFICANT CHANGE UP (ref 0–6)
GLUCOSE SERPL-MCNC: 147 MG/DL — HIGH (ref 70–99)
HCT VFR BLD CALC: 36 % — SIGNIFICANT CHANGE UP (ref 34.5–45)
HGB BLD-MCNC: 11.9 G/DL — SIGNIFICANT CHANGE UP (ref 11.5–15.5)
IMM GRANULOCYTES NFR BLD AUTO: 0.9 % — SIGNIFICANT CHANGE UP (ref 0–0.9)
LYMPHOCYTES # BLD AUTO: 2.28 K/UL — SIGNIFICANT CHANGE UP (ref 1–3.3)
LYMPHOCYTES # BLD AUTO: 21.2 % — SIGNIFICANT CHANGE UP (ref 13–44)
MCHC RBC-ENTMCNC: 25.9 PG — LOW (ref 27–34)
MCHC RBC-ENTMCNC: 33.1 G/DL — SIGNIFICANT CHANGE UP (ref 32–36)
MCV RBC AUTO: 78.3 FL — LOW (ref 80–100)
MONOCYTES # BLD AUTO: 1.14 K/UL — HIGH (ref 0–0.9)
MONOCYTES NFR BLD AUTO: 10.6 % — SIGNIFICANT CHANGE UP (ref 2–14)
NEUTROPHILS # BLD AUTO: 6.45 K/UL — SIGNIFICANT CHANGE UP (ref 1.8–7.4)
NEUTROPHILS NFR BLD AUTO: 60.2 % — SIGNIFICANT CHANGE UP (ref 43–77)
NRBC # BLD: 0 /100 WBCS — SIGNIFICANT CHANGE UP (ref 0–0)
PLATELET # BLD AUTO: 345 K/UL — SIGNIFICANT CHANGE UP (ref 150–400)
POTASSIUM SERPL-MCNC: 3.9 MMOL/L — SIGNIFICANT CHANGE UP (ref 3.5–5.3)
POTASSIUM SERPL-SCNC: 3.9 MMOL/L — SIGNIFICANT CHANGE UP (ref 3.5–5.3)
PROT SERPL-MCNC: 7.7 G/DL — SIGNIFICANT CHANGE UP (ref 6–8.3)
RBC # BLD: 4.6 M/UL — SIGNIFICANT CHANGE UP (ref 3.8–5.2)
RBC # FLD: 16.2 % — HIGH (ref 10.3–14.5)
SODIUM SERPL-SCNC: 136 MMOL/L — SIGNIFICANT CHANGE UP (ref 135–145)
TSH SERPL-MCNC: 0.7 UIU/ML — SIGNIFICANT CHANGE UP (ref 0.27–4.2)
WBC # BLD: 10.73 K/UL — HIGH (ref 3.8–10.5)
WBC # FLD AUTO: 10.73 K/UL — HIGH (ref 3.8–10.5)

## 2024-03-08 ENCOUNTER — APPOINTMENT (OUTPATIENT)
Dept: INFUSION THERAPY | Facility: HOSPITAL | Age: 79
End: 2024-03-08

## 2024-03-08 DIAGNOSIS — R11.2 NAUSEA WITH VOMITING, UNSPECIFIED: ICD-10-CM

## 2024-03-08 DIAGNOSIS — Z51.11 ENCOUNTER FOR ANTINEOPLASTIC CHEMOTHERAPY: ICD-10-CM

## 2024-03-09 ENCOUNTER — APPOINTMENT (OUTPATIENT)
Dept: INFUSION THERAPY | Facility: HOSPITAL | Age: 79
End: 2024-03-09

## 2024-03-21 ENCOUNTER — APPOINTMENT (OUTPATIENT)
Dept: MRI IMAGING | Facility: IMAGING CENTER | Age: 79
End: 2024-03-21

## 2024-03-21 ENCOUNTER — NON-APPOINTMENT (OUTPATIENT)
Age: 79
End: 2024-03-21

## 2024-03-25 ENCOUNTER — APPOINTMENT (OUTPATIENT)
Dept: NEUROSURGERY | Facility: CLINIC | Age: 79
End: 2024-03-25

## 2024-03-28 ENCOUNTER — RESULT REVIEW (OUTPATIENT)
Age: 79
End: 2024-03-28

## 2024-03-28 ENCOUNTER — APPOINTMENT (OUTPATIENT)
Dept: HEMATOLOGY ONCOLOGY | Facility: CLINIC | Age: 79
End: 2024-03-28

## 2024-03-28 ENCOUNTER — APPOINTMENT (OUTPATIENT)
Dept: HEMATOLOGY ONCOLOGY | Facility: CLINIC | Age: 79
End: 2024-03-28
Payer: MEDICARE

## 2024-03-28 ENCOUNTER — APPOINTMENT (OUTPATIENT)
Dept: INFUSION THERAPY | Facility: HOSPITAL | Age: 79
End: 2024-03-28

## 2024-03-28 DIAGNOSIS — F17.210 NICOTINE DEPENDENCE, CIGARETTES, UNCOMPLICATED: ICD-10-CM

## 2024-03-28 LAB
ALBUMIN SERPL ELPH-MCNC: 3.6 G/DL — SIGNIFICANT CHANGE UP (ref 3.3–5)
ALP SERPL-CCNC: 132 U/L — HIGH (ref 40–120)
ALT FLD-CCNC: 10 U/L — SIGNIFICANT CHANGE UP (ref 10–45)
ANION GAP SERPL CALC-SCNC: 12 MMOL/L — SIGNIFICANT CHANGE UP (ref 5–17)
AST SERPL-CCNC: 20 U/L — SIGNIFICANT CHANGE UP (ref 10–40)
BASOPHILS # BLD AUTO: 0.1 K/UL — SIGNIFICANT CHANGE UP (ref 0–0.2)
BASOPHILS NFR BLD AUTO: 1.3 % — SIGNIFICANT CHANGE UP (ref 0–2)
BILIRUB SERPL-MCNC: 0.2 MG/DL — SIGNIFICANT CHANGE UP (ref 0.2–1.2)
BUN SERPL-MCNC: 9 MG/DL — SIGNIFICANT CHANGE UP (ref 7–23)
CALCIUM SERPL-MCNC: 8.9 MG/DL — SIGNIFICANT CHANGE UP (ref 8.4–10.5)
CHLORIDE SERPL-SCNC: 104 MMOL/L — SIGNIFICANT CHANGE UP (ref 96–108)
CO2 SERPL-SCNC: 21 MMOL/L — LOW (ref 22–31)
CREAT SERPL-MCNC: 0.48 MG/DL — LOW (ref 0.5–1.3)
EGFR: 97 ML/MIN/1.73M2 — SIGNIFICANT CHANGE UP
EOSINOPHIL # BLD AUTO: 0.26 K/UL — SIGNIFICANT CHANGE UP (ref 0–0.5)
EOSINOPHIL NFR BLD AUTO: 3.4 % — SIGNIFICANT CHANGE UP (ref 0–6)
GLUCOSE SERPL-MCNC: 135 MG/DL — HIGH (ref 70–99)
HCT VFR BLD CALC: 36.1 % — SIGNIFICANT CHANGE UP (ref 34.5–45)
HGB BLD-MCNC: 11.8 G/DL — SIGNIFICANT CHANGE UP (ref 11.5–15.5)
IMM GRANULOCYTES NFR BLD AUTO: 1.2 % — HIGH (ref 0–0.9)
LDH SERPL L TO P-CCNC: 222 U/L — SIGNIFICANT CHANGE UP (ref 50–242)
LYMPHOCYTES # BLD AUTO: 1.92 K/UL — SIGNIFICANT CHANGE UP (ref 1–3.3)
LYMPHOCYTES # BLD AUTO: 24.7 % — SIGNIFICANT CHANGE UP (ref 13–44)
MCHC RBC-ENTMCNC: 26.4 PG — LOW (ref 27–34)
MCHC RBC-ENTMCNC: 32.7 G/DL — SIGNIFICANT CHANGE UP (ref 32–36)
MCV RBC AUTO: 80.8 FL — SIGNIFICANT CHANGE UP (ref 80–100)
MONOCYTES # BLD AUTO: 1.03 K/UL — HIGH (ref 0–0.9)
MONOCYTES NFR BLD AUTO: 13.3 % — SIGNIFICANT CHANGE UP (ref 2–14)
NEUTROPHILS # BLD AUTO: 4.36 K/UL — SIGNIFICANT CHANGE UP (ref 1.8–7.4)
NEUTROPHILS NFR BLD AUTO: 56.1 % — SIGNIFICANT CHANGE UP (ref 43–77)
NRBC # BLD: 0 /100 WBCS — SIGNIFICANT CHANGE UP (ref 0–0)
PLATELET # BLD AUTO: 396 K/UL — SIGNIFICANT CHANGE UP (ref 150–400)
POTASSIUM SERPL-MCNC: 3.8 MMOL/L — SIGNIFICANT CHANGE UP (ref 3.5–5.3)
POTASSIUM SERPL-SCNC: 3.8 MMOL/L — SIGNIFICANT CHANGE UP (ref 3.5–5.3)
PROT SERPL-MCNC: 6.8 G/DL — SIGNIFICANT CHANGE UP (ref 6–8.3)
RBC # BLD: 4.47 M/UL — SIGNIFICANT CHANGE UP (ref 3.8–5.2)
RBC # FLD: 17.6 % — HIGH (ref 10.3–14.5)
SODIUM SERPL-SCNC: 137 MMOL/L — SIGNIFICANT CHANGE UP (ref 135–145)
TSH SERPL-MCNC: 0.46 UIU/ML — SIGNIFICANT CHANGE UP (ref 0.27–4.2)
URATE SERPL-MCNC: 4.2 MG/DL — SIGNIFICANT CHANGE UP (ref 2.5–7)
WBC # BLD: 7.76 K/UL — SIGNIFICANT CHANGE UP (ref 3.8–10.5)
WBC # FLD AUTO: 7.76 K/UL — SIGNIFICANT CHANGE UP (ref 3.8–10.5)

## 2024-03-28 PROCEDURE — G2211 COMPLEX E/M VISIT ADD ON: CPT

## 2024-03-28 PROCEDURE — 99215 OFFICE O/P EST HI 40 MIN: CPT

## 2024-03-28 NOTE — ASSESSMENT
[Future Reassessment of Pain Scale] : Future reassessment of pain scale    [Medication(s)] : Medication(s) [Palliative] : Goals of care discussed with patient: Palliative [Palliative Care Plan] : Patient was apprised of incurable nature of disease.  Hospice and Palliative care options discussed. 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 Treatment regimen and SE reviewed again with patient and daughter. Written consent obtained.  -Pt has been receiving chemo. Has completed 4 cycles, but one time, she missed carboplatin -MRI brain reviewed with patient and daughter. Multiple but small metastatic lesions. Pt is symptomatic. She has severe nausea, dizziness, gait and balance problems. Will start dexamethasone 2 mg BID.  She had been referred to Dr. Howell but delayed imaging and scheduling consultation. She has been advised to schedule evaluation ASAP. -Pt has not been able to tolerate recommended SRS -Discussed with pt and her family that we will try to go with one more cycle since she missed carbo once after which she will receive Tecentriq.  -calcium has normalized -Monitor Calcium - Due for scans and will order today -OV 3 weeks [AdvancecareDate] : 12/19/23

## 2024-03-28 NOTE — REVIEW OF SYSTEMS
[Fatigue] : fatigue [Recent Change In Weight] : ~T recent weight change [Cough] : cough [Shortness Of Breath] : shortness of breath [Diarrhea: Grade 0] : Diarrhea: Grade 0 [SOB on Exertion] : shortness of breath during exertion [Confused] : confusion [Dizziness] : dizziness [Difficulty Walking] : difficulty walking [Negative] : Allergic/Immunologic

## 2024-03-28 NOTE — HISTORY OF PRESENT ILLNESS
[Disease: _____________________] : Disease: [unfilled] [M: ___] : M[unfilled] [AJCC Stage: ____] : AJCC Stage: [unfilled] [de-identified] : Ms. Spencer is a pleasant 78-year-old smoking woman (still smoking a cigarette per week, up to 2 packs for 60 years) who presented with a fall and hip fracture in September 2023 and on further work it was found to have a right upper lobe nodule. She was seen by Dr Laws who ordered PET/CT which revealed multifocal hepatic metastases and a skull base metastasis. Pt did not have a PCP prior to all this and had not had a screening CT.   12/5/23 - Liver biopsy - POSITIVE FOR MALIGNANT CELLS. Consistent with metastatic small cell carcinoma.  11/27/23 - PET CT Multifocal hepatic metastases, the largest with necrosis and extra capsular extension and our lead peritoneal seeding. Upper abdominal lymph nodes, multifocal osteolytic pulmonary and left adrenal metastases, left orbital fossa mass eroding the skull base with concern for intracranial extension.  During today's visit, pt c/o nausea all the time. She has left eye vision loss. She has loss of appetite and lost about 10 lbs over few months.  Pt is yet to get brain MRI   1/18/24: Pt presents for C1 Carbo/etoposide/cosela/tecentriq today. She delayed start of treatment 2/2 severe nausea. Her MRI was recently completed and revealed Multiple rim enhancing lesions in the frontal and parietal lobes. She endorses gait/balance changes and forgetfulness and dizziness. Nausea continues but no vomiting. No appetite. No reported recent falls. SHe has been referred to Dr. Howell but has not seen him yet.   3/28/24: Cycle 4. Doing ok. fatigue, anorexia, inability to gain weight. Still with some gait issues. continues to smoke tobacco to combat stress  [de-identified] : small cell lung cancer

## 2024-03-29 ENCOUNTER — RESULT REVIEW (OUTPATIENT)
Age: 79
End: 2024-03-29

## 2024-03-29 ENCOUNTER — APPOINTMENT (OUTPATIENT)
Dept: INFUSION THERAPY | Facility: HOSPITAL | Age: 79
End: 2024-03-29

## 2024-03-30 ENCOUNTER — APPOINTMENT (OUTPATIENT)
Dept: INFUSION THERAPY | Facility: HOSPITAL | Age: 79
End: 2024-03-30

## 2024-04-08 ENCOUNTER — APPOINTMENT (OUTPATIENT)
Dept: CT IMAGING | Facility: IMAGING CENTER | Age: 79
End: 2024-04-08
Payer: MEDICARE

## 2024-04-08 ENCOUNTER — OUTPATIENT (OUTPATIENT)
Dept: OUTPATIENT SERVICES | Facility: HOSPITAL | Age: 79
LOS: 1 days | End: 2024-04-08
Payer: MEDICARE

## 2024-04-08 DIAGNOSIS — Z98.890 OTHER SPECIFIED POSTPROCEDURAL STATES: Chronic | ICD-10-CM

## 2024-04-08 DIAGNOSIS — C34.90 MALIGNANT NEOPLASM OF UNSPECIFIED PART OF UNSPECIFIED BRONCHUS OR LUNG: ICD-10-CM

## 2024-04-08 PROCEDURE — 71260 CT THORAX DX C+: CPT

## 2024-04-08 PROCEDURE — 74177 CT ABD & PELVIS W/CONTRAST: CPT

## 2024-04-08 PROCEDURE — 74177 CT ABD & PELVIS W/CONTRAST: CPT | Mod: 26,MH

## 2024-04-08 PROCEDURE — 71260 CT THORAX DX C+: CPT | Mod: 26,MH

## 2024-04-09 ENCOUNTER — OUTPATIENT (OUTPATIENT)
Dept: OUTPATIENT SERVICES | Facility: HOSPITAL | Age: 79
LOS: 1 days | Discharge: HOME | End: 2024-04-09

## 2024-04-09 DIAGNOSIS — Z98.890 OTHER SPECIFIED POSTPROCEDURAL STATES: Chronic | ICD-10-CM

## 2024-04-09 DIAGNOSIS — C34.90 MALIGNANT NEOPLASM OF UNSPECIFIED PART OF UNSPECIFIED BRONCHUS OR LUNG: ICD-10-CM

## 2024-04-19 PROBLEM — K76.9 HEPATIC LESION: Status: ACTIVE | Noted: 2023-10-22

## 2024-04-19 NOTE — ASSESSMENT
[Future Reassessment of Pain Scale] : Future reassessment of pain scale    [Medication(s)] : Medication(s) [Palliative] : Goals of care discussed with patient: Palliative [Palliative Care Plan] : Patient was apprised of incurable nature of disease.  Hospice and Palliative care options discussed. [With Patient/Caregiver] : With Patient/Caregiver [Designated Health Care Proxy] : Designated Health Care Proxy [Name: ___] : Name: [unfilled] [Relationship: ___] : Relationship: [unfilled] [FreeTextEntry1] : 79 yo w, smoker, with newly diagnosed small cell lung cancer, mets to orbit, Brain and liver  #small cell lung -Starting C1  carboplatin AUC 4, etoposide 65 mg/m2 days 1-3, tecentriq and Cosela. Treatment regimen and SE reviewed again with patient and daughter. Written consent obtained.  -MRI brain reviewed with patient and daughter. Multiple but small metastatic lesions. Pt is symptomatic. She has severe nausea, dizziness, gait and balance problems. Started on dexamethasone 2 mg BID.   Has seen Dr. Howell who recommended SRS #Hypercalcemia -Calcium noted 11.3. Continue Zometa with tx -Monitor Calcium Continue current regimen  Tolerating well with minimal AEs Mild nausea since starting chemo- discussed antimetics. Nausea multifactorial.  OV in 3 weeks with tx [AdvancecareDate] : 12/19/23

## 2024-04-19 NOTE — HISTORY OF PRESENT ILLNESS
[Disease: _____________________] : Disease: [unfilled] [M: ___] : M[unfilled] [AJCC Stage: ____] : AJCC Stage: [unfilled] [de-identified] : Ms. Spencer is a pleasant 78-year-old smoking woman (still smoking a cigarette per week, up to 2 packs for 60 years) who presented with a fall and hip fracture in September 2023 and on further work it was found to have a right upper lobe nodule. She was seen by Dr Laws who ordered PET/CT which revealed multifocal hepatic metastases and a skull base metastasis. Pt did not have a PCP prior to all this and had not had a screening CT.   12/5/23 - Liver biopsy - POSITIVE FOR MALIGNANT CELLS. Consistent with metastatic small cell carcinoma.  11/27/23 - PET CT Multifocal hepatic metastases, the largest with necrosis and extra capsular extension and our lead peritoneal seeding. Upper abdominal lymph nodes, multifocal osteolytic pulmonary and left adrenal metastases, left orbital fossa mass eroding the skull base with concern for intracranial extension.  During today's visit, pt c/o nausea all the time. She has left eye vision loss. She has loss of appetite and lost about 10 lbs over few months.  Pt is yet to get brain MRI   1/18/24: Pt presents for C1 Carbo/etoposide/cosela/tecentriq today. She delayed start of treatment 2/2 severe nausea. Her MRI was recently completed and revealed Multiple rim enhancing lesions in the frontal and parietal lobes. She endorses gait/balance changes and forgetfulness and dizziness. Nausea continues but no vomiting. No appetite. No reported recent falls. SHe has been referred to Dr. Howell but has not seen him yet.   2/9/24: Reports nausea since initiating chemo. + with diminished vison LEFT "I see black". Some trouble with gait/dizziness described as being wobbly .Underwent a partial LEFT Per daughter she is with periods of confusion and forgetfulness. Denies HA/unilateral extremity weakness. No issues with speech or comprehension. Saw Dr. Howell who has recommended SRS tx to areas of active disease in CNS.  [de-identified] : small cell lung cancer

## 2024-04-25 ENCOUNTER — NON-APPOINTMENT (OUTPATIENT)
Age: 79
End: 2024-04-25

## 2024-04-25 ENCOUNTER — APPOINTMENT (OUTPATIENT)
Dept: INFUSION THERAPY | Facility: HOSPITAL | Age: 79
End: 2024-04-25

## 2024-04-25 ENCOUNTER — APPOINTMENT (OUTPATIENT)
Dept: HEMATOLOGY ONCOLOGY | Facility: CLINIC | Age: 79
End: 2024-04-25

## 2024-04-25 ENCOUNTER — LABORATORY RESULT (OUTPATIENT)
Age: 79
End: 2024-04-25

## 2024-04-25 ENCOUNTER — RESULT REVIEW (OUTPATIENT)
Age: 79
End: 2024-04-25

## 2024-04-25 LAB
BASOPHILS # BLD AUTO: 0.19 K/UL — SIGNIFICANT CHANGE UP (ref 0–0.2)
BASOPHILS NFR BLD AUTO: 1.9 % — SIGNIFICANT CHANGE UP (ref 0–2)
EOSINOPHIL # BLD AUTO: 0.45 K/UL — SIGNIFICANT CHANGE UP (ref 0–0.5)
EOSINOPHIL NFR BLD AUTO: 4.4 % — SIGNIFICANT CHANGE UP (ref 0–6)
HCT VFR BLD CALC: 35.9 % — SIGNIFICANT CHANGE UP (ref 34.5–45)
HGB BLD-MCNC: 11.8 G/DL — SIGNIFICANT CHANGE UP (ref 11.5–15.5)
IMM GRANULOCYTES NFR BLD AUTO: 1.6 % — HIGH (ref 0–0.9)
LYMPHOCYTES # BLD AUTO: 2.56 K/UL — SIGNIFICANT CHANGE UP (ref 1–3.3)
LYMPHOCYTES # BLD AUTO: 25.2 % — SIGNIFICANT CHANGE UP (ref 13–44)
MCHC RBC-ENTMCNC: 26.7 PG — LOW (ref 27–34)
MCHC RBC-ENTMCNC: 32.9 G/DL — SIGNIFICANT CHANGE UP (ref 32–36)
MCV RBC AUTO: 81.2 FL — SIGNIFICANT CHANGE UP (ref 80–100)
MONOCYTES # BLD AUTO: 1.18 K/UL — HIGH (ref 0–0.9)
MONOCYTES NFR BLD AUTO: 11.6 % — SIGNIFICANT CHANGE UP (ref 2–14)
NEUTROPHILS # BLD AUTO: 5.63 K/UL — SIGNIFICANT CHANGE UP (ref 1.8–7.4)
NEUTROPHILS NFR BLD AUTO: 55.3 % — SIGNIFICANT CHANGE UP (ref 43–77)
NRBC # BLD: 0 /100 WBCS — SIGNIFICANT CHANGE UP (ref 0–0)
PLATELET # BLD AUTO: 322 K/UL — SIGNIFICANT CHANGE UP (ref 150–400)
RBC # BLD: 4.42 M/UL — SIGNIFICANT CHANGE UP (ref 3.8–5.2)
RBC # FLD: 16.5 % — HIGH (ref 10.3–14.5)
WBC # BLD: 10.17 K/UL — SIGNIFICANT CHANGE UP (ref 3.8–10.5)
WBC # FLD AUTO: 10.17 K/UL — SIGNIFICANT CHANGE UP (ref 3.8–10.5)

## 2024-04-26 ENCOUNTER — RESULT REVIEW (OUTPATIENT)
Age: 79
End: 2024-04-26

## 2024-04-26 ENCOUNTER — APPOINTMENT (OUTPATIENT)
Dept: HEMATOLOGY ONCOLOGY | Facility: CLINIC | Age: 79
End: 2024-04-26
Payer: MEDICARE

## 2024-04-26 ENCOUNTER — APPOINTMENT (OUTPATIENT)
Dept: INFUSION THERAPY | Facility: HOSPITAL | Age: 79
End: 2024-04-26

## 2024-04-26 DIAGNOSIS — F17.200 NICOTINE DEPENDENCE, UNSPECIFIED, UNCOMPLICATED: ICD-10-CM

## 2024-04-26 DIAGNOSIS — Z51.11 ENCOUNTER FOR ANTINEOPLASTIC CHEMOTHERAPY: ICD-10-CM

## 2024-04-26 DIAGNOSIS — R11.2 NAUSEA WITH VOMITING, UNSPECIFIED: ICD-10-CM

## 2024-04-26 LAB
ALBUMIN SERPL ELPH-MCNC: 4.1 G/DL — SIGNIFICANT CHANGE UP (ref 3.3–5)
ALP SERPL-CCNC: 102 U/L — SIGNIFICANT CHANGE UP (ref 40–120)
ALT FLD-CCNC: 8 U/L — LOW (ref 10–45)
ANION GAP SERPL CALC-SCNC: 14 MMOL/L — SIGNIFICANT CHANGE UP (ref 5–17)
AST SERPL-CCNC: 19 U/L — SIGNIFICANT CHANGE UP (ref 10–40)
BILIRUB SERPL-MCNC: 0.3 MG/DL — SIGNIFICANT CHANGE UP (ref 0.2–1.2)
BUN SERPL-MCNC: 12 MG/DL — SIGNIFICANT CHANGE UP (ref 7–23)
CALCIUM SERPL-MCNC: 9.5 MG/DL — SIGNIFICANT CHANGE UP (ref 8.4–10.5)
CHLORIDE SERPL-SCNC: 104 MMOL/L — SIGNIFICANT CHANGE UP (ref 96–108)
CO2 SERPL-SCNC: 20 MMOL/L — LOW (ref 22–31)
CREAT SERPL-MCNC: 0.53 MG/DL — SIGNIFICANT CHANGE UP (ref 0.5–1.3)
EGFR: 95 ML/MIN/1.73M2 — SIGNIFICANT CHANGE UP
GLUCOSE SERPL-MCNC: 124 MG/DL — HIGH (ref 70–99)
LDH SERPL L TO P-CCNC: 247 U/L — HIGH (ref 50–242)
MAGNESIUM SERPL-MCNC: 2.1 MG/DL — SIGNIFICANT CHANGE UP (ref 1.6–2.6)
POTASSIUM SERPL-MCNC: 4.5 MMOL/L — SIGNIFICANT CHANGE UP (ref 3.5–5.3)
POTASSIUM SERPL-SCNC: 4.5 MMOL/L — SIGNIFICANT CHANGE UP (ref 3.5–5.3)
PROT SERPL-MCNC: 7.2 G/DL — SIGNIFICANT CHANGE UP (ref 6–8.3)
SODIUM SERPL-SCNC: 139 MMOL/L — SIGNIFICANT CHANGE UP (ref 135–145)
URATE SERPL-MCNC: 4.5 MG/DL — SIGNIFICANT CHANGE UP (ref 2.5–7)

## 2024-04-26 PROCEDURE — 99214 OFFICE O/P EST MOD 30 MIN: CPT

## 2024-04-26 NOTE — CONSULT LETTER
[Dear  ___] : Dear  [unfilled], [Consult Letter:] : I had the pleasure of evaluating your patient, [unfilled]. [Please see my note below.] : Please see my note below. [Consult Closing:] : Thank you very much for allowing me to participate in the care of this patient.  If you have any questions, please do not hesitate to contact me. [Sincerely,] : Sincerely, [FreeTextEntry2] : Dr. Juwan Laws [FreeTextEntry3] : Sadie Boyd MD  [DrMohamud  ___] : Dr. LOPEZ

## 2024-04-26 NOTE — ASSESSMENT
[FreeTextEntry1] : 77 yo w, smoker, with newly diagnosed small cell lung cancer, mets to orbit, Brain and liver  #small cell lung Jan 2024 started carboplatin AUC 4, etoposide 65 mg/m2 days 1-3, tecentriq and Cosela. MRI brain reviewed with patient and daughter. Multiple but small metastatic lesions. Evaluated by Dr. Howell but has not been able to tolerate recommended SRS. Scans from April 2024 show decrease RUL nodules and resolution of mediastinal LAD and hepatic mets but notes lytic lesion in the right acetabulum.   Plan:  -To complete C5 carbo/etop/cosela/tecentriq today. Plan to transition to maintenance Tecentriq. Discussed scheduling with patient and her daughter. Daughter will call to schedule treatment appointments after reviewing her work schedule.  -Brain mets: Still not treated and symptoms seem to be worsening. Discussed importance of MRI brain and f/u w. Dr. Howell ASAP. Unfortunately they cannot make the appointments scheduled next week thus advised that they call and reschedule ASAP.  -Right iliac lesion: Patient is having some pain. Will discuss radiating that area with Dr. Howell. Also provided form for dental clearance to start Xgeva.  -Monitor Calcium  -OV 3 weeks with next treatment  [Future Reassessment of Pain Scale] : Future reassessment of pain scale    [Medication(s)] : Medication(s) [Palliative] : Goals of care discussed with patient: Palliative [Palliative Care Plan] : Patient was apprised of incurable nature of disease.  Hospice and Palliative care options discussed. [With Patient/Caregiver] : With Patient/Caregiver [AdvancecareDate] : 12/19/23 [Designated Health Care Proxy] : Designated Health Care Proxy [Name: ___] : Name: [unfilled] [Relationship: ___] : Relationship: [unfilled]

## 2024-04-26 NOTE — REVIEW OF SYSTEMS
[Fatigue] : fatigue [Recent Change In Weight] : ~T recent weight change [Shortness Of Breath] : shortness of breath [Cough] : cough [SOB on Exertion] : shortness of breath during exertion [Diarrhea: Grade 0] : Diarrhea: Grade 0 [Confused] : confusion [Difficulty Walking] : difficulty walking [Negative] : Allergic/Immunologic

## 2024-04-26 NOTE — HISTORY OF PRESENT ILLNESS
[Disease: _____________________] : Disease: [unfilled] [M: ___] : M[unfilled] [AJCC Stage: ____] : AJCC Stage: [unfilled] [de-identified] : Ms. Spencer is a pleasant 78-year-old smoking woman (still smoking a cigarette per week, up to 2 packs for 60 years) who presented with a fall and hip fracture in September 2023 and on further work it was found to have a right upper lobe nodule. She was seen by Dr Laws who ordered PET/CT which revealed multifocal hepatic metastases and a skull base metastasis. Pt did not have a PCP prior to all this and had not had a screening CT.   12/5/23 - Liver biopsy - POSITIVE FOR MALIGNANT CELLS. Consistent with metastatic small cell carcinoma.  11/27/23 - PET CT Multifocal hepatic metastases, the largest with necrosis and extra capsular extension and our lead peritoneal seeding. Upper abdominal lymph nodes, multifocal osteolytic pulmonary and left adrenal metastases, left orbital fossa mass eroding the skull base with concern for intracranial extension.  During today's visit, pt c/o nausea all the time. She has left eye vision loss. She has loss of appetite and lost about 10 lbs over few months.  Pt is yet to get brain MRI   1/18/24: Pt presents for C1 Carbo/etoposide/cosela/tecentriq today. She delayed start of treatment 2/2 severe nausea. Her MRI was recently completed and revealed Multiple rim enhancing lesions in the frontal and parietal lobes. She endorses gait/balance changes and forgetfulness and dizziness. Nausea continues but no vomiting. No appetite. No reported recent falls. SHe has been referred to Dr. Howell but has not seen him yet.   3/28/24: Cycle 4. Doing ok. fatigue, anorexia, inability to gain weight. Still with some gait issues. continues to smoke tobacco to combat stress   4/26/24: Patient seen today for follow up accompanied by her daughter Rica. Daughter reports that patient is more forgetfull and patient feels her gait is more unsteady. They are supposed to have MRI and see Dr. Howell next week however they have to change the appointment due to scheduling conflict with daughter. Her appetite has decreased and she was previously prescribed steroids for this but has not been taking them. She drinks 3 ensures per day but struggles to eat food because she lives alone and only likes specific foods that she alone can cook. She does also endorse nausea that is relieved with reglan. She also notes some right hip discomfort when she sleeps on that side. Her breathing is stable. Denies fever, chest pain, V/D.  [de-identified] : small cell lung cancer

## 2024-04-27 ENCOUNTER — APPOINTMENT (OUTPATIENT)
Dept: INFUSION THERAPY | Facility: HOSPITAL | Age: 79
End: 2024-04-27

## 2024-04-30 ENCOUNTER — APPOINTMENT (OUTPATIENT)
Dept: MRI IMAGING | Facility: IMAGING CENTER | Age: 79
End: 2024-04-30

## 2024-04-30 ENCOUNTER — APPOINTMENT (OUTPATIENT)
Dept: HEMATOLOGY ONCOLOGY | Facility: CLINIC | Age: 79
End: 2024-04-30

## 2024-05-01 ENCOUNTER — APPOINTMENT (OUTPATIENT)
Dept: MRI IMAGING | Facility: IMAGING CENTER | Age: 79
End: 2024-05-01

## 2024-05-01 NOTE — HISTORY OF PRESENT ILLNESS
[FreeTextEntry1] : INITIAL CONSULTATION : 1/25/2024 This is a 79 y/o F smoking woman who presented with a fall and LEFT hip fracture and on further work up for emphysema during her hospitalization she was found to have a right upper lobe nodule. She was seen by Dr. Laws who ordered pet CT which revealed multifocal hepatic metastases and a skull base metastasis.   PREVIOUS RADIATION: NO 11/27/23 - PET CT  Multifocal hepatic metastases, the largest with necrosis and extra capsular extension and our lead peritoneal seeding. Upper abdominal lymph nodes, multifocal osteolytic pulmonary and left adrenal metastases, left orbital fosamax eroding the skull base with concern for intracranial extension  12/5/23 - Liver biopsy - Liver biopsy - POSITIVE FOR MALIGNANT CELLS. Consistent with metastatic small cell carcinoma.  Established care with Dr. Boyd on Carboplatin Etoposide, Tecentriq and Cosela (initiated 1/2024)  Reports nausea since initiating chemo  + with diminished vison LEFT "I see black". Some trouble with gait/dizziness described as being wobbly .Underwent a partial LEFT HIP replacement in September 2023 at [facility] and is recovering well. Per daughter she is with periods of confusion and forgetfulness. Denies  HA/unilateral extremity weakness. No issues with speech or comprehension.  MRI brain w w/o contrast 1/2/2024 IMPRESSION:  1. Cerebral atrophy and chronic microvascular ischemic disease. 2. Rounded foci of FLAIR hyperintensity including one cortically based in the right frontal lobe warrant a follow-up MRI of the brain with and without contrast as they are concerning for metastatic disease. We will attempt to recall the patient for additional images. Consider correlation to prior studies if available. 3. Abnormal FLAIR signal in the greater wing of the left sphenoid and region of previously seen 2 cm lytic mass with FDG uptake. The patient returned for additional postcontrast axial T1-weighted images. On the additional views there is a 7 mm rim-enhancing lesion in the right posterior frontal lobe. There is an additional 4 mm rim-enhancing lesion also in the posterior right frontal lobe. There is a 5 mm rim-enhancing lesion in the left parieto-occipital lobe. These are consistent with metastatic disease. There is an additional punctate focus of enhancement in the right occipital lobe image 35 series 23 which is difficult to characterize given its small size.  There is a 1.5 cm abnormal enhancement in the greater wing of the left sphenoid and an area of a previously identified 2 cm lytic mass with FDG uptake.  Visit dated 5/8/2024 Patient returns for continuation of care. Ms. Spencer has not been able to complete GKRS as had been prescribed. Today  Last seen by Dr. Boyd 3/28 on Carboplatin/Etoposide/Tecentriq and Cosela  CT C/A/P 4/8/2024 IMPRESSION: Since 11/16/2023, interval decrease/resolution of right upper lobe nodules, resolution of mediastinal lymphadenopathy and decrease of hepatic metastases. 1.5 cm lytic lesion within the right acetabulum is enlarged.  MRI brain w w/o contrast 5/1/2024

## 2024-05-01 NOTE — REVIEW OF SYSTEMS
[FreeTextEntry3] : LEFT eye visual disturbance  [FreeTextEntry9] : stiff neck attributes to sleep a pattern. [de-identified] : periods of confusion/forgetfulness

## 2024-05-08 ENCOUNTER — NON-APPOINTMENT (OUTPATIENT)
Age: 79
End: 2024-05-08

## 2024-05-08 ENCOUNTER — APPOINTMENT (OUTPATIENT)
Dept: RADIATION ONCOLOGY | Facility: CLINIC | Age: 79
End: 2024-05-08

## 2024-05-14 RX ORDER — DEXAMETHASONE 2 MG/1
2 TABLET ORAL TWICE DAILY
Qty: 60 | Refills: 1 | Status: ACTIVE | COMMUNITY
Start: 2024-01-18 | End: 1900-01-01

## 2024-05-20 ENCOUNTER — APPOINTMENT (OUTPATIENT)
Dept: MRI IMAGING | Facility: IMAGING CENTER | Age: 79
End: 2024-05-20

## 2024-05-20 ENCOUNTER — OUTPATIENT (OUTPATIENT)
Dept: OUTPATIENT SERVICES | Facility: HOSPITAL | Age: 79
LOS: 1 days | End: 2024-05-20

## 2024-05-20 DIAGNOSIS — C79.31 SECONDARY MALIGNANT NEOPLASM OF BRAIN: ICD-10-CM

## 2024-05-20 DIAGNOSIS — Z98.890 OTHER SPECIFIED POSTPROCEDURAL STATES: Chronic | ICD-10-CM

## 2024-05-21 ENCOUNTER — APPOINTMENT (OUTPATIENT)
Dept: NEUROSURGERY | Facility: AMBULATORY SURGERY CENTER | Age: 79
End: 2024-05-21

## 2024-05-29 ENCOUNTER — NON-APPOINTMENT (OUTPATIENT)
Age: 79
End: 2024-05-29

## 2024-05-30 ENCOUNTER — APPOINTMENT (OUTPATIENT)
Dept: OTOLARYNGOLOGY | Facility: CLINIC | Age: 79
End: 2024-05-30
Payer: MEDICARE

## 2024-05-30 VITALS
HEIGHT: 62 IN | WEIGHT: 95 LBS | BODY MASS INDEX: 17.48 KG/M2 | OXYGEN SATURATION: 97 % | SYSTOLIC BLOOD PRESSURE: 124 MMHG | HEART RATE: 68 BPM | DIASTOLIC BLOOD PRESSURE: 70 MMHG

## 2024-05-30 DIAGNOSIS — H91.93 UNSPECIFIED HEARING LOSS, BILATERAL: ICD-10-CM

## 2024-05-30 DIAGNOSIS — C34.90 MALIGNANT NEOPLASM OF UNSPECIFIED PART OF UNSPECIFIED BRONCHUS OR LUNG: ICD-10-CM

## 2024-05-30 DIAGNOSIS — J38.01 PARALYSIS OF VOCAL CORDS AND LARYNX, UNILATERAL: ICD-10-CM

## 2024-05-30 DIAGNOSIS — R49.0 DYSPHONIA: ICD-10-CM

## 2024-05-30 DIAGNOSIS — C79.31 SECONDARY MALIGNANT NEOPLASM OF BRAIN: ICD-10-CM

## 2024-05-30 PROCEDURE — 31579 LARYNGOSCOPY TELESCOPIC: CPT

## 2024-05-30 PROCEDURE — 99204 OFFICE O/P NEW MOD 45 MIN: CPT | Mod: 25

## 2024-05-30 RX ORDER — OXYCODONE 5 MG/1
5 TABLET ORAL
Qty: 90 | Refills: 0 | Status: COMPLETED | COMMUNITY
End: 2024-05-30

## 2024-05-30 RX ORDER — PROCHLORPERAZINE MALEATE 10 MG/1
10 TABLET ORAL
Qty: 90 | Refills: 3 | Status: COMPLETED | COMMUNITY
Start: 2023-12-27 | End: 2024-05-30

## 2024-05-30 RX ORDER — MELOXICAM 15 MG/1
15 TABLET ORAL
Qty: 45 | Refills: 1 | Status: COMPLETED | COMMUNITY
Start: 2023-10-25 | End: 2024-05-30

## 2024-05-30 RX ORDER — PHENYLEPHRINE HCL 10 MG
7 TABLET ORAL DAILY
Qty: 2 | Refills: 0 | Status: COMPLETED | COMMUNITY
Start: 2023-11-02 | End: 2024-05-30

## 2024-05-30 NOTE — ASSESSMENT
[FreeTextEntry1] : Assessment/Plan:  #1 Left vocal fold paresis  #2 Dysphonia- mild  #3 Hearing ears  We discussed need for possible injection laryngoplasty in the future should her left vocal fold become immobile.  However since it is now moving and voice is much better today I expect that she had a paralysis 3 weeks ago that is now recovering.  I want to see her back in 1 month to re assess. But she is to call to make an earlier appointment if voice gets very bad again for more than 1 day. I have also ordered for audio which she would like to get at next visit.

## 2024-05-30 NOTE — HISTORY OF PRESENT ILLNESS
[de-identified] : MEGAN PARRY is a 78 year old woman who presents to the [facility] with difficulty phonating. She is referred by Dr. Sadie Boyd. Currently undergoing chemo and due to start radiation for lung cancer.  She now does not note periods of normal voicing - hoarse for 3 weeks She does not note specific difficulties with swallowing. She does not note frequent classic heartburn symptoms. Smoking history: Smoker for her entire life, continues to smoke a few cigarettes a week.    VOCAL DEMANDS: Her vocal demands are primarily those of general conversation

## 2024-05-30 NOTE — CONSULT LETTER
Pt transferred to us and gets Lynparza through [employer]. Copay through [facility] Part [facility] [id number].     Liaison spoke with Zahida and she has submitted a reconsideration form to [employer] for 2024. They denied her due to their new income max. She will keep us posted on the outcome.       Thank you,    Huma Jackson  [facility] Liajyoti [email or web address]  Phone: [phone]  Fax: [phone]'   [Dear  ___] : Dear  [unfilled], [Consult Letter:] : I had the pleasure of evaluating your patient, [unfilled]. [Please see my note below.] : Please see my note below. [Consult Closing:] : Thank you very much for allowing me to participate in the care of this patient.  If you have any questions, please do not hesitate to contact me. [Sincerely,] : Sincerely, [FreeTextEntry2] : Dr. Sadie Boyd [FreeTextEntry3] : Nj Aguilar M.D. Division of Laryngology | Department of Otolaryngology [address] [address]

## 2024-05-30 NOTE — PROCEDURE
[de-identified] : Stroboscopic Laryngoscopy Procedure Note:  Indication:	Assess laryngeal biomechanics and vocal fold oscillation.  Description of Procedure:	Informed consent was verbally obtained from the patient prior to the procedure. The patient was seated in the clinic chair. Topical anesthesia was achieved by first spraying the nasal cavities with 4% lidocaine and nasal decongestant.   Findings:  Supraglottis: no masses or lesions  Glottis:    Structure:                        Right: crisp and shows no lesions or masses                        Left:  crisp and shows no lesions or masses                 Mobility:                        Right:  normal                        Left:  moderately paretic                Amplitude:                        Right:  normal                       Left:  normal                Closure: complete                 Wave symmetry:  symmetric  Subglottis: no masses or lesions within the visualized subglottis Visualized airway is widely patent.

## 2024-06-05 RX ORDER — LORAZEPAM 0.5 MG/1
0.5 TABLET ORAL
Qty: 10 | Refills: 0 | Status: ACTIVE | COMMUNITY
Start: 2024-06-05 | End: 1900-01-01

## 2024-06-17 ENCOUNTER — OUTPATIENT (OUTPATIENT)
Dept: OUTPATIENT SERVICES | Facility: HOSPITAL | Age: 79
LOS: 1 days | End: 2024-06-17
Payer: MEDICARE

## 2024-06-17 ENCOUNTER — APPOINTMENT (OUTPATIENT)
Dept: MRI IMAGING | Facility: IMAGING CENTER | Age: 79
End: 2024-06-17

## 2024-06-17 DIAGNOSIS — C79.31 SECONDARY MALIGNANT NEOPLASM OF BRAIN: ICD-10-CM

## 2024-06-17 DIAGNOSIS — Z98.890 OTHER SPECIFIED POSTPROCEDURAL STATES: Chronic | ICD-10-CM

## 2024-06-17 PROCEDURE — 77334 RADIATION TREATMENT AID(S): CPT | Mod: 26

## 2024-06-17 PROCEDURE — 77295 3-D RADIOTHERAPY PLAN: CPT | Mod: 26

## 2024-06-17 PROCEDURE — 76498 UNLISTED MR PROCEDURE: CPT

## 2024-06-17 PROCEDURE — 77300 RADIATION THERAPY DOSE PLAN: CPT | Mod: 26

## 2024-06-17 PROCEDURE — 77432 STEREOTACTIC RADIATION TRMT: CPT

## 2024-06-17 PROCEDURE — 77290 THER RAD SIMULAJ FIELD CPLX: CPT | Mod: 26

## 2024-06-17 PROCEDURE — A9585: CPT

## 2024-06-17 PROCEDURE — 77263 THER RADIOLOGY TX PLNG CPLX: CPT

## 2024-06-18 NOTE — H&P PST ADULT - MAMMOGRAM, LAST, PROFILE
[Time Spent: ___ minutes] : I have spent [unfilled] minutes of time on the encounter. have not had mammo for a long time

## 2024-06-19 ENCOUNTER — APPOINTMENT (OUTPATIENT)
Dept: NEUROSURGERY | Facility: CLINIC | Age: 79
End: 2024-06-19
Payer: MEDICARE

## 2024-06-19 PROCEDURE — 61796 SRS CRANIAL LESION SIMPLE: CPT

## 2024-06-28 ENCOUNTER — APPOINTMENT (OUTPATIENT)
Dept: OTOLARYNGOLOGY | Facility: CLINIC | Age: 79
End: 2024-06-28

## 2024-07-24 ENCOUNTER — RX RENEWAL (OUTPATIENT)
Age: 79
End: 2024-07-24

## 2024-12-20 ENCOUNTER — EMERGENCY (EMERGENCY)
Facility: HOSPITAL | Age: 79
LOS: 0 days | Discharge: ANOTHER HOSPITAL | End: 2024-12-20
Attending: STUDENT IN AN ORGANIZED HEALTH CARE EDUCATION/TRAINING PROGRAM
Payer: MEDICARE

## 2024-12-20 ENCOUNTER — INPATIENT (INPATIENT)
Facility: HOSPITAL | Age: 79
LOS: 3 days | Discharge: ANOTHER HOSPITAL | DRG: 312 | End: 2024-12-24
Attending: HOSPITALIST | Admitting: STUDENT IN AN ORGANIZED HEALTH CARE EDUCATION/TRAINING PROGRAM
Payer: MEDICARE

## 2024-12-20 VITALS
SYSTOLIC BLOOD PRESSURE: 133 MMHG | TEMPERATURE: 98 F | HEART RATE: 96 BPM | DIASTOLIC BLOOD PRESSURE: 84 MMHG | WEIGHT: 89.95 LBS | RESPIRATION RATE: 20 BRPM | HEIGHT: 62 IN | OXYGEN SATURATION: 98 %

## 2024-12-20 VITALS
TEMPERATURE: 99 F | RESPIRATION RATE: 16 BRPM | HEART RATE: 87 BPM | DIASTOLIC BLOOD PRESSURE: 72 MMHG | HEIGHT: 62 IN | SYSTOLIC BLOOD PRESSURE: 119 MMHG | WEIGHT: 134.92 LBS | OXYGEN SATURATION: 97 %

## 2024-12-20 VITALS
SYSTOLIC BLOOD PRESSURE: 129 MMHG | OXYGEN SATURATION: 98 % | DIASTOLIC BLOOD PRESSURE: 62 MMHG | RESPIRATION RATE: 16 BRPM | HEART RATE: 93 BPM | TEMPERATURE: 98 F

## 2024-12-20 DIAGNOSIS — Z98.890 OTHER SPECIFIED POSTPROCEDURAL STATES: Chronic | ICD-10-CM

## 2024-12-20 DIAGNOSIS — M25.531 PAIN IN RIGHT WRIST: ICD-10-CM

## 2024-12-20 DIAGNOSIS — D49.6 NEOPLASM OF UNSPECIFIED BEHAVIOR OF BRAIN: ICD-10-CM

## 2024-12-20 DIAGNOSIS — S80.11XA CONTUSION OF RIGHT LOWER LEG, INITIAL ENCOUNTER: ICD-10-CM

## 2024-12-20 DIAGNOSIS — Z88.0 ALLERGY STATUS TO PENICILLIN: ICD-10-CM

## 2024-12-20 DIAGNOSIS — M79.651 PAIN IN RIGHT THIGH: ICD-10-CM
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
## 2024-12-20 LAB
ALBUMIN SERPL ELPH-MCNC: 3.1 G/DL — LOW (ref 3.3–5)
ALBUMIN SERPL ELPH-MCNC: 3.9 G/DL — SIGNIFICANT CHANGE UP (ref 3.3–5)
ALP SERPL-CCNC: 78 U/L — SIGNIFICANT CHANGE UP (ref 40–120)
ALP SERPL-CCNC: 84 U/L — SIGNIFICANT CHANGE UP (ref 40–120)
ALT FLD-CCNC: 49 U/L — HIGH (ref 10–45)
ALT FLD-CCNC: 50 U/L — SIGNIFICANT CHANGE UP (ref 12–78)
ANION GAP SERPL CALC-SCNC: 19 MMOL/L — HIGH (ref 5–17)
ANION GAP SERPL CALC-SCNC: 8 MMOL/L — SIGNIFICANT CHANGE UP (ref 5–17)
APTT BLD: 28.9 SEC — SIGNIFICANT CHANGE UP (ref 24.5–35.6)
AST SERPL-CCNC: 114 U/L — HIGH (ref 10–40)
AST SERPL-CCNC: 138 U/L — HIGH (ref 15–37)
BASOPHILS # BLD AUTO: 0.06 K/UL — SIGNIFICANT CHANGE UP (ref 0–0.2)
BASOPHILS # BLD AUTO: 0.07 K/UL — SIGNIFICANT CHANGE UP (ref 0–0.2)
BASOPHILS NFR BLD AUTO: 0.5 % — SIGNIFICANT CHANGE UP (ref 0–2)
BASOPHILS NFR BLD AUTO: 0.5 % — SIGNIFICANT CHANGE UP (ref 0–2)
BILIRUB SERPL-MCNC: 0.5 MG/DL — SIGNIFICANT CHANGE UP (ref 0.2–1.2)
BILIRUB SERPL-MCNC: 0.7 MG/DL — SIGNIFICANT CHANGE UP (ref 0.2–1.2)
BUN SERPL-MCNC: 14 MG/DL — SIGNIFICANT CHANGE UP (ref 7–23)
BUN SERPL-MCNC: 14 MG/DL — SIGNIFICANT CHANGE UP (ref 7–23)
CALCIUM SERPL-MCNC: 9 MG/DL — SIGNIFICANT CHANGE UP (ref 8.5–10.1)
CALCIUM SERPL-MCNC: 9.7 MG/DL — SIGNIFICANT CHANGE UP (ref 8.4–10.5)
CHLORIDE SERPL-SCNC: 100 MMOL/L — SIGNIFICANT CHANGE UP (ref 96–108)
CHLORIDE SERPL-SCNC: 106 MMOL/L — SIGNIFICANT CHANGE UP (ref 96–108)
CK SERPL-CCNC: 3748 U/L — HIGH (ref 26–192)
CO2 SERPL-SCNC: 19 MMOL/L — LOW (ref 22–31)
CO2 SERPL-SCNC: 21 MMOL/L — LOW (ref 22–31)
CREAT SERPL-MCNC: 0.64 MG/DL — SIGNIFICANT CHANGE UP (ref 0.5–1.3)
CREAT SERPL-MCNC: 0.68 MG/DL — SIGNIFICANT CHANGE UP (ref 0.5–1.3)
EGFR: 89 ML/MIN/1.73M2 — SIGNIFICANT CHANGE UP
EGFR: 90 ML/MIN/1.73M2 — SIGNIFICANT CHANGE UP
EOSINOPHIL # BLD AUTO: 0.05 K/UL — SIGNIFICANT CHANGE UP (ref 0–0.5)
EOSINOPHIL # BLD AUTO: 0.09 K/UL — SIGNIFICANT CHANGE UP (ref 0–0.5)
EOSINOPHIL NFR BLD AUTO: 0.4 % — SIGNIFICANT CHANGE UP (ref 0–6)
EOSINOPHIL NFR BLD AUTO: 0.7 % — SIGNIFICANT CHANGE UP (ref 0–6)
FLUAV AG NPH QL: SIGNIFICANT CHANGE UP
FLUBV AG NPH QL: SIGNIFICANT CHANGE UP
GLUCOSE SERPL-MCNC: 103 MG/DL — HIGH (ref 70–99)
GLUCOSE SERPL-MCNC: 112 MG/DL — HIGH (ref 70–99)
HCT VFR BLD CALC: 43.3 % — SIGNIFICANT CHANGE UP (ref 34.5–45)
HCT VFR BLD CALC: 43.8 % — SIGNIFICANT CHANGE UP (ref 34.5–45)
HGB BLD-MCNC: 14.4 G/DL — SIGNIFICANT CHANGE UP (ref 11.5–15.5)
HGB BLD-MCNC: 14.5 G/DL — SIGNIFICANT CHANGE UP (ref 11.5–15.5)
IMM GRANULOCYTES NFR BLD AUTO: 0.3 % — SIGNIFICANT CHANGE UP (ref 0–0.9)
IMM GRANULOCYTES NFR BLD AUTO: 0.5 % — SIGNIFICANT CHANGE UP (ref 0–0.9)
INR BLD: 1.04 RATIO — SIGNIFICANT CHANGE UP (ref 0.85–1.16)
LACTATE SERPL-SCNC: 1.5 MMOL/L — SIGNIFICANT CHANGE UP (ref 0.7–2)
LIDOCAIN IGE QN: 13 U/L — SIGNIFICANT CHANGE UP (ref 13–75)
LYMPHOCYTES # BLD AUTO: 1.9 K/UL — SIGNIFICANT CHANGE UP (ref 1–3.3)
LYMPHOCYTES # BLD AUTO: 14.1 % — SIGNIFICANT CHANGE UP (ref 13–44)
LYMPHOCYTES # BLD AUTO: 17.6 % — SIGNIFICANT CHANGE UP (ref 13–44)
LYMPHOCYTES # BLD AUTO: 2.2 K/UL — SIGNIFICANT CHANGE UP (ref 1–3.3)
MAGNESIUM SERPL-MCNC: 2.2 MG/DL — SIGNIFICANT CHANGE UP (ref 1.6–2.6)
MAGNESIUM SERPL-MCNC: 2.3 MG/DL — SIGNIFICANT CHANGE UP (ref 1.6–2.6)
MCHC RBC-ENTMCNC: 26.9 PG — LOW (ref 27–34)
MCHC RBC-ENTMCNC: 26.9 PG — LOW (ref 27–34)
MCHC RBC-ENTMCNC: 32.9 G/DL — SIGNIFICANT CHANGE UP (ref 32–36)
MCHC RBC-ENTMCNC: 33.5 G/DL — SIGNIFICANT CHANGE UP (ref 32–36)
MCV RBC AUTO: 80.3 FL — SIGNIFICANT CHANGE UP (ref 80–100)
MCV RBC AUTO: 81.7 FL — SIGNIFICANT CHANGE UP (ref 80–100)
MONOCYTES # BLD AUTO: 1.23 K/UL — HIGH (ref 0–0.9)
MONOCYTES # BLD AUTO: 1.25 K/UL — HIGH (ref 0–0.9)
MONOCYTES NFR BLD AUTO: 9.3 % — SIGNIFICANT CHANGE UP (ref 2–14)
MONOCYTES NFR BLD AUTO: 9.8 % — SIGNIFICANT CHANGE UP (ref 2–14)
NEUTROPHILS # BLD AUTO: 10.2 K/UL — HIGH (ref 1.8–7.4)
NEUTROPHILS # BLD AUTO: 8.85 K/UL — HIGH (ref 1.8–7.4)
NEUTROPHILS NFR BLD AUTO: 70.9 % — SIGNIFICANT CHANGE UP (ref 43–77)
NEUTROPHILS NFR BLD AUTO: 75.4 % — SIGNIFICANT CHANGE UP (ref 43–77)
NRBC # BLD: 0 /100 WBCS — SIGNIFICANT CHANGE UP (ref 0–0)
NRBC # BLD: 0 /100 WBCS — SIGNIFICANT CHANGE UP (ref 0–0)
NT-PROBNP SERPL-SCNC: 2029 PG/ML — HIGH (ref 0–450)
PHOSPHATE SERPL-MCNC: 3.1 MG/DL — SIGNIFICANT CHANGE UP (ref 2.5–4.5)
PLATELET # BLD AUTO: 452 K/UL — HIGH (ref 150–400)
PLATELET # BLD AUTO: 459 K/UL — HIGH (ref 150–400)
POTASSIUM SERPL-MCNC: 3.7 MMOL/L — SIGNIFICANT CHANGE UP (ref 3.5–5.3)
POTASSIUM SERPL-MCNC: 4.2 MMOL/L — SIGNIFICANT CHANGE UP (ref 3.5–5.3)
POTASSIUM SERPL-SCNC: 3.7 MMOL/L — SIGNIFICANT CHANGE UP (ref 3.5–5.3)
POTASSIUM SERPL-SCNC: 4.2 MMOL/L — SIGNIFICANT CHANGE UP (ref 3.5–5.3)
PROT SERPL-MCNC: 7.3 GM/DL — SIGNIFICANT CHANGE UP (ref 6–8.3)
PROT SERPL-MCNC: 7.4 G/DL — SIGNIFICANT CHANGE UP (ref 6–8.3)
PROTHROM AB SERPL-ACNC: 11.8 SEC — SIGNIFICANT CHANGE UP (ref 9.9–13.4)
RBC # BLD: 5.36 M/UL — HIGH (ref 3.8–5.2)
RBC # BLD: 5.39 M/UL — HIGH (ref 3.8–5.2)
RBC # FLD: 13.1 % — SIGNIFICANT CHANGE UP (ref 10.3–14.5)
RBC # FLD: 13.3 % — SIGNIFICANT CHANGE UP (ref 10.3–14.5)
RSV RNA NPH QL NAA+NON-PROBE: SIGNIFICANT CHANGE UP
SARS-COV-2 RNA SPEC QL NAA+PROBE: SIGNIFICANT CHANGE UP
SODIUM SERPL-SCNC: 135 MMOL/L — SIGNIFICANT CHANGE UP (ref 135–145)
SODIUM SERPL-SCNC: 138 MMOL/L — SIGNIFICANT CHANGE UP (ref 135–145)
TROPONIN I, HIGH SENSITIVITY RESULT: 60.2 NG/L — HIGH
TROPONIN T, HIGH SENSITIVITY RESULT: 49 NG/L — SIGNIFICANT CHANGE UP (ref 0–51)
TROPONIN T, HIGH SENSITIVITY RESULT: 55 NG/L — HIGH (ref 0–51)
WBC # BLD: 12.49 K/UL — HIGH (ref 3.8–10.5)
WBC # BLD: 13.51 K/UL — HIGH (ref 3.8–10.5)
WBC # FLD AUTO: 12.49 K/UL — HIGH (ref 3.8–10.5)
WBC # FLD AUTO: 13.51 K/UL — HIGH (ref 3.8–10.5)

## 2024-12-20 PROCEDURE — 73630 X-RAY EXAM OF FOOT: CPT | Mod: 26,RT

## 2024-12-20 PROCEDURE — 73130 X-RAY EXAM OF HAND: CPT | Mod: 26,RT

## 2024-12-20 PROCEDURE — 73110 X-RAY EXAM OF WRIST: CPT | Mod: 26,RT

## 2024-12-20 PROCEDURE — 70450 CT HEAD/BRAIN W/O DYE: CPT | Mod: 26,MC

## 2024-12-20 PROCEDURE — 73562 X-RAY EXAM OF KNEE 3: CPT | Mod: 26,50

## 2024-12-20 PROCEDURE — 72170 X-RAY EXAM OF PELVIS: CPT | Mod: 26,XE

## 2024-12-20 PROCEDURE — 71250 CT THORAX DX C-: CPT | Mod: 26,MC

## 2024-12-20 PROCEDURE — 73590 X-RAY EXAM OF LOWER LEG: CPT | Mod: 26,RT

## 2024-12-20 PROCEDURE — 93010 ELECTROCARDIOGRAM REPORT: CPT

## 2024-12-20 PROCEDURE — 25600 CLTX DST RDL FX/EPHYS SEP WO: CPT | Mod: 54,RT

## 2024-12-20 PROCEDURE — 73610 X-RAY EXAM OF ANKLE: CPT | Mod: 26,RT

## 2024-12-20 PROCEDURE — 99285 EMERGENCY DEPT VISIT HI MDM: CPT

## 2024-12-20 PROCEDURE — 73502 X-RAY EXAM HIP UNI 2-3 VIEWS: CPT | Mod: 26,LT,76

## 2024-12-20 PROCEDURE — 73030 X-RAY EXAM OF SHOULDER: CPT | Mod: 26,RT

## 2024-12-20 PROCEDURE — 73552 X-RAY EXAM OF FEMUR 2/>: CPT | Mod: 26,RT

## 2024-12-20 PROCEDURE — 71045 X-RAY EXAM CHEST 1 VIEW: CPT | Mod: 26

## 2024-12-20 PROCEDURE — 74176 CT ABD & PELVIS W/O CONTRAST: CPT | Mod: 26,MC

## 2024-12-20 PROCEDURE — 72125 CT NECK SPINE W/O DYE: CPT | Mod: 26,MC

## 2024-12-20 PROCEDURE — 73090 X-RAY EXAM OF FOREARM: CPT | Mod: 26,RT

## 2024-12-20 PROCEDURE — 99285 EMERGENCY DEPT VISIT HI MDM: CPT | Mod: GC,57

## 2024-12-20 RX ORDER — ACETAMINOPHEN 500MG 500 MG/1
600 TABLET, COATED ORAL ONCE
Refills: 0 | Status: COMPLETED | OUTPATIENT
Start: 2024-12-20 | End: 2024-12-20

## 2024-12-20 RX ORDER — CLINDAMYCIN HYDROCHLORIDE 300 MG/1
600 CAPSULE ORAL ONCE
Refills: 0 | Status: COMPLETED | OUTPATIENT
Start: 2024-12-20 | End: 2024-12-20

## 2024-12-20 RX ORDER — SODIUM CHLORIDE 9 MG/ML
250 INJECTION, SOLUTION INTRAMUSCULAR; INTRAVENOUS; SUBCUTANEOUS ONCE
Refills: 0 | Status: COMPLETED | OUTPATIENT
Start: 2024-12-20 | End: 2024-12-20

## 2024-12-20 RX ADMIN — SODIUM CHLORIDE 250 MILLILITER(S): 9 INJECTION, SOLUTION INTRAMUSCULAR; INTRAVENOUS; SUBCUTANEOUS at 20:10

## 2024-12-20 RX ADMIN — CLINDAMYCIN HYDROCHLORIDE 100 MILLIGRAM(S): 300 CAPSULE ORAL at 21:34

## 2024-12-20 RX ADMIN — ACETAMINOPHEN 500MG 240 MILLIGRAM(S): 500 TABLET, COATED ORAL at 17:53

## 2024-12-20 NOTE — ED PROVIDER NOTE - ATTENDING CONTRIBUTION TO CARE
79-year-old female who has history of non-small cell lung cancer who she reports with mets to the brain presents to the emergency department with.  New mass.  Patient has had prior chemo and radiation to the brain 1 episode is here due to new mass in the pontine area as well as fall from standing.  Patient with multiple healing bruises on the chest and lower legs.  Patient is accompanied by daughter Rica who noted that patient had weakness on the right side for the past 2 weeks.  Patient baseline ambulates with a walker however has been coming increasingly difficult over the past 2 weeks.  Patient is w/ a 2.7x2.2x2.2 cm hypodense mass, in the L laura, 79-year-old female who has history of non-small cell lung cancer who she reports with mets to the brain presents to the emergency department with.  New mass.  Patient has had prior chemo and radiation to the brain 1 episode is here due to new mass in the pontine area as well as fall from standing.  Patient with multiple healing bruises on the chest and lower legs.  Patient is accompanied by daughter Rica who noted that patient had weakness on the right side for the past 2 weeks.  Patient baseline ambulates with a walker however has been coming increasingly difficult over the past 2 weeks.  Patient is w/ a 2.7x2.2x2.2 cm hypodense mass, in the L laura,    per daughter pt w/ freq falls.   I have reviewed the triage vital signs.    Const: appearing stated age, no acute distress  Head: atraumatic, normocephalic  Eyes: no conjunctival injection and no scleral icterus  ENMT: Atraumatic external nose and ears, Moist mucus membranes  Neck: Symmetric, trachea midline, no c spine tenderness  BACK: no bruising, no midline t/l spine tenderness  CVS: +S1/S2, dorsalis pedis/radial pulse 2+ bilaterally  RESP: Unlabored respiratory effort, Clear to auscultation bilaterally  GI: Nontender/Nondistended, soft abdomen  MSK: Extremities w/o deformity or ttp   Skin: Warm, Dry w/ no rashes  Neuro: GCS=15, CNs II-XII grossly intact, motor in all 4 extremities equal, Sensation grossly intact   Psych: Awake, Alert, & Orientedx3;  Appropriate mood and affect, cooperative  Pt w/ freq falls, 79-year-old female who has history of non-small cell lung cancer who she reports with mets to the brain presents to the emergency department with.  New mass.  Patient has had prior chemo and radiation to the brain 1 episode is here due to new mass in the pontine area as well as fall from standing.  Patient with multiple healing bruises on the chest and lower legs.  Patient is accompanied by daughter Rica who noted that patient had weakness on the right side for the past 2 weeks.  Patient baseline ambulates with a walker however has been coming increasingly difficult over the past 2 weeks.  Patient is w/ a 2.7x2.2x2.2 cm hypodense mass, in the L laura,    per daughter pt w/ freq falls.   I have reviewed the triage vital signs.    Const: appearing stated age, no acute distress multiple areas of bruises of different stages of healing  Head: atraumatic, normocephalic  Eyes: no conjunctival injection and no scleral icterus  ENMT:Moist mucus membranes, pressure wound on the R face,  Neck: Symmetric, trachea midline, no c spine tenderness  BACK: multiple bruises on the R flank/rib area,  no midline t/l spine tenderness  CVS: +S1/S2, dorsalis pedis/radial pulse 2+ bilaterally  RESP: Unlabored respiratory effort, Clear to auscultation bilaterally  GI: mild tenderness /Nondistended, soft abdomen  MSK: pain when palpating the R wrist, the pelvis and the RLE w/ abrasion on the L knee,   Neuro: GCS=15,no facial droop, 4/5 strenght on the R side compared to the L  intact sensation b/l  Psych: Awake, Alert, & Orientedx3;  Appropriate mood and affect, cooperative  Pt w/ freq falls, here w/ new brain mass, sent for nsg, seen by nsg recommending admsision for MRI, neuro consult for steroids/aeds, pt w multiple bruises reviewed xrays from [place] [facility] has likely a nondisplaced R distal radius fx, to splint, and will obtain ct chest abd/pelvis imaging, and tba for rhabd pt w/ likely component of CHF given elevated probnp, will have gentle ivf hydration, given rash on the R face and the R sided will tx for cellulitis w/ clinda given pcn allergy

## 2024-12-20 NOTE — ED PROVIDER NOTE - WR ORDER ID 3
[id number]
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

## 2024-12-20 NOTE — ED ADULT NURSE NOTE - NSFALLHARMRISKINTERV_ED_ALL_ED

## 2024-12-20 NOTE — CONSULT NOTE ADULT - SUBJECTIVE AND OBJECTIVE BOX
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 CTH w/ ~2.5cm pontine lesion c/f lung Ca met vs HGG. PLTs wnl, coags pending. Exam: AOx4, EOMI, PERRL. no facial. bruising/swollen Rt face. Tender Rt  elbow/shoulder. RUE HG 4+/5, finger abduction 3/5 palmar contracted. RLE 4/5, distally 4+/5. o/w 5/5. no c/f resp status  =====================  PAST MEDICAL HISTORY   Tobacco abuse    Alcohol abuse    Lung nodules    Liver lesion    H/O fracture of hip    History of fracture due to fall      PAST SURGICAL HISTORY   History of repair of hip fracture      penicillins (Unknown)      MEDICATIONS:  Antibiotics:    Neuro:    Other:      SOCIAL HISTORY:   Occupation:   Marital Status:     FAMILY HISTORY:  FH: HTN (hypertension)        ROS: Negative except per HPI    LABS:  PT/INR - ( 20 Dec 2024 19:53 )   PT: 11.8 sec;   INR: 1.04 ratio         PTT - ( 20 Dec 2024 19:53 )  PTT:28.9 sec                        14.4   12.49 )-----------( 459      ( 20 Dec 2024 19:53 )             43.8     12-20    138  |  100  |  14  ----------------------------<  112[H]  3.7   |  19[L]  |  0.68    Ca    9.7      20 Dec 2024 19:53  Phos  3.1     12-20  Mg     2.2     12-20    TPro  7.4  /  Alb  3.9  /  TBili  0.5  /  DBili  x   /  AST  114[H]  /  ALT  49[H]  /  AlkPhos  84  12-20

## 2024-12-20 NOTE — CONSULT NOTE ADULT - ASSESSMENT
79F Hx SCLC w/ known brain mets (Lt temporal + Lt BG) s/p GKRS Apr-May 2024, xfer [facility] s/p found down today w/ sev weeks of RUE hand contracted RLE uncoordinated, blurry vision. CTH w/ ~2.5cm pontine lesion c/f lung Ca met vs HGG. PLTs wnl, coags pending. Exam: AOx4, EOMI, PERRL. no facial. bruising/swollen Rt face. Tender Rt  elbow/shoulder. RUE HG 4+/5, finger abduction 3/5 palmar contracted. RLE 4/5, distally 4+/5. o/w 5/5. no c/f resp status  -recc trauma w/u XRs for RUE RLE.  -no acute nsgy intervention  -recc adm med  -MRI wwo SRS  -rest of plan pending MRI 79F Hx SCLC w/ known brain mets (Lt temporal + Lt BG) s/p GKRS Apr-May 2024, xfer [facility] s/p found down today w/ sev weeks of RUE hand contracted RLE uncoordinated, blurry vision. CTH w/ ~2.5cm pontine lesion c/f lung Ca met vs HGG. PLTs wnl, coags pending. Exam: AOx4, EOMI, PERRL. no facial. bruising/swollen Rt face. Tender Rt  elbow/shoulder. RUE HG 4+/5, finger abduction 3/5 palmar contracted. RLE 4/5, distally 4+/5. o/w 5/5. no c/f resp status  -recc trauma w/u XRs for RUE RLE.  -no acute nsgy intervention  -recc adm med  -MRI wwo SRS  -C/s neurology/rad-onc. AEDs/steroids per neuro/rad-onc  -rest of plan pending MRI

## 2024-12-20 NOTE — ED PROVIDER NOTE - PROGRESS NOTE DETAILS
nsgat bedside alex has distal radius fracture that is comminuted on the right side will splint and a sugar-tong case signed out to Dr. Reyes

## 2024-12-20 NOTE — ED PROVIDER NOTE - CLINICAL SUMMARY MEDICAL DECISION MAKING FREE TEXT BOX
79-year-old female pmhx of small cell lung cancer with mets to the brain comes to ED status post fall. Patient was found by neighbor down on the floor this afternoon after family was concerned.  Unclear how long patient was down.  Patient family reports that the right arm and right leg of the patient appear more contracted than usual over the last couple of weeks which may have played a factor in the fall according to the family.  Patient reports right wrist pain and right thigh pain status post fall.  Their pain/symptom is moderate, constant, non mediating with rest. Otherwise ROS negative.    General: NAD   HEENT: Erythemic right side of face, PERRL   Cardiac: RRR, no murmurs, 2+ peripheral pulses   Chest: CTA   Abdomen: soft, non-distended, bowel sounds present, no ttp, no rebound or guarding   Extremities: Erythemic right wrist/forearm.  Bruising of the right leg.  No peripheral edema, calf tenderness, or leg size discrepancies   Skin: no rashes   Neuro: AAOx2, 5+motor left extremities, 4+ extremities right arm slightly contracted, right leg with slight contraction. Sensory grossly intact   Psych: mood and affect appropriate    Impression: 79-year-old female pmhx of small cell lung cancer with mets to the brain comes to ED status post fall. Their symptoms and exam findings are concerning metabolic abnormality, viral illness, infection, intracranial pathology.    Ordered labs, imaging, medications for diagnosis, management, and treatment.

## 2024-12-20 NOTE — ED ADULT NURSE NOTE - CHIEF COMPLAINT QUOTE
transfer from [place], lung ca with mets to brain. Fell yesterday, confused, new mass seen in brain on ct scan. transfer for trauma c/s

## 2024-12-20 NOTE — ED PROVIDER NOTE - CARE PLAN
Goal:	brain mass, distal radius fracture Right sided closed  Assessment and plan of treatment:	brain mass, distal radius fracture Right sided closed   Principal Discharge DX:	Syncope  Goal:	brain mass, distal radius fracture Right sided closed  Assessment and plan of treatment:	brain mass, distal radius fracture Right sided closed   1

## 2024-12-20 NOTE — ED PROVIDER NOTE - CLINICAL SUMMARY MEDICAL DECISION MAKING FREE TEXT BOX
80 y/o F hx of nonsmall cell lung cancer with mets to brain presents as transfer for new brain mass in the pontine area. Per daughter patient has had poor ambulation for the past few weeks as well as right sided weakness. Had a fall while at home alone today with unknown downtime. Noted to have multiple healing bruises on chest and lower extremities with right wrist ttp and diffuse right leg ttp. Will discuss with neurosurgery regarding new CT findings, XR/CTs due to fall, labs, TBA.

## 2024-12-20 NOTE — ED ADULT NURSE NOTE - NSFALLRISKINTERV_ED_ALL_ED
Assistance OOB with selected safe patient handling equipment if applicable/Assistance with ambulation/Communicate fall risk and risk factors to all staff, patient, and family/Monitor gait and stability/Provide visual cue: yellow wristband, yellow gown, etc/Reinforce activity limits and safety measures with patient and family/Call bell, personal items and telephone in reach/Instruct patient to call for assistance before getting out of bed/chair/stretcher/Non-slip footwear applied when patient is off stretcher/[employer] to call system/Physically safe environment - no spills, clutter or unnecessary equipment/Purposeful Proactive Rounding/Room/bathroom lighting operational, light cord in reach

## 2024-12-20 NOTE — ED ADULT TRIAGE NOTE - CHIEF COMPLAINT QUOTE
Fell hours ago, found on floor, right shoulder pain, Right eye swollen prior to fall, unable to explain fall event

## 2024-12-20 NOTE — ED ADULT NURSE NOTE - OBJECTIVE STATEMENT
79 y.o F BIB EMS as transfer from [facility] p/w c/o brain mass. A+Ox4. Per daughter states has 79 y.o F BIB EMS as transfer from [facility] p/w c/o brain mass. A+Ox4. Per daughter states pt has hx of small cell lung cancer w/ mets to liver and brain. Reports went through radiation and chemo therapy which shrunk mass in August, and since has had no issues. Acutely over the past x1 wk reports pt has been increasingly confused a/w unsteady gait. Yesterday pt wasn't answering the phone, has neighbors check on her and was found down in home, unknown LOC/ hit head, unknown how long down for. At [facility] has CT scans done showing new onset brain mass w/ midline shift, transfer to [facility] for neurosgy consult. Upon initial assessment, Bruising noted to R side of body, R face, R hip, R back, R knee and an abrasion noted to L Knee. Endorsing weakness of R side. PERRL. No other complaints at this time, daughter at bedside, comfort and safety maintained.

## 2024-12-20 NOTE — ED ADULT NURSE NOTE - OBJECTIVE STATEMENT
79 year old female with PMH of small cell Lung Ca with Mets to the LIver and Brain. Pt BIBA s/p Fall hours ago, found on floor, right shoulder pain, Right eye swollen prior to fall but new right face abrasion noted. Bruising noted to right face, right arm, right hip, right lthigh and right foot. Pt with complaint of right sided pain. Weakness noted to right side. Facial symmetry WNl. Pt unable to explain fall event

## 2024-12-20 NOTE — ED ADULT TRIAGE NOTE - CHIEF COMPLAINT QUOTE
transfer from [place], lung ca with mets to brain. Fell yesterday, confused, new mass seen in brain on ct scan. transfer for trauma c/s

## 2024-12-20 NOTE — ED ADULT NURSE NOTE - NURSING MUSC ROM
09/05/24                            Aarti Boyerondo  [address] [place] [address]    To Whom It May Concern:    This is to certify Aarti Boyerondo was evaluated with JASPAL Navarro on 09/05/24 and can return to regular school on 09/06/2024.    RESTRICTIONS: please excuse from gym on Friday, 09/06/2024                    JASPAL Navarro  [facility]  [address] [facility] [address]  Dept Phone: [phone]      
full range of motion in all extremities

## 2024-12-21 DIAGNOSIS — S22.41XA MULTIPLE FRACTURES OF RIBS, RIGHT SIDE, INITIAL ENCOUNTER FOR CLOSED FRACTURE: ICD-10-CM
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
## 2024-12-21 DIAGNOSIS — S52.91XA UNSPECIFIED FRACTURE OF RIGHT FOREARM, INITIAL ENCOUNTER FOR CLOSED FRACTURE: ICD-10-CM

## 2024-12-21 DIAGNOSIS — Z29.9 ENCOUNTER FOR PROPHYLACTIC MEASURES, UNSPECIFIED: ICD-10-CM

## 2024-12-21 DIAGNOSIS — R52 PAIN, UNSPECIFIED: ICD-10-CM

## 2024-12-21 DIAGNOSIS — R55 SYNCOPE AND COLLAPSE: ICD-10-CM

## 2024-12-21 DIAGNOSIS — C79.31 SECONDARY MALIGNANT NEOPLASM OF BRAIN: ICD-10-CM

## 2024-12-21 LAB
ANION GAP SERPL CALC-SCNC: 15 MMOL/L — SIGNIFICANT CHANGE UP (ref 5–17)
APPEARANCE UR: CLEAR — SIGNIFICANT CHANGE UP
BACTERIA # UR AUTO: NEGATIVE /HPF — SIGNIFICANT CHANGE UP
BILIRUB UR-MCNC: NEGATIVE — SIGNIFICANT CHANGE UP
BUN SERPL-MCNC: 16 MG/DL — SIGNIFICANT CHANGE UP (ref 7–23)
CALCIUM SERPL-MCNC: 9 MG/DL — SIGNIFICANT CHANGE UP (ref 8.4–10.5)
CAST: 4 /LPF — SIGNIFICANT CHANGE UP (ref 0–4)
CHLORIDE SERPL-SCNC: 102 MMOL/L — SIGNIFICANT CHANGE UP (ref 96–108)
CK MB BLD-MCNC: 0.9 % — SIGNIFICANT CHANGE UP (ref 0–3.5)
CK MB CFR SERPL CALC: 24.2 NG/ML — HIGH (ref 0–3.8)
CK SERPL-CCNC: 2578 U/L — HIGH (ref 25–170)
CO2 SERPL-SCNC: 21 MMOL/L — LOW (ref 22–31)
COLOR SPEC: YELLOW — SIGNIFICANT CHANGE UP
CREAT SERPL-MCNC: 0.53 MG/DL — SIGNIFICANT CHANGE UP (ref 0.5–1.3)
DIFF PNL FLD: ABNORMAL
EGFR: 95 ML/MIN/1.73M2 — SIGNIFICANT CHANGE UP
GLUCOSE SERPL-MCNC: 106 MG/DL — HIGH (ref 70–99)
GLUCOSE UR QL: NEGATIVE MG/DL — SIGNIFICANT CHANGE UP
HCT VFR BLD CALC: 40.9 % — SIGNIFICANT CHANGE UP (ref 34.5–45)
HGB BLD-MCNC: 13.6 G/DL — SIGNIFICANT CHANGE UP (ref 11.5–15.5)
KETONES UR-MCNC: 15 MG/DL
LEUKOCYTE ESTERASE UR-ACNC: NEGATIVE — SIGNIFICANT CHANGE UP
MCHC RBC-ENTMCNC: 27 PG — SIGNIFICANT CHANGE UP (ref 27–34)
MCHC RBC-ENTMCNC: 33.3 G/DL — SIGNIFICANT CHANGE UP (ref 32–36)
MCV RBC AUTO: 81.3 FL — SIGNIFICANT CHANGE UP (ref 80–100)
NITRITE UR-MCNC: NEGATIVE — SIGNIFICANT CHANGE UP
NRBC # BLD: 0 /100 WBCS — SIGNIFICANT CHANGE UP (ref 0–0)
PH UR: 6 — SIGNIFICANT CHANGE UP (ref 5–8)
PLATELET # BLD AUTO: 360 K/UL — SIGNIFICANT CHANGE UP (ref 150–400)
POTASSIUM SERPL-MCNC: 3.4 MMOL/L — LOW (ref 3.5–5.3)
POTASSIUM SERPL-SCNC: 3.4 MMOL/L — LOW (ref 3.5–5.3)
PROT UR-MCNC: SIGNIFICANT CHANGE UP MG/DL
RBC # BLD: 5.03 M/UL — SIGNIFICANT CHANGE UP (ref 3.8–5.2)
RBC # FLD: 13.2 % — SIGNIFICANT CHANGE UP (ref 10.3–14.5)
RBC CASTS # UR COMP ASSIST: 57 /HPF — HIGH (ref 0–4)
SODIUM SERPL-SCNC: 138 MMOL/L — SIGNIFICANT CHANGE UP (ref 135–145)
SP GR SPEC: 1.02 — SIGNIFICANT CHANGE UP (ref 1–1.03)
SQUAMOUS # UR AUTO: 2 /HPF — SIGNIFICANT CHANGE UP (ref 0–5)
TROPONIN T, HIGH SENSITIVITY RESULT: 39 NG/L — SIGNIFICANT CHANGE UP (ref 0–51)
UROBILINOGEN FLD QL: 0.2 MG/DL — SIGNIFICANT CHANGE UP (ref 0.2–1)
WBC # BLD: 9.64 K/UL — SIGNIFICANT CHANGE UP (ref 3.8–10.5)
WBC # FLD AUTO: 9.64 K/UL — SIGNIFICANT CHANGE UP (ref 3.8–10.5)
WBC UR QL: 1 /HPF — SIGNIFICANT CHANGE UP (ref 0–5)

## 2024-12-21 PROCEDURE — 70551 MRI BRAIN STEM W/O DYE: CPT | Mod: 26

## 2024-12-21 PROCEDURE — 72148 MRI LUMBAR SPINE W/O DYE: CPT | Mod: 26

## 2024-12-21 PROCEDURE — 99223 1ST HOSP IP/OBS HIGH 75: CPT

## 2024-12-21 PROCEDURE — 99222 1ST HOSP IP/OBS MODERATE 55: CPT

## 2024-12-21 RX ORDER — LATANOPROST 50 UG/ML
1 SOLUTION OPHTHALMIC
Refills: 0 | DISCHARGE

## 2024-12-21 RX ORDER — DEXAMETHASONE SODIUM PHOSPHATE 4 MG/ML
10 VIAL (ML) INJECTION ONCE
Refills: 0 | Status: DISCONTINUED | OUTPATIENT
Start: 2024-12-21 | End: 2024-12-21

## 2024-12-21 RX ORDER — DEXAMETHASONE SODIUM PHOSPHATE 4 MG/ML
4 VIAL (ML) INJECTION DAILY
Refills: 0 | Status: DISCONTINUED | OUTPATIENT
Start: 2024-12-22 | End: 2024-12-24

## 2024-12-21 RX ORDER — TIMOLOL MALEATE 0.5 %
1 DROPS OPHTHALMIC (EYE)
Refills: 0 | DISCHARGE

## 2024-12-21 RX ORDER — ACETAMINOPHEN 80 MG/.8ML
650 SOLUTION/ DROPS ORAL EVERY 6 HOURS
Refills: 0 | Status: DISCONTINUED | OUTPATIENT
Start: 2024-12-21 | End: 2024-12-24

## 2024-12-21 RX ORDER — POLYETHYLENE GLYCOL 3350 17 G/DOSE
17 POWDER (GRAM) ORAL DAILY
Refills: 0 | Status: DISCONTINUED | OUTPATIENT
Start: 2024-12-21 | End: 2024-12-24

## 2024-12-21 RX ORDER — MORPHINE SULFATE 15 MG
1 TABLET, EXTENDED RELEASE ORAL EVERY 6 HOURS
Refills: 0 | Status: DISCONTINUED | OUTPATIENT
Start: 2024-12-21 | End: 2024-12-24

## 2024-12-21 RX ORDER — TIMOLOL MALEATE 0.5 %
1 DROPS OPHTHALMIC (EYE) DAILY
Refills: 0 | Status: DISCONTINUED | OUTPATIENT
Start: 2024-12-21 | End: 2024-12-24

## 2024-12-21 RX ORDER — SENNOSIDES 8.6 MG/1
2 TABLET, FILM COATED ORAL AT BEDTIME
Refills: 0 | Status: DISCONTINUED | OUTPATIENT
Start: 2024-12-21 | End: 2024-12-24

## 2024-12-21 RX ORDER — DEXAMETHASONE SODIUM PHOSPHATE 4 MG/ML
10 VIAL (ML) INJECTION ONCE
Refills: 0 | Status: COMPLETED | OUTPATIENT
Start: 2024-12-21 | End: 2024-12-21

## 2024-12-21 RX ORDER — OXYCODONE HCL 15 MG
5 TABLET ORAL EVERY 6 HOURS
Refills: 0 | Status: DISCONTINUED | OUTPATIENT
Start: 2024-12-21 | End: 2024-12-24

## 2024-12-21 RX ORDER — LATANOPROST 50 UG/ML
1 SOLUTION OPHTHALMIC AT BEDTIME
Refills: 0 | Status: DISCONTINUED | OUTPATIENT
Start: 2024-12-21 | End: 2024-12-24

## 2024-12-21 RX ADMIN — LATANOPROST 1 DROP(S): 50 SOLUTION OPHTHALMIC at 21:44

## 2024-12-21 RX ADMIN — SENNOSIDES 2 TABLET(S): 8.6 TABLET, FILM COATED ORAL at 21:44

## 2024-12-21 RX ADMIN — Medication 1 DROP(S): at 21:44

## 2024-12-21 RX ADMIN — Medication 100 MILLIGRAM(S): at 17:11

## 2024-12-21 RX ADMIN — ACETAMINOPHEN 650 MILLIGRAM(S): 80 SOLUTION/ DROPS ORAL at 18:27

## 2024-12-21 NOTE — PROGRESS NOTE ADULT - SUBJECTIVE AND OBJECTIVE BOX
Patient seen and examined at bedside.    --Anticoagulation--    T(C): 36.9 (12-21-24 @ 05:09), Max: 37.1 (12-20-24 @ 19:14)  HR: 69 (12-21-24 @ 05:09) (69 - 96)  BP: 114/64 (12-21-24 @ 05:09) (90/72 - 133/84)  RR: 16 (12-21-24 @ 05:09) (16 - 20)  SpO2: 97% (12-21-24 @ 05:09) (95% - 99%)  Wt(kg): --    Exam:  AOx4, EOMI, PERRL. no facial. bruising/swollen Rt face. Tender Rt  elbow/shoulder. RUE HG 4+/5, finger abduction 3/5 palmar contracted. RLE 4/5, distally 4+/5. o/w 5/5. no c/f resp status

## 2024-12-21 NOTE — H&P ADULT - NSHPLABSRESULTS_GEN_ALL_CORE
CT head- 2.7 x 2.2 x 2.2 cm expansile mostly hypodense mass suspected in the left laura. There is a suspected capsule or rim measuring 3 to 4 mm in thickness. Necrotic metastatic lesion is suspected given that the patient has a history of lung cancer.

## 2024-12-21 NOTE — H&P ADULT - PROBLEM SELECTOR PLAN 3
CTH noted 2.7x2,. 2x2, .2cm mass in Chris. She is known to have brain mets from small cell Ca. Chemo stopped in April  - NeuroSx and evaluated, rec MRI brain ( ordered)  - c/w Decadron 4mg/d after initial 10mg  - no need of AEDs as ppx

## 2024-12-21 NOTE — ED ADULT NURSE REASSESSMENT NOTE - NS ED NURSE REASSESS COMMENT FT1
Size 16 FR Estevez catheter inserted under sterile technique with 2 RN's at the bedside as per MD order. PT tolerated well. Approximately 150mL clear, yellow urine drained on initial insertion. UA/UC sent as per MD order. pt tolerated well. Size 16 FR Estevez catheter inserted under sterile technique with 2 RN's at the bedside as per MD order. PT tolerated well. Approximately 150mL clear, yellow urine drained on initial insertion. UA/UC sent as per MD order.

## 2024-12-21 NOTE — H&P ADULT - NEUROLOGICAL
AAOx 3, nonfocal/cranial nerves II-XII intact/sensation intact/responds to pain/responds to verbal commands/deep reflexes intact/superficial reflexes intact details…

## 2024-12-21 NOTE — H&P ADULT - PROBLEM SELECTOR PLAN 2
X-ray and  T chest show R 5th and 7th rib Fx, likely from fall  - analgesics, incentive spirometer, PT X-ray and  T chest show R 5th and 7th rib Fx, likely from fall  - Also has L3 compression  - Ortho spine rec MRI L spines   - analgesics, incentive spirometer, PT

## 2024-12-21 NOTE — CONSULT NOTE ADULT - SUBJECTIVE AND OBJECTIVE BOX
Surgery Consult Note  Attending: MD Anju  Service: ACS/Trauma     HPI: 79-year-old female who has history of non-small cell lung cancer who she reports with mets to the brain presents to the emergency department with. Patient was found down with unclear down time. Patient with multiple healing bruises on the chest and lower legs. Patient's daughter reports weakness on the right side for the past 2 weeks.  Patient baseline ambulates with a walker however has been coming increasingly difficult over the past 2 weeks.  Patient has a 2 cm hypodense laura mass likely metastatic, R 5&7 rib fractures, distal radius fx and age indeterminate L3 compression fx.     PAST MEDICAL HISTORY:  PAST MEDICAL & SURGICAL HISTORY:  Tobacco abuse      Alcohol abuse      Lung nodules      Liver lesion      H/O fracture of hip      History of fracture due to fall      History of repair of hip fracture          ALLERGIES:  Allergies    penicillins (Unknown)    Intolerances        SOCIAL HISTORY: Negative unless reported above     FAMILY HISTORY:  FAMILY HISTORY:  FH: HTN (hypertension)        PHYSICAL EXAM:  General: NAD, resting comfortably  HEENT: bruising surrounding R eye   Pulmonary/chest: normal resp effort, R anterior chest tenderness, 1250 on IS  Cardiovascular: NSR  Abdominal: soft, ND, NT  Extremities: WWP, abrasion over L knee, R arm in splint   Neuro: A/O x 2    VITAL SIGNS:  Vital Signs Last 24 Hrs  T(C): 36.9 (20 Dec 2024 23:33), Max: 37.1 (20 Dec 2024 19:14)  T(F): 98.4 (20 Dec 2024 23:33), Max: 98.7 (20 Dec 2024 19:14)  HR: 80 (21 Dec 2024 02:45) (79 - 96)  BP: 90/72 (21 Dec 2024 02:45) (90/72 - 133/84)  BP(mean): 79 (21 Dec 2024 02:45) (79 - 79)  RR: 16 (21 Dec 2024 02:45) (16 - 20)  SpO2: 98% (21 Dec 2024 02:45) (95% - 99%)    Parameters below as of 21 Dec 2024 02:45  Patient On (Oxygen Delivery Method): room air        I&O's Summary      LABS:                        14.4   12.49 )-----------( 459      ( 20 Dec 2024 19:53 )             43.8     12-20    138  |  100  |  14  ----------------------------<  112[H]  3.7   |  19[L]  |  0.68    Ca    9.7      20 Dec 2024 19:53  Phos  3.1     12-20  Mg     2.2     12-20    TPro  7.4  /  Alb  3.9  /  TBili  0.5  /  DBili  x   /  AST  114[H]  /  ALT  49[H]  /  AlkPhos  84  12-20    PT/INR - ( 20 Dec 2024 19:53 )   PT: 11.8 sec;   INR: 1.04 ratio         PTT - ( 20 Dec 2024 19:53 )  PTT:28.9 sec  Urinalysis Basic - ( 20 Dec 2024 19:53 )    Color: x / Appearance: x / SG: x / pH: x  Gluc: 112 mg/dL / Ketone: x  / Bili: x / Urobili: x   Blood: x / Protein: x / Nitrite: x   Leuk Esterase: x / RBC: x / WBC x   Sq Epi: x / Non Sq Epi: x / Bacteria: x      CAPILLARY BLOOD GLUCOSE      POCT Blood Glucose.: 114 mg/dL (20 Dec 2024 14:25)    LIVER FUNCTIONS - ( 20 Dec 2024 19:53 )  Alb: 3.9 g/dL / Pro: 7.4 g/dL / ALK PHOS: 84 U/L / ALT: 49 U/L / AST: 114 U/L / GGT: x

## 2024-12-21 NOTE — CONSULT NOTE ADULT - SUBJECTIVE AND OBJECTIVE BOX
Neurology - Consult Note    -  Spectra: [phone] ([facility]), [phone] ([facility])  -    HPI: 79F with PMHx of small       Review of Systems:  INCOMPLETE   CONSTITUTIONAL: No fevers or chills  EYES AND ENT: No visual changes or no throat pain   NECK: No pain or stiffness  RESPIRATORY: No hemoptysis or shortness of breath  CARDIOVASCULAR: No chest pain or palpitations  GASTROINTESTINAL: No melena or hematochezia  GENITOURINARY: No dysuria or hematuria  NEUROLOGICAL: +As stated in HPI above  SKIN: No itching, burning, rashes, or lesions   All other review of systems is negative unless indicated above.    Allergies:  penicillins (Unknown)      PMHx/PSHx/Family Hx: As above, otherwise see below   Tobacco abuse    Alcohol abuse    Lung nodules    Liver lesion    H/O fracture of hip    History of fracture due to fall        Social Hx:  No current use of tobacco, alcohol, or illicit drugs  Lives with ***    Medications:  MEDICATIONS  (STANDING):    MEDICATIONS  (PRN):      Vitals:  T(C): 36.9 (12-21-24 @ 05:09), Max: 37.1 (12-20-24 @ 19:14)  HR: 69 (12-21-24 @ 05:09) (69 - 96)  BP: 114/64 (12-21-24 @ 05:09) (90/72 - 133/84)  RR: 16 (12-21-24 @ 05:09) (16 - 20)  SpO2: 97% (12-21-24 @ 05:09) (95% - 99%)    Physical Examination: INCOMPLETE  General - NAD  Cardiovascular - Peripheral pulses palpable, no edema  Eyes - Fundoscopy with flat, sharp optic discs and no hemorrhage or exudates; Fundoscopy not well visualized; Fundoscopy not performed due to safety precautions in the setting of the COVID-19 pandemic    Neurologic Exam:  Mental status - Eyes open, attending to examiner. Awake, Alert, Oriented to person, place, and time. Speech fluent, repetition and naming intact. Follows simple and complex commands. Attention/concentration, recent and remote memory (including registration and recall), and fund of knowledge intact    Cranial nerves - PERRLA, VFF, EOMI, face sensation (V1-V3) intact b/l, facial strength intact without asymmetry b/l, hearing intact b/l, palate with symmetric elevation, trapezius 5/5 strength b/l, tongue midline on protrusion with full lateral movement    Motor - Normal bulk and tone throughout. No pronator drift.  Strength testing            Deltoid      Biceps      Triceps     Wrist Extension    Wrist Flexion     Interossei         R            5                 5               5                     5                            5                        5                 5  L             5                 5               5                     5                            5                        5                 5              Hip Flexion    Hip Extension    Knee Flexion    Knee Extension    Dorsiflexion    Plantar Flexion  R              5                           5                       5                           5                            5                          5  L              5                           5                        5                           5                            5                          5    Sensation - Light touch intact throughout    DTR's -             Biceps      Triceps     Brachioradialis      Patellar    Ankle    Toes/plantar response  R             2+             2+                  2+                       2+            2+                 Down  L              2+             2+                 2+                        2+           2+                 Down    Coordination - Finger to Nose intact b/l. No tremors appreciated    Gait and station - Normal casual gait. Romberg (-)    Labs:                        14.4   12.49 )-----------( 459      ( 20 Dec 2024 19:53 )             43.8     12-20    138  |  100  |  14  ----------------------------<  112[H]  3.7   |  19[L]  |  0.68    Ca    9.7      20 Dec 2024 19:53  Phos  3.1     12-20  Mg     2.2     12-20    TPro  7.4  /  Alb  3.9  /  TBili  0.5  /  DBili  x   /  AST  114[H]  /  ALT  49[H]  /  AlkPhos  84  12-20    CAPILLARY BLOOD GLUCOSE      POCT Blood Glucose.: 114 mg/dL (20 Dec 2024 14:25)    LIVER FUNCTIONS - ( 20 Dec 2024 19:53 )  Alb: 3.9 g/dL / Pro: 7.4 g/dL / ALK PHOS: 84 U/L / ALT: 49 U/L / AST: 114 U/L / GGT: x             PT/INR - ( 20 Dec 2024 19:53 )   PT: 11.8 sec;   INR: 1.04 ratio         PTT - ( 20 Dec 2024 19:53 )  PTT:28.9 sec  CSF:                  Radiology:  CT Head No Cont:  (20 Dec 2024 17:06)     Neurology - Consult Note    -  Spectra: [phone] ([facility]), [phone] ([facility])  -    HPI: 79F with PMHx of small cell lung CA with known met to left laura (s/p chemo, RT) transferred from [facility] to [facility] for neurosurgery evaluation due to left pontine 2.7x2.2x2.2 expansile mostly hypodense mass. Patient was found down at her house by her neighbor for an unknown period of time. Neurosurgery and orthopedics evaluated patient and recommended no acute intervention as it is unclear if mass is acute or chronic, and is consistent with radiation treatment in past and could possibly be scar tissue after RT. Patient is admitted to hospital for rhabdomyolysis and syncope workup. Ambultes using walker at baseline and is alert and oriented x3 at baseline. Neurology consulted for steroid and antiepileptic management.       Review of Systems: unable to obtain due to medical conditions      Allergies:  penicillins (Unknown)      PMHx/PSHx/Family Hx: As above, otherwise see below   Tobacco abuse    Alcohol abuse    Lung nodules    Liver lesion    H/O fracture of hip    History of fracture due to fall        Social Hx: unable to obtain due to medical conditions    Medications:  MEDICATIONS  (STANDING):    MEDICATIONS  (PRN):      Vitals:  T(C): 36.9 (12-21-24 @ 05:09), Max: 37.1 (12-20-24 @ 19:14)  HR: 69 (12-21-24 @ 05:09) (69 - 96)  BP: 114/64 (12-21-24 @ 05:09) (90/72 - 133/84)  RR: 16 (12-21-24 @ 05:09) (16 - 20)  SpO2: 97% (12-21-24 @ 05:09) (95% - 99%)    Physical Examination:   General - NAD  Cardiovascular -  no edema    Neurologic Exam:  Mental status - Eyes open, attending to examiner. Oriented to person but not place or time. Minimal verbal output. Follows simple commands with repeated encouragement.     Cranial nerves - PERRL, visual fields intact to threat b/l, EOMI, face sensation (V1-V3) intact b/l, facial strength intact without asymmetry b/l, hearing intact b/l, palate with symmetric elevation, trapezius 5/5 strength b/l, tongue midline on protrusion with full lateral movement    Motor - Normal bulk throughout.   Strength testing  *LUE testing limited as is bandaged  LUE, RUE: maintains antigravity for at least 10 seconds  LLE, RLE: unable to maintain antigravity for at least 5 seconds    Sensation - Light touch intact throughout    DTR's -             Biceps      Triceps     Brachioradialis      Patellar    Ankle      R             1+             1+                  1+                1+         1+                   L              1+             1+                 1+                1+         1+                    Coordination - Finger to Nose intact b/l. No tremors appreciated. GHADA heel to shin as patient is unable to lift LE to assess HTS.    Gait and station - GHADA due to concern for patient safety    Labs:                        14.4   12.49 )-----------( 459      ( 20 Dec 2024 19:53 )             43.8     12-20    138  |  100  |  14  ----------------------------<  112[H]  3.7   |  19[L]  |  0.68    Ca    9.7      20 Dec 2024 19:53  Phos  3.1     12-20  Mg     2.2     12-20    TPro  7.4  /  Alb  3.9  /  TBili  0.5  /  DBili  x   /  AST  114[H]  /  ALT  49[H]  /  AlkPhos  84  12-20    CAPILLARY BLOOD GLUCOSE      POCT Blood Glucose.: 114 mg/dL (20 Dec 2024 14:25)    LIVER FUNCTIONS - ( 20 Dec 2024 19:53 )  Alb: 3.9 g/dL / Pro: 7.4 g/dL / ALK PHOS: 84 U/L / ALT: 49 U/L / AST: 114 U/L / GGT: x             PT/INR - ( 20 Dec 2024 19:53 )   PT: 11.8 sec;   INR: 1.04 ratio         PTT - ( 20 Dec 2024 19:53 )  PTT:28.9 sec  CSF:                  Radiology:  CT Head No Cont (12.20.24 @ 17:06) >  CT HEAD:  2.7 x 2.2 x 2.2 cm expansile mostly hypodense mass suspected in the left   laura. There is a suspected capsule or rimmeasuring 3 to 4 mm in   thickness. Necrotic metastatic lesion is suspected given that the patient   has a history of lung cancer.  MRI with gadolinium may be helpful for further evaluation, if clinically   indicated.    CT CERVICAL SPINE:  No acute fracture or traumatic subluxation.  Multi-level degenerative changes.  Smaller irregular nodule with pleural retraction in the posterior right   lung apex.

## 2024-12-21 NOTE — CONSULT NOTE ADULT - ASSESSMENT
79-year-old female who has history of non-small cell lung cancer who she reports with mets to the brain presents to the emergency department after being found down. Patient has a new 2 cm hypodense laura mass likely metastatic.     Injuries:   - R 5&7 rib fractures  - Distal radius fx   - Age indeterminate L3 compression fx    Plan:   - No acute surgical intervention   - Multimodal pain control for rib fx, patient saturating well on RA and pulling 1250 on IS  - Recommend medical admission for workup of fall & management of chronic illness  - Tertiary exam in AM     ACS/Trauma, [id number]

## 2024-12-21 NOTE — CONSULT NOTE ADULT - ASSESSMENT
ASSESSMENT: 79F with PMHx of small cell lung CA with known met to left laura (s/p chemo, RT) transferred from [facility] to [facility] for neurosurgery evaluation due to left pontine 2.7x2.2x2.2 expansile mostly hypodense mass. Patient was found down at her house by her neighbor for an unknown period of time. Neurology consulted for steroid and antiepileptic management. Neurological exam revealed lower extremity weakness, minimal verbal output, and following simple commands with repeated encouragement. CTH on 12/20/2024 revealed 2.7 x 2.2 x 2.2 cm expansile mostly hypodense mass suspected in the left laura. There is a suspected capsule or rimmeasuring 3 to 4 mm in thickness.       IMPRESSION: Found down on ground for unknown period of time. Etiology possibly due to edema from known left pontine mass vs seizure 2/2 known brain met vs syncope.     RECOMMENDATIONS:  [] MRI brain with and without contrast  [] Routine EEG  [] UA, UCx  [] Will discuss with neuro-oncology team in AM regarding steroids and antiseizure medications  [] Syncope workup per primary team ASSESSMENT: 79F with PMHx of small cell lung CA with known met to left laura (s/p chemo, RT) transferred from [facility] to [facility] for neurosurgery evaluation due to left pontine 2.7x2.2x2.2 expansile mostly hypodense mass. Patient was found down at her house by her neighbor for an unknown period of time. Neurology consulted for steroid and antiepileptic management. Neurological exam revealed lower extremity weakness, minimal verbal output, and following simple commands with repeated encouragement. CTH on 12/20/2024 revealed 2.7 x 2.2 x 2.2 cm expansile mostly hypodense mass suspected in the left laura. There is a suspected capsule or rim measuring 3 to 4 mm in thickness.       IMPRESSION: Found down on ground for unknown period of time. Etiology possibly due to edema from known left pontine mass vs seizure 2/2 known brain met vs syncope.     RECOMMENDATIONS:  [] MRI brain with and without contrast  [] Routine EEG  [] UA, UCx  [] Discussed with neurooncology, can consider Dexamethsone 10mg once and then 4mg daily  [] For ASM, if patient still altered, can consider LEV 500mg BID until MRI brain resulted  [] Syncope workup per primary team

## 2024-12-21 NOTE — CONSULT NOTE ADULT - SUBJECTIVE AND OBJECTIVE BOX
Patient is a 79y Female who presents as a trauma due to associated rib fractures and non displaced right distal radius fracture also c/o mild back pain sp syncopal fall. History of non-small cell lung cancer who she reports with mets to the brain presents to the emergency department with. Patient was found down with unclear down time. Patient with multiple healing bruises on the chest and lower legs. Patient's daughter reports weakness on the right side for the past 2 weeks.  Patient baseline ambulates with a walker however has been coming increasingly difficult over the past 2 weeks.  Patient has a 2 cm hypodense laura mass likely metastatic, R 5&7 rib fractures, distal radius fx and age indeterminate L3 compression fx. Denies HS/LOC. Denies pain/injury elsewhere. Denies numbness/tingling/paresthesias/weakness. Denies bowel/bladder incontinence. Denies fevers/chills. No other complaints at this time.    MEDICATIONS  (STANDING):      Allergies    penicillins (Unknown)    Intolerances        PAST MEDICAL & SURGICAL HISTORY:  Tobacco abuse    Alcohol abuse    Lung nodules    Liver lesion    H/O fracture of hip    History of fracture due to fall    History of repair of hip fracture                              14.4   12.49 )-----------( 459      ( 20 Dec 2024 19:53 )             43.8       20 Dec 2024 19:53    138    |  100    |  14     ----------------------------<  112    3.7     |  19     |  0.68     Ca    9.7        20 Dec 2024 19:53  Phos  3.1       20 Dec 2024 19:53  Mg     2.2       20 Dec 2024 19:53    TPro  7.4    /  Alb  3.9    /  TBili  0.5    /  DBili  x      /  AST  114    /  ALT  49     /  AlkPhos  84     20 Dec 2024 19:53      PT/INR - ( 20 Dec 2024 19:53 )   PT: 11.8 sec;   INR: 1.04 ratio         PTT - ( 20 Dec 2024 19:53 )  PTT:28.9 sec    Urinalysis Basic - ( 20 Dec 2024 19:53 )    Color: x / Appearance: x / SG: x / pH: x  Gluc: 112 mg/dL / Ketone: x  / Bili: x / Urobili: x   Blood: x / Protein: x / Nitrite: x   Leuk Esterase: x / RBC: x / WBC x   Sq Epi: x / Non Sq Epi: x / Bacteria: x        Vital Signs Last 24 Hrs  T(C): 36.9 (12-20-24 @ 23:33), Max: 37.1 (12-20-24 @ 19:14)  T(F): 98.4 (12-20-24 @ 23:33), Max: 98.7 (12-20-24 @ 19:14)  HR: 80 (12-21-24 @ 02:45) (79 - 96)  BP: 90/72 (12-21-24 @ 02:45) (90/72 - 133/84)  BP(mean): 79 (12-21-24 @ 02:45) (79 - 79)  RR: 16 (12-21-24 @ 02:45) (16 - 20)  SpO2: 98% (12-21-24 @ 02:45) (95% - 99%)    Physical Exam:  Gen: NAD  Spine:  Skin intact  No gross deformity  No midline TTP C/T/L/S spine  No bony step offs  No paraspinal muscle ttp/hypertonicity   Negative Straight leg raise  Negative clonus  Negative babinski  Negative wilson    Motor:                   C5                C6              C7               C8           T1     R***** removed this portion of physical exam due to nondisplaced distal radius fracture  L             5/5               5/5             5/5             5/5          5/5                L2             L3             L4               L5            S1  R         4/5           4/5          4/5             4/5           4/5  L          4/5          4/5           4/5             4/5           4/5    *Not fully cooperative with exam, diffusely weak due to generalized condition and lack of cooperation with exam    Sensory:            C5         C6         C7      C8       T1        (0=absent, 1=impaired, 2=normal, NT=not testable)  R         2            2           2        2         2  L          2            2           2        2         2               L2          L3         L4      L5       S1         (0=absent, 1=impaired, 2=normal, NT=not testable)  R         2            2            2        2        2  L          2            2           2        2         2    Imaging:   IMPRESSION:  Chest CT: Acute right anterior 5th and 7th minimally displaced anterior   rib fractures. Nonspecific groundglass opacities left upper lobe. No   contusion, pleural effusion or pneumothorax.    Abdomen/pelvis CT: Age-indeterminate, possibly acute, mild L3 vertebral   body compression fracture. No acute posttraumatic sequela otherwise lung   for lack of intravenous contrast.  Post treatment changes to the hepatic metastasis with interval decrease   size and increased calcification. The largest lesion now measuring 4.2 x   4.0 cm.      A/P: 79y Female with L3 VCF.  Pain control  WBAT with assistive devices as needed  FU Labs/imaging  SCDs  Recommend LSO brace  FU Possible MR of L spine, will order  Plan to be discussed with Dr. Madrid, will adjust accordingly

## 2024-12-21 NOTE — H&P ADULT - PROBLEM SELECTOR PLAN 4
She said she tripped and fell, couldn't get up. She uses walker at home, RLE has been worsening with sensation and possible from brain mets  - Daughter, Rica wanted to talk to Hospice for further plans  - fall precaution, PT rodolfo She said she tripped and fell, couldn't get up. She uses walker at home, RLE has been worsening with sensation and possible from brain mets  - Tele, Echo  - Daughter, Rica wanted to talk to Hospice for further plans  - fall precaution, PT rodolfo Syncope vs trip and fall. She said she tripped and fell, couldn't get up. She uses walker at home, RLE has been worsening with sensation and possible from brain mets  - Tele, Echo  - Daughter, Rica wanted to talk to Hospice for further plans  - fall precaution, PT rodolfo

## 2024-12-21 NOTE — H&P ADULT - PROBLEM SELECTOR PLAN 1
X-Ray showed nondisplaced R distal radial Fx after fall at home  - s/o splint by Ortho  - analgesics pen, PT  - Outpatient f/u with ortho

## 2024-12-21 NOTE — H&P ADULT - ASSESSMENT
Patient is a 79F with h/o small cell lung cancer with mets to the brain with vision problems, liver, bone (not on chemo- last chemo in April), lives alone comes to ED as she was found by neighbor down on the floor in the afternoon yesterday. She said she tripped and fell, but now sure.  She has been c/o pain in her right arm and right leg of the patient appear more contracted than usual over the last couple of weeks. She uses walker at home. Denies SOB, chest pain, fever, but some cough. As per daughter her chemo was stopped in April 2024 given advance disease.    In ED she was found to be afebrile, hemodynamically stable, mildly confused, c/p R forearm and chest wall pain, X-rays and CTs show R distal radial Fx, R 5th and 7th rib Fx, L3 compression and 2.7cm mass in laura, lung nodules, decreased liver mets. She was evaluated by Trauma, Ortho, Neurology and NeuroSx. Patient is a 79F with h/o small cell lung cancer with mets to the brain with vision problems, liver, bone (not on chemo- last chemo in April), L hip Fx 2023 lives alone comes to ED as she was found by neighbor down on the floor in the afternoon yesterday. She said she tripped and fell, but now sure.  She has been c/o pain in her right arm and right leg of the patient appear more contracted than usual over the last couple of weeks. She uses walker at home. Denies SOB, chest pain, fever, but some cough. As per daughter her chemo was stopped in April 2024 given advance disease.    In ED she was found to be afebrile, hemodynamically stable, mildly confused, c/p R forearm and chest wall pain, X-rays and CTs show R distal radial Fx, R 5th and 7th rib Fx, L3 compression and 2.7cm mass in laura, lung nodules, decreased liver mets. She was evaluated by Trauma, Ortho, Neurology and NeuroSx.

## 2024-12-21 NOTE — H&P ADULT - NSHPSOCIALHISTORY_GEN_ALL_CORE
Lives alone, has one daughter who lives 15 min away  smokes cigarettes 1/2 PPD for years  Denies drinking alcohol

## 2024-12-21 NOTE — H&P ADULT - HISTORY OF PRESENT ILLNESS
Patient is a 79F with h/o small cell lung cancer with mets to the brain with vision problems, liver, bone (not on chemo- last chemo in April), lives alone comes to ED as she was found by neighbor down on the floor in the afternoon yesterday. She said she tripped and fell, but now sure.  She has been c/o pain in her right arm and right leg of the patient appear more contracted than usual over the last couple of weeks. She uses walker at home. Denies SOB, chest pain, fever, but some cough. As per daughter her chemo was stopped in April 2024 given advance disease. Patient is a 79F with h/o small cell lung cancer with mets to the brain with vision problems, liver, bone (not on chemo- last chemo in April), L hip Fx in 2023, lives alone comes to ED as she was found by neighbor down on the floor in the afternoon yesterday. She said she tripped and fell, but now sure.  She has been c/o pain in her right arm and right leg of the patient appear more contracted than usual over the last couple of weeks. She uses walker at home. Denies SOB, chest pain, fever, but some cough. As per daughter her chemo was stopped in April 2024 given advance disease.

## 2024-12-21 NOTE — H&P ADULT - PROBLEM SELECTOR PLAN 5
Has been on oxycodone at home  - will c/w Oxycodone PO q 6 hrs prn and morphine 1mg q 6 hr prn for severe pain  - Bowel regimens  -  palliative pain if needed

## 2024-12-21 NOTE — PROGRESS NOTE ADULT - ASSESSMENT
-no acute nsgy intervention  -no c/i to med admit for oncologic mgmt, fall w/u, PT  -trauma, ortho seens, no acute intervention  -neuroonc following, steroids/AEDs per neuro  -MR brain stereo pending   -rest of plan pending MRI

## 2024-12-22 LAB
ANION GAP SERPL CALC-SCNC: 12 MMOL/L — SIGNIFICANT CHANGE UP (ref 5–17)
ANION GAP SERPL CALC-SCNC: 28 MMOL/L — HIGH (ref 5–17)
BUN SERPL-MCNC: 12 MG/DL — SIGNIFICANT CHANGE UP (ref 7–23)
BUN SERPL-MCNC: 12 MG/DL — SIGNIFICANT CHANGE UP (ref 7–23)
CALCIUM SERPL-MCNC: 9 MG/DL — SIGNIFICANT CHANGE UP (ref 8.4–10.5)
CALCIUM SERPL-MCNC: <3 MG/DL — CRITICAL LOW (ref 8.4–10.5)
CHLORIDE SERPL-SCNC: 106 MMOL/L — SIGNIFICANT CHANGE UP (ref 96–108)
CHLORIDE SERPL-SCNC: 93 MMOL/L — LOW (ref 96–108)
CO2 SERPL-SCNC: 12 MMOL/L — LOW (ref 22–31)
CO2 SERPL-SCNC: 20 MMOL/L — LOW (ref 22–31)
CREAT SERPL-MCNC: 0.52 MG/DL — SIGNIFICANT CHANGE UP (ref 0.5–1.3)
CREAT SERPL-MCNC: 0.53 MG/DL — SIGNIFICANT CHANGE UP (ref 0.5–1.3)
CULTURE RESULTS: NO GROWTH — SIGNIFICANT CHANGE UP
EGFR: 95 ML/MIN/1.73M2 — SIGNIFICANT CHANGE UP
EGFR: 95 ML/MIN/1.73M2 — SIGNIFICANT CHANGE UP
GLUCOSE SERPL-MCNC: 132 MG/DL — HIGH (ref 70–99)
GLUCOSE SERPL-MCNC: 138 MG/DL — HIGH (ref 70–99)
HCT VFR BLD CALC: 41.5 % — SIGNIFICANT CHANGE UP (ref 34.5–45)
HGB BLD-MCNC: 13.7 G/DL — SIGNIFICANT CHANGE UP (ref 11.5–15.5)
MCHC RBC-ENTMCNC: 27.1 PG — SIGNIFICANT CHANGE UP (ref 27–34)
MCHC RBC-ENTMCNC: 33 G/DL — SIGNIFICANT CHANGE UP (ref 32–36)
MCV RBC AUTO: 82 FL — SIGNIFICANT CHANGE UP (ref 80–100)
NRBC # BLD: 0 /100 WBCS — SIGNIFICANT CHANGE UP (ref 0–0)
PLATELET # BLD AUTO: 336 K/UL — SIGNIFICANT CHANGE UP (ref 150–400)
POTASSIUM SERPL-MCNC: 3.8 MMOL/L — SIGNIFICANT CHANGE UP (ref 3.5–5.3)
POTASSIUM SERPL-MCNC: >9 MMOL/L — CRITICAL HIGH (ref 3.5–5.3)
POTASSIUM SERPL-SCNC: 3.8 MMOL/L — SIGNIFICANT CHANGE UP (ref 3.5–5.3)
POTASSIUM SERPL-SCNC: >9 MMOL/L — CRITICAL HIGH (ref 3.5–5.3)
RBC # BLD: 5.06 M/UL — SIGNIFICANT CHANGE UP (ref 3.8–5.2)
RBC # FLD: 13.4 % — SIGNIFICANT CHANGE UP (ref 10.3–14.5)
SODIUM SERPL-SCNC: 133 MMOL/L — LOW (ref 135–145)
SODIUM SERPL-SCNC: 138 MMOL/L — SIGNIFICANT CHANGE UP (ref 135–145)
SPECIMEN SOURCE: SIGNIFICANT CHANGE UP
WBC # BLD: 7.56 K/UL — SIGNIFICANT CHANGE UP (ref 3.8–10.5)
WBC # FLD AUTO: 7.56 K/UL — SIGNIFICANT CHANGE UP (ref 3.8–10.5)

## 2024-12-22 PROCEDURE — 99232 SBSQ HOSP IP/OBS MODERATE 35: CPT

## 2024-12-22 RX ORDER — ALPRAZOLAM 0.25 MG/1
0.25 TABLET ORAL ONCE
Refills: 0 | Status: DISCONTINUED | OUTPATIENT
Start: 2024-12-22 | End: 2024-12-23

## 2024-12-22 RX ADMIN — LATANOPROST 1 DROP(S): 50 SOLUTION OPHTHALMIC at 21:11

## 2024-12-22 RX ADMIN — ACETAMINOPHEN 650 MILLIGRAM(S): 80 SOLUTION/ DROPS ORAL at 18:23

## 2024-12-22 RX ADMIN — ACETAMINOPHEN 650 MILLIGRAM(S): 80 SOLUTION/ DROPS ORAL at 06:18

## 2024-12-22 RX ADMIN — ACETAMINOPHEN 650 MILLIGRAM(S): 80 SOLUTION/ DROPS ORAL at 17:31

## 2024-12-22 RX ADMIN — ACETAMINOPHEN 650 MILLIGRAM(S): 80 SOLUTION/ DROPS ORAL at 05:27

## 2024-12-22 RX ADMIN — Medication 4 MILLIGRAM(S): at 05:28

## 2024-12-22 RX ADMIN — Medication 5 MILLIGRAM(S): at 22:10

## 2024-12-22 RX ADMIN — Medication 1 DROP(S): at 17:32

## 2024-12-22 RX ADMIN — Medication 5 MILLIGRAM(S): at 21:11

## 2024-12-22 RX ADMIN — Medication 17 GRAM(S): at 11:08

## 2024-12-22 NOTE — PROGRESS NOTE ADULT - ASSESSMENT
-neuroonc following, steroids/AEDs per neuro    - MRI w/o contrast done yesterday - underlying lesion vs hemorrhage x2    -Needs MRI w/ contrast - might need sedation or under anesthesia - order in - please coordinate    -rest of plan pending MRI

## 2024-12-22 NOTE — PROGRESS NOTE ADULT - SUBJECTIVE AND OBJECTIVE BOX
TEAM [ Trauma ] Surgery Daily Progress Note  =====================================================  SUBJECTIVE: Patient seen and examined at bedside on AM rounds. Patient reports that they're feeling well. No current complaints of pain or shortness of breath. Denies fever, chills.    PMH:   PAST MEDICAL & SURGICAL HISTORY:  Tobacco abuse    Alcohol abuse    Lung nodules    Liver lesion    H/O fracture of hip    History of fracture due to fall    History of repair of hip fracture    ALLERGIES:  penicillins (Unknown)  --------------------------------------------------------------------------------------  MEDICATIONS:    Neurologic Medications  acetaminophen     Tablet .. 650 milliGRAM(s) Oral every 6 hours PRN Mild Pain (1 - 3)  morphine  - Injectable 1 milliGRAM(s) IV Push every 6 hours PRN Severe Pain (7 - 10)  oxyCODONE    IR 5 milliGRAM(s) Oral every 6 hours PRN Moderate Pain (4 - 6)    Respiratory Medications    Cardiovascular Medications    Gastrointestinal Medications  polyethylene glycol 3350 17 Gram(s) Oral daily  senna 2 Tablet(s) Oral at bedtime    Genitourinary Medications    Hematologic/Oncologic Medications    Antimicrobial/Immunologic Medications    Endocrine/Metabolic Medications  dexAMETHasone     Tablet 4 milliGRAM(s) Oral daily    Topical/Other Medications  latanoprost 0.005% Ophthalmic Solution 1 Drop(s) Both EYES at bedtime  timolol 0.5% Solution 1 Drop(s) Both EYES daily  --------------------------------------------------------------------------------------  VITAL SIGNS:    T(C): 36.4 (12-22-24 @ 04:55), Max: 37 (12-21-24 @ 15:20)  HR: 72 (12-22-24 @ 04:55) (72 - 89)  BP: 141/73 (12-22-24 @ 04:55) (116/50 - 141/73)  RR: 18 (12-22-24 @ 04:55) (16 - 18)  SpO2: 97% (12-22-24 @ 04:55) (95% - 97%)  --------------------------------------------------------------------------------------  EXAM  General: NAD, resting comfortably  HEENT: mild R periorbital swelling, trachea midline, otherwise NC/AT.   Pulmonary/chest: normal resp effort, mild R anterior chest tenderness  Cardiovascular: NSR, VSS  Abdominal: soft, ND, NT, no evidence of echymosis or trauma  Pelvis: Stable to rock  Extremities: WWP, abrasion over L knee, R hand and arm not splinted. No point tenderness in RUE. Appropriate strength and sensation in BL extremities  Neuro: A&O x 3  Back: No tenderness to palpation in midline CTL spine    --------------------------------------------------------------------------------------  LABS                        13.7   7.56  )-----------( 336      ( 22 Dec 2024 07:00 )             41.5   12-22    138  |  106  |  12  ----------------------------<  138[H]  3.8   |  20[L]  |  0.52    Ca    9.0      22 Dec 2024 08:32  Phos  3.1     12-20  Mg     2.2     12-20    TPro  7.4  /  Alb  3.9  /  TBili  0.5  /  DBili  x   /  AST  114[H]  /  ALT  49[H]  /  AlkPhos  84  12-20  --------------------------------------------------------------------------------------  INS AND OUTS:    12-21-24 @ 07:01  -  12-22-24 @ 07:00  --------------------------------------------------------  IN: 0 mL / OUT: 1150 mL / NET: -1150 mL    --------------------------------------------------------------------------------------  INTERPRETATION:  CLINICAL INDICATION: 79 years  Female with Chest Pain.    COMPARISON: 8/28/2023    AP view of the chest demonstrates the lungs to be clear. There is no   pleural effusion. The pulmonary vasculature is normal. There is no   pneumothorax.    The heart is normal in size. There is no mediastinal or hilar mass.    Mild thoracic degenerative changes and scoliosis are present.    IMPRESSION:    No acute infiltrate.    --- End of Report ---    ZACH ALVAREZ MD; Attending Radiologist    IMPRESSION:    CT HEAD:  2.7 x 2.2 x 2.2 cm expansile mostly hypodense mass suspected in the left   laura. There is a suspected capsule or rimmeasuring 3 to 4 mm in   thickness. Necrotic metastatic lesion is suspected given that the patient   has a history of lung cancer.  MRI with gadolinium may be helpful for further evaluation, if clinically   indicated.    CT CERVICAL SPINE:  No acute fracture or traumatic subluxation.  Multi-level degenerative changes.  Smaller irregular nodule with pleural retraction in the posterior right   lung apex.    Findings were discussed with Dr. MELI COLE [phone]   12/20/2024 5:22 PM by Dr. Mendez with read back confirmation.    --- End of Report ---  INTERPRETATION:  CLINICAL INDICATION: Right and wrist pain status post   fall    TECHNIQUE: 3 views of the right knee; 3 views of the right wrist; 2 views   of the right forearm.    COMPARISON: None.    FINDINGS:    No acute fracture. No dislocation. Moderate to severe right basal and STT   joint arthrosis. Joint spaces are otherwise maintained. No radiopaque   foreign body.      IMPRESSION:  No acute fracture or dislocation.    --- End of Report ---            CORRIE DOMINIQUE DO  This document has been electronically signed. Dec 21 2024  8:19AM      INTERPRETATION:  CLINICAL INDICATION: Right shoulder pain status post fall    TECHNIQUE: 2 views of the right shoulder.    COMPARISON: None.    FINDINGS:    No acute fracture. No dislocation. Joint spaces are maintained. No   radiopaque foreign body. Visualized right lung is clear.      IMPRESSION:  No acute fracture or dislocation.    --- End of Report ---            CORRIE DOMINIQUE DO  This document has been electronically signed. Dec 21 2024  8:20AM    INTERPRETATION:  CLINICAL INFORMATION: Fall with bruises. History of   small cell lung cancer.    COMPARISON: CT chest abdomen and pelvis dated 4/8/2024..    CONTRAST/COMPLICATIONS:  IV Contrast: NONE  Oral Contrast: NONE  .    PROCEDURE:  CT of the Chest, Abdomen and Pelvis was performed.  Sagittal and coronal reformats were performed.    FINDINGS:  CHEST:  LUNGS AND LARGE AIRWAYS: Patent central airways. Emphysema. No focal   consolidation or pneumothorax. Groundglass opacities in the left upper   lobe (301-45)  PLEURA: No pleural effusion.  VESSELS: Atherosclerotic disease of the thoracic aorta.  HEART: Heart size is normal. Coronary artery calcifications. No   pericardial effusion.  MEDIASTINUM AND PREETHI: No lymphadenopathy.  CHEST WALL AND LOWER NECK: No chest wall hematoma.    ABDOMEN AND PELVIS:  LIVER: Previously seen hepatic metastasis are now calcified, likely   reflecting treatment changes. There is interval decrease in the largest   lesion which measures 4.2 x 4.0 cm, previously measuring 5.4 x 4.8 cm.  BILE DUCTS: Normal caliber.  GALLBLADDER: Within normal limits.  SPLEEN: Within normal limits.  PANCREAS: Within normal limits.  ADRENALS: Stable 3.0 by 1.8 cm indeterminate left adrenal nodule.  KIDNEYS/URETERS: Bilateral renal cysts. No hydronephrosis.    BLADDER: Mildly distended.  REPRODUCTIVE ORGANS: Not adequately assessed secondary to streak artifact   from left hip arthroplasty.    BOWEL: No bowel obstruction. Appendix is not visualized. No evidence of   inflammation in the pericecal region.  PERITONEUM/RETROPERITONEUM: No pneumoperitoneum or retroperitoneal   hemorrhage.  VESSELS: Atherosclerotic changes.  LYMPH NODES: No lymphadenopathy.  ABDOMINAL WALL: Within normal limits.  BONES: Degenerative changes. Acute right 5th and 7th minimally displaced   anterior rib fracture. Thoracolumbar levoscoliosis. Left hip arthroplasty   intact. Stable right iliac wing and right acetabular lytic lesion. The   incidental partially imaged bilateral upper extremities are unremarkable.   There is new vertebral body height loss with mild wedging of the superior   endplate an underlying sclerosis at L3 since prior study.    IMPRESSION:  Chest CT: Acute right anterior 5th and 7th minimally displaced anterior   rib fractures. Nonspecific groundglass opacities left upper lobe. No   contusion, pleural effusion or pneumothorax.    Abdomen/pelvis CT: Age-indeterminate, possibly acute, mild L3 vertebral   body compression fracture. No acute posttraumatic sequela otherwise lung   for lack of intravenous contrast.  Post treatment changes to the hepatic metastasis with interval decrease   size and increased calcification. The largest lesion now measuring 4.2 x   4.0 cm.            --- End of Report ---           PEDRO MARTIN DO; Resident Radiologist  This document has been electronically signed.  SHAHEEN LLAMAS MD; Attending Radiologist  This document has been electronically signed. Dec 21 2024 12:12AM    COMPARISON: No similar prior comparisons available.    FINDINGS:    PELVIS:  No acute displaced fracture or dislocation.  The sacroiliac joints and pubic symphysis remain intact. There are   arthritic changes at the pubic symphysis.  Intact pelvic and obturatorrings.  Faint mineral densities overlying the right lower quadrant, potentially   ingested material versus external to the patient.  Mild degenerative changes.      RIGHT:  No acute fracture or dislocation.  No knee or ankle joint effusion.  Spaces are well preserved.  Mild soft tissue swelling about the patellar tendon.  Tarsometatarsal alignment preserved.  There is an acute spiral fracture of the fifth proximal phalanx.  No evidence for a Lisfranc injury.    LEFT:  No acute fracture or dislocation.  Left hip arthroplasty. No evidence of hardware loosening.  Dystrophic soft tissue calcification/ossification seen just lateral to   the joint.  No knee joint effusion. Soft tissue swelling about the medial aspect of   the left knee joint and the patellar tendon.    IMPRESSION:  No acute displaced fracture or dislocation the pelvis.  Acute spiral fracture of the fifth proximal phalanx.  No fracture or dislocation of the left lower extremity to the level of   the knee.  Mild soft tissue swelling about both knees.    --- End of Report ---          REEMA HERNANDEZ MD; Resident Radiologist  This document has been electronically signed.   JOESPH MCDONALD MD; Attending Radiologist  This document has been electronically signed. Dec 21 2024 10:10AM

## 2024-12-22 NOTE — PROGRESS NOTE ADULT - SUBJECTIVE AND OBJECTIVE BOX
SUBJECTIVE/INTERVAL HISTORY:  Feels good, reports that she will be getting a home aid.    PAST MEDICAL & SURGICAL HISTORY:  Tobacco abuse      Alcohol abuse      Lung nodules      Liver lesion      H/O fracture of hip      History of fracture due to fall      History of repair of hip fracture        FAMILY HISTORY:  FH: HTN (hypertension)        MEDICATIONS (HOME):  Home Medications:  latanoprost 0.005% ophthalmic solution: 1 drop(s) in each eye once a day (at bedtime) (21 Dec 2024 09:02)  timolol hemihydrate 0.5% ophthalmic solution: 1 drop(s) in each affected eye once a day (21 Dec 2024 09:02)    MEDICATIONS  (STANDING):  ALPRAZolam 0.25 milliGRAM(s) Oral once  dexAMETHasone     Tablet 4 milliGRAM(s) Oral daily  latanoprost 0.005% Ophthalmic Solution 1 Drop(s) Both EYES at bedtime  polyethylene glycol 3350 17 Gram(s) Oral daily  senna 2 Tablet(s) Oral at bedtime  timolol 0.5% Solution 1 Drop(s) Both EYES daily    MEDICATIONS  (PRN):  acetaminophen     Tablet .. 650 milliGRAM(s) Oral every 6 hours PRN Mild Pain (1 - 3)  morphine  - Injectable 1 milliGRAM(s) IV Push every 6 hours PRN Severe Pain (7 - 10)  oxyCODONE    IR 5 milliGRAM(s) Oral every 6 hours PRN Moderate Pain (4 - 6)    ALLERGIES/INTOLERANCES:  Allergies  penicillins (Unknown)    Intolerances    VITALS & EXAMINATION:  Vital Signs Last 24 Hrs  T(C): 36.8 (22 Dec 2024 11:23), Max: 37 (21 Dec 2024 15:20)  T(F): 98.3 (22 Dec 2024 11:23), Max: 98.6 (21 Dec 2024 15:20)  HR: 73 (22 Dec 2024 11:23) (72 - 89)  BP: 111/67 (22 Dec 2024 11:23) (111/67 - 141/73)  BP(mean): 65 (21 Dec 2024 17:00) (65 - 896)  RR: 18 (22 Dec 2024 11:23) (16 - 18)  SpO2: 93% (22 Dec 2024 11:23) (93% - 97%)    Parameters below as of 22 Dec 2024 11:23  Patient On (Oxygen Delivery Method): room air    PHYSICAL EXAM    General - NAD  Cardiovascular -  no edema    Neurologic Exam:  Mental status - Eyes open, attending to examiner. Oriented to person but not place or time. attends to examiner and knows that she lives alone but her sister helps her    Cranial nerves - PERRL, visual fields intact to threat b/l, EOMI, face sensation (V1-V3) intact b/l, facial strength intact without asymmetry b/l, hearing intact b/l, palate with symmetric elevation, trapezius 5/5 strength b/l, tongue midline on protrusion with full lateral movement    Motor - Normal bulk throughout.   Strength testing  *LUE testing limited as is bandaged  LUE, RUE: maintains antigravity for at least 10 seconds  LLE, RLE: unable to maintain antigravity for at least 5 seconds    Sensation - Light touch intact throughout    DTR's -             Biceps      Triceps     Brachioradialis      Patellar    Ankle      R             1+             1+                  1+                1+         1+                   L              1+             1+                 1+                1+         1+                    Coordination - Finger to Nose intact b/l. No tremors appreciated. GHADA heel to shin as patient is unable to lift LE to assess HTS.    Gait and station - GHADA due to concern for patient safety      LABORATORY:  CBC                       13.7   7.56  )-----------( 336      ( 22 Dec 2024 07:00 )             41.5     Chem 12-22    138  |  106  |  12  ----------------------------<  138[H]  3.8   |  20[L]  |  0.52    Ca    9.0      22 Dec 2024 08:32  Phos  3.1     12-20  Mg     2.2     12-20    TPro  7.4  /  Alb  3.9  /  TBili  0.5  /  DBili  x   /  AST  114[H]  /  ALT  49[H]  /  AlkPhos  84  12-20    LFTs LIVER FUNCTIONS - ( 20 Dec 2024 19:53 )  Alb: 3.9 g/dL / Pro: 7.4 g/dL / ALK PHOS: 84 U/L / ALT: 49 U/L / AST: 114 U/L / GGT: x           Coagulopathy PT/INR - ( 20 Dec 2024 19:53 )   PT: 11.8 sec;   INR: 1.04 ratio         PTT - ( 20 Dec 2024 19:53 )  PTT:28.9 sec  Lipid Panel   A1c   Cardiac enzymes CARDIAC MARKERS ( 21 Dec 2024 16:08 )  x     / x     / x     / x     / 24.2 ng/mL      U/A Urinalysis Basic - ( 22 Dec 2024 08:32 )    Color: x / Appearance: x / SG: x / pH: x  Gluc: 138 mg/dL / Ketone: x  / Bili: x / Urobili: x   Blood: x / Protein: x / Nitrite: x   Leuk Esterase: x / RBC: x / WBC x   Sq Epi: x / Non Sq Epi: x / Bacteria: x        STUDIES & IMAGING:  Studies (EKG, EEG, EMG, etc):     MRI brain  IMPRESSION: Limited exam secondary to patient condition, and only several   sequences were obtained.   Reidentified is a lesion centered within the left laura demonstrating internal hemorrhagic blood products, with surrounding vasogenic edema . There is an additional lesion and the left high medial parietal lobe measuring approximately 0.9 x 0.9 cm, with a small amount of vasogenic edema. Additionally, there are scattered small foci of T2/FLAIR hyperintense signal in the periventricular and subcortical white matter, may reflect vasogenic edema secondary to additional underlying lesions, versus chronic microvascular ischemic changes. Further evaluation with IV contrast is needed.    MRI lumbar spine    Very limited diagnostic exam secondary to patient condition. Only coronal   T1 and sagittal T2-weighted sequences were obtained and are severely   motion degraded. There is severe levoscoliosis of the lumbar spine apex   at L2. No high-grade spinal canal stenosis. No definite acute fracture of   the lumbar spine.      Radiology (XR, CT, MR, U/S, TTE/LAMONT):

## 2024-12-22 NOTE — CONSULT NOTE ADULT - SUBJECTIVE AND OBJECTIVE BOX
Patient is a 79y old  Female who presents with a chief complaint of found down (22 Dec 2024 13:30)      HPI:  Patient is a 79F with h/o small cell lung cancer with mets to the brain with vision problems, liver, bone (not on chemo- last chemo in April), L hip Fx in 2023, lives alone comes to ED as she was found by neighbor down on the floor in the afternoon yesterday. She said she tripped and fell, but now sure.  She has been c/o pain in her right arm and right leg of the patient appear more contracted than usual over the last couple of weeks. She uses walker at home. Denies SOB, chest pain, fever, but some cough. As per daughter her chemo was stopped in April 2024 given advance disease. (21 Dec 2024 15:01)      PAST MEDICAL & SURGICAL HISTORY:  Tobacco abuse      Alcohol abuse      Lung nodules      Liver lesion      H/O fracture of hip      History of fracture due to fall      History of repair of hip fracture          MEDICATIONS  (STANDING):  ALPRAZolam 0.25 milliGRAM(s) Oral once  dexAMETHasone     Tablet 4 milliGRAM(s) Oral daily  latanoprost 0.005% Ophthalmic Solution 1 Drop(s) Both EYES at bedtime  polyethylene glycol 3350 17 Gram(s) Oral daily  senna 2 Tablet(s) Oral at bedtime  timolol 0.5% Solution 1 Drop(s) Both EYES daily    MEDICATIONS  (PRN):  acetaminophen     Tablet .. 650 milliGRAM(s) Oral every 6 hours PRN Mild Pain (1 - 3)  morphine  - Injectable 1 milliGRAM(s) IV Push every 6 hours PRN Severe Pain (7 - 10)  oxyCODONE    IR 5 milliGRAM(s) Oral every 6 hours PRN Moderate Pain (4 - 6)      Allergies    penicillins (Unknown)    Intolerances        VITALS:    Vital Signs Last 24 Hrs  T(C): 36.6 (22 Dec 2024 20:46), Max: 36.8 (22 Dec 2024 11:23)  T(F): 97.8 (22 Dec 2024 20:46), Max: 98.3 (22 Dec 2024 11:23)  HR: 66 (22 Dec 2024 20:46) (66 - 79)  BP: 113/60 (22 Dec 2024 20:46) (111/67 - 141/73)  BP(mean): --  RR: 18 (22 Dec 2024 20:46) (18 - 18)  SpO2: 97% (22 Dec 2024 20:46) (93% - 97%)    Parameters below as of 22 Dec 2024 20:46  Patient On (Oxygen Delivery Method): room air        LABS:                          13.7   7.56  )-----------( 336      ( 22 Dec 2024 07:00 )             41.5       12-22    138  |  106  |  12  ----------------------------<  138[H]  3.8   |  20[L]  |  0.52    Ca    9.0      22 Dec 2024 08:32        CAPILLARY BLOOD GLUCOSE              LOWER EXTREMITY PHYSICAL EXAM:    Vascular: DP/PT 1/4, B/L, CFT <3 seconds B/L, Temperature gradient warm to cool, B/L.   Neuro: Epicritic sensation diminished to the level of digits, B/L.  Musculoskeletal/Ortho: unremarkable  Skin: Right foot no open wounds, no acute signs, no skin tenting, no ecchymosis. Left foot no open wounds, no acute signs of infection.      RADIOLOGY & ADDITIONAL STUDIES:    < from: Xray Foot AP + Lateral + Oblique, Right (12.20.24 @ 22:57) >    ACC: [id number] EXAM:  XR HIP 2-3V RT   ORDERED BY: JL BELTRAN     ACC: [id number] EXAM:  XR HIP 2-3V LT   ORDERED BY: JL BELTRAN     ACC: [id number] EXAM:  XR KNEE AP LAT OBL 3 VIEWS BI   ORDERED BY: JL BELTRAN     ACC: [id number] EXAM:  XR TIB FIB AP LAT 2 VIEWS RT   ORDERED BY: JL BELTRAN     ACC: [id number] EXAM:  XR ANKLE COMP MIN 3 VIEWS RT   ORDERED BY: JL BELTRAN     ACC: [id number] EXAM:  XR FOOT COMP MIN 3 VIEWS RT   ORDERED BY: JL BELTRAN     ACC: [id number] EXAM:  XR FEMUR 2 VIEWS RT   ORDERED BY: JL BELTRAN     ACC: [id number] EXAM:  XR PELVIS AP ONLY 1-2 VIEWS   ORDERED BY: JL BELTRAN     PROCEDURE DATE:  12/20/2024          INTERPRETATION:  CLINICAL INDICATION: Fall.    TECHNIQUE: Single shotAP pelvis  RIGHT: 2 views of right hip, 2 views of right femur, 3 views of right   knee, 2 views of right tibia/fibula, 3 views of right ankle, and 3 views   of right foot,  LEFT: 2 views of left hip joint, 3 views of left knee joint,      COMPARISON: No similar prior comparisons available.    FINDINGS:    PELVIS:  No acute displaced fracture or dislocation.  The sacroiliac joints and pubic symphysis remain intact. There are   arthritic changes at the pubic symphysis.  Intact pelvic and obturatorrings.  Faint mineral densities overlying the right lower quadrant, potentially   ingested material versus external to the patient.  Mild degenerative changes.      RIGHT:  No acute fracture or dislocation.  No knee or ankle joint effusion.  Spaces are well preserved.  Mild soft tissue swelling about the patellar tendon.  Tarsometatarsal alignment preserved.  There is an acute spiral fracture of the fifth proximal phalanx.  No evidence for a Lisfranc injury.    LEFT:  No acute fracture or dislocation.  Left hip arthroplasty. No evidence of hardware loosening.  Dystrophic soft tissue calcification/ossification seen just lateral to   the joint.  No knee joint effusion. Soft tissue swelling about the medial aspect of   the left knee joint and the patellar tendon.    IMPRESSION:  No acute displaced fracture or dislocation the pelvis.  Acute spiral fracture of the fifth proximal phalanx.  No fracture or dislocation of the left lower extremity to the level of   the knee.  Mild soft tissue swelling about both knees.    --- End of Report ---          REEMA HERNANDEZ MD; Resident Radiologist  This document has been electronically signed.   JOESPH MCDONALD MD; Attending Radiologist  This document has been electronically signed. Dec 21 2024 10:10AM    < end of copied text >

## 2024-12-22 NOTE — PROVIDER CONTACT NOTE (OTHER) - ASSESSMENT
Pt A&O X2/3, confused, no bleeding noted from huff removal, right arm splint removed. c/o mild generalized pain.

## 2024-12-22 NOTE — PROVIDER CONTACT NOTE (OTHER) - ACTION/TREATMENT ORDERED:
okay not to replace GINETTE huff patient, will reach out to ortho regarding splint, no new orders at this time okay not to replace huff, at this time, will reach out to ortho regarding splint, no new orders at this time. will f.u regarding huff with day team

## 2024-12-22 NOTE — DISCHARGE NOTE PROVIDER - CARE PROVIDER_API CALL
Carlos Madrid  Spine Surgery  [address], [address]  [place] [address]  Phone: [phone]  Fax: [phone]  Follow Up Time: 1 week

## 2024-12-22 NOTE — PROGRESS NOTE ADULT - SUBJECTIVE AND OBJECTIVE BOX
Patient is a 79y old  Female who presents with a chief complaint of     SUBJECTIVE / OVERNIGHT EVENTS: Patient seen and examined at bedside. Patient states she has mild pain over her right ribs over fracture site. Denies any chest pain, shortness of breath, or abdominal pain. Pt states she would like a hamburger.     MEDICATIONS  (STANDING):  dexAMETHasone     Tablet 4 milliGRAM(s) Oral daily  latanoprost 0.005% Ophthalmic Solution 1 Drop(s) Both EYES at bedtime  polyethylene glycol 3350 17 Gram(s) Oral daily  senna 2 Tablet(s) Oral at bedtime  timolol 0.5% Solution 1 Drop(s) Both EYES daily    MEDICATIONS  (PRN):  acetaminophen     Tablet .. 650 milliGRAM(s) Oral every 6 hours PRN Mild Pain (1 - 3)  morphine  - Injectable 1 milliGRAM(s) IV Push every 6 hours PRN Severe Pain (7 - 10)  oxyCODONE    IR 5 milliGRAM(s) Oral every 6 hours PRN Moderate Pain (4 - 6)      Vital Signs Last 24 Hrs  T(C): 36.8 (22 Dec 2024 11:23), Max: 37 (21 Dec 2024 15:20)  T(F): 98.3 (22 Dec 2024 11:23), Max: 98.6 (21 Dec 2024 15:20)  HR: 73 (22 Dec 2024 11:23) (72 - 89)  BP: 111/67 (22 Dec 2024 11:23) (111/67 - 141/73)  BP(mean): 65 (21 Dec 2024 17:00) (65 - 896)  RR: 18 (22 Dec 2024 11:23) (16 - 18)  SpO2: 93% (22 Dec 2024 11:23) (93% - 97%)    Parameters below as of 22 Dec 2024 11:23  Patient On (Oxygen Delivery Method): room air      CAPILLARY BLOOD GLUCOSE        I&O's Summary    21 Dec 2024 07:01  -  22 Dec 2024 07:00  --------------------------------------------------------  IN: 0 mL / OUT: 1150 mL / NET: -1150 mL        PHYSICAL EXAM:  GENERAL APPEARANCE: Well developed, NAD  HEENT:  PERRL, EOMI. hearing grossly intact.  CARDIAC: Normal S1 and S2. no mrg. RRR  LUNGS: Clear to auscultation B/L, no rales, rhonchi, or wheezing  ABDOMEN: Soft , NTND, bowel sounds normal. No guarding or rebound.   MUSCULOSKELETAL: No joint erythema or tenderness.   EXTREMITIES: No edema. Peripheral pulses intact.   NEUROLOGICAL: Non focal.  SKIN: Warm and dry , Well perfused, bruising over R temple area s/p fall  PSYCHIATRIC: AOx3      LABS:                        13.7   7.56  )-----------( 336      ( 22 Dec 2024 07:00 )             41.5     12-22    138  |  106  |  12  ----------------------------<  138[H]  3.8   |  20[L]  |  0.52    Ca    9.0      22 Dec 2024 08:32  Phos  3.1     12-20  Mg     2.2     12-20    TPro  7.4  /  Alb  3.9  /  TBili  0.5  /  DBili  x   /  AST  114[H]  /  ALT  49[H]  /  AlkPhos  84  12-20    PT/INR - ( 20 Dec 2024 19:53 )   PT: 11.8 sec;   INR: 1.04 ratio         PTT - ( 20 Dec 2024 19:53 )  PTT:28.9 sec  CARDIAC MARKERS ( 21 Dec 2024 16:08 )  x     / x     / x     / x     / 24.2 ng/mL      Urinalysis Basic - ( 22 Dec 2024 08:32 )    Color: x / Appearance: x / SG: x / pH: x  Gluc: 138 mg/dL / Ketone: x  / Bili: x / Urobili: x   Blood: x / Protein: x / Nitrite: x   Leuk Esterase: x / RBC: x / WBC x   Sq Epi: x / Non Sq Epi: x / Bacteria: x        RADIOLOGY & ADDITIONAL TESTS:    < from: MR Head No Cont (12.21.24 @ 19:27) >  IMPRESSION: Limited exam secondary to patient condition, and only several   sequences were obtained.  Reidentified is a lesion centered within the   left laura demonstrating internal hemorrhagic blood products, with   surrounding vasogenic edema . There is an additional lesion and the left   high medial parietal lobe measuring approximately 0.9 x 0.9 cm, with a   small amount of vasogenic edema. Additionally, there are scattered small   foci of T2/FLAIR hyperintense signal in the periventricular and   subcortical white matter, may reflect vasogenic edema secondary to   additional underlying lesions, versus chronic microvascular ischemic   changes. Further evaluation with IV contrast is needed.    < end of copied text >  < from: MR Lumbar Spine No Cont (12.21.24 @ 19:27) >  IMPRESSION:    Very limited diagnostic exam secondary to patient condition. Only coronal   T1 and sagittal T2-weighted sequences were obtained and are severely   motion degraded. There is severe levoscoliosis of thelumbar spine apex   at L2. No high-grade spinal canal stenosis. No definite acute fracture of   the lumbar spine.    --- End of Report ---    < end of copied text >  < from: Xray Femur 2 Views, Right (12.20.24 @ 22:58) >  IMPRESSION:  No acute displaced fracture or dislocation the pelvis.  Acute spiral fracture of the fifth proximal phalanx.  No fracture or dislocation of the left lower extremity to the level of   the knee.  Mild soft tissue swelling about both knees.    < from: CT Abdomen and Pelvis No Cont (12.20.24 @ 22:26) >  IMPRESSION:  Chest CT: Acute right anterior 5th and 7th minimally displaced anterior   rib fractures. Nonspecific groundglass opacities left upper lobe. No   contusion, pleural effusion or pneumothorax.    Abdomen/pelvis CT: Age-indeterminate, possibly acute, mild L3 vertebral   body compression fracture. No acute posttraumatic sequela otherwise lung   for lack of intravenous contrast.  Post treatment changes to the hepatic metastasis with interval decrease   size and increased calcification. The largest lesion now measuring 4.2 x   4.0 cm.      < end of copied text >

## 2024-12-22 NOTE — DISCHARGE NOTE PROVIDER - NSDCMRMEDTOKEN_GEN_ALL_CORE_FT
latanoprost 0.005% ophthalmic solution: 1 drop(s) in each eye once a day (at bedtime)  LSO brace: Dx L3 compression fracture  timolol hemihydrate 0.5% ophthalmic solution: 1 drop(s) in each affected eye once a day   acetaminophen 325 mg oral tablet: 2 tab(s) orally every 6 hours As needed Mild Pain (1 - 3)  dexAMETHasone 4 mg oral tablet: 1 tab(s) orally once a day  latanoprost 0.005% ophthalmic solution: 1 drop(s) in each eye once a day (at bedtime)  levETIRAcetam 100 mg/mL intravenous solution: 5 milliliter(s) intravenous every 12 hours  LSO brace: Dx L3 compression fracture  oxyCODONE 5 mg oral tablet: 1 tab(s) orally every 6 hours As needed Moderate Pain (4 - 6)  polyethylene glycol 3350 oral powder for reconstitution: 17 gram(s) orally once a day  senna leaf extract oral tablet: 2 tab(s) orally once a day (at bedtime)  timolol hemihydrate 0.5% ophthalmic solution: 1 drop(s) in each affected eye once a day

## 2024-12-22 NOTE — DISCHARGE NOTE PROVIDER - NSDCFUADDAPPT_GEN_ALL_CORE_FT
Podiatry Discharge Instructions:  Follow up: Please follow up with Dr. Davis within 1 week of discharge from the hospital, please call [phone] for appointment and discuss that you recently were seen in the hospital.    Weight bearing: Please weight bear as tolerated to right heel in a surgical shoe.  Antibiotics: Please continue as instructed. Ortho Discharge Instructions:  Keep the right wrist splint in place.  Follow up with Dr. Madrid in 1 week for concern of occult distal right radius fracture and L3 vertebral compression fracture. call [phone] for an appointment.    Podiatry Discharge Instructions:  Follow up: Please follow up with Dr. Davis within 1 week of discharge from the hospital, please call [phone] for appointment and discuss that you recently were seen in the hospital.    Weight bearing: Please weight bear as tolerated to right heel in a surgical shoe.  Antibiotics: Please continue as instructed.

## 2024-12-22 NOTE — CONSULT NOTE ADULT - ASSESSMENT
79F presents for right foot 5th proximal phalanx fx  - Pt seen and evaluated  - Afebrile, WBC 7.56  - Right foot no open wounds, no acute signs, no skin tenting, no ecchymosis. Left foot no open wounds, no acute signs of infection.  - Right foot xray: Acute spiral fracture of the fifth proximal phalanx.  - Ordered darco post op shoe  - Recommend weight bearing as tolerated to right heel  - No acute podiatric surgical intervention at this time. Please reconsult as needed.  - Pt to follow up with Dr. Davis within 1 week of discharge  - Follow up information in discharge paperwork  - Discussed with attending 79F presents for right foot 5th proximal phalanx fx  - Pt seen and evaluated  - Afebrile, WBC 7.56  - Right foot no open wounds, no acute signs, no skin tenting, no ecchymosis. Left foot no open wounds, no acute signs of infection.  - Right foot xray: Acute spiral fracture of the fifth proximal phalanx.  - Ordered darco post op shoe  - Recommend weight bearing as tolerated to right heel  - Pt has more acute medical needs that require acute attention. No acute podiatric surgical intervention at this time. Please reconsult as needed.  - Pt to follow up with Dr. Davis within 1 week of discharge  - Follow up information in discharge paperwork  - Discussed with attending

## 2024-12-22 NOTE — PROGRESS NOTE ADULT - ASSESSMENT
79F PMH NSCLC (w/ mets to brain), found down after presumed ground level fall in setting of 2 weeks of increased difficulty with ambulation even with walker. Unclear duration of down time. Falls appear to be recurrent in nature given bruises in various stages of healing.    Injuries:   - Acute right anterior 5th and 7th minimally displaced rib fractures  - Acute spiral fracture of the right fifth proximal phalanx  - Likely subacute to chronic mild L3 vertebral body compression fracture    Plan:   - No acute surgical intervention   - TTS negative for additional injuries/no further indication for additional imaging  - Multimodal pain control for rib fx, continue to encourage incentive spirometry   - Spine surgery following patient for L3 fx  - Orthopedic surgery to re-splint R hand and comment on duration (discussed 12/22 AM)  - Rest of care per primary team    [facility]  [id number] / [phone]

## 2024-12-22 NOTE — PHYSICAL THERAPY INITIAL EVALUATION ADULT - PERTINENT HX OF CURRENT PROBLEM, REHAB EVAL
79F with h/o small cell lung cancer with mets to the brain with vision problems, liver, bone (not on chemo- last chemo in April), L hip Fx 2023 lives alone presents after being found down, imaging showing acute spiral fracture of fifth proximal phlanx, acute  right anterior 5th and 7th minimally displaced anterior   rib fractures and possibly acute  mild L3 vertebral body compression fracture. MRI showed concern for blood products in the left laura and vasogeneric edema.

## 2024-12-22 NOTE — DISCHARGE NOTE PROVIDER - NSDCCPCAREPLAN_GEN_ALL_CORE_FT
PRINCIPAL DISCHARGE DIAGNOSIS  Diagnosis: Metastatic cancer to brain  Assessment and Plan of Treatment: Follow up with oncologist.      SECONDARY DISCHARGE DIAGNOSES  Diagnosis: Right radial fracture  Assessment and Plan of Treatment: You were found to have an acute right fifth proximal phlanx fracture. You were seen by Orthopedics and a splint was placed.  Follow up with Dr. Madrid upon discharge.    Diagnosis: Fracture of two ribs of right side  Assessment and Plan of Treatment: You were found to have an acute right anterior 5th and 7th minimally displaced anterior rib fractures and possibly acute mild L3 vertebral body compression fracture.  You were evaluated by Orthopedics and fitted for an LSO brace. No acute surgical intervention indicated at this time.  Follow up with Dr. Madrid upon discharge.

## 2024-12-22 NOTE — DISCHARGE NOTE PROVIDER - HOSPITAL COURSE
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 She said she tripped and fell, but now sure.  She has been c/o pain in her right arm and right leg of the patient appear more contracted than usual over the last couple of weeks. She uses walker at home. Denies SOB, chest pain, fever, but some cough. As per daughter her chemo was stopped in April 2024 given advance disease. (21 Dec 2024 15:01)    Hospital Course:      Important Medication Changes and Reason:    Active or Pending Issues Requiring Follow-up:    Advanced Directives:   [ ] Full code  [ ] DNR  [ ] Hospice    Discharge Diagnoses:         HPI:  Patient is a 79F with h/o small cell lung cancer with mets to the brain with vision problems, liver, bone (not on chemo- last chemo in April), L hip Fx in 2023, lives alone comes to ED as she was found by neighbor down on the floor in the afternoon yesterday. She said she tripped and fell, but now sure.  She has been c/o pain in her right arm and right leg of the patient appear more contracted than usual over the last couple of weeks. She uses walker at home. Denies SOB, chest pain, fever, but some cough. As per daughter her chemo was stopped in April 2024 given advance disease. (21 Dec 2024 15:01)    Hospital Course:  Pt w/ metastatic cancer to the brain admitted s/p fall. Found to have acute spiral fx of fifth proximal phlanx, acute right anterior 5th and 7th minimally displaced anterior rib fractures and possibly acute mild L3 vertebral body compression fracture. MRI reveals new laura mass likely metastatic, showed concern for blood products in the left laura and vasogeneric edema.   Neurology consulted for steroid and antiepileptic management.   CTH on 12/20/2024 revealed 2.7 x 2.2 x 2.2 cm expansile mostly hypodense mass suspected in the left laura. There is a suspected capsule or rim measuring 3 to 4 mm in thickness. MRI demonstrates the left pontine lesion and a new left high medial parietal lobe measuring approximately 0.9 x 0.9 cm, with a small amount of vasogenic edema. Neurooncology, Dr. Andi Holly, as well as the general neurology team on board - Plan to continue with steroids, obtain prolonged video EEG (neg for seizures) and start Keppra 500mg BID. Repeat MRI w/ contrast: Large mass in the laura, with multiple metastatic lesions in the left parietal lobe.  No plans for NSx intervention at this time, recommended to c/w Dex. Rad-onc consulted - discussed use of palliative radiation with pt and family. Plan to be transferred to [facility] for RT to the skull base 30Gy/10fx.   For fractures, ortho was consulted. S/p finger splint. No acute surgical intervention for lumbar fracture. Recommended to c/w LSO brace and to f/u outpatient with Dr. Madrid upon discharge to complete MRI imaging.   Pt is medically stable for transfer to [facility].     Important Medication Changes and Reason:    Active or Pending Issues Requiring Follow-up:  - f/u with Oncology, Ortho    Advanced Directives:   [x ] Full code  [ ] DNR  [ ] Hospice    Discharge Diagnoses:  Metastatic cancer to brain  Right rib fractures  Right radial fracture

## 2024-12-22 NOTE — PHYSICAL THERAPY INITIAL EVALUATION ADULT - ADDITIONAL COMMENTS
As per patient, lives alone in private apartment with 3 steps to enter no stairs inside. PTA, pt was independent with all mobility without use of assistive device. Has rolling walker if needs it.

## 2024-12-22 NOTE — PROGRESS NOTE ADULT - ASSESSMENT
ASSESSMENT: 79F with PMHx of small cell lung CA with known met to left laura (s/p chemo, RT) transferred from [facility] to [facility] for neurosurgery evaluation due to left pontine 2.7x2.2x2.2 expansile mostly hypodense mass. Patient was found down at her house by her neighbor for an unknown period of time. Neurology consulted for steroid and antiepileptic management. Neurological exam revealed lower extremity weakness, minimal verbal output, and following simple commands with repeated encouragement. CTH on 12/20/2024 revealed 2.7 x 2.2 x 2.2 cm expansile mostly hypodense mass suspected in the left laura. There is a suspected capsule or rim measuring 3 to 4 mm in thickness.       IMPRESSION: Found down on ground for unknown period of time. Etiology possibly due to edema from known left pontine mass vs seizure 2/2 known brain met vs syncope. Mentation improving.    RECOMMENDATIONS:  Diag:  [] MRI brain with and without contrast  [] Routine EEG to quantify cerebral dysfunction    [] UA, UCx  [] Discussed with neurooncology, can consider Dexamethsone 10mg once and then 4mg daily  [] For ASM, if patient still altered, can consider LEV 500mg BID until MRI brain resulted  [] Syncope workup per primary team ASSESSMENT: 79F with PMHx of small cell lung CA with known met to left laura (s/p chemo, RT) transferred from [facility] to [facility] for neurosurgery evaluation due to left pontine 2.7x2.2x2.2 expansile mostly hypodense mass. Patient was found down at her house by her neighbor for an unknown period of time. Neurology consulted for steroid and antiepileptic management. Neurological exam revealed lower extremity weakness, minimal verbal output, and following simple commands with repeated encouragement. CTH on 12/20/2024 revealed 2.7 x 2.2 x 2.2 cm expansile mostly hypodense mass suspected in the left laura. There is a suspected capsule or rim measuring 3 to 4 mm in thickness.     IMPRESSION: Found down on ground for unknown period of time. Etiology possibly due to edema from known left pontine mass vs seizure 2/2 known brain met vs syncope. Mentation improving.    RECOMMENDATIONS:  Diag:  [x] MRI brain with and without contrast  [] Routine EEG to quantify cerebral dysfunction  [/] UA, UCx    meds:  [/] sanchez discussed w neurooncology, sp dex 10 mg once now on 4mg daily  [] For ASM, if patient still altered, can consider LEV 500mg BID until MRI brain resulted  [] Syncope workup per primary team

## 2024-12-22 NOTE — PROVIDER CONTACT NOTE (CRITICAL VALUE NOTIFICATION) - ASSESSMENT
Patient is Axo x2 with periods of confusion. Pt denies SOB, chest pain, dizziness, blurred vision or headache

## 2024-12-22 NOTE — PROGRESS NOTE ADULT - TIME BILLING
The necessity of the time spent during the encounter on this date of service was due to:     - Ordering, reviewing, and interpreting labs, testing, and imaging.  - Independently obtaining a review of systems and performing a physical exam  - Reviewing prior hospitalization and where necessary, outpatient records.  - Counselling and educating patient and family regarding interpretation of aforementioned items and plan of care.

## 2024-12-22 NOTE — PROVIDER CONTACT NOTE (CRITICAL VALUE NOTIFICATION) - BACKGROUND
80 y/o F hx of nonsmall cell lung cancer with mets to brain presents as transfer for new brain mass in the pontine area.

## 2024-12-22 NOTE — PROGRESS NOTE ADULT - ASSESSMENT
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 MRI showed concern for blood products in the left laura and vasogeneric edema. Orthopedic surgery, neurosurgery and trauma surgery consulted for further recommendations

## 2024-12-23 DIAGNOSIS — C34.90 MALIGNANT NEOPLASM OF UNSPECIFIED PART OF UNSPECIFIED BRONCHUS OR LUNG: ICD-10-CM

## 2024-12-23 LAB
ANION GAP SERPL CALC-SCNC: 13 MMOL/L — SIGNIFICANT CHANGE UP (ref 5–17)
BUN SERPL-MCNC: 17 MG/DL — SIGNIFICANT CHANGE UP (ref 7–23)
CALCIUM SERPL-MCNC: 8.3 MG/DL — LOW (ref 8.4–10.5)
CHLORIDE SERPL-SCNC: 105 MMOL/L — SIGNIFICANT CHANGE UP (ref 96–108)
CO2 SERPL-SCNC: 19 MMOL/L — LOW (ref 22–31)
CREAT SERPL-MCNC: 0.51 MG/DL — SIGNIFICANT CHANGE UP (ref 0.5–1.3)
EGFR: 95 ML/MIN/1.73M2 — SIGNIFICANT CHANGE UP
GLUCOSE SERPL-MCNC: 103 MG/DL — HIGH (ref 70–99)
HCT VFR BLD CALC: 37.9 % — SIGNIFICANT CHANGE UP (ref 34.5–45)
HGB BLD-MCNC: 12.4 G/DL — SIGNIFICANT CHANGE UP (ref 11.5–15.5)
MAGNESIUM SERPL-MCNC: 1.9 MG/DL — SIGNIFICANT CHANGE UP (ref 1.6–2.6)
MCHC RBC-ENTMCNC: 27 PG — SIGNIFICANT CHANGE UP (ref 27–34)
MCHC RBC-ENTMCNC: 32.7 G/DL — SIGNIFICANT CHANGE UP (ref 32–36)
MCV RBC AUTO: 82.6 FL — SIGNIFICANT CHANGE UP (ref 80–100)
NRBC # BLD: 0 /100 WBCS — SIGNIFICANT CHANGE UP (ref 0–0)
PHOSPHATE SERPL-MCNC: 2.2 MG/DL — LOW (ref 2.5–4.5)
PLATELET # BLD AUTO: 343 K/UL — SIGNIFICANT CHANGE UP (ref 150–400)
POTASSIUM SERPL-MCNC: 3.6 MMOL/L — SIGNIFICANT CHANGE UP (ref 3.5–5.3)
POTASSIUM SERPL-SCNC: 3.6 MMOL/L — SIGNIFICANT CHANGE UP (ref 3.5–5.3)
RBC # BLD: 4.59 M/UL — SIGNIFICANT CHANGE UP (ref 3.8–5.2)
RBC # FLD: 13.2 % — SIGNIFICANT CHANGE UP (ref 10.3–14.5)
SODIUM SERPL-SCNC: 137 MMOL/L — SIGNIFICANT CHANGE UP (ref 135–145)
WBC # BLD: 9.12 K/UL — SIGNIFICANT CHANGE UP (ref 3.8–10.5)
WBC # FLD AUTO: 9.12 K/UL — SIGNIFICANT CHANGE UP (ref 3.8–10.5)

## 2024-12-23 PROCEDURE — 99221 1ST HOSP IP/OBS SF/LOW 40: CPT

## 2024-12-23 PROCEDURE — 99497 ADVNCD CARE PLAN 30 MIN: CPT

## 2024-12-23 PROCEDURE — 95816 EEG AWAKE AND DROWSY: CPT | Mod: 26

## 2024-12-23 PROCEDURE — 99223 1ST HOSP IP/OBS HIGH 75: CPT

## 2024-12-23 PROCEDURE — 99233 SBSQ HOSP IP/OBS HIGH 50: CPT | Mod: GC

## 2024-12-23 PROCEDURE — 70551 MRI BRAIN STEM W/O DYE: CPT | Mod: 26

## 2024-12-23 PROCEDURE — 70553 MRI BRAIN STEM W/O & W/DYE: CPT | Mod: 26

## 2024-12-23 PROCEDURE — 99233 SBSQ HOSP IP/OBS HIGH 50: CPT

## 2024-12-23 RX ORDER — GINKGO BILOBA 40 MG
3 CAPSULE ORAL ONCE
Refills: 0 | Status: COMPLETED | OUTPATIENT
Start: 2024-12-23 | End: 2024-12-23

## 2024-12-23 RX ORDER — LORAZEPAM 1 MG/1
0.5 TABLET ORAL ONCE
Refills: 0 | Status: DISCONTINUED | OUTPATIENT
Start: 2024-12-23 | End: 2024-12-23

## 2024-12-23 RX ORDER — LEVETIRACETAM 100 MG/ML
500 SOLUTION ORAL EVERY 12 HOURS
Refills: 0 | Status: DISCONTINUED | OUTPATIENT
Start: 2024-12-23 | End: 2024-12-24

## 2024-12-23 RX ORDER — SOD PHOS DI, MONO/K PHOS MONO 250 MG
1 TABLET ORAL ONCE
Refills: 0 | Status: COMPLETED | OUTPATIENT
Start: 2024-12-23 | End: 2024-12-23

## 2024-12-23 RX ADMIN — LORAZEPAM 0.5 MILLIGRAM(S): 1 TABLET ORAL at 15:29

## 2024-12-23 RX ADMIN — LEVETIRACETAM 500 MILLIGRAM(S): 100 SOLUTION ORAL at 17:25

## 2024-12-23 RX ADMIN — Medication 3 MILLIGRAM(S): at 01:12

## 2024-12-23 RX ADMIN — ACETAMINOPHEN 650 MILLIGRAM(S): 80 SOLUTION/ DROPS ORAL at 06:00

## 2024-12-23 RX ADMIN — ACETAMINOPHEN 650 MILLIGRAM(S): 80 SOLUTION/ DROPS ORAL at 05:09

## 2024-12-23 RX ADMIN — ACETAMINOPHEN 650 MILLIGRAM(S): 80 SOLUTION/ DROPS ORAL at 23:01

## 2024-12-23 RX ADMIN — ALPRAZOLAM 0.25 MILLIGRAM(S): 0.25 TABLET ORAL at 08:30

## 2024-12-23 RX ADMIN — LATANOPROST 1 DROP(S): 50 SOLUTION OPHTHALMIC at 23:02

## 2024-12-23 RX ADMIN — Medication 1 PACKET(S): at 11:03

## 2024-12-23 RX ADMIN — Medication 4 MILLIGRAM(S): at 05:10

## 2024-12-23 RX ADMIN — Medication 1 DROP(S): at 11:03

## 2024-12-23 NOTE — CONSULT NOTE ADULT - ATTENDING COMMENTS
80 yo female with extensive small cell lung cancer s/p chemo/IO admitted with new CNS mets.  Agree with RT consult. Dex for vasogenic edema.  Systemic disease stable, plan to continue Atezo maintenance.  Will f/u
I have discussed this patient with the resident and agree with the above note and plan.     79 year old woman with metastatic NSCLC with metastases to the liver and brain presenting after being found down. Initial injures including right 5th and 7th rib fractures, right distal radius fracture and possible L3 compression fracture. Also found to have a new laura metastatic lesion.     No evidence of hemothorax or pneumothorax and suspect L3 compression fracture is subacute to chronic. Able to pull 1.2L on incentive spirometry.     Trauma will follow for tertiary exam.       Abby Rene M.D.  Department of Trauma, Acute Care Surgery and Surgical Critical Care
Ms. Spencer was evaluation with resident physician, agree with note above.  In brief, she is a 78 yo female with metastatic lung cancer and history of receiving chemotherapy and of brain metastasis treated with gamma knife radiosurgery, admitted with declining functional status, s/p fall and right sided weakness. Imaging shows a pontine lesion consistent with metastatic disease. Options for palliative therapy include radiation therapy. Given the progression of disease and functional status, hospice/palliative care is very reasonable. Discussed treatment rationale, risks and benefits, with patient and her daughter. Treatment will be considered and patient will be followed closely with in-patient team.
Agree with above, patient with a complex medical history, will obtain MR L spine for full evaluation of lumbar fx in the setting of malignancy. We will continue to follow.
Ms. Spencer is a 80yo F with PMHx of small cell lung CA with known met to left laura (s/p chemo, RT) She was transferred from [facility] to [facility] for neurosurgery evaluation due to left pontine 2.7x2.2x2.2 expansile mostly hypodense mass. She was found down at her house by her neighbor for an unknown period of time and Neurology was consulted for antiepileptic management and assistance with intracranial mass lesion.     Neurological exam:  Pt is verbal but confused.  Oriented * 1.5.  NO gross cn deficit.  + bilateral upper extremity 4+/5 non focal.  +  bilateral  lower extremity weakness,  Gait not tested.    CTH on 12/20/2024 revealed 2.7 x 2.2 x 2.2 cm expansile mostly hypodense mass suspected in the left laura. There is a suspected capsule or rim measuring 3 to 4 mm in thickness.       IMPRESSION: Syncope and Disorientation.   Etiology possibly due to left pontine mass or edema effect vs seizure 2/2 known brain met vs syncope.     RECOMMENDATIONS:  [] MRI brain with and without contrast  [] Routine EEG  [] UA, UCx  [] Discussed with neurooncology, can consider Dexamethsone 10mg once and then 4mg daily  [] For ASM, if patient still altered, can consider LEV 500mg BID until MRI brain resulted  [] Syncope workup per primary team

## 2024-12-23 NOTE — PROGRESS NOTE ADULT - ATTENDING COMMENTS
During the evaluation, the patient's daughter was present, and I appreciated her involvement and support.      Patient seen and examined at bedside.    I agree with the findings and plan as documented in the Resident's note except below.    MRI Brain with and without contrast.   VEEG  Continue steroids  Keppra 500 mg BID  Continue medical management, neuro- check and fall precaution.  GI and DVT prophylaxis.    Patient is at high risk for complications and morbidity or mortality. There is high probability of imminent or life threatening deterioration in the patient's condition.  I discussed the diagnosis and treatment plan with the patient.  All questions and concerns were addressed. The patient demonstrated good understanding of the treatment plan.    My cumulative time taking care of this patient is 60 minutes  If you have any further questions, please do not hesitate to contact our consult service.  Thank you for allowing us to participate in this patient care.
Ms. Spencer is a 80yo F with PMHx of small cell lung CA with known met to left laura (s/p chemo, RT) She was transferred from [facility] to [facility] for neurosurgery evaluation due to left pontine 2.7x2.2x2.2 expansile mostly hypodense mass. She was found down at her house by her neighbor for an unknown period of time and Neurology was consulted for antiepileptic management and assistance with intracranial mass lesion.   She denies any concerns over night.  RElaizes she has needs at home and intending to have help more regularly.  States her daughter has been helping her until now.    Neurological exam:  Pt is verbal but confused.  Oriented * 1.5.  NO gross cn deficit.  + bilateral upper extremity 4+/5 non focal.  +  bilateral  lower extremity weakness,  Gait not tested.    CTH on 12/20/2024 revealed 2.7 x 2.2 x 2.2 cm expansile mostly hypodense mass suspected in the left laura. There is a suspected capsule or rim measuring 3 to 4 mm in thickness.       IMPRESSION: Syncope and Disorientation.   Etiology possibly due to left pontine mass or edema effect vs seizure 2/2 known brain met vs syncope.     RECOMMENDATIONS:  [] Routine EEG  [] UA, UCx  [] Dexamethsone 10mg once initiation and then 4mg daily  [] For ASM, if patient still altered, can consider LEV 500mg BID until MRI brain resulted  [] Syncope workup per primary team .

## 2024-12-23 NOTE — CONSULT NOTE ADULT - ASSESSMENT
ASSESSMENT/PLAN    MEGAN PARRY is a 79y woman with ES-SCLC s/p prior SRS for a right frontal lesion in June 2024, now with a mass in the left mikey laura with vasogenic edema and additional metastatic disease in the high left medial lobe. She is claustrophobic and has a challenge tolerating MRI. She is planned for MRI with constrast today. The mass in the left hemipons is about 3cm.    We discussed the use of palliative radiation in this setting, namely to improve quality of life through the reduction of symptoms.  We talked about the risks, benefits, acute and long term side effects, as well as expected treatment outcomes.  She was given the opportunity to ask questions, which were answered to /her apparent satisfaction.  She will be planned for RT to the skull base 30Gy/10fx as inpatient at [facility]

## 2024-12-23 NOTE — PROGRESS NOTE ADULT - SUBJECTIVE AND OBJECTIVE BOX
PROGRESS NOTE:   Authored by Dr. Debbie Tate MD  Pager [phone]     Patient is a 79y old  Female who presents with a chief complaint of AMS, found down (23 Dec 2024 13:54)      SUBJECTIVE / OVERNIGHT EVENTS: Patient seen and examined at bedside. Patient confused but pleasant, expresses she would like to go home. Did not tolerate MRI with contrast.    ADDITIONAL REVIEW OF SYSTEMS: as above    MEDICATIONS  (STANDING):  dexAMETHasone     Tablet 4 milliGRAM(s) Oral daily  latanoprost 0.005% Ophthalmic Solution 1 Drop(s) Both EYES at bedtime  levETIRAcetam   Injectable 500 milliGRAM(s) IV Push every 12 hours  polyethylene glycol 3350 17 Gram(s) Oral daily  senna 2 Tablet(s) Oral at bedtime  timolol 0.5% Solution 1 Drop(s) Both EYES daily    MEDICATIONS  (PRN):  acetaminophen     Tablet .. 650 milliGRAM(s) Oral every 6 hours PRN Mild Pain (1 - 3)  morphine  - Injectable 1 milliGRAM(s) IV Push every 6 hours PRN Severe Pain (7 - 10)  oxyCODONE    IR 5 milliGRAM(s) Oral every 6 hours PRN Moderate Pain (4 - 6)      CAPILLARY BLOOD GLUCOSE        I&O's Summary    22 Dec 2024 07:01  -  23 Dec 2024 07:00  --------------------------------------------------------  IN: 480 mL / OUT: 700 mL / NET: -220 mL        PHYSICAL EXAM:  Vital Signs Last 24 Hrs  T(C): 36.7 (23 Dec 2024 12:17), Max: 36.7 (23 Dec 2024 12:17)  T(F): 98.1 (23 Dec 2024 12:17), Max: 98.1 (23 Dec 2024 12:17)  HR: 68 (23 Dec 2024 12:17) (65 - 68)  BP: 107/53 (23 Dec 2024 12:17) (107/53 - 136/77)  BP(mean): --  RR: 18 (23 Dec 2024 12:17) (18 - 18)  SpO2: 94% (23 Dec 2024 12:17) (94% - 97%)    Parameters below as of 23 Dec 2024 12:17  Patient On (Oxygen Delivery Method): room air        CONSTITUTIONAL: NAD  RESPIRATORY: Normal respiratory effort; lungs are clear to auscultation bilaterally  CARDIOVASCULAR: Regular rate and rhythm, normal S1 and S2, no murmur/rub/gallop; No lower extremity edema; Peripheral pulses are 2+ bilaterally  ABDOMEN: Nontender to palpation, normoactive bowel sounds, no rebound/guarding; No hepatosplenomegaly  MUSCLOSKELETAL: no clubbing or cyanosis of digits; no joint swelling or tenderness to palpation  PSYCH: A+O to person, place  LABS:                        12.4   9.12  )-----------( 343      ( 23 Dec 2024 06:53 )             37.9     12-23    137  |  105  |  17  ----------------------------<  103[H]  3.6   |  19[L]  |  0.51    Ca    8.3[L]      23 Dec 2024 06:52  Phos  2.2     12-23  Mg     1.9     12-23            Urinalysis Basic - ( 23 Dec 2024 06:52 )    Color: x / Appearance: x / SG: x / pH: x  Gluc: 103 mg/dL / Ketone: x  / Bili: x / Urobili: x   Blood: x / Protein: x / Nitrite: x   Leuk Esterase: x / RBC: x / WBC x   Sq Epi: x / Non Sq Epi: x / Bacteria: x        Culture - Urine (collected 21 Dec 2024 05:08)  Source: Clean Catch Clean Catch (Midstream)  Final Report (22 Dec 2024 06:48):    No growth    MRI:  MRI brain without contrast 12/23  Study is foreshortened.  -Expansile mass lesion centered in the left hemipons measuring approximately 2.8 x 3.1 x 2.6 cm (AP x TV x CC) with surrounding vasogenic edema.   -foci of susceptibility artifact within the described lesion, suggesting a hemorrhagic component. Probable necrotic component within the mass lesion.   -Resulting effacement of the basal cisterns and fourth ventricle. Ventricular size similar to prior imaging.  -0.9 cm restriction lesion in the high medial left frontal lobe, similar to prior imaging, images 44 series 8 and 91 of series 5. Probable additional 1.0 cm lesion in the high left medial frontal lobe, image 91 of series 5.  -No diffusion restriction to suggest acute infarct. Limited evaluation of the extra-axial spaces.  -The craniocervical junction is within normal limits. The pituitary is unremarkable. Limited evaluation of the major intracranial flow voids, paranasal sinuses, mastoid air cells, and orbits.      RADIOLOGY & ADDITIONAL TESTS:  Results Reviewed:   Imaging Personally Reviewed:  Electrocardiogram Personally Reviewed:    COORDINATION OF CARE:  Care Discussed with Consultants/Other Providers [Y/N]:  Prior or Outpatient Records Reviewed [Y/N]:

## 2024-12-23 NOTE — PROGRESS NOTE ADULT - CONVERSATION DETAILS
d/w daughter at length - patient lives alone and has been declining. We discussed that her cancer is not curable. Daughter is very worried about the patient's decline. It has been very difficult to get her to do treatment, including even the MRI and radiation. The daughter is having a hard time deciding how to proceed.   She agreed to having palliative care on board to have further discussions about treatment options and discharge plan, since she cannot take care of her at home and does not have the finances to privately hire.  I explained that if she wishes to not pursue treatment, patient may be a candidate for inpatient hospice, depending on her symptoms. Ongoing discussions.   Full code for now.

## 2024-12-23 NOTE — PROGRESS NOTE ADULT - SUBJECTIVE AND OBJECTIVE BOX
Patient seen and examined at bedside.    --Anticoagulation--    T(C): 36.2 (12-23-24 @ 05:15), Max: 36.8 (12-22-24 @ 11:23)  HR: 65 (12-23-24 @ 05:15) (65 - 79)  BP: 136/77 (12-23-24 @ 05:15) (111/67 - 136/82)  RR: 18 (12-23-24 @ 05:15) (18 - 18)  SpO2: 95% (12-23-24 @ 05:15) (93% - 97%)  Wt(kg): --    Exam:  AOx4, EOMI, PERRL. no facial. bruising/swollen Rt face. Tender Rt  elbow/shoulder. RUE HG 4+/5, finger abduction 3/5 palmar contracted. RLE 4/5, distally 4+/5. o/w 5/5. no c/f resp status

## 2024-12-23 NOTE — PROGRESS NOTE ADULT - ASSESSMENT
-adm med    -neuroonc following, steroids/AEDs per neuro    -MR stereo w/contrast pending    -interventional plan pending MRI

## 2024-12-23 NOTE — PROGRESS NOTE ADULT - SUBJECTIVE AND OBJECTIVE BOX
Neurology Progress Note    SUBJECTIVE/OBJECTIVE/INTERVAL EVENTS: Patient seen and examined at bedside w/ neuro attending and team. Patient's daughter at bedside. Patient has no new complaints.    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 Patient was found down at her house by her neighbor for an unknown period of time. Neurology consulted for steroid and antiepileptic management. Neurological exam revealed lower extremity weakness, minimal verbal output, and following simple commands with repeated encouragement. CTH on 12/20/2024 revealed 2.7 x 2.2 x 2.2 cm expansile mostly hypodense mass suspected in the left laura. There is a suspected capsule or rim measuring 3 to 4 mm in thickness.    REVIEW OF SYSTEMS: denies all.    MEDICATIONS  (STANDING):  dexAMETHasone     Tablet 4 milliGRAM(s) Oral daily  latanoprost 0.005% Ophthalmic Solution 1 Drop(s) Both EYES at bedtime  levETIRAcetam   Injectable 500 milliGRAM(s) IV Push every 12 hours  LORazepam     Tablet 0.5 milliGRAM(s) Oral once  polyethylene glycol 3350 17 Gram(s) Oral daily  senna 2 Tablet(s) Oral at bedtime  timolol 0.5% Solution 1 Drop(s) Both EYES daily  MEDICATIONS  (PRN):  acetaminophen     Tablet .. 650 milliGRAM(s) Oral every 6 hours PRN Mild Pain (1 - 3)  morphine  - Injectable 1 milliGRAM(s) IV Push every 6 hours PRN Severe Pain (7 - 10)  oxyCODONE    IR 5 milliGRAM(s) Oral every 6 hours PRN Moderate Pain (4 - 6)    VITALS & EXAMINATION:  Vital Signs Last 24 Hrs  T(C): 36.7 (23 Dec 2024 12:17), Max: 36.7 (23 Dec 2024 12:17)  T(F): 98.1 (23 Dec 2024 12:17), Max: 98.1 (23 Dec 2024 12:17)  HR: 68 (23 Dec 2024 12:17) (65 - 79)  BP: 107/53 (23 Dec 2024 12:17) (107/53 - 136/82)  BP(mean): --  RR: 18 (23 Dec 2024 12:17) (18 - 18)  SpO2: 94% (23 Dec 2024 12:17) (94% - 97%)  Parameters below as of 23 Dec 2024 12:17  Patient On (Oxygen Delivery Method): room air    General:  Constitutional: Female, nontoxic, not in distress  cachetic throughout     Neurological (>12):  MS: Awake, alert.  Oriented person place situation year month ([facility], December, 2024) Patient aware [place] is coming up.   Follows simple complex cross commands. Able to ID 3/3 objects. Attends to examiner  Language: Speech is clear, fluent, somewhat good repetition,  comprehension, registration of words.  CNs: PERRL (R 3mm, L 3mm). VFF. EOMI. No disconjugate gaze, nystagmus. V1-3 intact LT, No facial asymmetry b/l. Hearing grossly normal b/l. Tongue midline and can move side to side.     Motor - decreased bulk throughout. Pronator drift in RUE   RUE able to be maintained antigravity, drift  LUE able to be maintained antigravity  LLE able to be maintained antigravity, drift  RLE able to be maintained antigravity    Sensation: Intact to LT b/l x4 extremities.  Reflexes L/R:  patient refused due to painful   Coordination: slightly ataxic finger to nose in RUE     Gait: deferred due unsteady gait and focused neurological exam    LABORATORY:  CBC                       12.4   9.12  )-----------( 343      ( 23 Dec 2024 06:53 )             37.9     Chem 12-23    137  |  105  |  17  ----------------------------<  103[H]  3.6   |  19[L]  |  0.51    Ca    8.3[L]      23 Dec 2024 06:52  Phos  2.2     12-23  Mg     1.9     12-23    STUDIES & IMAGING: (EEG, CT, MR, U/S, TTE/LAMONT): Neurology Progress Note    SUBJECTIVE/OBJECTIVE/INTERVAL EVENTS: Patient seen and examined at bedside w/ neuro attending and team. Patient's daughter at bedside. Patient has no new complaints. Imaging reviewed with daughter at bedside.     INTERVAL HISTORY: 79 year old  female with PMHx of small cell lung CA with known met to left laura (s/p chemo, RT) transferred from [facility] to [facility] for neurosurgery evaluation due to left pontine 2.7x2.2x2.2 expansile mostly hypodense mass. Patient was originally found down at her house by her neighbor for an unknown period of time. Neurology consulted for steroid and antiepileptic management. CTH on 12/20/2024 revealed 2.7 x 2.2 x 2.2 cm expansile mostly hypodense mass suspected in the left laura. There is a suspected capsule or rim measuring 3 to 4 mm in thickness. MRI demonstrates the left pontine lesion and a new left high medial parietal lobe measuring approximately 0.9 x 0.9 cm, with a small amount of vasogenic edema. Neurooncology, Dr. Andi Holly, as well as the general neurology team. Plan to continue with steroids, repeat MRI brain with contrast, obtain prolonged video EEG and start Keppra 500mg BID.     REVIEW OF SYSTEMS: denies all.    MEDICATIONS  (STANDING):  dexAMETHasone     Tablet 4 milliGRAM(s) Oral daily  latanoprost 0.005% Ophthalmic Solution 1 Drop(s) Both EYES at bedtime  levETIRAcetam   Injectable 500 milliGRAM(s) IV Push every 12 hours  LORazepam     Tablet 0.5 milliGRAM(s) Oral once  polyethylene glycol 3350 17 Gram(s) Oral daily  senna 2 Tablet(s) Oral at bedtime  timolol 0.5% Solution 1 Drop(s) Both EYES daily  MEDICATIONS  (PRN):  acetaminophen     Tablet .. 650 milliGRAM(s) Oral every 6 hours PRN Mild Pain (1 - 3)  morphine  - Injectable 1 milliGRAM(s) IV Push every 6 hours PRN Severe Pain (7 - 10)  oxyCODONE    IR 5 milliGRAM(s) Oral every 6 hours PRN Moderate Pain (4 - 6)    VITALS & EXAMINATION:  Vital Signs Last 24 Hrs  T(C): 36.7 (23 Dec 2024 12:17), Max: 36.7 (23 Dec 2024 12:17)  T(F): 98.1 (23 Dec 2024 12:17), Max: 98.1 (23 Dec 2024 12:17)  HR: 68 (23 Dec 2024 12:17) (65 - 79)  BP: 107/53 (23 Dec 2024 12:17) (107/53 - 136/82)  BP(mean): --  RR: 18 (23 Dec 2024 12:17) (18 - 18)  SpO2: 94% (23 Dec 2024 12:17) (94% - 97%)  Parameters below as of 23 Dec 2024 12:17  Patient On (Oxygen Delivery Method): room air    General:  Constitutional: Female, nontoxic, not in distress  cachetic throughout     Neurological (>12):  MS: Awake, alert.  Oriented person place situation year month (hospital, December, 2024) Patient aware [place] is coming up.   Follows simple complex cross commands. Able to ID 3/3 objects. Attends to examiner  Language: Speech is clear, fluent, somewhat good repetition,  comprehension, registration of words.  CNs: PERRL (R 3mm, L 3mm). VFF. EOMI. No disconjugate gaze, nystagmus. V1-3 intact LT, No facial asymmetry b/l. Hearing grossly normal b/l. Tongue midline and can move side to side.     Motor - decreased bulk throughout. Pronator drift in RUE   RUE able to be maintained antigravity, drift  LUE able to be maintained antigravity  LLE able to be maintained antigravity, drift  RLE able to be maintained antigravity    Sensation: Intact to LT b/l x4 extremities.  Reflexes L/R:  patient refused due to painful   Coordination: slightly ataxic finger to nose in RUE     Gait: deferred due unsteady gait and focused neurological exam    LABORATORY:  CBC                       12.4   9.12  )-----------( 343      ( 23 Dec 2024 06:53 )             37.9     Chem 12-23    137  |  105  |  17  ----------------------------<  103[H]  3.6   |  19[L]  |  0.51    Ca    8.3[L]      23 Dec 2024 06:52  Phos  2.2     12-23  Mg     1.9     12-23    STUDIES & IMAGING: (EEG, CT, MR, U/S, TTE/LAMONT):    MRI brain without contrast 12/23  Study is foreshortened.  -Expansile mass lesion centered in the left hemipons measuring approximately 2.8 x 3.1 x 2.6 cm (AP x TV x CC) with surrounding vasogenic edema.   -foci of susceptibility artifact within the described lesion, suggesting a hemorrhagic component. Probable necrotic component within the mass lesion.   -Resulting effacement of the basal cisterns and fourth ventricle. Ventricular size similar to prior imaging.  -0.9 cm restriction lesion in the high medial left frontal lobe, similar to prior imaging, images 44 series 8 and 91 of series 5. Probable additional 1.0 cm lesion in the high left medial frontal lobe, image 91 of series 5.  -No diffusion restriction to suggest acute infarct. Limited evaluation of the extra-axial spaces.  -The craniocervical junction is within normal limits. The pituitary is unremarkable. Limited evaluation of the major intracranial flow voids, paranasal sinuses, mastoid air cells, and orbits.  IMPRESSION:  1. Redemonstrated expansile mass lesion centered in the left hemipons with surrounding vasogenic edema and probable hemorrhagic component.  2. Additional metastatic lesions in the high left medial frontal lobe.     CTH 12/20  2.7 x 2.2 x 2.2 cm expansile mostly hypodense mass suspected in the left laura. There is a suspected capsule or rim measuring 3 to 4 mm in thickness. Necrotic metastatic lesion is suspected given that the patient has a history of lung cancer.  MRI with gadolinium may be helpful for further evaluation, if clinically indicated.    CT CERVICAL SPINE:  No acute fracture or traumatic subluxation.  Multi-level degenerative changes.  Smaller irregular nodule with pleural retraction in the posterior right lung apex.

## 2024-12-23 NOTE — PROGRESS NOTE ADULT - PROBLEM SELECTOR PLAN 6
Has been on oxycodone at home  - will c/w Oxycodone PO q 6 hrs prn and morphine 1mg q 6 hr prn for severe pain  - Bowel regimens

## 2024-12-23 NOTE — CONSULT NOTE ADULT - SUBJECTIVE AND OBJECTIVE BOX
HPI (admission)    Patient is a 79F with h/o small cell lung cancer with mets to the brain with vision problems, liver, bone (not on chemo- last chemo in April), L hip Fx in 2023, lives alone comes to ED as she was found by neighbor down on the floor in the afternoon yesterday. She said she tripped and fell, but now sure.  She has been c/o pain in her right arm and right leg of the patient appear more contracted than usual over the last couple of weeks. She uses walker at home. Denies SOB, chest pain, fever, but some cough. As per daughter her chemo was stopped in April 2024 given advance disease. (21 Dec 2024 15:01)      PAST MEDICAL & SURGICAL HISTORY:  Tobacco abuse      Alcohol abuse      Lung nodules      Liver lesion      H/O fracture of hip      History of fracture due to fall      History of repair of hip fracture          Allergies    penicillins (Unknown)    Intolerances        MEDICATIONS  (STANDING):  dexAMETHasone     Tablet 4 milliGRAM(s) Oral daily  latanoprost 0.005% Ophthalmic Solution 1 Drop(s) Both EYES at bedtime  levETIRAcetam   Injectable 500 milliGRAM(s) IV Push every 12 hours  polyethylene glycol 3350 17 Gram(s) Oral daily  senna 2 Tablet(s) Oral at bedtime  timolol 0.5% Solution 1 Drop(s) Both EYES daily    MEDICATIONS  (PRN):  acetaminophen     Tablet .. 650 milliGRAM(s) Oral every 6 hours PRN Mild Pain (1 - 3)  morphine  - Injectable 1 milliGRAM(s) IV Push every 6 hours PRN Severe Pain (7 - 10)  oxyCODONE    IR 5 milliGRAM(s) Oral every 6 hours PRN Moderate Pain (4 - 6)      FAMILY HISTORY:  FH: HTN (hypertension)        SOCIAL HISTORY: No EtOH, current smoker (>50 pack year)     REVIEW OF SYSTEMS:    CONSTITUTIONAL: + weakness, fevers or chills  EYES/ENT: No visual changes;  No vertigo or throat pain   NECK: No pain or stiffness  RESPIRATORY: No cough, wheezing, hemoptysis; No shortness of breath  CARDIOVASCULAR: No chest pain or palpitations  GASTROINTESTINAL: No abdominal or epigastric pain. No nausea, vomiting   GENITOURINARY: No dysuria, frequency or hematuria  NEUROLOGICAL: No numbness or weakness, + imbalance   SKIN: No itching, burning, rashes, or lesions   All other review of systems is negative unless indicated above.        T(F): 98.1 (12-23-24 @ 12:17), Max: 98.1 (12-23-24 @ 12:17)  HR: 68 (12-23-24 @ 12:17)  BP: 107/53 (12-23-24 @ 12:17)  RR: 18 (12-23-24 @ 12:17)  SpO2: 94% (12-23-24 @ 12:17)  Wt(kg): --    GENERAL: NAD, cachetic   HEAD:  Atraumatic, Normocephalic  EYES: EOMI, PERRLA, conjunctiva and sclera clear  CHEST/LUNG: Clear to auscultation bilaterally  HEART: Regular rate and rhythm  ABDOMEN: Soft, Nontender, Nondistended  NEUROLOGY: A&Ox3, decreased motor strength of right lower extremity   SKIN: No rashes or lesions                          12.4   9.12  )-----------( 343      ( 23 Dec 2024 06:53 )             37.9         137  |  105  |  17  ----------------------------<  103[H]  3.6   |  19[L]  |  0.51    Ca    8.3[L]      23 Dec 2024 06:52  Phos  2.2     12-23  Mg     1.9     12-23        Magnesium: 1.9 mg/dL (12-23 @ 06:52)  Phosphorus: 2.2 mg/dL (12-23 @ 06:52)          Clean Catch Clean Catch (Midstream)  12-21 @ 05:08   No growth  --  --

## 2024-12-23 NOTE — PROVIDER CONTACT NOTE (OTHER) - SITUATION
patient removed huff and splint, per patient "it was bothering me"
pt agitated s/p xanax premed at MRI, did not tolerate full exam

## 2024-12-23 NOTE — CONSULT NOTE ADULT - CONSULT REASON
Brain metastases
L3 VCF
Small cell lung cancer
Trauma
brainstem mass
Steroids and antiepileptic management
sacral/bilateral buttocks and bilateral heel skin changes
Right foot fx

## 2024-12-23 NOTE — CONSULT NOTE ADULT - SUBJECTIVE AND OBJECTIVE BOX
Wound Surgery Consult Note:    HPI:  Patient is a 79F with h/o small cell lung cancer with mets to the brain with vision problems, liver, bone (not on chemo- last chemo in April), L hip Fx in 2023, lives alone comes to ED as she was found by neighbor down on the floor in the afternoon yesterday. She said she tripped and fell, but now sure.  She has been c/o pain in her right arm and right leg of the patient appear more contracted than usual over the last couple of weeks. She uses walker at home. Denies SOB, chest pain, fever, but some cough. As per daughter her chemo was stopped in April 2024 given advance disease. (21 Dec 2024 15:01)    Request for wound care evaluation of the bilateral heels received from nursing. Ms. Spencer was encountered on an alternating air with low air loss surface. Sacrum/bilateral buttocks and right hip with deep maroon discolored intact skin in the setting of being found down, cannot not ruled out as a deep tissue injury at this time.. She is incontinent of stool and urine. Her immobility, inactivity, incontinence of bowel and bladder in addition to poor nutritional status all contribute to her risk of pressure injury development and hinder healing. Principles of pressure injury prevention including but not limited to turning and positioning reviewed with patient.     PAST MEDICAL & SURGICAL HISTORY:  Tobacco abuse  Alcohol abuse  Lung nodules  Liver lesion  H/O fracture of hip  History of fracture due to fall  History of repair of hip fracture    REVIEW OF SYSTEMS  CONSTITUTIONAL: + weakness, no fevers or chills  EYES/ENT: No visual changes;  No vertigo or throat pain   MOUTH: No oral lesion, moist  NECK: No pain or stiffness  RESPIRATORY: No cough, wheezing, hemoptysis; No shortness of breath  CARDIOVASCULAR: No chest pain or palpitations  GASTROINTESTINAL: No abdominal or epigastric pain. No nausea, vomiting, or hematemesis; No diarrhea or constipation. No melena or hematochezia.  GENITOURINARY: No dysuria, frequency or hematuria  NEUROLOGICAL: + weakness  SKIN: No itching, rashes  PSYCH: no confusion or altered mental status    MEDICATIONS  (STANDING):  dexAMETHasone     Tablet 4 milliGRAM(s) Oral daily  latanoprost 0.005% Ophthalmic Solution 1 Drop(s) Both EYES at bedtime  levETIRAcetam   Injectable 500 milliGRAM(s) IV Push every 12 hours  polyethylene glycol 3350 17 Gram(s) Oral daily  senna 2 Tablet(s) Oral at bedtime  timolol 0.5% Solution 1 Drop(s) Both EYES daily    MEDICATIONS  (PRN):  acetaminophen     Tablet .. 650 milliGRAM(s) Oral every 6 hours PRN Mild Pain (1 - 3)  morphine  - Injectable 1 milliGRAM(s) IV Push every 6 hours PRN Severe Pain (7 - 10)  oxyCODONE    IR 5 milliGRAM(s) Oral every 6 hours PRN Moderate Pain (4 - 6)    Allergies    penicillins (Unknown)    Intolerances    SOCIAL HISTORY:  ; Former smoker    FAMILY HISTORY:  FH: HTN (hypertension)    Vital Signs Last 24 Hrs  T(C): 36.7 (23 Dec 2024 12:17), Max: 36.7 (23 Dec 2024 12:17)  T(F): 98.1 (23 Dec 2024 12:17), Max: 98.1 (23 Dec 2024 12:17)  HR: 68 (23 Dec 2024 12:17) (65 - 68)  BP: 107/53 (23 Dec 2024 12:17) (107/53 - 136/77)  BP(mean): --  RR: 18 (23 Dec 2024 12:17) (18 - 18)  SpO2: 94% (23 Dec 2024 12:17) (94% - 97%)    Parameters below as of 23 Dec 2024 12:17  Patient On (Oxygen Delivery Method): room air    Physical Exam:  General: alert, WN  Ophthamology: sclera clear  ENMT: moist mucous membranes, trachea midline  Respiratory: equal chest rise with respirations  Gastrointestinal: soft NT/ND  Neurology: verbal,  following commands  Psych: calm, cooperative  Musculoskeletal: no contractures  Vascular: BLE edema equal  Skin:  Sacrum/bilateral buttocks deep maroon discolored intact skin L 3cm x 3cm x none, no drainage   Right hip with deep maroon discolored intact skin L 0.5cm x 3cm x none, no drainage  Right great toe with possible Subungual hematoma  No odor, increased warmth, tenderness, induration, fluctuance, erythema  Toe nails elongated, thickened, growing into skin    LABS:  12-23    137  |  105  |  17  ----------------------------<  103[H]  3.6   |  19[L]  |  0.51    Ca    8.3[L]      23 Dec 2024 06:52  Phos  2.2     12-23  Mg     1.9     12-23                            12.4   9.12  )-----------( 343      ( 23 Dec 2024 06:53 )             37.9       Urinalysis Basic - ( 23 Dec 2024 06:52 )    Color: x / Appearance: x / SG: x / pH: x  Gluc: 103 mg/dL / Ketone: x  / Bili: x / Urobili: x   Blood: x / Protein: x / Nitrite: x   Leuk Esterase: x / RBC: x / WBC x   Sq Epi: x / Non Sq Epi: x / Bacteria: x

## 2024-12-23 NOTE — EEG REPORT - NS EEG TEXT BOX
REPORT OF ROUTINE EEG WITH VIDEO    [facility]: [address], [address] [place] [address], Phone: [phone]  [facility]: [address] [place] [id number], Phone: [phone]  Office: [address] [place] [address], [place] [address], [place] [address], Phone: [phone]    Patient Name: Leena Spencer    Age: 79 year  : 1945    EEG #: [id number]  Study Date: 2024   Start Time: 11:50:20 AM     Study Duration: 20.3 minutes    Technical Information:					  On Instrument: [id number]  Placement and Labeling of Electrodes:  The EEG was performed utilizing 20 channels referential EEG connections (coronal over temporal over parasagittal montage) using all standard 10-20 electrode placements with EKG.  Recording was at a sampling rate of 256 samples per second per channel.  Time synchronized digital video recording was done simultaneously with EEG recording.  A low light infrared camera was used for low light recording.  Theron and seizure detection algorithms were utilized.    History:  -79 year old Female with syncope is here for evaluation      Medication  Oxycodone    Morphine    Tylenol    Decadron      Study Interpretation:    FINDINGS:  The background was continuous, spontaneously variable and reactive.  During wakefulness, the posteriorly dominant rhythm consisted of symmetric, well modulated 9 Hz activity with intermittent slowing, with an amplitude to 30 uV, that attenuated to eye opening.  Low amplitude central beta was noted in wakefulness.    Background Slowing:  Generalized slowing: present. Intermittent diffuse delta theta activity noted    Focal slowing: none was present.    Sleep Background:  Drowsiness was characterized by fragmentation, attenuation, and slowing of the background activity.      Stage II sleep transients were not recorded.      Non-epileptiform activity:  None.    Epileptiform Activity:   No epileptiform discharges were present.    Events:  No clinical events were recorded.  No seizures were recorded.    Activation Procedures:   Hyperventilation was not performed.    Photic stimulation was performed and did not elicit abnormalities.      Artifacts:  Intermittent myogenic and movement artifacts were noted.    ECG:  The heart rate on single channel ECG was predominantly between 60-70 BPM.  ________________________________________  EEG Classification:    Abnormal EEG in the awake, and drowsy states.  1.	Mild diffuse background slowing  ________________________________________  Clinical Impression:    Mild nonspecific diffuse or multifocal cerebral dysfunction.     No epileptiform pattern or seizure recorded.  ________________________________________    MARIBETH Hardy  Attending Physician, [facility] [place]    ------------------------------------  EEG Reading Room: [phone]  On Call Service After Hours: [phone]

## 2024-12-23 NOTE — PROGRESS NOTE ADULT - ASSESSMENT
Patient is a 79F with advanced small cell lung cancer with mets to the brain with vision problems, liver, bone (not on chemo- last chemo in April), L hip Fx 2023 lives alone a/w unwitnessed fall , imaging showing acute spiral fracture of fifth proximal phlanx, acute right anterior 5th and 7th minimally displaced anterior rib fractures and possibly acute  mild L3 vertebral body compression fracture. MRI reveals new laura mass likely metastatic, showed concern for blood products in the left laura and vasogeneric edema.

## 2024-12-23 NOTE — CONSULT NOTE ADULT - ASSESSMENT
Impression:    Sacral/bilateral Buttocks deep tissue injury vs. ecchymosis present on admission  Right deep tissue injury vs. ecchymosis present on admission  Incontinence of bowel and bladder  Incontinence Dermatitis    Recommend:  1.) topical therapy: sacral/buttock wound – cleanse with incontinence cleanser, pat dry, apply Romulo ointment BID and PRN for incontinent episodes  Right hip - cleanse with soap and water, pat dry, apply foam dressing every other day  2.) Incontinence Management - incontinence cleanser, pads, pericare BID  3.) Maintain on an alternating air with low air loss surface  4.) Turn and reposition Q 2 hours  5.) Nutrition optimization - please add Eyal  6.) Offload heels/feet with complete cair air fluidized boots/pillows; ensure that the soles of the feet are not resting on the foot board of the bed.  7.) chair cushion for chair sitting  8.) Foot/toe care per wound care team Podiatry who will see patient    Care as per medicine. Will not actively follow but will remain available. Please recall for new issues or deterioration.  Upon discharge f/u as outpatient at [facility] [address] [place] [address] [phone]  Thank you for this consult  Sharonda Oliveros, LEAH-C, CWOCN via TEAMS    Nights/ Weekends/ Holidays please call:  General Surgery Consult pager ([phone]) for emergencies

## 2024-12-23 NOTE — CONSULT NOTE ADULT - SUBJECTIVE AND OBJECTIVE BOX
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 She said she tripped and fell, but now sure.  She has been c/o pain in her right arm and right leg of the patient appear more contracted than usual over the last couple of weeks. She uses walker at home. Denies SOB, chest pain, fever, but some cough. As per daughter her chemo was stopped in April 2024 given advance disease. She is s/p SRS 20Gy/1fx to right frontal lobe lesion. MRI brain shows Redemonstrated expansile mass lesion centered in the left hemipons with surrounding vasogenic edema and probable hemorrhagic component. Additional metastatic lesions in the high left medial frontal lobe.    KPS: 50/60    Allergies  penicillins (Unknown)    PAST MEDICAL & SURGICAL HISTORY:  Tobacco abuse  Alcohol abuse  History of repair of hip fracture        FAMILY HISTORY:  FH: HTN (hypertension)        PHYSICAL EXAM  General: cachectic, no acute distress  HEENT: NC/AT; EOMI, PERRL, sclera nonicteric; external ears normal  CV: NR, RR; no appreciable r/m/g  Lungs: CTAB, no increased work of breathing  Abdomen: Bowel sounds present; soft, NTND  MSK: Vertebral spine non-tender to palpation  Neuro: AAOx3; sensation to light touch in tact bilaterally.    IMAGING/LABS/PATHOLOGY: I have personally reviewed the relevant labs, pathology, and imaging as noted in the HPI.

## 2024-12-23 NOTE — CONSULT NOTE ADULT - ASSESSMENT
78-year-old with history of ES-SCLC transferred from [facility] to [facility] for neurosurgery evaluation due to left pontine 2.7x2.2x2.2 expansile mostly hypodense mass. Patient was found down at her house by her neighbor for an unknown period of time.     Extensive stage small cell lung cancer   - Presented with a fall and hip fracture in September 2023 and on further work it was found to have a right upper lobe nodule.   - PET/CT revealed multifocal hepatic metastases, multifocal osteolytic pulmonary and left adrenal metastases, left orbital fossa mass eroding the skull base with concern for intracranial extension.   - Liver biopsy 12/23 was consistent with metastatic small cell carcinoma  - Her MRI was recently completed and revealed multiple rim enhancing lesions in the frontal and parietal lobes.  - Completed C5 carbo/etop/tecentriq. Was planned to transition to maintenace Tecentriq but patient did not follow up   - Brain mets: recommended to follow up with Dr. Howell. Given limited volume of disease, she had SRS to the right frontal lesion in 6/2024 (juan f).   - She is admitted she was found down at her house by her neighbor for an unknown period of time. She was found to have a left himpons mass (2.8 x 3.1 x 2.6 cm) with vasogenic edema. Additional metastatic lesions in the left medial frontal lobe. CT C/A/P showed acute right anterior 5th and 7th displaced anterior rib fracrures, nonspecific GGO CHARLES, post-treatment changes to hepatic mets with decrease in size      Recommendations  - Please obtain radiation oncology consult for her brain mets     78-year-old with history of ES-SCLC transferred from [facility] to [facility] for neurosurgery evaluation due to left pontine 2.7x2.2x2.2 expansile mostly hypodense mass. Patient was found down at her house by her neighbor for an unknown period of time.     Extensive stage small cell lung cancer   - Presented with a fall and hip fracture in September 2023 and on further work it was found to have a right upper lobe nodule.   - PET/CT revealed multifocal hepatic metastases, multifocal osteolytic pulmonary and left adrenal metastases, left orbital fossa mass eroding the skull base with concern for intracranial extension.   - Liver biopsy 12/23 was consistent with metastatic small cell carcinoma  - Her MRI was recently completed and revealed multiple rim enhancing lesions in the frontal and parietal lobes.  - Completed C5 carbo/etop/tecentriq. Was planned to transition to maintenace Tecentriq but patient did not follow up   - Brain mets: recommended to follow up with Dr. Howell. Given limited volume of disease, she had SRS to the right frontal lesion in 6/2024 (juan f).   - She is admitted she was found down at her house by her neighbor for an unknown period of time. She was found to have a left laura mass (2.8 x 3.1 x 2.6 cm) with vasogenic edema. Additional metastatic lesions in the left medial frontal lobe. CT C/A/P showed acute right anterior 5th and 7th displaced anterior rib fracrures, nonspecific GGO CHARLES, post-treatment changes to hepatic mets with decrease in size      Recommendations  - Patient presenting with new brain mets but otherwise stable findings in chest/abdomen/pelvis without new disease.   - Please obtain radiation oncology consult for her brain mets   - Agree with dexamethasone for vasogenic edema     Sharon Greenberg MD MS  Hematology/Oncology Fellow PGY-4  [employer] [facility] [employer] at [facility] | [facility] | [facility]  Available on Teams

## 2024-12-23 NOTE — PROGRESS NOTE ADULT - ASSESSMENT
79 year old  female with PMHx of small cell lung CA with known met to left laura (s/p chemo, RT) transferred from [facility] to [facility] for neurosurgery evaluation due to left pontine 2.7x2.2x2.2 expansile mostly hypodense mass. Patient was originally found down at her house by her neighbor for an unknown period of time. Neurology consulted for steroid and antiepileptic management. CTH on 12/20/2024 revealed 2.7 x 2.2 x 2.2 cm expansile mostly hypodense mass suspected in the left laura. There is a suspected capsule or rim measuring 3 to 4 mm in thickness. MRI demonstrates the left pontine lesion and a new left high medial parietal lobe measuring approximately 0.9 x 0.9 cm, with a small amount of vasogenic edema. Neurooncology, Dr. Andi Holly, as well as the general neurology team. Plan to continue with steroids, repeat MRI brain with contrast, obtain prolonged video EEG and start Keppra 500mg BID.     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 Holly

## 2024-12-23 NOTE — CONSULT NOTE ADULT - CONSULT REQUESTED DATE/TIME
20-Dec-2024 20:56
21-Dec-2024 00:00
23-Dec-2024 16:27
21-Dec-2024 03:19
22-Dec-2024 20:54
23-Dec-2024 20:21
21-Dec-2024 03:30
23-Dec-2024 13:54

## 2024-12-24 ENCOUNTER — TRANSCRIPTION ENCOUNTER (OUTPATIENT)
Age: 79
End: 2024-12-24

## 2024-12-24 ENCOUNTER — INPATIENT (INPATIENT)
Facility: HOSPITAL | Age: 79
LOS: 13 days | Discharge: NURSING, REHAB OR LONG-TERM CARE | End: 2025-01-07
Attending: STUDENT IN AN ORGANIZED HEALTH CARE EDUCATION/TRAINING PROGRAM | Admitting: STUDENT IN AN ORGANIZED HEALTH CARE EDUCATION/TRAINING PROGRAM
Payer: MEDICARE

## 2024-12-24 VITALS
RESPIRATION RATE: 19 BRPM | TEMPERATURE: 98 F | OXYGEN SATURATION: 100 % | HEART RATE: 67 BPM | SYSTOLIC BLOOD PRESSURE: 159 MMHG | DIASTOLIC BLOOD PRESSURE: 72 MMHG

## 2024-12-24 VITALS
TEMPERATURE: 98 F | SYSTOLIC BLOOD PRESSURE: 119 MMHG | DIASTOLIC BLOOD PRESSURE: 71 MMHG | RESPIRATION RATE: 18 BRPM | HEART RATE: 68 BPM | OXYGEN SATURATION: 97 %

## 2024-12-24 DIAGNOSIS — S52.91XA UNSPECIFIED FRACTURE OF RIGHT FOREARM, INITIAL ENCOUNTER FOR CLOSED FRACTURE: ICD-10-CM

## 2024-12-24 DIAGNOSIS — Z98.890 OTHER SPECIFIED POSTPROCEDURAL STATES: Chronic | ICD-10-CM

## 2024-12-24 DIAGNOSIS — R52 PAIN, UNSPECIFIED: ICD-10-CM

## 2024-12-24 DIAGNOSIS — Z29.9 ENCOUNTER FOR PROPHYLACTIC MEASURES, UNSPECIFIED: ICD-10-CM

## 2024-12-24 DIAGNOSIS — C34.90 MALIGNANT NEOPLASM OF UNSPECIFIED PART OF UNSPECIFIED BRONCHUS OR LUNG: ICD-10-CM
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
## 2024-12-24 DIAGNOSIS — C79.31 SECONDARY MALIGNANT NEOPLASM OF BRAIN: ICD-10-CM

## 2024-12-24 DIAGNOSIS — S22.41XA MULTIPLE FRACTURES OF RIBS, RIGHT SIDE, INITIAL ENCOUNTER FOR CLOSED FRACTURE: ICD-10-CM

## 2024-12-24 PROCEDURE — 99223 1ST HOSP IP/OBS HIGH 75: CPT | Mod: GC

## 2024-12-24 PROCEDURE — 93005 ELECTROCARDIOGRAM TRACING: CPT

## 2024-12-24 PROCEDURE — 84484 ASSAY OF TROPONIN QUANT: CPT

## 2024-12-24 PROCEDURE — 85014 HEMATOCRIT: CPT

## 2024-12-24 PROCEDURE — 85730 THROMBOPLASTIN TIME PARTIAL: CPT

## 2024-12-24 PROCEDURE — 99285 EMERGENCY DEPT VISIT HI MDM: CPT | Mod: 25

## 2024-12-24 PROCEDURE — 70551 MRI BRAIN STEM W/O DYE: CPT | Mod: MC

## 2024-12-24 PROCEDURE — 73630 X-RAY EXAM OF FOOT: CPT

## 2024-12-24 PROCEDURE — 71250 CT THORAX DX C-: CPT | Mod: MC

## 2024-12-24 PROCEDURE — 85025 COMPLETE CBC W/AUTO DIFF WBC: CPT

## 2024-12-24 PROCEDURE — 85027 COMPLETE CBC AUTOMATED: CPT

## 2024-12-24 PROCEDURE — 72148 MRI LUMBAR SPINE W/O DYE: CPT | Mod: MC

## 2024-12-24 PROCEDURE — 82803 BLOOD GASES ANY COMBINATION: CPT

## 2024-12-24 PROCEDURE — 82947 ASSAY GLUCOSE BLOOD QUANT: CPT

## 2024-12-24 PROCEDURE — 85610 PROTHROMBIN TIME: CPT

## 2024-12-24 PROCEDURE — 73562 X-RAY EXAM OF KNEE 3: CPT

## 2024-12-24 PROCEDURE — 70553 MRI BRAIN STEM W/O & W/DYE: CPT | Mod: MC

## 2024-12-24 PROCEDURE — 80048 BASIC METABOLIC PNL TOTAL CA: CPT

## 2024-12-24 PROCEDURE — 81001 URINALYSIS AUTO W/SCOPE: CPT

## 2024-12-24 PROCEDURE — 82435 ASSAY OF BLOOD CHLORIDE: CPT

## 2024-12-24 PROCEDURE — 96374 THER/PROPH/DIAG INJ IV PUSH: CPT

## 2024-12-24 PROCEDURE — 74176 CT ABD & PELVIS W/O CONTRAST: CPT | Mod: MC

## 2024-12-24 PROCEDURE — 82550 ASSAY OF CK (CPK): CPT

## 2024-12-24 PROCEDURE — 73590 X-RAY EXAM OF LOWER LEG: CPT

## 2024-12-24 PROCEDURE — A9585: CPT

## 2024-12-24 PROCEDURE — 73502 X-RAY EXAM HIP UNI 2-3 VIEWS: CPT

## 2024-12-24 PROCEDURE — 82553 CREATINE MB FRACTION: CPT

## 2024-12-24 PROCEDURE — 87086 URINE CULTURE/COLONY COUNT: CPT

## 2024-12-24 PROCEDURE — 80053 COMPREHEN METABOLIC PANEL: CPT

## 2024-12-24 PROCEDURE — 99239 HOSP IP/OBS DSCHRG MGMT >30: CPT

## 2024-12-24 PROCEDURE — 83735 ASSAY OF MAGNESIUM: CPT

## 2024-12-24 PROCEDURE — 83605 ASSAY OF LACTIC ACID: CPT

## 2024-12-24 PROCEDURE — 73610 X-RAY EXAM OF ANKLE: CPT

## 2024-12-24 PROCEDURE — 84100 ASSAY OF PHOSPHORUS: CPT

## 2024-12-24 PROCEDURE — 84132 ASSAY OF SERUM POTASSIUM: CPT

## 2024-12-24 PROCEDURE — 95816 EEG AWAKE AND DROWSY: CPT

## 2024-12-24 PROCEDURE — 97161 PT EVAL LOW COMPLEX 20 MIN: CPT

## 2024-12-24 PROCEDURE — 84295 ASSAY OF SERUM SODIUM: CPT

## 2024-12-24 PROCEDURE — 73552 X-RAY EXAM OF FEMUR 2/>: CPT

## 2024-12-24 PROCEDURE — 99233 SBSQ HOSP IP/OBS HIGH 50: CPT

## 2024-12-24 PROCEDURE — 72170 X-RAY EXAM OF PELVIS: CPT

## 2024-12-24 PROCEDURE — 82330 ASSAY OF CALCIUM: CPT

## 2024-12-24 PROCEDURE — 85018 HEMOGLOBIN: CPT

## 2024-12-24 RX ORDER — DEXAMETHASONE SODIUM PHOSPHATE 4 MG/ML
1 VIAL (ML) INJECTION
Qty: 0 | Refills: 0 | DISCHARGE
Start: 2024-12-24

## 2024-12-24 RX ORDER — POLYETHYLENE GLYCOL 3350 17 G/DOSE
17 POWDER (GRAM) ORAL DAILY
Refills: 0 | Status: DISCONTINUED | OUTPATIENT
Start: 2024-12-24 | End: 2025-01-07

## 2024-12-24 RX ORDER — LEVETIRACETAM 100 MG/ML
5 SOLUTION ORAL
Qty: 0 | Refills: 0 | DISCHARGE
Start: 2024-12-24

## 2024-12-24 RX ORDER — POLYETHYLENE GLYCOL 3350 17 G/DOSE
17 POWDER (GRAM) ORAL
Qty: 0 | Refills: 0 | DISCHARGE
Start: 2024-12-24

## 2024-12-24 RX ORDER — LATANOPROST 50 UG/ML
1 SOLUTION OPHTHALMIC AT BEDTIME
Refills: 0 | Status: DISCONTINUED | OUTPATIENT
Start: 2024-12-24 | End: 2025-01-07

## 2024-12-24 RX ORDER — OXYCODONE HCL 15 MG
1 TABLET ORAL
Qty: 0 | Refills: 0 | DISCHARGE
Start: 2024-12-24

## 2024-12-24 RX ORDER — DEXAMETHASONE SODIUM PHOSPHATE 4 MG/ML
4 VIAL (ML) INJECTION DAILY
Refills: 0 | Status: DISCONTINUED | OUTPATIENT
Start: 2024-12-24 | End: 2024-12-27

## 2024-12-24 RX ORDER — ACETAMINOPHEN 80 MG/.8ML
2 SOLUTION/ DROPS ORAL
Qty: 0 | Refills: 0 | DISCHARGE
Start: 2024-12-24

## 2024-12-24 RX ORDER — TIMOLOL MALEATE 0.5 %
1 DROPS OPHTHALMIC (EYE) DAILY
Refills: 0 | Status: DISCONTINUED | OUTPATIENT
Start: 2024-12-24 | End: 2025-01-07

## 2024-12-24 RX ORDER — OXYCODONE HCL 15 MG
10 TABLET ORAL EVERY 6 HOURS
Refills: 0 | Status: DISCONTINUED | OUTPATIENT
Start: 2024-12-24 | End: 2024-12-31

## 2024-12-24 RX ORDER — OXYCODONE HCL 15 MG
5 TABLET ORAL EVERY 6 HOURS
Refills: 0 | Status: DISCONTINUED | OUTPATIENT
Start: 2024-12-24 | End: 2024-12-30

## 2024-12-24 RX ORDER — SENNOSIDES 8.6 MG/1
2 TABLET, FILM COATED ORAL AT BEDTIME
Refills: 0 | Status: DISCONTINUED | OUTPATIENT
Start: 2024-12-24 | End: 2025-01-07

## 2024-12-24 RX ORDER — ACETAMINOPHEN 80 MG/.8ML
650 SOLUTION/ DROPS ORAL EVERY 6 HOURS
Refills: 0 | Status: DISCONTINUED | OUTPATIENT
Start: 2024-12-24 | End: 2025-01-07

## 2024-12-24 RX ORDER — SENNOSIDES 8.6 MG/1
2 TABLET, FILM COATED ORAL
Qty: 0 | Refills: 0 | DISCHARGE
Start: 2024-12-24

## 2024-12-24 RX ORDER — LEVETIRACETAM 100 MG/ML
500 SOLUTION ORAL
Refills: 0 | Status: DISCONTINUED | OUTPATIENT
Start: 2024-12-24 | End: 2025-01-07

## 2024-12-24 RX ADMIN — ACETAMINOPHEN 650 MILLIGRAM(S): 80 SOLUTION/ DROPS ORAL at 18:38

## 2024-12-24 RX ADMIN — Medication 1 DROP(S): at 13:17

## 2024-12-24 RX ADMIN — LATANOPROST 1 DROP(S): 50 SOLUTION OPHTHALMIC at 23:34

## 2024-12-24 RX ADMIN — Medication 4 MILLIGRAM(S): at 05:19

## 2024-12-24 RX ADMIN — LEVETIRACETAM 500 MILLIGRAM(S): 100 SOLUTION ORAL at 05:20

## 2024-12-24 RX ADMIN — Medication 17 GRAM(S): at 13:17

## 2024-12-24 RX ADMIN — Medication 4 MILLIGRAM(S): at 17:38

## 2024-12-24 RX ADMIN — ACETAMINOPHEN 650 MILLIGRAM(S): 80 SOLUTION/ DROPS ORAL at 23:37

## 2024-12-24 RX ADMIN — ACETAMINOPHEN 650 MILLIGRAM(S): 80 SOLUTION/ DROPS ORAL at 00:00

## 2024-12-24 RX ADMIN — ACETAMINOPHEN 650 MILLIGRAM(S): 80 SOLUTION/ DROPS ORAL at 17:38

## 2024-12-24 RX ADMIN — LEVETIRACETAM 500 MILLIGRAM(S): 100 SOLUTION ORAL at 17:38

## 2024-12-24 NOTE — DIETITIAN INITIAL EVALUATION ADULT - PERTINENT LABORATORY DATA
12-23    137  |  105  |  17  ----------------------------<  103[H]  3.6   |  19[L]  |  0.51    Ca    8.3[L]      23 Dec 2024 06:52  Phos  2.2     12-23  Mg     1.9     12-23

## 2024-12-24 NOTE — PROGRESS NOTE ADULT - SUBJECTIVE AND OBJECTIVE BOX
PROGRESS NOTE:   Authored by Dr. Debbie Tate MD  Pager [phone]     Patient is a 79y old  Female who presents with a chief complaint of AMS/fall (23 Dec 2024 16:32)      SUBJECTIVE / OVERNIGHT EVENTS: Patient seen and examined at bedside. Patient laying in bed, appears weak, denies pain, confused    ADDITIONAL REVIEW OF SYSTEMS: unable to obtain due to confusion/AMS    MEDICATIONS  (STANDING):  dexAMETHasone     Tablet 4 milliGRAM(s) Oral daily  latanoprost 0.005% Ophthalmic Solution 1 Drop(s) Both EYES at bedtime  levETIRAcetam   Injectable 500 milliGRAM(s) IV Push every 12 hours  polyethylene glycol 3350 17 Gram(s) Oral daily  senna 2 Tablet(s) Oral at bedtime  timolol 0.5% Solution 1 Drop(s) Both EYES daily    MEDICATIONS  (PRN):  acetaminophen     Tablet .. 650 milliGRAM(s) Oral every 6 hours PRN Mild Pain (1 - 3)  morphine  - Injectable 1 milliGRAM(s) IV Push every 6 hours PRN Severe Pain (7 - 10)  oxyCODONE    IR 5 milliGRAM(s) Oral every 6 hours PRN Moderate Pain (4 - 6)      CAPILLARY BLOOD GLUCOSE        I&O's Summary    23 Dec 2024 07:01  -  24 Dec 2024 07:00  --------------------------------------------------------  IN: 0 mL / OUT: 600 mL / NET: -600 mL        PHYSICAL EXAM:  Vital Signs Last 24 Hrs  T(C): 37.1 (24 Dec 2024 09:25), Max: 37.1 (24 Dec 2024 09:25)  T(F): 98.7 (24 Dec 2024 09:25), Max: 98.7 (24 Dec 2024 09:25)  HR: 67 (24 Dec 2024 09:25) (67 - 70)  BP: 129/82 (24 Dec 2024 09:25) (107/53 - 141/71)  BP(mean): --  RR: 18 (24 Dec 2024 09:25) (18 - 18)  SpO2: 96% (24 Dec 2024 09:25) (94% - 96%)    Parameters below as of 24 Dec 2024 09:25  Patient On (Oxygen Delivery Method): room air        CONSTITUTIONAL: NAD, frail  RESPIRATORY: Normal respiratory effort; lungs are clear to auscultation bilaterally  CARDIOVASCULAR: Regular rate and rhythm, normal S1 and S2; No lower extremity edema; Peripheral pulses are 2+ bilaterally  ABDOMEN: Nontender to palpation, normoactive bowel sounds, no rebound/guarding  MUSCLOSKELETAL: RUE weakness  PSYCH: A+O to person, place    LABS:                        12.4   9.12  )-----------( 343      ( 23 Dec 2024 06:53 )             37.9     12-23    137  |  105  |  17  ----------------------------<  103[H]  3.6   |  19[L]  |  0.51    Ca    8.3[L]      23 Dec 2024 06:52  Phos  2.2     12-23  Mg     1.9     12-23            Urinalysis Basic - ( 23 Dec 2024 06:52 )    Color: x / Appearance: x / SG: x / pH: x  Gluc: 103 mg/dL / Ketone: x  / Bili: x / Urobili: x   Blood: x / Protein: x / Nitrite: x   Leuk Esterase: x / RBC: x / WBC x   Sq Epi: x / Non Sq Epi: x / Bacteria: x          RADIOLOGY & ADDITIONAL TESTS:  Results Reviewed:   Imaging Personally Reviewed:  Electrocardiogram Personally Reviewed:    COORDINATION OF CARE:  Care Discussed with Consultants/Other Providers [Y/N]:  Prior or Outpatient Records Reviewed [Y/N]:

## 2024-12-24 NOTE — PROGRESS NOTE ADULT - SUBJECTIVE AND OBJECTIVE BOX
NEURO-ONCOLOGY    79f with SCLC  she had carbo/etop in spring of 2024 but was lost to follow up  she had SRS to a single right frontal lesion by Dr. Howell in 6/24  presented from home after fall  found to have a large pontine met, and smaller lesions in left frontal lobe, 4 in all   Clinically weak and debilitated but no cranial neuropathies     MEDICATIONS  (STANDING):  dexAMETHasone     Tablet 4 milliGRAM(s) Oral daily  latanoprost 0.005% Ophthalmic Solution 1 Drop(s) Both EYES at bedtime  levETIRAcetam   Injectable 500 milliGRAM(s) IV Push every 12 hours  polyethylene glycol 3350 17 Gram(s) Oral daily  senna 2 Tablet(s) Oral at bedtime  timolol 0.5% Solution 1 Drop(s) Both EYES daily    (Exam as noted with exceptions in parentheses)    General: frail woman, no distress    Mental Status:  Awake, alert and attentive. Oriented to person, place and time. Recent and remote memory intact. Normal concentration. Fluent spontaneous speech with intact naming and repetition. Normal fund of knowledge.      Cranial Nerves: II: Full visual fields. III,IV,VI:Pupils round, reactive to light. Full extraocular movements. No nystagmus. V: Normal bilateral bite, facial sensation. VII: No facial weakness. VIII: Hearing intact. IX,X: Palate midline, intact gag. XI:  Sternocleidomastoids normal. XII: Tongue protrudes midline. No dysarthria.     Motor:  Normal tone, bulk and power throughout including arms and legs, proximal and distal. No pronator drift. No abnormal movements.      Sensation: Normal in arms, legs and trunk to pin, proprioception and vibration. Negative Romberg.     Coordination: No dysmetria or dysdiadochokinesis bilaterally. Normal heel-shin testing.     Gait: Normal including heel and toe walking. Normal station.  (not walked for safety)    Reflexes: Normoactive and symmetric throughout. Absent Babinski bilaterally.       NEUROIMAGING: personally reviewed as above

## 2024-12-24 NOTE — DISCHARGE NOTE NURSING/CASE MANAGEMENT/SOCIAL WORK - PATIENT PORTAL LINK FT
You can access the FollowMyHealth Patient Portal offered by [facility] by registering at the following website: [email or web address]. By joining [employer]’s FollowMyHealth portal, you will also be able to view your health information using other applications (apps) compatible with our system.

## 2024-12-24 NOTE — PROGRESS NOTE ADULT - PROBLEM SELECTOR PLAN 1
known met to frontal lobe (s/p chemo, RT) transferred from [facility] to [facility] for neurosurgery evaluation due to left pontine 2.7x2.2x2.2 expansile mostly hypodense mass. MRI demonstrates the left pontine lesion and a new left high medial parietal lobe measuring approximately 0.9 x 0.9 cm, with a small amount of vasogenic edema.     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 d/w daughter - understands that this is a palliative treatment. Patient will need palliative care evaluation at [facility], started discussions here, daughter does not know how to proceed, on the one hand she would like to pursue treatment (not give up) but patient has been refusing and also she does not want her to suffer. See goals of care note about (12.23)
known met to frontal lobe (s/p chemo, RT) transferred from [facility] to [facility] for neurosurgery evaluation due to left pontine 2.7x2.2x2.2 expansile mostly hypodense mass. Patient was originally found down at her house by her neighbor for an unknown period of time. Neurology consulted for steroid and antiepileptic management. CTH on 12/20/2024 revealed 2.7 x 2.2 x 2.2 cm expansile mostly hypodense mass suspected in the left laura. There is a suspected capsule or rim measuring 3 to 4 mm in thickness. MRI demonstrates the left pontine lesion and a new left high medial parietal lobe measuring approximately 0.9 x 0.9 cm, with a small amount of vasogenic edema. Neurooncology, Dr. Andi Holly, as well as the general neurology team. Plan to continue with steroids, repeat MRI brain with contrast, obtain prolonged video EEG and start Keppra 500mg BID.     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
X-Ray showed nondisplaced R distal radial Fx after fall at home  - s/o splint by Ortho  - analgesics pen, PT  - Outpatient f/u with ortho

## 2024-12-24 NOTE — DIETITIAN INITIAL EVALUATION ADULT - SIGNS/SYMPTOMS
as evidenced by sacral/bilateral buttocks deep tissue injury, BMI < 19 as evidenced by severe muscle mass depletion, severe subcutaneous fat loss, Patient likely meeting <

## 2024-12-24 NOTE — DIETITIAN INITIAL EVALUATION ADULT - NAME AND PHONE
Patient is here for MD f/u for CLL. Ongoing fatigue otherwise feeling ok. Denies pain. No nausea or vomiting. No diarrhea or constipation. Eating well. Continues on Xarelto. Denies bleeding but bruises easily.        Education Record    Learner:  Patient    Disease / Diagnosis:  CLL     Barriers / Limitations:  None   Comments:    Method:  Discussion   Comments:    General Topics:  Plan of care reviewed   Comments:    Outcome:  Shows understanding   Comments: Sofía Hi RD, CDN   Available on teams

## 2024-12-24 NOTE — PROGRESS NOTE ADULT - PROBLEM SELECTOR PROBLEM 6
Encounter for deep vein thrombosis (DVT) prophylaxis
Chronic pain of multiple sites
Chronic pain of multiple sites

## 2024-12-24 NOTE — DIETITIAN INITIAL EVALUATION ADULT - ADD RECOMMEND
1. Continue Regular Diet to optimize nutrition   2. Continue Ensure Max BID to promote PO intake   3. Recommend re-weigh Patient   4. Recommend daily Multivitamin and Ascorbic Acid pending no medical contraindications to promote wound healing   5. Replete electrolytes prn per medical team discretion  6. Monitor routine weights, nutrition related labs, PO intake and tolerance, oral nutrition supplement compliance, and skin integrity  7. Malnutrition notification sticker placed in Patient's chart

## 2024-12-24 NOTE — H&P ADULT - ATTENDING COMMENTS
Retention Suture Text: Retention sutures were placed to support the closure and prevent dehiscence. #Metastatic small cell lung CA s/p chemo   #Brain mets   -Recent MRI showed left pontine lesion and a new left high medial parietal lobe measuring 0.9x0.9 cm.   -Recent EEG negative for seizures   -c/w Keppra for seizure prophylaxis   -c/w Decadron 4mg daily for vasogenic edema  -no neurosurgical intervention   -transferred to [facility] for radiation therapy   -outpatient Onc f/up    #Fall   #R radial fracture s/p splint   #Fracture of R 5th and 7th ribs  #L3 compression fracture   -seen by ortho   -pain control   -LSO brace   -PT

## 2024-12-24 NOTE — H&P ADULT - NSHPPOAPULMEMBOLUS_GEN_A_CORE
PT DAILY TREATMENT NOTE     Patient Name: Anna Fields  Date:2022  : 1964  [x]  Patient  Verified  Payor: [employer]ICA[employer] / Plan: [address] [place] [address] / Product Type: Managed Care Medicaid /    Treatment Area: Other low back pain [M54.59]  Cervicalgia [M54.2]  Sprain of ligaments of lumbar spine, subsequent encounter [S33. 5XXD]   Next MD APPT:   In time: 230pm   Out time: 325 pm  Total Treatment Time (min): 55  Total Timed Codes (min): 48  1:1 Treatment Time (MC only): 48  Visit #: 13    SUBJECTIVE    Any medication changes, allergies to medications, adverse drug reactions, diagnosis change, or new procedure performed?:   [x] No    [] Yes (see summary sheet for update)    Pain Level (0-10 scale) pre treatment: 3          Pain Level (0-10 scale) post treatment: 2    Subjective functional status/changes:   [] No changes reported  Pt states that she is feeling a little better.      OBJECTIVE    Modality rationale: decrease inflammation, decrease pain, increase tissue extensibility, and increase muscle contraction/control to improve the patients ability to reduce pain   Min Type Additional Details   15 [x] Estim: [x]UnAtt   []Att       []TENS instruct                  []IFC  [x]Premod   []NMES                     []Other:  []w/US   []w/ice   [x]w/heat  Position: seated  Location: lumbar paraspinals, B UT    []  Ice     []  Heat  []  Ice massage Position:  Location:    []  Traction: [] Cervical       []Lumbar                       [] Prone          []Supine                       []Intermittent   []Continuous Lbs:  []w/heat  []W/heat and Estim    []  Ultrasound: []Continuous   [] Pulsed at:                           []1MHz   []3MHz Location:  W/cm2:      [x] Skin assessment post-treatment:   [x]intact  []redness- no adverse reaction   []redness - adverse reaction:     33 min Therapeutic Exercise:  [x] See flow sheet :   Rationale: increase ROM, increase strength, and improve coordination to improve the patients ability to move without pain   min Manual Therapy:     Rationale: decrease pain, increase ROM, increase tissue extensibility, and decrease trigger points to improve the patients ability to being pain free     min Neuromuscular Re-education:  []  See flow sheet :   Rationale: increase ROM, increase strength, and improve coordination  to improve the patients ability to being pain free      With   [] TE   [] TA   [] Neuro   [] SC   [] other: Patient Education: [] Review HEP    [] Progressed/Changed HEP based on:   [] positioning   [] body mechanics   [] transfers   [] Use of heat/ice    [] other:          Other Objective/Functional Measures:        ASSESSMENT/Changes in Function   Pt tolerated session without any increase of symptoms. Pt able to increase speed and duration on treadmill without any increase of symptoms. Patient will continue to benefit from skilled PT services to modify and progress therapeutic interventions, address functional mobility deficits, address ROM deficits, address strength deficits, analyze and address soft tissue restrictions, analyze and cue movement patterns, analyze and modify body mechanics/ergonomics, and assess and modify postural abnormalities to attain remaining goals. GOALS/Progress towards goals:  Short Term Goals: To be accomplished in 4 weeks:              1.  Inpd with HEP Met 11/16              2.  Decrease pain to < 5/10 without routine ADL   Long Term Goals: To be accomplished in 8 weeks:              1.  Normal gait on even surfaces. 2. Pain < 2/10 with routine ADL. 3. Full AROM entire spine. Patient Goal (s): get back to being pain free.    PLAN  [x]  Continue plan of care  []  Upgrade activities as tolerated       []  Update interventions per flow sheet       []  Discharge due to:  []  Other:     Mike Lucas., PTA 12/14/2022 no

## 2024-12-24 NOTE — PROGRESS NOTE ADULT - SUBJECTIVE AND OBJECTIVE BOX
Podiatry pager #: [phone]/ [phone]    Patient is a 79y old  Female who presents with a chief complaint of AMS/fall (23 Dec 2024 Podiatry consult for evaluation of ingrown painful thickened toenails of both lower extremities.      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 She said she tripped and fell, but now sure.  She has been c/o pain in her right arm and right leg of the patient appear more contracted than usual over the last couple of weeks. She uses walker at home. Denies SOB, chest pain, fever, but some cough. As per daughter her chemo was stopped in April 2024 given advance disease. (21 Dec 2024 15:01)      PAST MEDICAL & SURGICAL HISTORY:  Tobacco abuse      Alcohol abuse      Lung nodules      Liver lesion      H/O fracture of hip      History of fracture due to fall      History of repair of hip fracture          MEDICATIONS  (STANDING):  dexAMETHasone     Tablet 4 milliGRAM(s) Oral daily  latanoprost 0.005% Ophthalmic Solution 1 Drop(s) Both EYES at bedtime  levETIRAcetam   Injectable 500 milliGRAM(s) IV Push every 12 hours  polyethylene glycol 3350 17 Gram(s) Oral daily  senna 2 Tablet(s) Oral at bedtime  timolol 0.5% Solution 1 Drop(s) Both EYES daily    MEDICATIONS  (PRN):  acetaminophen     Tablet .. 650 milliGRAM(s) Oral every 6 hours PRN Mild Pain (1 - 3)  morphine  - Injectable 1 milliGRAM(s) IV Push every 6 hours PRN Severe Pain (7 - 10)  oxyCODONE    IR 5 milliGRAM(s) Oral every 6 hours PRN Moderate Pain (4 - 6)      Allergies    penicillins (Unknown)    Intolerances        VITALS:    Vital Signs Last 24 Hrs  T(C): 36.3 (24 Dec 2024 04:25), Max: 36.7 (23 Dec 2024 12:17)  T(F): 97.3 (24 Dec 2024 04:25), Max: 98.1 (23 Dec 2024 12:17)  HR: 69 (24 Dec 2024 04:25) (68 - 70)  BP: 141/71 (24 Dec 2024 04:25) (107/53 - 141/71)  BP(mean): --  RR: 18 (24 Dec 2024 04:25) (18 - 18)  SpO2: 95% (24 Dec 2024 04:25) (94% - 95%)    Parameters below as of 24 Dec 2024 04:25  Patient On (Oxygen Delivery Method): room air        LABS:                          12.4   9.12  )-----------( 343      ( 23 Dec 2024 06:53 )             37.9       12-23    137  |  105  |  17  ----------------------------<  103[H]  3.6   |  19[L]  |  0.51    Ca    8.3[L]      23 Dec 2024 06:52  Phos  2.2     12-23  Mg     1.9     12-23        CAPILLARY BLOOD GLUCOSE              LOWER EXTREMITY PHYSICAL EXAM:    Vasular: DP/PT 1_/4, B/L, CFT <_3 seconds B/L, Temperature gradient _wnl, B/L.   Neuro: Epicritic sensation intact_ to the level of _toes, B/L.  Skin:Positive dystrophic hypertrophic ingrown incurvated toenails x 10 digits.  Positive mild subungual hematoma medial aspect of the right hallux nail./Dry.  No purulence fluctuance malodor or clinical signs of infection.      RADIOLOGY & ADDITIONAL STUDIES:

## 2024-12-24 NOTE — H&P ADULT - PROBLEM SELECTOR PLAN 6
Has been on oxycodone at home  - will c/w Oxycodone 5mg PO q 6 hrs prn and oxycodone 10mg q 6 hr prn for severe pain  - Bowel regimens.

## 2024-12-24 NOTE — DIETITIAN INITIAL EVALUATION ADULT - ENERGY INTAKE
Per nursing flowsheets, poor PO intake during admission, 26-50%. Patient reports fair appetite during admission, and compliance with ordered oral nutrition shakes. Lunch tray observed 75% consumed, and 100% oral nutrition shake consumed./Fair (50-75%)

## 2024-12-24 NOTE — CHART NOTE - NSCHARTNOTEFT_GEN_A_CORE
Ms. 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 She now has right sided weakness and pain in her right arm and leg. MRI brain without contrast redemonstrated the expansile mass lesion centered in the left hemipons with surrounding vasogenic edema and probable hemorrhagic component. Additional metastatic lesions in the high left medial frontal lobe for which she previously had SRS 20 Gy in 1 fraction. MRI brain with contrast showed a ring-enhancing necrotic mass in the left laura, measuring 3.2 x 2.6 x 2.5 cm with mild vasogenic edema with expansion of the lanny and mild mass effect on the fourth ventricle without hydrocephalous. There were also 3 additional necrotic ring-enhancing lesions in the left parietal lobe parasagitally and in the left supramarginal gyrus.     She was previously evaluated by our team and transferred from [facility] to [facility] for inpatient radiation to the skull base. She was seen and examine and still has pain on the right side, 3-4/5 strength in the right lower extremity.     We will plan for her simulation CT this week. Ms. 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 She now has right sided weakness and pain in her right arm and leg. MRI brain without contrast redemonstrated the expansile mass lesion centered in the left hemipons with surrounding vasogenic edema and probable hemorrhagic component. Additional metastatic lesions in the high left medial frontal lobe for which she previously had SRS 20 Gy in 1 fraction. MRI brain with contrast showed a ring-enhancing necrotic mass in the left laura, measuring 3.2 x 2.6 x 2.5 cm with mild vasogenic edema with expansion of the lanny and mild mass effect on the fourth ventricle without hydrocephalous. There were also 3 additional necrotic ring-enhancing lesions in the left parietal lobe parasagitally and in the left supramarginal gyrus.     She was previously evaluated by our team and transferred from [facility] to [facility] for inpatient radiation to the skull base. She was seen and examine and still has pain on the right side, 3-4/5 strength in the right lower extremity.     We will plan for her simulation CT this week.      Sim to be done Thursday

## 2024-12-24 NOTE — DIETITIAN INITIAL EVALUATION ADULT - NS FNS DIET ORDER
Diet, Regular:   Supplement Feeding Modality:  Oral  Ensure Enlive Cans or Servings Per Day:  2   Frequency:  Two Times a day (12-21-24 @ 15:36) [Active]

## 2024-12-24 NOTE — PROGRESS NOTE ADULT - PROBLEM SELECTOR PLAN 4
X-Ray showed nondisplaced R distal radial Fx after fall at home  - s/o splint by Ortho  - analgesics pen, PT  - Outpatient f/u with ortho
Syncope vs trip and fall. She said she tripped and fell, couldn't get up. She uses walker at home, RLE has been worsening with sensation and possible from brain mets  - Tele, Echo  - Daughter, Rica wanted to talk to Hospice for further plans  - fall precaution, PT rodolfo
X-Ray showed nondisplaced R distal radial Fx after fall at home  - s/o splint by Ortho  - analgesics pen, PT  - Outpatient f/u with ortho

## 2024-12-24 NOTE — DIETITIAN INITIAL EVALUATION ADULT - ETIOLOGY
related to inadequate protein-energy intake with increased protein-energy needs (metastatic lung CA) related to increased physiological demand for nutrients, underweight

## 2024-12-24 NOTE — DISCHARGE NOTE NURSING/CASE MANAGEMENT/SOCIAL WORK - NSDCFUADDAPPT_GEN_ALL_CORE_FT
Ortho Discharge Instructions:  Keep the right wrist splint in place.  Follow up with Dr. Madrid in 1 week for concern of occult distal right radius fracture and L3 vertebral compression fracture. call [phone] for an appointment.    Podiatry Discharge Instructions:  Follow up: Please follow up with Dr. Davis within 1 week of discharge from the hospital, please call [phone] for appointment and discuss that you recently were seen in the hospital.    Weight bearing: Please weight bear as tolerated to right heel in a surgical shoe.  Antibiotics: Please continue as instructed.

## 2024-12-24 NOTE — H&P ADULT - PROBLEM SELECTOR PLAN 1
Known met to frontal lobe (s/p chemo, RT) transferred from [facility] to [facility] for NSGY evaluation due to left pontine 2.7x2.2x2.2 expansile mostly hypodense mass. Recent MRI demonstrated left pontine lesion and a new left high medial parietal lobe measuring 0.9x0.9 cm.     24hr video EEG  Started on Keppra 500mg bid.   Decadron 4mg daily for edema.   Nsx: No intervention at this time, recommend to c/w Dex.   Rad-Onc consulted and discussed use of palliative radiation with patient and family for RT to the skull base 30Gy/10fx.     Patient arrived to [facility] for inpatient radiation to laura lesion. Palliative care consult started.

## 2024-12-24 NOTE — DIETITIAN INITIAL EVALUATION ADULT - NSFNSPHYEXAMSKINFT_GEN_A_CORE
Per wound care (12/23), "Sacral/bilateral Buttocks deep tissue injury vs. ecchymosis present on admission. Right deep tissue injury vs. ecchymosis present on admission"

## 2024-12-24 NOTE — PROGRESS NOTE ADULT - ASSESSMENT
Assessment/plan:    Ingrown dystrophic toenails bilateral.    Aseptic debridement of ingrown dystrophic toenails x 10 with Betadine applied.  Recommend continue routine podiatric footcare as an outpatient.  Reconsult podiatry as needed.

## 2024-12-24 NOTE — DIETITIAN INITIAL EVALUATION ADULT - WEIGHT FOR BMI (KG)
Subjective   Patient ID: Karyn Owens is a 76 y.o. female who is long term resident being seen and evaluated for multiple medical problems.    HPI   76-year-old female patient is resting comfortably in her wheelchair no distress.  She has absolutely no complaints for me today.  Nursing has no new adverse events to report to me.    Current high risk medication:  Keppra  Senna  Lacosamide    Laboratory examination from June 2023:  Vitamin B12 272  TSH 0.85  Vitamin D 30  Keppra 34  Potassium 4.1  Bicarbonate 29  Creatinine 0.7  Albumin 3.5  Liver injury test normal  Hemoglobin 13.7    Review of Systems   Constitutional:  Negative for chills, diaphoresis and fever.   Respiratory:  Negative for cough and shortness of breath.    Cardiovascular:  Negative for chest pain and leg swelling.   Gastrointestinal:  Negative for constipation, diarrhea, nausea and vomiting.   Musculoskeletal:  Negative for joint swelling and myalgias.       Objective   /64   Pulse 64   Temp 36.7 °C (98 °F)   Resp 18   Wt (!) 44 kg (97 lb)   SpO2 95%   BMI 16.77 kg/m²     Physical Exam  Vitals reviewed.   Constitutional:       General: She is not in acute distress.     Appearance: She is not ill-appearing.      Comments: Frail contracted debilitated female in no distress   Cardiovascular:      Rate and Rhythm: Normal rate and regular rhythm.      Pulses: Normal pulses.      Heart sounds:      No gallop.   Pulmonary:      Breath sounds: Normal breath sounds. No wheezing, rhonchi or rales.   Abdominal:      General: Abdomen is flat. Bowel sounds are normal.      Palpations: Abdomen is soft.      Tenderness: There is no guarding or rebound.   Musculoskeletal:      Right lower leg: No edema.      Left lower leg: No edema.      Comments: The patient has severe kyphoscoliotic deformity of her cervical thoracic spine.  She has flexion contractures of her lower extremities         Assessment/Plan   Problem List Items Addressed This Visit        Debility    Dementia (CMS/HCC)    Meningioma (CMS/HCC) - Primary    Osteoporosis    Seizures (CMS/HCC)       A.  We will continue with restorative and supportive care as patient tolerates    B.  Laboratory examinations will next be due in December 2023 or as needed    C.  The patient's prognosis is fair-2-guarded.     40.4

## 2024-12-24 NOTE — DIETITIAN INITIAL EVALUATION ADULT - NUTRITIONGOAL OUTCOME1
/= 75% estimated energy requirement for >/= 1 month       Patient to meet > 75% estimated energy requirements

## 2024-12-24 NOTE — H&P ADULT - PROBLEM SELECTOR PLAN 3
X-ray and  T chest show R 5th and 7th rib Fx, likely from fall  - Also has L3 compression  - Limited MRI shows severe levoscoliosis of the lumbar spine apex at L2. No high-grade spinal canal stenosis. No definite acute fracture of the lumbar spine.  - Ortho measured and dispensed a LSO with rigid anterior posterior and lateral panels to stabilize and control the lumbar spine, reduce pain and ROM and safely protect the fracture site.   - analgesics, incentive spirometer, PT.

## 2024-12-24 NOTE — DIETITIAN INITIAL EVALUATION ADULT - OTHER INFO
Pertinent History:   Per H&P: Hx small cell lung CA mets to brain (last chemotherapy April 2024)    Pertinent Nutrition Related Labs:  12/23 labs reviewed.  - Glucose 103 (H)  - Phosphorus 2.2 (L)  - BNP 2029 (H) 12/20  - Hyperkalemia noted 12/22. Now resolved    Pertinent Medications:   Medications reviewed.

## 2024-12-24 NOTE — H&P ADULT - PROBLEM SELECTOR PLAN 5
Syncope vs trip and fall. She said she tripped and fell, couldn't get up. She uses walker at home, RLE has been worsening with sensation and possible from brain mets.

## 2024-12-24 NOTE — DIETITIAN INITIAL EVALUATION ADULT - PROBLEM/PLAN-5
Alpa Kamara  9/16/1962  [id number]     Procedure: C3-C7 Posterior Spinal Decompression and Instrumented Fusion (17257, 22614 x 3, 83460, 63048 x 3, 86593, 97710, 40472     Akin Griffin MD  [facility] Orthopedic Surgery  Phone: [phone]  Fax: [phone]    DISPLAY PLAN FREE TEXT

## 2024-12-24 NOTE — H&P ADULT - PROBLEM SELECTOR PLAN 8
DVT: SCDs for now   Diet: Regular  PT: [place] [facility]  Dispo: -     Consulted: Palliative care, Rad Onc aware of pt's arrival.  Patient to follow up with ortho when discharged with Dr. Madrid to complete MRI imaging. DVT: SCDs for now   Diet: Regular  PT: -  Dispo: -     Consulted: Palliative care, Rad Onc aware of pt's arrival.  Patient to follow up with ortho when discharged with Dr. Madrid to complete MRI imaging.

## 2024-12-24 NOTE — H&P ADULT - PROBLEM SELECTOR PLAN 4
X-Ray showed nondisplaced R distal radial Fx after fall at home  - s/o splint by Ortho  - analgesics pen, PT  - Outpatient f/u with ortho.

## 2024-12-24 NOTE — CHART NOTE - NSCHARTNOTEFT_GEN_A_CORE
Plan discussed with Dr. Madrid, ortho spine attending. Please obtain MRI Lumbar spine w/wo IV contrast. Recommend LSO brace. Ortho to follow up pending MRI.    For all questions related to patient care, please reach out via the on-call pager. Do NOT rely on Teams to contact orthopedic residents.*     Beatrice Garrido, PGY-3  Orthopedic Surgery  [facility]: [phone]  [facility]: [phone]  [facility] [id number]
chart and imaging reviewed   SCLC (prior carbo/etop/teqcentric), no eval since summer 2024   s/p RT to a single right frontal lesion in 6/24 (juan f)  presents with pontine mass no contrast as yet  found down at home   another smaller left frontal lesion apparent as well (but no gado)  per records no RT to brain stem lesion previously  likely a met  needs contrast MRI ASAP  not a lot of edema, dex 4 daily sufficient  patient needs med onc and rad onc evals  would treat empirically with keppra 500 bid   likely outpatient SRS versus WBRT defer to rad onc
MR Lumbar spine reviewed. Although incomplete study, images demonstrate to significant canal stenosis.  No planned operative intervention. Please have patient follow up with Dr. Madrid upon discharge from hospital and to complete MRI imaging. Orthopedics to sign off. Please reach out with any questions, concerns or reconsultation as needed.     For all questions related to patient care, please reach out via the on-call pager. Do NOT rely on Teams to contact orthopedic residents.*     Beatrice Garrido, PGY-3  Orthopedic Surgery  [facility]: [phone]  [facility]: [phone]  [facility] [place] [phone]
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 No plan for neurosurgical intervention this admit, recc f/u with rad-onc for SRS vs. WBRT.  C/w dex 4 daily.    - No further neurosurgical intervention at this time  - May f/u outpatient in 1-2 weeks from discharge  - No neurosurgical contraindication to discharge at this time  - Reconsult neurosurgery as needed
Primary team informed [facility] palliative care team to hold off on consult given patient being transferred today to [facility] for palliative radiation. Team requesting that patient be assessed by palliative care at [facility]. [facility] palliative care team made aware.    Admitting team at [facility] to place new consult for palliative care.
TERTIARY TRAUMA SURVEY (TTS)    Date of TTS: 12/22/24               Time: 6:55AM  Admit Date:  12/21/24               Trauma Activation: Consult   Admit GCS: E- 4 V- 5 M- 6    HPI:  79F PMH NSCLC (w/ mets to brain), found down after presumed ground level fall in setting of 2 weeks of increased difficulty with ambulation even with walker. Unclear duration of down time. Falls appear to be recurrent in nature given bruises in various stages of healing.    INTERVAL EVENTS: None    PAST MEDICAL & SURGICAL HISTORY:  Tobacco abuse      Alcohol abuse      Lung nodules      Liver lesion      H/O fracture of hip      History of fracture due to fall      History of repair of hip fracture    [  ] No significant past history as reviewed with the patient and family    FAMILY HISTORY:  FH: HTN (hypertension)      [  ] Family history not pertinent as reviewed with the patient and family    Social History:    CURRENT MEDICATIONS:   Medications (inpatient): dexAMETHasone     Tablet 4 milliGRAM(s) Oral daily  latanoprost 0.005% Ophthalmic Solution 1 Drop(s) Both EYES at bedtime  polyethylene glycol 3350 17 Gram(s) Oral daily  senna 2 Tablet(s) Oral at bedtime  timolol 0.5% Solution 1 Drop(s) Both EYES daily    Medications (PRN):acetaminophen     Tablet .. 650 milliGRAM(s) Oral every 6 hours PRN  morphine  - Injectable 1 milliGRAM(s) IV Push every 6 hours PRN  oxyCODONE    IR 5 milliGRAM(s) Oral every 6 hours PRN      ALLERGIES:  Allergies: penicillins (Unknown)  (Intolerances: )  ------------------------------------------------------------------------------------------  VITAL SIGNS  Vital Signs Last 24 Hrs  T(C): 36.4 (22 Dec 2024 04:55), Max: 37 (21 Dec 2024 15:20)  T(F): 97.5 (22 Dec 2024 04:55), Max: 98.6 (21 Dec 2024 15:20)  HR: 72 (22 Dec 2024 04:55) (72 - 89)  BP: 141/73 (22 Dec 2024 04:55) (116/50 - 141/73)  BP(mean): 65 (21 Dec 2024 17:00) (65 - 896)  RR: 18 (22 Dec 2024 04:55) (16 - 18)  SpO2: 97% (22 Dec 2024 04:55) (95% - 97%)    Parameters below as of 22 Dec 2024 04:55  Patient On (Oxygen Delivery Method): room air      Drug Dosing Weight  Height (cm): 157.5 (20 Dec 2024 19:14)  Weight (kg): 61.2 (20 Dec 2024 19:14)  BMI (kg/m2): 24.7 (20 Dec 2024 19:14)  BSA (m2): 1.62 (20 Dec 2024 19:14)      INS/OUTS:    12-21 @ 07:01  -  12-22 @ 07:00  --------------------------------------------------------  IN:  Total IN: 0 mL    OUT:    Indwelling Catheter - Urethral (mL): 1150 mL  Total OUT: 1150 mL    Total NET: -1150 mL        Drug Dosing Weight  Height (cm): 157.5 (20 Dec 2024 19:14)  Weight (kg): 61.2 (20 Dec 2024 19:14)  BMI (kg/m2): 24.7 (20 Dec 2024 19:14)  BSA (m2): 1.62 (20 Dec 2024 19:14)    EXAM  General: NAD, resting comfortably  HEENT: mild R periorbital swelling, trachea midline, otherwise NC/AT.   Pulmonary/chest: normal resp effort, mild R anterior chest tenderness  Cardiovascular: NSR, VSS  Abdominal: soft, ND, NT, no evidence of echymosis or trauma  Pelvis: Stable to rock  Extremities: WWP, abrasion over L knee, R hand and arm not splinted. No point tenderness in RUE. Appropriate strength and sensation in BL extremities  Neuro: A&O x 3  Back: No tenderness to palpation in midline CTL spine                          13.7   7.56  )-----------( 336      ( 22 Dec 2024 07:00 )             41.5     12-22    138  |  106  |  12  ----------------------------<  138[H]  3.8   |  20[L]  |  0.52    Ca    9.0      22 Dec 2024 08:32  Phos  3.1     12-20  Mg     2.2     12-20    TPro  7.4  /  Alb  3.9  /  TBili  0.5  /  DBili  x   /  AST  114[H]  /  ALT  49[H]  /  AlkPhos  84  12-20    PT/INR - ( 20 Dec 2024 19:53 )   PT: 11.8 sec;   INR: 1.04 ratio         PTT - ( 20 Dec 2024 19:53 )  PTT:28.9 sec  Urinalysis Basic - ( 22 Dec 2024 08:32 )    Color: x / Appearance: x / SG: x / pH: x  Gluc: 138 mg/dL / Ketone: x  / Bili: x / Urobili: x   Blood: x / Protein: x / Nitrite: x   Leuk Esterase: x / RBC: x / WBC x   Sq Epi: x / Non Sq Epi: x / Bacteria: x        List Injuries Identified to Date:  - Acute right anterior 5th and 7th minimally displaced rib fractures  - Acute spiral fracture of the right fifth proximal phalanx  - Likely subacute to chronic mild L3 vertebral body compression fracture    List Operative and Interventional Radiological Procedures: None    Consults (Date):  [ x ] Neurosurgery 12/21  [ x  ] Orthopedics 12/21    RADIOLOGICAL FINDINGS REVIEW:  INTERPRETATION:  CLINICAL INDICATION: 79 years  Female with Chest Pain.    COMPARISON: 8/28/2023    AP view of the chest demonstrates the lungs to be clear. There is no   pleural effusion. The pulmonary vasculature is normal. There is no   pneumothorax.    The heart is normal in size. There is no mediastinal or hilar mass.    Mild thoracic degenerative changes and scoliosis are present.    IMPRESSION:    No acute infiltrate.    --- End of Report ---    ZACH ALVAREZ MD; Attending Radiologist    IMPRESSION:    CT HEAD:  2.7 x 2.2 x 2.2 cm expansile mostly hypodense mass suspected in the left   laura. There is a suspected capsule or rimmeasuring 3 to 4 mm in   thickness. Necrotic metastatic lesion is suspected given that the patient   has a history of lung cancer.  MRI with gadolinium may be helpful for further evaluation, if clinically   indicated.    CT CERVICAL SPINE:  No acute fracture or traumatic subluxation.  Multi-level degenerative changes.  Smaller irregular nodule with pleural retraction in the posterior right   lung apex.    Findings were discussed with Dr. MELI COLE [phone]   12/20/2024 5:22 PM by Dr. Mendez with read back confirmation.    --- End of Report ---  INTERPRETATION:  CLINICAL INDICATION: Right and wrist pain status post   fall    TECHNIQUE: 3 views of the right knee; 3 views of the right wrist; 2 views   of the right forearm.    COMPARISON: None.    FINDINGS:    No acute fracture. No dislocation. Moderate to severe right basal and STT   joint arthrosis. Joint spaces are otherwise maintained. No radiopaque   foreign body.      IMPRESSION:  No acute fracture or dislocation.    --- End of Report ---            CORRIE DOMINIQUE DO  This document has been electronically signed. Dec 21 2024  8:19AM      INTERPRETATION:  CLINICAL INDICATION: Right shoulder pain status post fall    TECHNIQUE: 2 views of the right shoulder.    COMPARISON: None.    FINDINGS:    No acute fracture. No dislocation. Joint spaces are maintained. No   radiopaque foreign body. Visualized right lung is clear.      IMPRESSION:  No acute fracture or dislocation.    --- End of Report ---            CORRIE DOMINIQUE DO  This document has been electronically signed. Dec 21 2024  8:20AM    INTERPRETATION:  CLINICAL INFORMATION: Fall with bruises. History of   small cell lung cancer.    COMPARISON: CT chest abdomen and pelvis dated 4/8/2024..    CONTRAST/COMPLICATIONS:  IV Contrast: NONE  Oral Contrast: NONE  .    PROCEDURE:  CT of the Chest, Abdomen and Pelvis was performed.  Sagittal and coronal reformats were performed.    FINDINGS:  CHEST:  LUNGS AND LARGE AIRWAYS: Patent central airways. Emphysema. No focal   consolidation or pneumothorax. Groundglass opacities in the left upper   lobe (301-45)  PLEURA: No pleural effusion.  VESSELS: Atherosclerotic disease of the thoracic aorta.  HEART: Heart size is normal. Coronary artery calcifications. No   pericardial effusion.  MEDIASTINUM AND PREETHI: No lymphadenopathy.  CHEST WALL AND LOWER NECK: No chest wall hematoma.    ABDOMEN AND PELVIS:  LIVER: Previously seen hepatic metastasis are now calcified, likely   reflecting treatment changes. There is interval decrease in the largest   lesion which measures 4.2 x 4.0 cm, previously measuring 5.4 x 4.8 cm.  BILE DUCTS: Normal caliber.  GALLBLADDER: Within normal limits.  SPLEEN: Within normal limits.  PANCREAS: Within normal limits.  ADRENALS: Stable 3.0 by 1.8 cm indeterminate left adrenal nodule.  KIDNEYS/URETERS: Bilateral renal cysts. No hydronephrosis.    BLADDER: Mildly distended.  REPRODUCTIVE ORGANS: Not adequately assessed secondary to streak artifact   from left hip arthroplasty.    BOWEL: No bowel obstruction. Appendix is not visualized. No evidence of   inflammation in the pericecal region.  PERITONEUM/RETROPERITONEUM: No pneumoperitoneum or retroperitoneal   hemorrhage.  VESSELS: Atherosclerotic changes.  LYMPH NODES: No lymphadenopathy.  ABDOMINAL WALL: Within normal limits.  BONES: Degenerative changes. Acute right 5th and 7th minimally displaced   anterior rib fracture. Thoracolumbar levoscoliosis. Left hip arthroplasty   intact. Stable right iliac wing and right acetabular lytic lesion. The   incidental partially imaged bilateral upper extremities are unremarkable.   There is new vertebral body height loss with mild wedging of the superior   endplate an underlying sclerosis at L3 since prior study.    IMPRESSION:  Chest CT: Acute right anterior 5th and 7th minimally displaced anterior   rib fractures. Nonspecific groundglass opacities left upper lobe. No   contusion, pleural effusion or pneumothorax.    Abdomen/pelvis CT: Age-indeterminate, possibly acute, mild L3 vertebral   body compression fracture. No acute posttraumatic sequela otherwise lung   for lack of intravenous contrast.  Post treatment changes to the hepatic metastasis with interval decrease   size and increased calcification. The largest lesion now measuring 4.2 x   4.0 cm.            --- End of Report ---           PEDRO MARTIN DO; Resident Radiologist  This document has been electronically signed.  SHAHEEN LLAMAS MD; Attending Radiologist  This document has been electronically signed. Dec 21 2024 12:12AM    COMPARISON: No similar prior comparisons available.    FINDINGS:    PELVIS:  No acute displaced fracture or dislocation.  The sacroiliac joints and pubic symphysis remain intact. There are   arthritic changes at the pubic symphysis.  Intact pelvic and obturatorrings.  Faint mineral densities overlying the right lower quadrant, potentially   ingested material versus external to the patient.  Mild degenerative changes.      RIGHT:  No acute fracture or dislocation.  No knee or ankle joint effusion.  Spaces are well preserved.  Mild soft tissue swelling about the patellar tendon.  Tarsometatarsal alignment preserved.  There is an acute spiral fracture of the fifth proximal phalanx.  No evidence for a Lisfranc injury.    LEFT:  No acute fracture or dislocation.  Left hip arthroplasty. No evidence of hardware loosening.  Dystrophic soft tissue calcification/ossification seen just lateral to   the joint.  No knee joint effusion. Soft tissue swelling about the medial aspect of   the left knee joint and the patellar tendon.    IMPRESSION:  No acute displaced fracture or dislocation the pelvis.  Acute spiral fracture of the fifth proximal phalanx.  No fracture or dislocation of the left lower extremity to the level of   the knee.  Mild soft tissue swelling about both knees.    --- End of Report ---          REEMA HERNANDEZ MD; Resident Radiologist  This document has been electronically signed.   JOESPH MCDONALD MD; Attending Radiologist  This document has been electronically signed. Dec 21 2024 10:10AM    Recommendations:   - No acute surgical intervention   - TTS negative for additional injuries/no further indication for additional imaging  - Multimodal pain control for rib fx, continue to encourage incentive spirometry   - Spine surgery following patient for L3 fx  - Orthopedic surgery to re-splint R hand and comment on duration (discussed 12/22 AM)  - Rest of care per primary team    Trauma [facility]  [id number] / [phone]
Patient was measured and dispensed a LSO with rigid anterior posterior and lateral panels. The orthosis will stabilize and control the lumbar spine, reduce pain and ROM and safely protect the fracture site. care use and function were explained. Contact info was provided. All went without incident,   [facility]  [phone]

## 2024-12-24 NOTE — H&P ADULT - ASSESSMENT
Patient is a 79F with advanced small cell lung cancer with mets to the brain with vision problems, liver, bone (not on chemo- last chemo in April), L hip Fx 2023 lives alone a/w unwitnessed fall , imaging showing acute spiral fracture of fifth proximal phlanx, acute right anterior 5th and 7th minimally displaced anterior rib fractures and possibly acute  mild L3 vertebral body compression fracture. MRI reveals new laura mass likely metastatic, showed concern for blood products in the left laura and vasogeneric edema.  Patient is a 79F with advanced small cell lung cancer with mets to the brain with vision problems, liver, bone (not on chemo- last chemo in April), L hip Fx 2023 lives alone a/w unwitnessed fall , imaging showing acute spiral fracture of fifth proximal phalanx acute right anterior 5th and 7th minimally displaced anterior rib fractures and possibly acute  mild L3 vertebral body compression fracture. MRI reveals new laura mass likely metastatic, showed concern for blood products in the left laura and vasogenic edema.

## 2024-12-24 NOTE — PROGRESS NOTE ADULT - PROBLEM SELECTOR PLAN 5
Syncope vs trip and fall. She said she tripped and fell, couldn't get up. She uses walker at home, RLE has been worsening with sensation and possible from brain mets
Syncope vs trip and fall. She said she tripped and fell, couldn't get up. She uses walker at home, RLE has been worsening with sensation and possible from brain mets
Has been on oxycodone at home  - will c/w Oxycodone PO q 6 hrs prn and morphine 1mg q 6 hr prn for severe pain  - Bowel regimens  -  palliative pain if needed

## 2024-12-24 NOTE — H&P ADULT - NSHPREVIEWOFSYSTEMS_GEN_ALL_CORE
General Symptoms: Weight loss   Ophthalmologic symptoms: Diplopia   Resp symptoms: No wheezing, no dyspnea.   GI: Denies nausea, vomiting, and dirrhea.   MSK: Right forearm and back pain. Pain to the right toe 1st digit. General Symptoms: Weight loss   Ophthalmologic symptoms: Denies  Resp symptoms: No wheezing, no dyspnea.   GI: Denies nausea, vomiting, and dirrhea.   MSK: Right forearm and back pain. Pain to the right toe 1st digit.  Allergies: Penicillin   : Denies   ENT: No otorrhea, pink mucous membrane   Skin: Bruise on Left leg, right hip and right face.

## 2024-12-24 NOTE — PROGRESS NOTE ADULT - ASSESSMENT
SCLC with new brain mets including large but relatively asymptomatic pontine lesion    we discussed her scan and the situation  the immediate threat to her life is the brain stem lesions but the other brain lesions will likely grow in short order    I favor either WBRT or outpatient SRS to lesions ASAP, but seemingly plan is to transfer to [facility] for focal RT to the pontine lesion and then consider outpatient SRS if possible to the other lesions    she does not need more than 4 daily of dex, and can taper to 2 daily after RT  Will need outpatient RT f/u after dc  empiric keppra 500 bid for now   d/w rad onc attending

## 2024-12-24 NOTE — DIETITIAN INITIAL EVALUATION ADULT - ORAL INTAKE PTA/DIET HISTORY
Patient visited. Per Patient, denies adhering to a therapeutic diet and reports fluctuating appetite prior to admission. Patient reports "grazing" throughout the day. NFKA or intolerances reported. Patient denies micronutrient supplementation.

## 2024-12-24 NOTE — PATIENT PROFILE ADULT - FALL HARM RISK - DATE OF FALL

## 2024-12-24 NOTE — H&P ADULT - NSHPPHYSICALEXAM_GEN_ALL_CORE
Constitutional: Well-groomed, no distress.   Eyes: PERRL; EOMI; Conjunctiva clear  ENT: No gross abnormalities.   Respiratory: Clear to ausculation b/l; no wheezes; no rales; no rhonchi; airway patent; breath sounds equals.   CV: RRR, S1S2 present; no rub; no murmur; no JVD.   GI: Soft, nontender, nondistended.   Neuro: CN2-XII intact, sensation intact; responds to pain; responds to verbal commands; AOx2.   Skin: Warm and dry.   Psych: Alert and oriented to person, not to place (comments [place]) and year (comments '25) and Month (comments January). Vital Signs Last 24 Hrs  T(C): 36.7 (24 Dec 2024 17:03), Max: 37.1 (24 Dec 2024 09:25)  T(F): 98 (24 Dec 2024 17:03), Max: 98.7 (24 Dec 2024 09:25)  HR: 65 (24 Dec 2024 17:03) (65 - 69)  BP: 129/72 (24 Dec 2024 17:03) (119/71 - 141/71)  BP(mean): --  RR: 16 (24 Dec 2024 17:03) (16 - 18)  SpO2: 98% (24 Dec 2024 17:03) (95% - 98%)    Parameters below as of 24 Dec 2024 17:03  Patient On (Oxygen Delivery Method): room air    Constitutional: Well-groomed, no distress.   Eyes: PERRL; EOMI; Conjunctiva clear  ENT: No gross abnormalities.   Respiratory: Clear to ausculation b/l; no wheezes; no rales; no rhonchi; airway patent; breath sounds equals.   CV: RRR, S1S2 present; no rub; no murmur; no JVD.   GI: Soft, nontender, nondistended.   Neuro: CN2-XII intact, sensation intact; responds to pain; responds to verbal commands; AOx2.   Skin: Warm and dry.   Psych: Alert and oriented to person, not to place (comments [place]) and year (comments '25) and Month (comments January).

## 2024-12-24 NOTE — PROGRESS NOTE ADULT - PROBLEM SELECTOR PLAN 3
CTH noted 2.7x2,. 2x2, .2cm mass in Chris. She is known to have brain mets from small cell Ca. Chemo stopped in April  - NeuroSx and evaluated, MRI showed concern for blood products in the left chris and vasogeneric edema.  - Pt will need MRI w/ IV contrast, given lots of motion during first MRI can trial 0.25 mg xanax prior to second MRI to help relax patient.   - c/w Decadron 4mg/d after initial 10mg  - no need of AEDs as ppx
X-ray and  T chest show R 5th and 7th rib Fx, likely from fall  - Also has L3 compression  - Limited MRI shows severe levoscoliosis of the lumbar spine apex at L2. No high-grade spinal canal stenosis. No definite acute fracture of the lumbar spine.  - Ortho measured and dispensed a LSO with rigid anterior posterior and lateral panels to stabilize and control the lumbar spine, reduce pain and ROM and safely protect the fracture site.   - analgesics, incentive spirometer, PT
X-ray and  T chest show R 5th and 7th rib Fx, likely from fall  - Also has L3 compression  - Limited MRI shows severe levoscoliosis of the lumbar spine apex at L2. No high-grade spinal canal stenosis. No definite acute fracture of the lumbar spine.  - Ortho measured and dispensed a LSO with rigid anterior posterior and lateral panels to stabilize and control the lumbar spine, reduce pain and ROM and safely protect the fracture site.   - analgesics, incentive spirometer, PT

## 2024-12-24 NOTE — PROGRESS NOTE ADULT - PROBLEM SELECTOR PLAN 2
Extensive stage small cell lung cancer   - Presented with a fall and hip fracture in September 2023 and on further work it was found to have a right upper lobe nodule.   - PET/CT revealed multifocal hepatic metastases, multifocal osteolytic pulmonary and left adrenal metastases, left orbital fossa mass eroding the skull base with concern for intracranial extension.   - Liver biopsy 12/23 was consistent with metastatic small cell carcinoma  - Her MRI was recently completed and revealed multiple rim enhancing lesions in the frontal and parietal lobes.  - Completed C5 carbo/etop/tecentriq. Was planned to transition to maintenace Tecentriq but patient did not follow up
Extensive stage small cell lung cancer   - Presented with a fall and hip fracture in September 2023 and on further work it was found to have a right upper lobe nodule.   - PET/CT revealed multifocal hepatic metastases, multifocal osteolytic pulmonary and left adrenal metastases, left orbital fossa mass eroding the skull base with concern for intracranial extension.   - Liver biopsy 12/23 was consistent with metastatic small cell carcinoma  - Her MRI was recently completed and revealed multiple rim enhancing lesions in the frontal and parietal lobes.  - Completed C5 carbo/etop/tecentriq. Was planned to transition to maintenace Tecentriq but patient did not follow up
X-ray and  T chest show R 5th and 7th rib Fx, likely from fall  - Also has L3 compression  - Limited MRI shows severe levoscoliosis of the lumbar spine apex at L2. No high-grade spinal canal stenosis. No definite acute fracture of the lumbar spine.  - Ortho measured and dispensed a LSO with rigid anterior posterior and lateral panels to stabilize and control the lumbar spine, reduce pain and ROM and safely protect the fracture site.   - analgesics, incentive spirometer, PT

## 2024-12-24 NOTE — DISCHARGE NOTE NURSING/CASE MANAGEMENT/SOCIAL WORK - NSDCPEFALRISK_GEN_ALL_CORE
Pt notified and scheduled OV. For information on Fall & Injury Prevention, visit: [email or web address] OR  [email or web address] OR  https://www.cdc.gov/steadi/patient.html

## 2024-12-24 NOTE — H&P ADULT - NSHPSOCIALHISTORY_GEN_ALL_CORE
Lives alone  Has one daughter who lives 15 min away   Smokes cigarettes 1/2 PPD for years.   Denies drinking alcohol.

## 2024-12-24 NOTE — PROGRESS NOTE ADULT - PROVIDER SPECIALTY LIST ADULT
Neurology
Neurosurgery
Podiatry
Trauma Surgery
Hospitalist
Internal Medicine
Hospitalist

## 2024-12-24 NOTE — DIETITIAN INITIAL EVALUATION ADULT - PHYSCIAL ASSESSMENT
- Per RD note from previous admission (8/31/2023), Patient's weight documented to be 89.9lbs.    - Per [facility] HIE: 97lbs (3/28/2024), 94.8lbs (5/30/2024), 89.7lbs (12/20/2024). 7.5% weight loss x 9 months indicated     - Per electronic medical record, current dosing weight 134lbs (12/20/2024). Question dosing weight accuracy as Patient appears to be < 110lbs.     - Patient denies dosing weight to be accurate. Reports current body weight to be 105lbs. Patient unsure of recent weight loss prior to admission.

## 2024-12-24 NOTE — DIETITIAN INITIAL EVALUATION ADULT - REASON INDICATOR FOR ASSESSMENT
Dietitian consult received for: Pressure injury stage 2 or >, MST score 2 or >   Chart reviewed, events noted   Information obtained from: electronic medical record, Patient

## 2024-12-24 NOTE — PROGRESS NOTE ADULT - PROBLEM SELECTOR PLAN 7
SCD for now    Transfer to [facility] for inpatient radiation to the laura lesion. Discharge time 45min
SCD for now

## 2024-12-24 NOTE — H&P ADULT - PROBLEM SELECTOR PLAN 2
Extensive stage small cell lung cancer   - Presented with a fall and hip fracture in September 2023 and on further work it was found to have a right upper lobe nodule.   - PET/CT revealed multifocal hepatic metastases, multifocal osteolytic pulmonary and left adrenal metastases, left orbital fossa mass eroding the skull base with concern for intracranial extension.   - Liver biopsy 12/23 was consistent with metastatic small cell carcinoma  - Her MRI was recently completed and revealed multiple rim enhancing lesions in the frontal and parietal lobes.  - Completed C5 carbo/etop/tecentriq. Was planned to transition to maintenace Tecentriq but patient did not follow up.

## 2024-12-24 NOTE — H&P ADULT - HISTORY OF PRESENT ILLNESS
Patient is a 79F with h/o small cell lung cancer with mets to the brain with vision problems, liver, bone (not on chemo- last chemo in April), L hip Fx in 2023, lives alone comes to ED as she was found by neighbor down on the floor in the afternoon yesterday. She said she tripped and fell, but now sure.  She has been c/o pain in her right arm and right leg of the patient appear more contracted than usual over the last couple of weeks. She uses walker at home. Denies SOB, chest pain, fever, but some cough. As per daughter her chemo was stopped in April 2024 given advance disease. 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 She said she tripped and fell, but now sure.  She has been c/o pain in her right arm and right leg of the patient appear more contracted than usual over the last couple of weeks. She uses walker at home. Denies SOB, chest pain, fever, but some cough. As per daughter her chemo was stopped in April 2024 given advance disease.

## 2024-12-24 NOTE — DISCHARGE NOTE NURSING/CASE MANAGEMENT/SOCIAL WORK - FINANCIAL ASSISTANCE
[facility] provides services at a reduced cost to those who are determined to be eligible through [facility]’s financial assistance program. Information regarding [facility]’s financial assistance program can be found by going to [email or web address] or by calling [phone].

## 2024-12-24 NOTE — PATIENT PROFILE ADULT - FALL HARM RISK - HARM RISK INTERVENTIONS
Assistance with ambulation/Assistance OOB with selected safe patient handling equipment/Communicate Risk of Fall with Harm to all staff/Discuss with provider need for PT consult/Monitor gait and stability/Reinforce activity limits and safety measures with patient and family/Tailored Fall Risk Interventions/Visual Cue: Yellow wristband and red socks/Bed in lowest position, wheels locked, appropriate side rails in place/Call bell, personal items and telephone in reach/Instruct patient to call for assistance before getting out of bed or chair/Non-slip footwear when patient is out of bed/[place] to call system/Physically safe environment - no spills, clutter or unnecessary equipment/Purposeful Proactive Rounding/Room/bathroom lighting operational, light cord in reach

## 2024-12-24 NOTE — DIETITIAN INITIAL EVALUATION ADULT - PROBLEM SELECTOR PLAN 2
X-ray and  T chest show R 5th and 7th rib Fx, likely from fall  - Also has L3 compression  - Ortho spine rec MRI L spines   - analgesics, incentive spirometer, PT

## 2024-12-24 NOTE — CHART NOTE - NSCHARTNOTESELECT_GEN_ALL_CORE
- pall care team/Event Note
Event Note
Neurosurgery MR Review
Ortho sign off/Event Note
Tertiary trauma survey
NEURO-ONCOLOGY
Ortho update/Event Note

## 2024-12-24 NOTE — H&P ADULT - NSHPLABSRESULTS_GEN_ALL_CORE
12.4   9.12  )-----------( 343      ( 23 Dec 2024 06:53 )             37.9     MR Head:  Large mass in the laura, with multiple metastatic lesions in the left parietal lobe. 12.4   9.12  )-----------( 343      ( 23 Dec 2024 06:53 )             37.9     MR Head reviewed:  Large mass in the laura, with multiple metastatic lesions in the left parietal lobe.    EKG tracing reviewed and interpreted: ASHISH

## 2024-12-24 NOTE — DIETITIAN INITIAL EVALUATION ADULT - PERTINENT MEDS FT
MEDICATIONS  (STANDING):  dexAMETHasone     Tablet 4 milliGRAM(s) Oral daily  latanoprost 0.005% Ophthalmic Solution 1 Drop(s) Both EYES at bedtime  levETIRAcetam   Injectable 500 milliGRAM(s) IV Push every 12 hours  polyethylene glycol 3350 17 Gram(s) Oral daily  senna 2 Tablet(s) Oral at bedtime  timolol 0.5% Solution 1 Drop(s) Both EYES daily    MEDICATIONS  (PRN):  acetaminophen     Tablet .. 650 milliGRAM(s) Oral every 6 hours PRN Mild Pain (1 - 3)  morphine  - Injectable 1 milliGRAM(s) IV Push every 6 hours PRN Severe Pain (7 - 10)  oxyCODONE    IR 5 milliGRAM(s) Oral every 6 hours PRN Moderate Pain (4 - 6)

## 2024-12-25 LAB
ALBUMIN SERPL ELPH-MCNC: 3.2 G/DL — LOW (ref 3.3–5)
ALP SERPL-CCNC: 74 U/L — SIGNIFICANT CHANGE UP (ref 40–120)
ALT FLD-CCNC: 55 U/L — HIGH (ref 4–33)
ANION GAP SERPL CALC-SCNC: 14 MMOL/L — SIGNIFICANT CHANGE UP (ref 7–14)
APTT BLD: 25.3 SEC — SIGNIFICANT CHANGE UP (ref 24.5–35.6)
AST SERPL-CCNC: 43 U/L — HIGH (ref 4–32)
BILIRUB SERPL-MCNC: 0.3 MG/DL — SIGNIFICANT CHANGE UP (ref 0.2–1.2)
BUN SERPL-MCNC: 16 MG/DL — SIGNIFICANT CHANGE UP (ref 7–23)
CALCIUM SERPL-MCNC: 8.9 MG/DL — SIGNIFICANT CHANGE UP (ref 8.4–10.5)
CHLORIDE SERPL-SCNC: 105 MMOL/L — SIGNIFICANT CHANGE UP (ref 98–107)
CO2 SERPL-SCNC: 17 MMOL/L — LOW (ref 22–31)
CREAT SERPL-MCNC: 0.43 MG/DL — LOW (ref 0.5–1.3)
EGFR: 99 ML/MIN/1.73M2 — SIGNIFICANT CHANGE UP
GLUCOSE SERPL-MCNC: 118 MG/DL — HIGH (ref 70–99)
HCT VFR BLD CALC: 40 % — SIGNIFICANT CHANGE UP (ref 34.5–45)
HGB BLD-MCNC: 12.6 G/DL — SIGNIFICANT CHANGE UP (ref 11.5–15.5)
INR BLD: 0.96 RATIO — SIGNIFICANT CHANGE UP (ref 0.85–1.16)
MCHC RBC-ENTMCNC: 26 PG — LOW (ref 27–34)
MCHC RBC-ENTMCNC: 31.5 G/DL — LOW (ref 32–36)
MCV RBC AUTO: 82.6 FL — SIGNIFICANT CHANGE UP (ref 80–100)
NRBC # BLD: 0 /100 WBCS — SIGNIFICANT CHANGE UP (ref 0–0)
NRBC # FLD: 0 K/UL — SIGNIFICANT CHANGE UP (ref 0–0)
PLATELET # BLD AUTO: 401 K/UL — HIGH (ref 150–400)
POTASSIUM SERPL-MCNC: 4 MMOL/L — SIGNIFICANT CHANGE UP (ref 3.5–5.3)
POTASSIUM SERPL-SCNC: 4 MMOL/L — SIGNIFICANT CHANGE UP (ref 3.5–5.3)
PROT SERPL-MCNC: 5.5 G/DL — LOW (ref 6–8.3)
PROTHROM AB SERPL-ACNC: 11.1 SEC — SIGNIFICANT CHANGE UP (ref 9.9–13.4)
RBC # BLD: 4.84 M/UL — SIGNIFICANT CHANGE UP (ref 3.8–5.2)
RBC # FLD: 13.6 % — SIGNIFICANT CHANGE UP (ref 10.3–14.5)
SODIUM SERPL-SCNC: 136 MMOL/L — SIGNIFICANT CHANGE UP (ref 135–145)
WBC # BLD: 12.7 K/UL — HIGH (ref 3.8–10.5)
WBC # FLD AUTO: 12.7 K/UL — HIGH (ref 3.8–10.5)

## 2024-12-25 PROCEDURE — 99233 SBSQ HOSP IP/OBS HIGH 50: CPT

## 2024-12-25 RX ADMIN — ACETAMINOPHEN 650 MILLIGRAM(S): 80 SOLUTION/ DROPS ORAL at 18:23

## 2024-12-25 RX ADMIN — Medication 4 MILLIGRAM(S): at 05:45

## 2024-12-25 RX ADMIN — ACETAMINOPHEN 650 MILLIGRAM(S): 80 SOLUTION/ DROPS ORAL at 05:45

## 2024-12-25 RX ADMIN — Medication 17 GRAM(S): at 13:04

## 2024-12-25 RX ADMIN — ACETAMINOPHEN 650 MILLIGRAM(S): 80 SOLUTION/ DROPS ORAL at 13:04

## 2024-12-25 RX ADMIN — Medication 1 DROP(S): at 13:05

## 2024-12-25 RX ADMIN — LEVETIRACETAM 500 MILLIGRAM(S): 100 SOLUTION ORAL at 05:45

## 2024-12-25 RX ADMIN — LEVETIRACETAM 500 MILLIGRAM(S): 100 SOLUTION ORAL at 18:23

## 2024-12-25 RX ADMIN — ACETAMINOPHEN 650 MILLIGRAM(S): 80 SOLUTION/ DROPS ORAL at 01:39

## 2024-12-25 NOTE — DIETITIAN INITIAL EVALUATION ADULT - PROBLEM SELECTOR PROBLEM 3
Fracture of two ribs of right side Bilobed Flap Text: The defect edges were debeveled with a #15 scalpel blade.  Given the location of the defect and the proximity to free margins a bilobe flap was deemed most appropriate.  Using a sterile surgical marker, an appropriate bilobe flap drawn around the defect.    The area thus outlined was incised deep to adipose tissue with a #15 scalpel blade.  The skin margins were undermined to an appropriate distance in all directions utilizing iris scissors.

## 2024-12-25 NOTE — PHYSICAL THERAPY INITIAL EVALUATION ADULT - REHAB POTENTIAL, PT EVAL
Intake Note    Chief Complaint   Patient presents with   • Flu Like Symptoms     Pt is [place] speaking. History was obtained with the assistance of a .    HPI:36 year old female w/h/o NIDDM and HTN presenting with HA, cough, sore throat, and generalized myalgias beginning three days ago. Pt also notes nausea, SOB, and difficulty swallowing. Pt denies ill contacts. Pt has not checked her BG lately. Ibuprofen has been taken for relief.     PCP: No primary care provider.      PE: Mild pharyngeal erythema. Uvula midline. No trismus. Lungs clear.      Plan: Assess with CXR and strep throat swab. Further evaluation deferred to the discretion of the next provider.      ________________________________________________________________    I have reviewed the information recorded by the scribe for accuracy and agree with its contents.    Jenifer Verdugo acting as scribe for RIVERA Landa PA-C  04/16/18 [id number]     good, to achieve stated therapy goals

## 2024-12-25 NOTE — DIETITIAN INITIAL EVALUATION ADULT - PERTINENT LABORATORY DATA
12-25    136  |  105  |  16  ----------------------------<  118[H]  4.0   |  17[L]  |  0.43[L]    Ca    8.9      25 Dec 2024 07:38    TPro  5.5[L]  /  Alb  3.2[L]  /  TBili  0.3  /  DBili  x   /  AST  43[H]  /  ALT  55[H]  /  AlkPhos  74  12-25

## 2024-12-25 NOTE — PROGRESS NOTE ADULT - PROBLEM SELECTOR PLAN 8
DVT: SCDs for now   Diet: Regular  PT: -  Dispo: -     Consulted: Palliative care, Rad Onc aware of pt's arrival.  Patient to follow up with ortho when discharged with Dr. Madrid to complete MRI imaging.

## 2024-12-25 NOTE — DIETITIAN INITIAL EVALUATION ADULT - PERSON TAUGHT/METHOD
Education provided on adequate oral intake. Advised to encourage supplement intake to meet nutritional needs. Explored patient food preferences to implement as able during hospitalization. Patient verbalized understanding and denied further questions./verbal instruction/individual instruction/patient instructed

## 2024-12-25 NOTE — PHYSICAL THERAPY INITIAL EVALUATION ADULT - PERTINENT HX OF CURRENT PROBLEM, REHAB EVAL
Patient is 79 year old female with advanced small cell lung cancer with mets to the brain with vision problems, liver, bone (not on chemo- last chemo in April), Lt hip Fracture 2023, presents with unwitnessed fall , imaging showing acute spiral fracture of fifth proximal phalanx acute right anterior 5th and 7th minimally displaced anterior rib fractures and possibly acute  mild L3 vertebral body compression fracture. MRI reveals new laura mass likely metastatic, showed concern for blood products in the left laura and vasogenic edema.  X-Ray showed nondisplaced R distal radial Fracture after fall at home.

## 2024-12-25 NOTE — PROGRESS NOTE ADULT - SUBJECTIVE AND OBJECTIVE BOX
INTERNAL MEDICINE PROGRESS NOTE    Malignant neoplasm metastatic to brain        MEGAN PARRY  |  [id number]      Patient is a 79y Female transferred from [facility] to [facility] for CT.       S: GUMARO overnight. Pt seen and evaluated bedside this AM. Pt resting comfortably on exam. Tolerating Diet. Ambulating and voiding without issue. Pain well controlled.    MEDICATIONS  (STANDING):  acetaminophen     Tablet .. 650 milliGRAM(s) Oral every 6 hours  dexAMETHasone  Injectable 4 milliGRAM(s) IV Push daily  latanoprost 0.005% Ophthalmic Solution 1 Drop(s) Both EYES at bedtime  levETIRAcetam 500 milliGRAM(s) Oral two times a day  polyethylene glycol 3350 17 Gram(s) Oral daily  senna 2 Tablet(s) Oral at bedtime  timolol 0.5% Solution 1 Drop(s) Both EYES daily    MEDICATIONS  (PRN):  oxyCODONE    IR 5 milliGRAM(s) Oral every 6 hours PRN Moderate Pain (4 - 6)  oxyCODONE    IR 10 milliGRAM(s) Oral every 6 hours PRN Severe Pain (7 - 10)      O:   Vital Signs Last 24 Hrs  T(C): 36.2 (25 Dec 2024 05:30), Max: 37.1 (24 Dec 2024 09:25)  T(F): 97.2 (25 Dec 2024 05:30), Max: 98.7 (24 Dec 2024 09:25)  HR: 67 (25 Dec 2024 05:30) (65 - 68)  BP: 130/68 (25 Dec 2024 05:30) (119/71 - 130/68)  BP(mean): --  RR: 18 (25 Dec 2024 05:30) (16 - 18)  SpO2: 98% (25 Dec 2024 05:30) (96% - 98%)    Parameters below as of 25 Dec 2024 05:30  Patient On (Oxygen Delivery Method): room air        PHYSICAL EXAM:  GENERAL: NAD, well-groomed, well-developed  HEENT: NC/AT, PERRL, EOMI, clear conjunctiva.   CHEST/LUNG: Breathing even, unlabored  HEART: Regular rate and rhythm  ABDOMEN: Soft, nondistended.  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       S: GUMARO overnight. Pt seen and evaluated bedside this AM. Pt resting comfortably on exam. Tolerating Diet. Ambulating and voiding without issue. Pain well controlled.    MEDICATIONS  (STANDING):  acetaminophen     Tablet .. 650 milliGRAM(s) Oral every 6 hours  dexAMETHasone  Injectable 4 milliGRAM(s) IV Push daily  latanoprost 0.005% Ophthalmic Solution 1 Drop(s) Both EYES at bedtime  levETIRAcetam 500 milliGRAM(s) Oral two times a day  polyethylene glycol 3350 17 Gram(s) Oral daily  senna 2 Tablet(s) Oral at bedtime  timolol 0.5% Solution 1 Drop(s) Both EYES daily    MEDICATIONS  (PRN):  oxyCODONE    IR 5 milliGRAM(s) Oral every 6 hours PRN Moderate Pain (4 - 6)  oxyCODONE    IR 10 milliGRAM(s) Oral every 6 hours PRN Severe Pain (7 - 10)      O:   Vital Signs Last 24 Hrs  T(C): 36.2 (25 Dec 2024 05:30), Max: 37.1 (24 Dec 2024 09:25)  T(F): 97.2 (25 Dec 2024 05:30), Max: 98.7 (24 Dec 2024 09:25)  HR: 67 (25 Dec 2024 05:30) (65 - 68)  BP: 130/68 (25 Dec 2024 05:30) (119/71 - 130/68)  BP(mean): --  RR: 18 (25 Dec 2024 05:30) (16 - 18)  SpO2: 98% (25 Dec 2024 05:30) (96% - 98%)    Parameters below as of 25 Dec 2024 05:30  Patient On (Oxygen Delivery Method): room air        PHYSICAL EXAM:  GENERAL: NAD, sick appearing, frail elderly woman  HEENT: clear conjunctiva, brusie noted on right eye   CHEST/LUNG: Breathing even, unlabored  HEART: Regular rate and rhythm  ABDOMEN: Soft, nondistended.  EXTREMITIES: good distal pulses b/l   NEURO:  No focal deficits

## 2024-12-25 NOTE — PHYSICAL THERAPY INITIAL EVALUATION ADULT - DID THE PATIENT HAVE SURGERY?
Discharge Instructions per Joe Hernandez M.D.    You were evaluated in the ED for severe chest pain due to heartburn (GERD) in your esophagus (esophagitis).  A comprehensive cardiac and abdominal workup indicated there were no life-threatening causes of your pain.    Please follow up with your Digestive Health Provider for consideration of outpatient endoscopy or evaluation for IBS.    DIET: According to GERD instructions    ACTIVITY: Regular    DIAGNOSIS: Gastroesophageal Reflux with Esophagitis    Return to ER if you faint for any reason, develop bleeding in your bowels, develop sudden shortness of breath, or become unable to keep down food and drink due to vomiting.    
n/a

## 2024-12-25 NOTE — DIETITIAN INITIAL EVALUATION ADULT - NS FNS DIET ORDER
Diet, Regular:   Supplement Feeding Modality:  Oral  Ensure Enlive Cans or Servings Per Day:  2       Frequency:  Two Times a day (12-24-24 @ 16:31)

## 2024-12-25 NOTE — PROGRESS NOTE ADULT - PROBLEM SELECTOR PLAN 1
Known met to frontal lobe (s/p chemo, RT) transferred from [facility] to [facility] for NSGY evaluation due to left pontine 2.7x2.2x2.2 expansile mostly hypodense mass. Recent MRI demonstrated left pontine lesion and a new left high medial parietal lobe measuring 0.9x0.9 cm.     24hr video EEG  Started on Keppra 500mg bid.   Decadron 4mg daily for edema.   Nsx: No intervention at this time, recommend to c/w Dex.   Rad-Onc consulted and discussed use of palliative radiation with patient and family for RT to the skull base 30Gy/10fx.     Patient arrived to [facility] for inpatient radiation to laura lesion. Palliative care consult started.

## 2024-12-25 NOTE — PROGRESS NOTE ADULT - ATTENDING COMMENTS
78 y/o F with advanced small cell lung cancer with mets to the brain, liver, bone (currently not on chemo- last chemo in April), who presents after unwitnessed fall. Found to have new large ring enhancing necrotic mass in left laura with multiple mets lesions in left parietal lobe. Plan for CT simulation this week per rad onc. C/w dexamethasone 4mg for vasogenic edema in setting of brain mets. on Keppra 500mg bid for seizure ppx.  Patient seen at bedside with family, concerned about decline. HCP is daughter who states would wants patient to have long term placement at facility after hospitalization. Pending PT and palliative consult.

## 2024-12-25 NOTE — DIETITIAN NUTRITION RISK NOTIFICATION - TREATMENT: THE FOLLOWING DIET HAS BEEN RECOMMENDED
Diet, Regular:   Supplement Feeding Modality:  Oral  Ensure Enlive Cans or Servings Per Day:  2       Frequency:  Two Times a day (12-24-24 @ 16:31) [Active]

## 2024-12-25 NOTE — DIETITIAN INITIAL EVALUATION ADULT - PROBLEM SELECTOR PLAN 1
Known met to frontal lobe (s/p chemo, RT) transferred from [facility] to [facility] for NSGY evaluation due to left pontine 2.7x2.2x2.2 expansile mostly hypodense mass. Recent MRI demonstrated left pontine lesion and a new left high medial parietal lobe measuring 0.9x0.9 cm.     24hr video EEG  Started on Keppra 500mg bid.   Decadron 4mg daily for edema.   Nsx: No intervention at this time, recommend to c/w Dex.   Rad-Onc consulted and discussed use of palliative radiation with patient and family for RT to the skull base 30Gy/10fx.     Patient arrived to [facility] for inpatient radiation to laura lesion. Palliative care consult started.

## 2024-12-25 NOTE — DIETITIAN INITIAL EVALUATION ADULT - OTHER INFO
79F PMH metastatic lung cancer admitted for malignant neoplasm metastatic to brain.     Patient met bedside this afternoon in fair spirits. Endorsed she was feeling hungry and waiting for lunch. Observed >75% of breakfast tray consumed and 100% of Ensure Plus HP consumed. Patient has a good appetite. Denied food allergies and nausea/vomitting/diarrhea/constipation. Patient unable to recall last bowel movement. Reported she has lost some weight since cancer dx however, denies drastic weight loss in short period of time. Per HIE, appears aligned with pt's reports 44 kg 02/2024 -> 43 kg 5/2024. Patient does not believe she is 40.8 kg per HIE. Stated she enjoys the Ensure Plus and wishes for more.

## 2024-12-25 NOTE — DIETITIAN INITIAL EVALUATION ADULT - PERTINENT MEDS FT
MEDICATIONS  (STANDING):  acetaminophen     Tablet .. 650 milliGRAM(s) Oral every 6 hours  dexAMETHasone  Injectable 4 milliGRAM(s) IV Push daily  latanoprost 0.005% Ophthalmic Solution 1 Drop(s) Both EYES at bedtime  levETIRAcetam 500 milliGRAM(s) Oral two times a day  polyethylene glycol 3350 17 Gram(s) Oral daily  senna 2 Tablet(s) Oral at bedtime  timolol 0.5% Solution 1 Drop(s) Both EYES daily    MEDICATIONS  (PRN):  oxyCODONE    IR 5 milliGRAM(s) Oral every 6 hours PRN Moderate Pain (4 - 6)  oxyCODONE    IR 10 milliGRAM(s) Oral every 6 hours PRN Severe Pain (7 - 10)

## 2024-12-25 NOTE — PHYSICAL THERAPY INITIAL EVALUATION ADULT - ADDITIONAL COMMENTS
As per patient's daughter, patient lives alone in apartment, elevator access, ambulated with a walker.

## 2024-12-26 DIAGNOSIS — Z51.5 ENCOUNTER FOR PALLIATIVE CARE: ICD-10-CM

## 2024-12-26 LAB
ALBUMIN SERPL ELPH-MCNC: 3.2 G/DL — LOW (ref 3.3–5)
ALP SERPL-CCNC: 79 U/L — SIGNIFICANT CHANGE UP (ref 40–120)
ALT FLD-CCNC: 78 U/L — HIGH (ref 4–33)
ANION GAP SERPL CALC-SCNC: 12 MMOL/L — SIGNIFICANT CHANGE UP (ref 7–14)
AST SERPL-CCNC: 47 U/L — HIGH (ref 4–32)
BASOPHILS # BLD AUTO: 0.08 K/UL — SIGNIFICANT CHANGE UP (ref 0–0.2)
BASOPHILS NFR BLD AUTO: 0.5 % — SIGNIFICANT CHANGE UP (ref 0–2)
BILIRUB SERPL-MCNC: 0.3 MG/DL — SIGNIFICANT CHANGE UP (ref 0.2–1.2)
BUN SERPL-MCNC: 20 MG/DL — SIGNIFICANT CHANGE UP (ref 7–23)
CALCIUM SERPL-MCNC: 9.1 MG/DL — SIGNIFICANT CHANGE UP (ref 8.4–10.5)
CHLORIDE SERPL-SCNC: 104 MMOL/L — SIGNIFICANT CHANGE UP (ref 98–107)
CO2 SERPL-SCNC: 19 MMOL/L — LOW (ref 22–31)
CREAT SERPL-MCNC: 0.43 MG/DL — LOW (ref 0.5–1.3)
EGFR: 99 ML/MIN/1.73M2 — SIGNIFICANT CHANGE UP
EOSINOPHIL # BLD AUTO: 0.26 K/UL — SIGNIFICANT CHANGE UP (ref 0–0.5)
EOSINOPHIL NFR BLD AUTO: 1.6 % — SIGNIFICANT CHANGE UP (ref 0–6)
GLUCOSE SERPL-MCNC: 105 MG/DL — HIGH (ref 70–99)
HCT VFR BLD CALC: 43 % — SIGNIFICANT CHANGE UP (ref 34.5–45)
HGB BLD-MCNC: 13.6 G/DL — SIGNIFICANT CHANGE UP (ref 11.5–15.5)
IANC: 13.86 K/UL — HIGH (ref 1.8–7.4)
IMM GRANULOCYTES NFR BLD AUTO: 1.3 % — HIGH (ref 0–0.9)
LYMPHOCYTES # BLD AUTO: 1.56 K/UL — SIGNIFICANT CHANGE UP (ref 1–3.3)
LYMPHOCYTES # BLD AUTO: 9.4 % — LOW (ref 13–44)
MAGNESIUM SERPL-MCNC: 1.9 MG/DL — SIGNIFICANT CHANGE UP (ref 1.6–2.6)
MCHC RBC-ENTMCNC: 26.3 PG — LOW (ref 27–34)
MCHC RBC-ENTMCNC: 31.6 G/DL — LOW (ref 32–36)
MCV RBC AUTO: 83.2 FL — SIGNIFICANT CHANGE UP (ref 80–100)
MONOCYTES # BLD AUTO: 0.54 K/UL — SIGNIFICANT CHANGE UP (ref 0–0.9)
MONOCYTES NFR BLD AUTO: 3.3 % — SIGNIFICANT CHANGE UP (ref 2–14)
NEUTROPHILS # BLD AUTO: 13.86 K/UL — HIGH (ref 1.8–7.4)
NEUTROPHILS NFR BLD AUTO: 83.9 % — HIGH (ref 43–77)
NRBC # BLD: 0 /100 WBCS — SIGNIFICANT CHANGE UP (ref 0–0)
NRBC # FLD: 0 K/UL — SIGNIFICANT CHANGE UP (ref 0–0)
PHOSPHATE SERPL-MCNC: 2.9 MG/DL — SIGNIFICANT CHANGE UP (ref 2.5–4.5)
PLATELET # BLD AUTO: 406 K/UL — HIGH (ref 150–400)
POTASSIUM SERPL-MCNC: 4.6 MMOL/L — SIGNIFICANT CHANGE UP (ref 3.5–5.3)
POTASSIUM SERPL-SCNC: 4.6 MMOL/L — SIGNIFICANT CHANGE UP (ref 3.5–5.3)
PROT SERPL-MCNC: 6.7 G/DL — SIGNIFICANT CHANGE UP (ref 6–8.3)
RBC # BLD: 5.17 M/UL — SIGNIFICANT CHANGE UP (ref 3.8–5.2)
RBC # FLD: 13.8 % — SIGNIFICANT CHANGE UP (ref 10.3–14.5)
SODIUM SERPL-SCNC: 135 MMOL/L — SIGNIFICANT CHANGE UP (ref 135–145)
WBC # BLD: 16.51 K/UL — HIGH (ref 3.8–10.5)
WBC # FLD AUTO: 16.51 K/UL — HIGH (ref 3.8–10.5)

## 2024-12-26 PROCEDURE — 77334 RADIATION TREATMENT AID(S): CPT | Mod: 26

## 2024-12-26 PROCEDURE — 77263 THER RADIOLOGY TX PLNG CPLX: CPT

## 2024-12-26 PROCEDURE — 77290 THER RAD SIMULAJ FIELD CPLX: CPT | Mod: 26

## 2024-12-26 PROCEDURE — 99223 1ST HOSP IP/OBS HIGH 75: CPT

## 2024-12-26 PROCEDURE — 99232 SBSQ HOSP IP/OBS MODERATE 35: CPT | Mod: GC

## 2024-12-26 RX ORDER — CHLORHEXIDINE GLUCONATE 1.2 MG/ML
1 RINSE ORAL DAILY
Refills: 0 | Status: DISCONTINUED | OUTPATIENT
Start: 2024-12-26 | End: 2025-01-07

## 2024-12-26 RX ORDER — HYDROMORPHONE HCL 4 MG
0.2 TABLET ORAL EVERY 4 HOURS
Refills: 0 | Status: DISCONTINUED | OUTPATIENT
Start: 2024-12-26 | End: 2024-12-26

## 2024-12-26 RX ADMIN — Medication 1 DROP(S): at 12:51

## 2024-12-26 RX ADMIN — SENNOSIDES 2 TABLET(S): 8.6 TABLET, FILM COATED ORAL at 21:17

## 2024-12-26 RX ADMIN — ACETAMINOPHEN 650 MILLIGRAM(S): 80 SOLUTION/ DROPS ORAL at 18:43

## 2024-12-26 RX ADMIN — LATANOPROST 1 DROP(S): 50 SOLUTION OPHTHALMIC at 00:38

## 2024-12-26 RX ADMIN — ACETAMINOPHEN 650 MILLIGRAM(S): 80 SOLUTION/ DROPS ORAL at 12:51

## 2024-12-26 RX ADMIN — Medication 4 MILLIGRAM(S): at 05:31

## 2024-12-26 RX ADMIN — CHLORHEXIDINE GLUCONATE 1 APPLICATION(S): 1.2 RINSE ORAL at 18:44

## 2024-12-26 RX ADMIN — Medication 17 GRAM(S): at 12:50

## 2024-12-26 RX ADMIN — ACETAMINOPHEN 650 MILLIGRAM(S): 80 SOLUTION/ DROPS ORAL at 05:30

## 2024-12-26 RX ADMIN — LATANOPROST 1 DROP(S): 50 SOLUTION OPHTHALMIC at 21:19

## 2024-12-26 RX ADMIN — LEVETIRACETAM 500 MILLIGRAM(S): 100 SOLUTION ORAL at 05:33

## 2024-12-26 RX ADMIN — LEVETIRACETAM 500 MILLIGRAM(S): 100 SOLUTION ORAL at 18:43

## 2024-12-26 NOTE — ADVANCED PRACTICE NURSE CONSULT - REASON FOR CONSULT
Patient seen on skin care rounds after wound care referral received for assessment of skin impairment and recommendations of topical management. As per H&P, patient is a 79F with advanced small cell lung cancer with mets to the brain with vision problems, liver, bone (not on chemo- last chemo in April), L hip Fx 2023 lives alone a/w unwitnessed fall , imaging showing acute spiral fracture of fifth proximal phalanx acute right anterior 5th and 7th minimally displaced anterior rib fractures and possibly acute  mild L3 vertebral body compression fracture. Chart reviewed: WBC 16.51, H/H 13.6/43.0, Platelets 406, INR 0.96, Serum albumin 3.2, Serg 13.

## 2024-12-26 NOTE — ADVANCED PRACTICE NURSE CONSULT - ASSESSMENT
General: A&Ox2, thin, cachectic, able to turn with 1x assistance, incontinent of urine and stool. Skin warm, dry with increased moisture in intertriginous folds, scattered areas of hyperpigmentation and hypopigmentation, scattered areas of ecchymosis without hematoma on face, bilateral upper and lower extremities.     Vascular of b/l lower extremities: Bilateral lower extremities with scattered areas of hyper and hypopigmentation. Dry, flaky skin. Thickened-yellow hyperpigmented overgrown toenails. No temperature changes noted. No Edema. Capillary refill less than 3 seconds. +2 DP/PT pulses. Blanchable erythema noted to b/l heels. Left 1st toe and left heel with dry, stable callous. Right first toe and right posterior achilles noted with scattered dried, serosanguinous scabs.     Sacrum to b/l buttocks: Moisture associated dermatitis as evidenced by increased moisture in sacralgluteal fold, blanchable erythema, and hypopigmentation. No increased warmth, no drainage, no induration, no edema, no overt signs of infection noted at this time. No suspected candidiasis.     Left medial knee: Abrasion s/p fall as per patient. Measuring [id number]. Presenting as 20% moist, yellow firmly attached slough and red dermis/agranular mixture. Small serosanguinous drainage, no odor.  Periwound with hypopigmentation. Irregularly bordered. No increased warmth, no drainage, no erythema, no induration, no edema, no overt signs of infection noted at this time. Goals of care: monitor for tissue type changes, antimicrobial support, continue measures to decrease friction/shear/pressure.  General: A&Ox2, thin, cachectic, able to turn with 1x assistance, incontinent of urine and stool. Skin warm, dry with increased moisture in intertriginous folds, scattered areas of hyperpigmentation and hypopigmentation, scattered areas of ecchymosis without hematoma on face, bilateral upper and lower extremities. Dry, serosanguinous scabbing to right side of face.     Vascular of b/l lower extremities: Bilateral lower extremities with scattered areas of hyper and hypopigmentation. Dry, flaky skin. Thickened-yellow hyperpigmented overgrown toenails. No temperature changes noted. No Edema. Capillary refill less than 3 seconds. +2 DP/PT pulses. Blanchable erythema noted to b/l heels. Left 1st toe and left heel with dry, stable callous. Right first toe and right posterior achilles noted with scattered dried, serosanguinous scabs.     Sacrum to b/l buttocks: Moisture associated dermatitis as evidenced by increased moisture in sacralgluteal fold, blanchable erythema, and hypopigmentation. No increased warmth, no drainage, no induration, no edema, no overt signs of infection noted at this time. No suspected candidiasis.     Left medial knee: Abrasion s/p fall as per patient. Measuring [id number]. Presenting as 20% moist, yellow firmly attached slough and red dermis/agranular mixture. Small serosanguinous drainage, no odor.  Periwound with hypopigmentation. Irregularly bordered. No increased warmth, no drainage, no erythema, no induration, no edema, no overt signs of infection noted at this time. Goals of care: monitor for tissue type changes, antimicrobial support, continue measures to decrease friction/shear/pressure.

## 2024-12-26 NOTE — PROGRESS NOTE ADULT - PROBLEM SELECTOR PLAN 8
DVT: SCDs for now   Diet: Regular  PT: -  Dispo: -     Consulted: Palliative care, Rad Onc aware of pt's arrival.  Patient to follow up with ortho when discharged with Dr. Madrid to complete MRI imaging. DVT: SCDs for now   Diet: Regular with Ensure HP TID  PT: MANPREET  Dispo: -     Consulted: Palliative care, Rad Onc aware of pt's arrival.  Patient to follow up with ortho when discharged with Dr. Madrid to complete MRI imaging.

## 2024-12-26 NOTE — CONSULT NOTE ADULT - PROBLEM SELECTOR RECOMMENDATION 9
- Diagnosed one year ago 12/2023      - Completed C5 carbo/etop/cosela/atezo. There was plan to transition to maintenance atezo, no follow up since April this year   - Follows with Dr. Boyd at [facility]   - Now with POD with brain mets including Expansile mass lesion centered in the left hemipons measuring   approximately 2.8 x 3.1 x 2.6 cm (AP x TV x CC) with surrounding vasogenic edema. Foci of susceptibility artifact within the described lesion, suggesting a hemorrhagic component. Probable necrotic component   within the mass lesion  - Plan for inpatient RT, 10 fractions

## 2024-12-26 NOTE — CONSULT NOTE ADULT - SUBJECTIVE AND OBJECTIVE BOX
HPI:  Patient is a 79F with h/o small cell lung cancer with mets to the brain with vision problems, liver, bone (not on chemo- last chemo in April), L hip Fx in 2023, lives alone comes to ED as she was found by neighbor down on the floor in the afternoon of 12/20. She said she tripped and fell, but now sure.  She has been c/o pain in her right arm and right leg of the patient appear more contracted than usual over the last couple of weeks. She uses walker at home. Denies SOB, chest pain, fever, but some cough. As per daughter her chemo was stopped in April 2024 given advance disease.     PERTINENT PM/SXH:   Tobacco abuse    Alcohol abuse    Lung nodules    Liver lesion    H/O fracture of hip    History of fracture due to fall      History of repair of hip fracture      FAMILY HISTORY:  FH: HTN (hypertension)      ------------------------------------------------------------------------------------------------------------  ITEMS NOT CHECKED ARE NOT PRESENT    SOCIAL HISTORY:   Living Situation: [ ]Home  [ ]Long term care  [ ]Rehab [ ]Other  Support:     Substance hx:  [ ]   Tobacco hx:  [ ]   Alcohol hx: [ ]   Family Hx substance abuse [ ]yes [ ]no    Moravian/Spirituality:  PCSSQ[Palliative Care Spiritual Screening Question]   Severity (0-10): 0  Score of 4 or > indicate consideration of Chaplaincy referral.  Chaplaincy Referral: [ ] yes [X ] refused [ ] following    Anticipatory Grief present?:  [ ] yes [X ] no  [ ] Deferred                      Other Referrals:    [ ]Hospice   [ ]Caregiver [place] Support  [ ]Child Life    [ ]Patient & Family Centered Care Referral  [ ]Holistic Therapy     ------------------------------------------------------------------------------------------------------------    PRESENT SYMPTOMS:     [ ] Unable to self-report      [ ] PAINADS     [ ] RDOS    [ ] No     [ ] Yes     Source if other than patient:  [ ]Family   [ ]Team     PAIN:   If blank, patient unable to specify     [ ]yes [ ]no    1. Location-   2. Radiation-    3. Quality-   4. Timing-   5. Minimal acceptable level/pain goal-   6. Aggravating factors-   7. QOL impact-     SYMPTOMS:   Dyspnea:                           [ ]Mild [ ]Moderate [ ]Severe  Anxiety:                             [ ]Mild [ ]Moderate [ ]Severe  Fatigue:                             [ ]Mild [ ]Moderate [ ]Severe  Nausea/Vomiting:              [ ]Mild [ ]Moderate [ ]Severe  Loss of appetite:                [ ]Mild [ ]Moderate [ ]Severe  Constipation:                     [ ]Mild [ ]Moderate [ ]Severe    Other Symptoms:  [X ]All other review of systems negative     ------------------------------------------------------------------------------------------------------------      I-Stop Reference No:     ------------------------------------------------------------------------------------------------------------    FUNCTIONAL STATUS:     Baseline ADL (prior to admission):  [ ] Independent  [ ] Moderate Assistance [ ] Dependent    Palliative Performance Score (current):     [ ]PPSV2 < or = to 30%   [ ]artificial nutrition      NUTRITIONAL STATUS:     Protein Calorie Malnutrition Present:   [ ]mild   [ ]moderate or severe    Weight:   [ ]underweight (BMI 18.5 or less)   [ ]morbid obesity (BMI 30 or higher)   [ ]anasarca  [ ]significant weight loss     Height (cm): 157.5 (12-20-24 @ 19:14), 157.5 (12-20-24 @ 14:07), 153 (04-25-24 @ 10:39)  Weight (kg): 61.2 (12-20-24 @ 19:14), 40.8 (12-20-24 @ 14:07), 46 (04-25-24 @ 10:39)  BMI (kg/m2): 24.7 (12-20-24 @ 19:14), 16.4 (12-20-24 @ 14:07), 19.7 (04-25-24 @ 10:39)    ------------------------------------------------------------------------------------------------------------    PRIOR ADVANCE DIRECTIVES:    [ ] DNR/MOLST    [ ] Living Will    [ ] Health Care Proxy(s)    DECISION MAKER(s):  [ ] Patient    [ ] Surrogate(s)  [ ] HCP   [ ] Guardian             ------------------------------------------------------------------------------------------------------------  PHYSICAL EXAM:  Vital Signs Last 24 Hrs  T(C): 36.3 (26 Dec 2024 11:27), Max: 36.6 (25 Dec 2024 18:25)  T(F): 97.3 (26 Dec 2024 11:27), Max: 97.9 (25 Dec 2024 18:25)  HR: 75 (26 Dec 2024 11:27) (61 - 75)  BP: 118/56 (26 Dec 2024 11:27) (118/56 - 159/72)  BP(mean): 70 (26 Dec 2024 11:27) (70 - 70)  RR: 18 (26 Dec 2024 11:27) (17 - 19)  SpO2: 97% (26 Dec 2024 11:27) (97% - 99%)    Parameters below as of 26 Dec 2024 11:27  Patient On (Oxygen Delivery Method): room air     I&O's Summary      GENERAL:  [ ]Cachexia  [ ] Frail  [ ]Awake  [ ]Oriented   [ ]Lethargic  [ ]Unarousable  [ ]Verbal  [ ]Non-Verbal    BEHAVIORAL:   [ ] Anxiety  [ ] Delirium [ ] Agitation [ ] Other    HEENT:   [ ]Normal   [ ]Dry mouth   [ ]ET Tube/Trach  [ ]Oral lesions    PULMONARY:   [ ]Clear              [ ]Tachypnea  [ ]Audible excessive secretions   [ ]Rhonchi        [ ]Right [ ]Left [ ]Bilateral  [ ]Crackles        [ ]Right [ ]Left [ ]Bilateral  [ ]Wheezing     [ ]Right [ ]Left [ ]Bilateral  [ ]Diminished breath sounds [ ]right [ ]left [ ]bilateral    CARDIOVASCULAR:    [ ]Regular [ ]Irregular [ ]Tachy  [ ]Jose A [ ]Murmur [ ]Other    GASTROINTESTINAL:  [ ]Soft  [ ]Distended   [ ]+BS  [ ]Non tender [ ]Tender  [ ]Other [ ]PEG [ ]OGT/ NGT      GENITOURINARY:  [ ]Normal [ ] Incontinent   [ ]Oliguria/Anuria   [ ]Estevez    MUSCULOSKELETAL:   [ ]Normal   [ ]Weakness  [ ]Bed/Wheelchair bound [ ]Edema    NEUROLOGIC:   [ ]No focal deficits  [ ]Cognitive impairment  [ ]Dysphagia [ ]Dysarthria [ ]Paresis [ ]Other     SKIN:   [ ]Normal  [ ]Rash  [ ]Other  [ ]Pressure ulcer(s)       Present on admission [ ]y [ ]n    ------------------------------------------------------------------------------------------------------------    LABS:                        13.6   16.51 )-----------( 406      ( 26 Dec 2024 08:31 )             43.0   12-26    135  |  104  |  20  ----------------------------<  105[H]  4.6   |  19[L]  |  0.43[L]    Ca    9.1      26 Dec 2024 08:31  Phos  2.9     12-26  Mg     1.90     12-26    TPro  6.7  /  Alb  3.2[L]  /  TBili  0.3  /  DBili  x   /  AST  47[H]  /  ALT  78[H]  /  AlkPhos  79  12-26  PT/INR - ( 25 Dec 2024 07:38 )   PT: 11.1 sec;   INR: 0.96 ratio         PTT - ( 25 Dec 2024 07:38 )  PTT:25.3 sec    Urinalysis Basic - ( 26 Dec 2024 08:31 )    Color: x / Appearance: x / SG: x / pH: x  Gluc: 105 mg/dL / Ketone: x  / Bili: x / Urobili: x   Blood: x / Protein: x / Nitrite: x   Leuk Esterase: x / RBC: x / WBC x   Sq Epi: x / Non Sq Epi: x / Bacteria: x        ------------------------------------------------------------------------------------------------------------    CRITICAL CARE:  [ ]Shock Present  [ ]Septic [ ]Cardiogenic [ ]Neurologic [ ]Hypovolemic [ ] Undifferentiated/Mixed   [ ]Vasopressors [ ]Inotropes     [ ]Respiratory failure present: [ ]Acute  [ ]Chronic [ ]Hypoxic  [ ]Hypercarbic   [ ]Mechanical ventilation   [ ]Trach collar   [ ]Non-invasive ventilatory support   [ ]High flow    [ ]Non-rebreather/Venti     [ ]Other organ failure     ------------------------------------------------------------------------------------------------------------    RADIOLOGY & ADDITIONAL STUDIES:      ------------------------------------------------------------------------------------------------------------    ALLERGIES:  Allergies    penicillins (Unknown)    Intolerances        MEDICATIONS  (STANDING):  acetaminophen     Tablet .. 650 milliGRAM(s) Oral every 6 hours  chlorhexidine 2% Cloths 1 Application(s) Topical daily  dexAMETHasone  Injectable 4 milliGRAM(s) IV Push daily  latanoprost 0.005% Ophthalmic Solution 1 Drop(s) Both EYES at bedtime  levETIRAcetam 500 milliGRAM(s) Oral two times a day  polyethylene glycol 3350 17 Gram(s) Oral daily  senna 2 Tablet(s) Oral at bedtime  timolol 0.5% Solution 1 Drop(s) Both EYES daily    MEDICATIONS  (PRN):  oxyCODONE    IR 5 milliGRAM(s) Oral every 6 hours PRN Moderate Pain (4 - 6)  oxyCODONE    IR 10 milliGRAM(s) Oral every 6 hours PRN Severe Pain (7 - 10)           HPI:  Patient is a 79F with h/o small cell lung cancer with mets to the brain with vision problems, liver, bone (not on chemo- last chemo in April), L hip Fx in 2023, lives alone comes to ED as she was found by neighbor down on the floor in the afternoon of 12/20. She said she tripped and fell, but now sure.  She has been c/o pain in her right arm and right leg of the patient appear more contracted than usual over the last couple of weeks. She uses walker at home. Denies SOB, chest pain, fever, but some cough. As per daughter her chemo was stopped in April 2024 given advance disease.     PERTINENT PM/SXH:   Tobacco abuse    Alcohol abuse    Lung nodules    Liver lesion    H/O fracture of hip    History of fracture due to fall      History of repair of hip fracture      FAMILY HISTORY:  FH: HTN (hypertension)      ------------------------------------------------------------------------------------------------------------  ITEMS NOT CHECKED ARE NOT PRESENT    SOCIAL HISTORY:   Living Situation: [X ]Home  [ ]Long term care  [ ]Rehab [ ]Other  Support: Guillermo Sheldon    Substance hx:  [ ]   Tobacco hx:  [ ]   Alcohol hx: [ ]   Family Hx substance abuse [ ]yes [ ]no    Latter day/Spirituality:  PCSSQ[Palliative Care Spiritual Screening Question]   Severity (0-10): 0  Score of 4 or > indicate consideration of Chaplaincy referral.  Chaplaincy Referral: [ ] yes [X ] refused [ ] following    Anticipatory Grief present?:  [ ] yes [X ] no  [ ] Deferred                      Other Referrals:    [ ]Hospice   [ ]Caregiver [place] Support  [ ]Child Life    [ ]Patient & Family Centered Care Referral  [ ]Holistic Therapy     ------------------------------------------------------------------------------------------------------------    PRESENT SYMPTOMS:     [ ] Unable to self-report      [ ] PAINADS     [ ] RDOS    [ ] No     [ ] Yes     Source if other than patient:  [ ]Family   [ ]Team     PAIN:   If blank, patient unable to specify     [ ]yes [ ]no    1. Location-   2. Radiation-    3. Quality-   4. Timing-   5. Minimal acceptable level/pain goal-   6. Aggravating factors-   7. QOL impact-     SYMPTOMS:   Dyspnea:                           [ ]Mild [ ]Moderate [ ]Severe  Anxiety:                             [ ]Mild [ ]Moderate [ ]Severe  Fatigue:                             [ ]Mild [ ]Moderate [ ]Severe  Nausea/Vomiting:              [ ]Mild [ ]Moderate [ ]Severe  Loss of appetite:                [ ]Mild [ ]Moderate [ ]Severe  Constipation:                     [ ]Mild [ ]Moderate [ ]Severe    Other Symptoms:  [X ]All other review of systems negative     ------------------------------------------------------------------------------------------------------------      I-Stop Reference No: [id number]    Prescription Information      PDI Filter:    PDI	Current Rx	Drug Type	Rx Written	Rx Dispensed	Drug	Quantity	Days Supply	Prescriber Name	Prescriber YUKO #	Payment Method	Dispenser  A	N	O	12/27/2023	12/27/2023	oxycodone hcl (ir) 5 mg tablet	90	15	Greta Quinteros	[id number]	Medicare	[id number]  A	N	B	06/05/2024	06/16/2024	lorazepam 0.5 mg tablet	10	5	Eitan Palomo A, NP	[id number]	Medicare	[id number]    ------------------------------------------------------------------------------------------------------------    FUNCTIONAL STATUS:     Baseline ADL (prior to admission):  [ ] Independent  [X ] Moderate Assistance [ ] Dependent    Palliative Performance Score (current): 30%    [X ]PPSV2 < or = to 30%   [ ]artificial nutrition      NUTRITIONAL STATUS:     Protein Calorie Malnutrition Present:   [ ]mild   [X ]moderate or severe    Weight:   [ ]underweight (BMI 18.5 or less)   [ ]morbid obesity (BMI 30 or higher)   [ ]anasarca  [X ]significant weight loss     Height (cm): 157.5 (12-20-24 @ 19:14), 157.5 (12-20-24 @ 14:07), 153 (04-25-24 @ 10:39)  Weight (kg): 61.2 (12-20-24 @ 19:14), 40.8 (12-20-24 @ 14:07), 46 (04-25-24 @ 10:39)  BMI (kg/m2): 24.7 (12-20-24 @ 19:14), 16.4 (12-20-24 @ 14:07), 19.7 (04-25-24 @ 10:39)    ------------------------------------------------------------------------------------------------------------    PRIOR ADVANCE DIRECTIVES:    [ ] DNR/MOLST    [ ] Living Will    [ ] Health Care Proxy(s)    DECISION MAKER(s):  [ ] Patient    [X ] Surrogate(s)- daughter Monalisa  [ ] HCP   [ ] Guardian             ------------------------------------------------------------------------------------------------------------  PHYSICAL EXAM:  Vital Signs Last 24 Hrs  T(C): 36.3 (26 Dec 2024 11:27), Max: 36.6 (25 Dec 2024 18:25)  T(F): 97.3 (26 Dec 2024 11:27), Max: 97.9 (25 Dec 2024 18:25)  HR: 75 (26 Dec 2024 11:27) (61 - 75)  BP: 118/56 (26 Dec 2024 11:27) (118/56 - 159/72)  BP(mean): 70 (26 Dec 2024 11:27) (70 - 70)  RR: 18 (26 Dec 2024 11:27) (17 - 19)  SpO2: 97% (26 Dec 2024 11:27) (97% - 99%)    Parameters below as of 26 Dec 2024 11:27  Patient On (Oxygen Delivery Method): room air     I&O's Summary    GENERAL:  [ ]Cachexia  [ ] Frail  [ ]Awake  [ ]Oriented   [ ]Lethargic  [ ]Unarousable  [ ]Verbal  [ ]Non-Verbal    BEHAVIORAL:   [ ] Anxiety  [ ] Delirium [ ] Agitation [ ] Other    HEENT:   [ ]Normal   [ ]Dry mouth   [ ]ET Tube/Trach  [ ]Oral lesions    PULMONARY:   [ ]Clear              [ ]Tachypnea  [ ]Audible excessive secretions   [ ]Rhonchi        [ ]Right [ ]Left [ ]Bilateral  [ ]Crackles        [ ]Right [ ]Left [ ]Bilateral  [ ]Wheezing     [ ]Right [ ]Left [ ]Bilateral  [ ]Diminished breath sounds [ ]right [ ]left [ ]bilateral    CARDIOVASCULAR:    [ ]Regular [ ]Irregular [ ]Tachy  [ ]Jose A [ ]Murmur [ ]Other    GASTROINTESTINAL:  [ ]Soft  [ ]Distended   [ ]+BS  [ ]Non tender [ ]Tender  [ ]Other [ ]PEG [ ]OGT/ NGT      GENITOURINARY:  [ ]Normal [ ] Incontinent   [ ]Oliguria/Anuria   [ ]Estevez    MUSCULOSKELETAL:   [ ]Normal   [ ]Weakness  [ ]Bed/Wheelchair bound [ ]Edema    NEUROLOGIC:   [ ]No focal deficits  [ ]Cognitive impairment  [ ]Dysphagia [ ]Dysarthria [ ]Paresis [ ]Other     SKIN:   [ ]Normal  [ ]Rash  [ ]Other  [ ]Pressure ulcer(s)       Present on admission [ ]y [ ]n    ------------------------------------------------------------------------------------------------------------    LABS:                        13.6   16.51 )-----------( 406      ( 26 Dec 2024 08:31 )             43.0   12-26    135  |  104  |  20  ----------------------------<  105[H]  4.6   |  19[L]  |  0.43[L]    Ca    9.1      26 Dec 2024 08:31  Phos  2.9     12-26  Mg     1.90     12-26    TPro  6.7  /  Alb  3.2[L]  /  TBili  0.3  /  DBili  x   /  AST  47[H]  /  ALT  78[H]  /  AlkPhos  79  12-26  PT/INR - ( 25 Dec 2024 07:38 )   PT: 11.1 sec;   INR: 0.96 ratio         PTT - ( 25 Dec 2024 07:38 )  PTT:25.3 sec    Urinalysis Basic - ( 26 Dec 2024 08:31 )    Color: x / Appearance: x / SG: x / pH: x  Gluc: 105 mg/dL / Ketone: x  / Bili: x / Urobili: x   Blood: x / Protein: x / Nitrite: x   Leuk Esterase: x / RBC: x / WBC x   Sq Epi: x / Non Sq Epi: x / Bacteria: x    ------------------------------------------------------------------------------------------------------------    CRITICAL CARE:  [ ]Shock Present  [ ]Septic [ ]Cardiogenic [ ]Neurologic [ ]Hypovolemic [ ] Undifferentiated/Mixed   [ ]Vasopressors [ ]Inotropes     [ ]Respiratory failure present: [ ]Acute  [ ]Chronic [ ]Hypoxic  [ ]Hypercarbic   [ ]Mechanical ventilation   [ ]Trach collar   [ ]Non-invasive ventilatory support   [ ]High flow    [ ]Non-rebreather/Venti     [ ]Other organ failure     ------------------------------------------------------------------------------------------------------------    RADIOLOGY & ADDITIONAL STUDIES:      ------------------------------------------------------------------------------------------------------------    ALLERGIES:  Allergies    penicillins (Unknown)    Intolerances        MEDICATIONS  (STANDING):  acetaminophen     Tablet .. 650 milliGRAM(s) Oral every 6 hours  chlorhexidine 2% Cloths 1 Application(s) Topical daily  dexAMETHasone  Injectable 4 milliGRAM(s) IV Push daily  latanoprost 0.005% Ophthalmic Solution 1 Drop(s) Both EYES at bedtime  levETIRAcetam 500 milliGRAM(s) Oral two times a day  polyethylene glycol 3350 17 Gram(s) Oral daily  senna 2 Tablet(s) Oral at bedtime  timolol 0.5% Solution 1 Drop(s) Both EYES daily    MEDICATIONS  (PRN):  oxyCODONE    IR 5 milliGRAM(s) Oral every 6 hours PRN Moderate Pain (4 - 6)  oxyCODONE    IR 10 milliGRAM(s) Oral every 6 hours PRN Severe Pain (7 - 10)           HPI:  Patient is a 79F with h/o small cell lung cancer with mets to the brain with vision problems, liver, bone (not on chemo- last chemo in April), L hip Fx in 2023, lives alone comes to ED as she was found by neighbor down on the floor in the afternoon of 12/20. She said she tripped and fell, but now sure.  She has been c/o pain in her right arm and right leg of the patient appear more contracted than usual over the last couple of weeks. She uses walker at home. Denies SOB, chest pain, fever, but some cough. As per daughter her chemo was stopped in April 2024 given advance disease.     PERTINENT PM/SXH:   Tobacco abuse    Alcohol abuse    Lung nodules    Liver lesion    H/O fracture of hip    History of fracture due to fall      History of repair of hip fracture      FAMILY HISTORY:  FH: HTN (hypertension)      ------------------------------------------------------------------------------------------------------------  ITEMS NOT CHECKED ARE NOT PRESENT    SOCIAL HISTORY:   Living Situation: [X ]Home  [ ]Long term care  [ ]Rehab [ ]Other  Support: Guillermo Sheldon    Substance hx:  [ ]   Tobacco hx:  [ ]   Alcohol hx: [ ]   Family Hx substance abuse [ ]yes [ ]no    Yarsanism/Spirituality:  PCSSQ[Palliative Care Spiritual Screening Question]   Severity (0-10): 0  Score of 4 or > indicate consideration of Chaplaincy referral.  Chaplaincy Referral: [ ] yes [X ] refused [ ] following    Anticipatory Grief present?:  [ ] yes [X ] no  [ ] Deferred                      Other Referrals:    [ ]Hospice   [ ]Caregiver [place] Support  [ ]Child Life    [ ]Patient & Family Centered Care Referral  [ ]Holistic Therapy     ------------------------------------------------------------------------------------------------------------    PRESENT SYMPTOMS:     [ ] Unable to self-report      [ ] PAINADS     [ ] RDOS    [X ] No     [ ] Yes     Source if other than patient:  [ ]Family   [ ]Team     PAIN:   If blank, patient unable to specify     [ ]yes [ ]no    1. Location-   2. Radiation-    3. Quality-   4. Timing-   5. Minimal acceptable level/pain goal-   6. Aggravating factors-   7. QOL impact-     SYMPTOMS:   Dyspnea:                           [ ]Mild [ ]Moderate [ ]Severe  Anxiety:                             [ ]Mild [ ]Moderate [ ]Severe  Fatigue:                             [ ]Mild [ ]Moderate [ ]Severe  Nausea/Vomiting:              [ ]Mild [ ]Moderate [ ]Severe  Loss of appetite:                [ ]Mild [ ]Moderate [ ]Severe  Constipation:                     [ ]Mild [ ]Moderate [ ]Severe    Other Symptoms:  [X ]All other review of systems negative     ------------------------------------------------------------------------------------------------------------      I-Stop Reference No: [id number]    Prescription Information      PDI Filter:    PDI	Current Rx	Drug Type	Rx Written	Rx Dispensed	Drug	Quantity	Days Supply	Prescriber Name	Prescriber YUKO #	Payment Method	Dispenser  A	N	O	12/27/2023	12/27/2023	oxycodone hcl (ir) 5 mg tablet	90	15	Greta Quinteros	[id number]	Medicare	[id number]  A	N	B	06/05/2024	06/16/2024	lorazepam 0.5 mg tablet	10	5	Eitan Palomo A, NP	[id number]	Medicare	[id number]    ------------------------------------------------------------------------------------------------------------    FUNCTIONAL STATUS:     Baseline ADL (prior to admission):  [ ] Independent  [X ] Moderate Assistance [ ] Dependent    Palliative Performance Score (current): 30%    [X ]PPSV2 < or = to 30%   [ ]artificial nutrition      NUTRITIONAL STATUS:     Protein Calorie Malnutrition Present:   [ ]mild   [X ]moderate or severe    Weight:   [ ]underweight (BMI 18.5 or less)   [ ]morbid obesity (BMI 30 or higher)   [ ]anasarca  [X ]significant weight loss     Height (cm): 157.5 (12-20-24 @ 19:14), 157.5 (12-20-24 @ 14:07), 153 (04-25-24 @ 10:39)  Weight (kg): 61.2 (12-20-24 @ 19:14), 40.8 (12-20-24 @ 14:07), 46 (04-25-24 @ 10:39)  BMI (kg/m2): 24.7 (12-20-24 @ 19:14), 16.4 (12-20-24 @ 14:07), 19.7 (04-25-24 @ 10:39)    ------------------------------------------------------------------------------------------------------------    PRIOR ADVANCE DIRECTIVES:    [ ] DNR/MOLST    [ ] Living Will    [ ] Health Care Proxy(s)    DECISION MAKER(s):  [ ] Patient    [X ] Surrogate(s)- daughter Monalisa  [ ] HCP   [ ] Guardian             ------------------------------------------------------------------------------------------------------------  PHYSICAL EXAM:  Vital Signs Last 24 Hrs  T(C): 36.3 (26 Dec 2024 11:27), Max: 36.6 (25 Dec 2024 18:25)  T(F): 97.3 (26 Dec 2024 11:27), Max: 97.9 (25 Dec 2024 18:25)  HR: 75 (26 Dec 2024 11:27) (61 - 75)  BP: 118/56 (26 Dec 2024 11:27) (118/56 - 159/72)  BP(mean): 70 (26 Dec 2024 11:27) (70 - 70)  RR: 18 (26 Dec 2024 11:27) (17 - 19)  SpO2: 97% (26 Dec 2024 11:27) (97% - 99%)    Parameters below as of 26 Dec 2024 11:27  Patient On (Oxygen Delivery Method): room air     I&O's Summary    GENERAL:  [X ]Cachexia  [X ] Frail  [X ]Awake  [X ]Oriented   [ ]Lethargic  [ ]Unarousable  [X ]Verbal  [ ]Non-Verbal    BEHAVIORAL:   [ ] Anxiety  [ ] Delirium [ ] Agitation [ ] Other    HEENT:   [X ]Normal   [ ]Dry mouth   [ ]ET Tube/Trach  [ ]Oral lesions    PULMONARY:   [X ]Clear              [ ]Tachypnea  [ ]Audible excessive secretions   [ ]Rhonchi        [ ]Right [ ]Left [ ]Bilateral  [ ]Crackles        [ ]Right [ ]Left [ ]Bilateral  [ ]Wheezing     [ ]Right [ ]Left [ ]Bilateral  [ ]Diminished breath sounds [ ]right [ ]left [ ]bilateral    CARDIOVASCULAR:    [X ]Regular [ ]Irregular [ ]Tachy  [ ]Jose A [ ]Murmur [ ]Other    GASTROINTESTINAL:  [X ]Soft  [ ]Distended   [X ]+BS  [ ]Non tender [ ]Tender  [ ]Other [ ]PEG [ ]OGT/ NGT      GENITOURINARY:  [ ]Normal [X ] Incontinent   [ ]Oliguria/Anuria   [ ]Estevez    MUSCULOSKELETAL:   [ ]Normal   [ ]Weakness  [X ]Bed/Wheelchair bound [ ]Edema    NEUROLOGIC:   [X ]No focal deficits  [ ]Cognitive impairment  [ ]Dysphagia [ ]Dysarthria [ ]Paresis [ ]Other     SKIN:   [X ]Normal  [ ]Rash  [ ]Other  [ ]Pressure ulcer(s)       Present on admission [ ]y [ ]n    ------------------------------------------------------------------------------------------------------------    LABS:                        13.6   16.51 )-----------( 406      ( 26 Dec 2024 08:31 )             43.0   12-26    135  |  104  |  20  ----------------------------<  105[H]  4.6   |  19[L]  |  0.43[L]    Ca    9.1      26 Dec 2024 08:31  Phos  2.9     12-26  Mg     1.90     12-26    TPro  6.7  /  Alb  3.2[L]  /  TBili  0.3  /  DBili  x   /  AST  47[H]  /  ALT  78[H]  /  AlkPhos  79  12-26  PT/INR - ( 25 Dec 2024 07:38 )   PT: 11.1 sec;   INR: 0.96 ratio         PTT - ( 25 Dec 2024 07:38 )  PTT:25.3 sec    Urinalysis Basic - ( 26 Dec 2024 08:31 )    Color: x / Appearance: x / SG: x / pH: x  Gluc: 105 mg/dL / Ketone: x  / Bili: x / Urobili: x   Blood: x / Protein: x / Nitrite: x   Leuk Esterase: x / RBC: x / WBC x   Sq Epi: x / Non Sq Epi: x / Bacteria: x    ------------------------------------------------------------------------------------------------------------    CRITICAL CARE:  [ ]Shock Present  [ ]Septic [ ]Cardiogenic [ ]Neurologic [ ]Hypovolemic [ ] Undifferentiated/Mixed   [ ]Vasopressors [ ]Inotropes     [ ]Respiratory failure present: [ ]Acute  [ ]Chronic [ ]Hypoxic  [ ]Hypercarbic   [ ]Mechanical ventilation   [ ]Trach collar   [ ]Non-invasive ventilatory support   [ ]High flow    [ ]Non-rebreather/Venti     [ ]Other organ failure     ------------------------------------------------------------------------------------------------------------    RADIOLOGY & ADDITIONAL STUDIES:    < from: MR Brain Stereotactic w/wo IV Cont (12.23.24 @ 17:11) >  IMPRESSION:    1.  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    < end of copied text >    ------------------------------------------------------------------------------------------------------------    ALLERGIES:  Allergies    penicillins (Unknown)    Intolerances        MEDICATIONS  (STANDING):  acetaminophen     Tablet .. 650 milliGRAM(s) Oral every 6 hours  chlorhexidine 2% Cloths 1 Application(s) Topical daily  dexAMETHasone  Injectable 4 milliGRAM(s) IV Push daily  latanoprost 0.005% Ophthalmic Solution 1 Drop(s) Both EYES at bedtime  levETIRAcetam 500 milliGRAM(s) Oral two times a day  polyethylene glycol 3350 17 Gram(s) Oral daily  senna 2 Tablet(s) Oral at bedtime  timolol 0.5% Solution 1 Drop(s) Both EYES daily    MEDICATIONS  (PRN):  oxyCODONE    IR 5 milliGRAM(s) Oral every 6 hours PRN Moderate Pain (4 - 6)  oxyCODONE    IR 10 milliGRAM(s) Oral every 6 hours PRN Severe Pain (7 - 10)           HPI:  Patient is a 79F with h/o small cell lung cancer with mets to the brain with vision problems, liver, bone (not on chemo- last chemo in April), L hip Fx in 2023, lives alone comes to ED as she was found by neighbor down on the floor in the afternoon of 12/20. She said she tripped and fell, but now sure.  She has been c/o pain in her right arm and right leg of the patient appear more contracted than usual over the last couple of weeks. She uses walker at home. Denies SOB, chest pain, fever, but some cough. As per daughter her chemo was stopped in April 2024 given advance disease.       PERTINENT PM/SXH:   Tobacco abuse    Alcohol abuse    Lung nodules    Liver lesion    H/O fracture of hip    History of fracture due to fall      History of repair of hip fracture      FAMILY HISTORY:  FH: HTN (hypertension)      ------------------------------------------------------------------------------------------------------------  ITEMS NOT CHECKED ARE NOT PRESENT    SOCIAL HISTORY:   Living Situation: [X ]Home  [ ]Long term care  [ ]Rehab [ ]Other  Support: Guillermo Sheldon    Substance hx:  [ ]   Tobacco hx:  [ ]   Alcohol hx: [ ]   Family Hx substance abuse [ ]yes [ ]no    Yarsani/Spirituality:  PCSSQ[Palliative Care Spiritual Screening Question]   Severity (0-10): 0  Score of 4 or > indicate consideration of Chaplaincy referral.  Chaplaincy Referral: [ ] yes [X ] refused [ ] following    Anticipatory Grief present?:  [ ] yes [X ] no  [ ] Deferred                      Other Referrals:    [ ]Hospice   [ ]Caregiver [place] Support  [ ]Child Life    [ ]Patient & Family Centered Care Referral  [ ]Holistic Therapy     ------------------------------------------------------------------------------------------------------------    PRESENT SYMPTOMS:     [ ] Unable to self-report      [ ] PAINADS     [ ] RDOS    [X ] No     [ ] Yes     Source if other than patient:  [ ]Family   [ ]Team     PAIN:   If blank, patient unable to specify     [ ]yes [ ]no    1. Location-   2. Radiation-    3. Quality-   4. Timing-   5. Minimal acceptable level/pain goal-   6. Aggravating factors-   7. QOL impact-     SYMPTOMS:   Dyspnea:                           [ ]Mild [ ]Moderate [ ]Severe  Anxiety:                             [ ]Mild [ ]Moderate [ ]Severe  Fatigue:                             [ ]Mild [ ]Moderate [ ]Severe  Nausea/Vomiting:              [ ]Mild [ ]Moderate [ ]Severe  Loss of appetite:                [ ]Mild [ ]Moderate [ ]Severe  Constipation:                     [ ]Mild [ ]Moderate [ ]Severe    Other Symptoms:  [X ]All other review of systems negative     ------------------------------------------------------------------------------------------------------------      I-Stop Reference No: [id number]    Prescription Information      PDI Filter:    PDI	Current Rx	Drug Type	Rx Written	Rx Dispensed	Drug	Quantity	Days Supply	Prescriber Name	Prescriber YUKO #	Payment Method	Dispenser  A	N	O	12/27/2023	12/27/2023	oxycodone hcl (ir) 5 mg tablet	90	15	Greta Quinteros	[id number]	Medicare	[id number]  A	N	B	06/05/2024	06/16/2024	lorazepam 0.5 mg tablet	10	5	Eitan Palomo A, NP	[id number]	Medicare	[id number]    ------------------------------------------------------------------------------------------------------------    FUNCTIONAL STATUS:     Baseline ADL (prior to admission):  [ ] Independent  [X ] Moderate Assistance [ ] Dependent    Palliative Performance Score (current): 30%    [X ]PPSV2 < or = to 30%   [ ]artificial nutrition      NUTRITIONAL STATUS:     Protein Calorie Malnutrition Present:   [ ]mild   [X ]moderate or severe    Weight:   [ ]underweight (BMI 18.5 or less)   [ ]morbid obesity (BMI 30 or higher)   [ ]anasarca  [X ]significant weight loss     Height (cm): 157.5 (12-20-24 @ 19:14), 157.5 (12-20-24 @ 14:07), 153 (04-25-24 @ 10:39)  Weight (kg): 61.2 (12-20-24 @ 19:14), 40.8 (12-20-24 @ 14:07), 46 (04-25-24 @ 10:39)  BMI (kg/m2): 24.7 (12-20-24 @ 19:14), 16.4 (12-20-24 @ 14:07), 19.7 (04-25-24 @ 10:39)    ------------------------------------------------------------------------------------------------------------    PRIOR ADVANCE DIRECTIVES:    [ ] DNR/MOLST    [ ] Living Will    [ ] Health Care Proxy(s)    DECISION MAKER(s):  [ ] Patient    [X ] Surrogate(s)- daughter Monalisa  [ ] HCP   [ ] Guardian             ------------------------------------------------------------------------------------------------------------    PHYSICAL EXAM:  Vital Signs Last 24 Hrs  T(C): 36.3 (26 Dec 2024 11:27), Max: 36.6 (25 Dec 2024 18:25)  T(F): 97.3 (26 Dec 2024 11:27), Max: 97.9 (25 Dec 2024 18:25)  HR: 75 (26 Dec 2024 11:27) (61 - 75)  BP: 118/56 (26 Dec 2024 11:27) (118/56 - 159/72)  BP(mean): 70 (26 Dec 2024 11:27) (70 - 70)  RR: 18 (26 Dec 2024 11:27) (17 - 19)  SpO2: 97% (26 Dec 2024 11:27) (97% - 99%)    Parameters below as of 26 Dec 2024 11:27  Patient On (Oxygen Delivery Method): room air     I&O's Summary    GENERAL:  [X ]Cachexia  [X ] Frail  [X ]Awake  [X ]Oriented   [ ]Lethargic  [ ]Unarousable  [X ]Verbal  [ ]Non-Verbal    BEHAVIORAL:   [ ] Anxiety  [ ] Delirium [ ] Agitation [ ] Other    HEENT:   [X ]Normal   [ ]Dry mouth   [ ]ET Tube/Trach  [ ]Oral lesions    PULMONARY:   [X ]Clear              [ ]Tachypnea  [ ]Audible excessive secretions   [ ]Rhonchi        [ ]Right [ ]Left [ ]Bilateral  [ ]Crackles        [ ]Right [ ]Left [ ]Bilateral  [ ]Wheezing     [ ]Right [ ]Left [ ]Bilateral  [ ]Diminished breath sounds [ ]right [ ]left [ ]bilateral    CARDIOVASCULAR:    [X ]Regular [ ]Irregular [ ]Tachy  [ ]Jose A [ ]Murmur [ ]Other    GASTROINTESTINAL:  [X ]Soft  [ ]Distended   [X ]+BS  [ ]Non tender [ ]Tender  [ ]Other [ ]PEG [ ]OGT/ NGT      GENITOURINARY:  [ ]Normal [X ] Incontinent   [ ]Oliguria/Anuria   [ ]Estevez    MUSCULOSKELETAL:   [ ]Normal   [ ]Weakness  [X ]Bed/Wheelchair bound [ ]Edema    NEUROLOGIC:   [X ]No focal deficits  [ ]Cognitive impairment  [ ]Dysphagia [ ]Dysarthria [ ]Paresis [ ]Other     SKIN:   [X ]Normal  [ ]Rash  [ ]Other  [ ]Pressure ulcer(s)       Present on admission [ ]y [ ]n    ------------------------------------------------------------------------------------------------------------    LABS:                        13.6   16.51 )-----------( 406      ( 26 Dec 2024 08:31 )             43.0   12-26    135  |  104  |  20  ----------------------------<  105[H]  4.6   |  19[L]  |  0.43[L]    Ca    9.1      26 Dec 2024 08:31  Phos  2.9     12-26  Mg     1.90     12-26    TPro  6.7  /  Alb  3.2[L]  /  TBili  0.3  /  DBili  x   /  AST  47[H]  /  ALT  78[H]  /  AlkPhos  79  12-26  PT/INR - ( 25 Dec 2024 07:38 )   PT: 11.1 sec;   INR: 0.96 ratio         PTT - ( 25 Dec 2024 07:38 )  PTT:25.3 sec    Urinalysis Basic - ( 26 Dec 2024 08:31 )    Color: x / Appearance: x / SG: x / pH: x  Gluc: 105 mg/dL / Ketone: x  / Bili: x / Urobili: x   Blood: x / Protein: x / Nitrite: x   Leuk Esterase: x / RBC: x / WBC x   Sq Epi: x / Non Sq Epi: x / Bacteria: x    ------------------------------------------------------------------------------------------------------------    CRITICAL CARE:  [ ]Shock Present  [ ]Septic [ ]Cardiogenic [ ]Neurologic [ ]Hypovolemic [ ] Undifferentiated/Mixed   [ ]Vasopressors [ ]Inotropes     [ ]Respiratory failure present: [ ]Acute  [ ]Chronic [ ]Hypoxic  [ ]Hypercarbic   [ ]Mechanical ventilation   [ ]Trach collar   [ ]Non-invasive ventilatory support   [ ]High flow    [ ]Non-rebreather/Venti     [ ]Other organ failure     ------------------------------------------------------------------------------------------------------------    RADIOLOGY & ADDITIONAL STUDIES:    < from: MR Brain Stereotactic w/wo IV Cont (12.23.24 @ 17:11) >  IMPRESSION:    1.  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    < end of copied text >    ------------------------------------------------------------------------------------------------------------    ALLERGIES:  Allergies    penicillins (Unknown)    Intolerances    MEDICATIONS  (STANDING):  acetaminophen     Tablet .. 650 milliGRAM(s) Oral every 6 hours  chlorhexidine 2% Cloths 1 Application(s) Topical daily  dexAMETHasone  Injectable 4 milliGRAM(s) IV Push daily  latanoprost 0.005% Ophthalmic Solution 1 Drop(s) Both EYES at bedtime  levETIRAcetam 500 milliGRAM(s) Oral two times a day  polyethylene glycol 3350 17 Gram(s) Oral daily  senna 2 Tablet(s) Oral at bedtime  timolol 0.5% Solution 1 Drop(s) Both EYES daily    MEDICATIONS  (PRN):  oxyCODONE    IR 5 milliGRAM(s) Oral every 6 hours PRN Moderate Pain (4 - 6)  oxyCODONE    IR 10 milliGRAM(s) Oral every 6 hours PRN Severe Pain (7 - 10)           HPI:  Patient is a 79F with h/o small cell lung cancer with mets to the brain with vision problems, liver, bone (not on chemo- last chemo in April), L hip Fx in 2023, lives alone comes to ED as she was found by neighbor down on the floor in the afternoon of 12/20. She said she tripped and fell, but now sure.  She has been c/o pain in her right arm and right leg of the patient appear more contracted than usual over the last couple of weeks. She uses walker at home. Denies SOB, chest pain, fever, but some cough. As per daughter her chemo was stopped in April 2024 given advance disease.     Patient seen this AM comfortable in no distress. Patient shares how she has been weak, not able to do much at home, usually sitting at home watching TV. She had not returned to oncology for treatment, said she just did not want to go back.       PERTINENT PM/SXH:   Tobacco abuse    Alcohol abuse    Lung nodules    Liver lesion    H/O fracture of hip    History of fracture due to fall      History of repair of hip fracture      FAMILY HISTORY:  FH: HTN (hypertension)      ------------------------------------------------------------------------------------------------------------  ITEMS NOT CHECKED ARE NOT PRESENT    SOCIAL HISTORY:   Living Situation: [X ]Home  [ ]Long term care  [ ]Rehab [ ]Other  Support: [facility] Sheldon    Substance hx:  [ ]   Tobacco hx:  [ ]   Alcohol hx: [ ]   Family Hx substance abuse [ ]yes [ ]no    Amish/Spirituality:  PCSSQ[Palliative Care Spiritual Screening Question]   Severity (0-10): 0  Score of 4 or > indicate consideration of Chaplaincy referral.  Chaplaincy Referral: [ ] yes [X ] refused [ ] following    Anticipatory Grief present?:  [ ] yes [X ] no  [ ] Deferred                      Other Referrals:    [ ]Hospice   [ ]Caregiver [place] Support  [ ]Child Life    [ ]Patient & Family Centered Care Referral  [ ]Holistic Therapy     ------------------------------------------------------------------------------------------------------------    PRESENT SYMPTOMS:     [ ] Unable to self-report      [ ] PAINADS     [ ] RDOS    [X ] No     [ ] Yes     Source if other than patient:  [ ]Family   [ ]Team     PAIN:   If blank, patient unable to specify     [ ]yes [ ]no    1. Location-   2. Radiation-    3. Quality-   4. Timing-   5. Minimal acceptable level/pain goal-   6. Aggravating factors-   7. QOL impact-     SYMPTOMS:   Dyspnea:                           [ ]Mild [ ]Moderate [ ]Severe  Anxiety:                             [ ]Mild [ ]Moderate [ ]Severe  Fatigue:                             [ ]Mild [ ]Moderate [ ]Severe  Nausea/Vomiting:              [ ]Mild [ ]Moderate [ ]Severe  Loss of appetite:                [ ]Mild [ ]Moderate [ ]Severe  Constipation:                     [ ]Mild [ ]Moderate [ ]Severe    Other Symptoms:  [X ]All other review of systems negative     ------------------------------------------------------------------------------------------------------------      I-Stop Reference No: [id number]    Prescription Information      PDI Filter:    PDI	Current Rx	Drug Type	Rx Written	Rx Dispensed	Drug	Quantity	Days Supply	Prescriber Name	Prescriber YUKO #	Payment Method	Dispenser  A	N	O	12/27/2023	12/27/2023	oxycodone hcl (ir) 5 mg tablet	90	15	Greta Quinteros	[id number]	Medicare	[id number]  A	N	B	06/05/2024	06/16/2024	lorazepam 0.5 mg tablet	10	5	Eitan Palomo A, LEAH	[id number]	Medicare	[id number]    ------------------------------------------------------------------------------------------------------------    FUNCTIONAL STATUS:     Baseline ADL (prior to admission):  [ ] Independent  [X ] Moderate Assistance [ ] Dependent    Palliative Performance Score (current): 30%    [X ]PPSV2 < or = to 30%   [ ]artificial nutrition      NUTRITIONAL STATUS:     Protein Calorie Malnutrition Present:   [ ]mild   [X ]moderate or severe    Weight:   [ ]underweight (BMI 18.5 or less)   [ ]morbid obesity (BMI 30 or higher)   [ ]anasarca  [X ]significant weight loss     Height (cm): 157.5 (12-20-24 @ 19:14), 157.5 (12-20-24 @ 14:07), 153 (04-25-24 @ 10:39)  Weight (kg): 61.2 (12-20-24 @ 19:14), 40.8 (12-20-24 @ 14:07), 46 (04-25-24 @ 10:39)  BMI (kg/m2): 24.7 (12-20-24 @ 19:14), 16.4 (12-20-24 @ 14:07), 19.7 (04-25-24 @ 10:39)    ------------------------------------------------------------------------------------------------------------    PRIOR ADVANCE DIRECTIVES:    [ ] DNR/MOLST    [ ] Living Will    [ ] Health Care Proxy(s)    DECISION MAKER(s):  [ ] Patient    [X ] Surrogate(s)- daughter Monalisa  [ ] HCP   [ ] Guardian             ------------------------------------------------------------------------------------------------------------    PHYSICAL EXAM:  Vital Signs Last 24 Hrs  T(C): 36.3 (26 Dec 2024 11:27), Max: 36.6 (25 Dec 2024 18:25)  T(F): 97.3 (26 Dec 2024 11:27), Max: 97.9 (25 Dec 2024 18:25)  HR: 75 (26 Dec 2024 11:27) (61 - 75)  BP: 118/56 (26 Dec 2024 11:27) (118/56 - 159/72)  BP(mean): 70 (26 Dec 2024 11:27) (70 - 70)  RR: 18 (26 Dec 2024 11:27) (17 - 19)  SpO2: 97% (26 Dec 2024 11:27) (97% - 99%)    Parameters below as of 26 Dec 2024 11:27  Patient On (Oxygen Delivery Method): room air     I&O's Summary    GENERAL:  [X ]Cachexia  [X ] Frail  [X ]Awake  [X ]Oriented   [ ]Lethargic  [ ]Unarousable  [X ]Verbal  [ ]Non-Verbal    BEHAVIORAL:   [ ] Anxiety  [ ] Delirium [ ] Agitation [ ] Other    HEENT:   [X ]Normal   [ ]Dry mouth   [ ]ET Tube/Trach  [ ]Oral lesions    PULMONARY:   [X ]Clear              [ ]Tachypnea  [ ]Audible excessive secretions   [ ]Rhonchi        [ ]Right [ ]Left [ ]Bilateral  [ ]Crackles        [ ]Right [ ]Left [ ]Bilateral  [ ]Wheezing     [ ]Right [ ]Left [ ]Bilateral  [ ]Diminished breath sounds [ ]right [ ]left [ ]bilateral    CARDIOVASCULAR:    [X ]Regular [ ]Irregular [ ]Tachy  [ ]Jose A [ ]Murmur [ ]Other    GASTROINTESTINAL:  [X ]Soft  [ ]Distended   [X ]+BS  [ ]Non tender [ ]Tender  [ ]Other [ ]PEG [ ]OGT/ NGT      GENITOURINARY:  [ ]Normal [X ] Incontinent   [ ]Oliguria/Anuria   [ ]Estevez    MUSCULOSKELETAL:   [ ]Normal   [ ]Weakness  [X ]Bed/Wheelchair bound [ ]Edema    NEUROLOGIC:   [X ]No focal deficits  [ ]Cognitive impairment  [ ]Dysphagia [ ]Dysarthria [ ]Paresis [ ]Other     SKIN:   [X ]Normal  [ ]Rash  [ ]Other  [ ]Pressure ulcer(s)       Present on admission [ ]y [ ]n    ------------------------------------------------------------------------------------------------------------    LABS:                        13.6   16.51 )-----------( 406      ( 26 Dec 2024 08:31 )             43.0   12-26    135  |  104  |  20  ----------------------------<  105[H]  4.6   |  19[L]  |  0.43[L]    Ca    9.1      26 Dec 2024 08:31  Phos  2.9     12-26  Mg     1.90     12-26    TPro  6.7  /  Alb  3.2[L]  /  TBili  0.3  /  DBili  x   /  AST  47[H]  /  ALT  78[H]  /  AlkPhos  79  12-26  PT/INR - ( 25 Dec 2024 07:38 )   PT: 11.1 sec;   INR: 0.96 ratio         PTT - ( 25 Dec 2024 07:38 )  PTT:25.3 sec    Urinalysis Basic - ( 26 Dec 2024 08:31 )    Color: x / Appearance: x / SG: x / pH: x  Gluc: 105 mg/dL / Ketone: x  / Bili: x / Urobili: x   Blood: x / Protein: x / Nitrite: x   Leuk Esterase: x / RBC: x / WBC x   Sq Epi: x / Non Sq Epi: x / Bacteria: x    ------------------------------------------------------------------------------------------------------------    CRITICAL CARE:  [ ]Shock Present  [ ]Septic [ ]Cardiogenic [ ]Neurologic [ ]Hypovolemic [ ] Undifferentiated/Mixed   [ ]Vasopressors [ ]Inotropes     [ ]Respiratory failure present: [ ]Acute  [ ]Chronic [ ]Hypoxic  [ ]Hypercarbic   [ ]Mechanical ventilation   [ ]Trach collar   [ ]Non-invasive ventilatory support   [ ]High flow    [ ]Non-rebreather/Venti     [ ]Other organ failure     ------------------------------------------------------------------------------------------------------------    RADIOLOGY & ADDITIONAL STUDIES:    < from: MR Brain Stereotactic w/wo IV Cont (12.23.24 @ 17:11) >  IMPRESSION:    1.  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    < end of copied text >    ------------------------------------------------------------------------------------------------------------    ALLERGIES:  Allergies    penicillins (Unknown)    Intolerances    MEDICATIONS  (STANDING):  acetaminophen     Tablet .. 650 milliGRAM(s) Oral every 6 hours  chlorhexidine 2% Cloths 1 Application(s) Topical daily  dexAMETHasone  Injectable 4 milliGRAM(s) IV Push daily  latanoprost 0.005% Ophthalmic Solution 1 Drop(s) Both EYES at bedtime  levETIRAcetam 500 milliGRAM(s) Oral two times a day  polyethylene glycol 3350 17 Gram(s) Oral daily  senna 2 Tablet(s) Oral at bedtime  timolol 0.5% Solution 1 Drop(s) Both EYES daily    MEDICATIONS  (PRN):  oxyCODONE    IR 5 milliGRAM(s) Oral every 6 hours PRN Moderate Pain (4 - 6)  oxyCODONE    IR 10 milliGRAM(s) Oral every 6 hours PRN Severe Pain (7 - 10)

## 2024-12-26 NOTE — PROGRESS NOTE ADULT - SUBJECTIVE AND OBJECTIVE BOX
INTERNAL MEDICINE PROGRESS NOTE    Malignant neoplasm metastatic to brain        MEGAN PARRY  |  [id number]      Patient is a 79y Female transferred from [facility] to [facility] for CT.       S: GUMARO overnight. Pt seen and evaluated bedside this AM. Pt resting comfortably on exam. Tolerating Diet. Ambulating and voiding without issue. Pain well controlled.    MEDICATIONS  (STANDING):  acetaminophen     Tablet .. 650 milliGRAM(s) Oral every 6 hours  dexAMETHasone  Injectable 4 milliGRAM(s) IV Push daily  latanoprost 0.005% Ophthalmic Solution 1 Drop(s) Both EYES at bedtime  levETIRAcetam 500 milliGRAM(s) Oral two times a day  polyethylene glycol 3350 17 Gram(s) Oral daily  senna 2 Tablet(s) Oral at bedtime  timolol 0.5% Solution 1 Drop(s) Both EYES daily    MEDICATIONS  (PRN):  oxyCODONE    IR 5 milliGRAM(s) Oral every 6 hours PRN Moderate Pain (4 - 6)  oxyCODONE    IR 10 milliGRAM(s) Oral every 6 hours PRN Severe Pain (7 - 10)      O:   Vital Signs Last 24 Hrs  T(C): 36.6 (26 Dec 2024 05:30), Max: 36.6 (25 Dec 2024 18:25)  T(F): 97.8 (26 Dec 2024 05:30), Max: 97.9 (25 Dec 2024 18:25)  HR: 64 (26 Dec 2024 05:30) (61 - 73)  BP: 159/72 (26 Dec 2024 05:30) (116/64 - 159/72)  BP(mean): --  RR: 19 (26 Dec 2024 05:30) (17 - 19)  SpO2: 99% (26 Dec 2024 05:30) (97% - 99%)    Parameters below as of 26 Dec 2024 05:30  Patient On (Oxygen Delivery Method): room air        PHYSICAL EXAM:  GENERAL: NAD, sick appearing, frail elderly woman  HEENT: clear conjunctiva, brusie noted on right eye   CHEST/LUNG: Breathing even, unlabored  HEART: Regular rate and rhythm  ABDOMEN: Soft, nondistended.  EXTREMITIES: good distal pulses b/l   NEURO:  No focal deficits                          12.6   12.70 )-----------( 401      ( 25 Dec 2024 07:38 )             40.0     12-25    136  |  105  |  16  ----------------------------<  118[H]  4.0   |  17[L]  |  0.43[L]    Ca    8.9      25 Dec 2024 07:38    TPro  5.5[L]  /  Alb  3.2[L]  /  TBili  0.3  /  DBili  x   /  AST  43[H]  /  ALT  55[H]  /  AlkPhos  74  12-25          IMAGING STUDIES:       INTERNAL MEDICINE PROGRESS NOTE    Malignant neoplasm metastatic to brain        MEGAN PARRY  |  [id number]      Patient is a 79y Female transferred from [facility] to [facility] for CT.       S: GUMAOR overnight. Pt seen and evaluated bedside this AM. Pt resting comfortably on exam. Tolerating Diet. Ambulating and voiding without issue. Pain well controlled.    MEDICATIONS  (STANDING):  acetaminophen     Tablet .. 650 milliGRAM(s) Oral every 6 hours  dexAMETHasone  Injectable 4 milliGRAM(s) IV Push daily  latanoprost 0.005% Ophthalmic Solution 1 Drop(s) Both EYES at bedtime  levETIRAcetam 500 milliGRAM(s) Oral two times a day  polyethylene glycol 3350 17 Gram(s) Oral daily  senna 2 Tablet(s) Oral at bedtime  timolol 0.5% Solution 1 Drop(s) Both EYES daily    MEDICATIONS  (PRN):  oxyCODONE    IR 5 milliGRAM(s) Oral every 6 hours PRN Moderate Pain (4 - 6)  oxyCODONE    IR 10 milliGRAM(s) Oral every 6 hours PRN Severe Pain (7 - 10)      O:   Vital Signs Last 24 Hrs  T(C): 36.6 (26 Dec 2024 05:30), Max: 36.6 (25 Dec 2024 18:25)  T(F): 97.8 (26 Dec 2024 05:30), Max: 97.9 (25 Dec 2024 18:25)  HR: 64 (26 Dec 2024 05:30) (61 - 73)  BP: 159/72 (26 Dec 2024 05:30) (116/64 - 159/72)  BP(mean): --  RR: 19 (26 Dec 2024 05:30) (17 - 19)  SpO2: 99% (26 Dec 2024 05:30) (97% - 99%)    Parameters below as of 26 Dec 2024 05:30  Patient On (Oxygen Delivery Method): room air        PHYSICAL EXAM:  GENERAL: NAD, sick appearing, frail elderly woman  HEENT: clear conjunctiva, brusie noted on right eye, bruise noted to the left portion of the zygoma.   CHEST/LUNG: Breathing even, unlabored  HEART: Regular rate and rhythm  ABDOMEN: Soft, nondistended.  EXTREMITIES: good distal pulses b/l. Bruise to right knee.  NEURO:  No focal deficits                          12.6   12.70 )-----------( 401      ( 25 Dec 2024 07:38 )             40.0     12-25    136  |  105  |  16  ----------------------------<  118[H]  4.0   |  17[L]  |  0.43[L]    Ca    8.9      25 Dec 2024 07:38    TPro  5.5[L]  /  Alb  3.2[L]  /  TBili  0.3  /  DBili  x   /  AST  43[H]  /  ALT  55[H]  /  AlkPhos  74  12-25          IMAGING STUDIES:

## 2024-12-26 NOTE — CONSULT NOTE ADULT - ASSESSMENT
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 She said she tripped and fell, but now sure.  She has been c/o pain in her right arm and right leg of the patient appear more contracted than usual over the last couple of weeks. She uses walker at home. Denies SOB, chest pain, fever, but some cough. As per daughter her chemo was stopped in April 2024 given advance disease.

## 2024-12-26 NOTE — PROGRESS NOTE ADULT - PROBLEM SELECTOR PLAN 1
Known met to frontal lobe (s/p chemo, RT) transferred from [facility] to [facility] for NSGY evaluation due to left pontine 2.7x2.2x2.2 expansile mostly hypodense mass. Recent MRI demonstrated left pontine lesion and a new left high medial parietal lobe measuring 0.9x0.9 cm.     24hr video EEG  Started on Keppra 500mg bid.   Decadron 4mg daily for edema.   Nsx: No intervention at this time, recommend to c/w Dex.   Rad-Onc consulted and discussed use of palliative radiation with patient and family for RT to the skull base 30Gy/10fx.     Patient arrived to [facility] for inpatient radiation to laura lesion. Palliative care consult started. Known met to frontal lobe (s/p chemo, RT) transferred from [facility] to [facility] for NSGY evaluation due to left pontine 2.7x2.2x2.2 expansile mostly hypodense mass. Recent MRI demonstrated left pontine lesion and a new left high medial parietal lobe measuring 0.9x0.9 cm.     Started on Keppra 500mg bid.   Decadron 4mg daily for edema.   Nsx: No intervention at this time, recommend to c/w Dex.   Rad-Onc consulted and discussed use of palliative radiation with patient and family for RT to the skull base 30Gy/10fx.     Patient arrived to [facility] for inpatient radiation to laura lesion. Palliative care consult started.

## 2024-12-26 NOTE — ADVANCED PRACTICE NURSE CONSULT - RECOMMEDATIONS
Topical recommendations:     Sacrum to b/l buttocks: Cleanse with skin cleanser, pat dry. Apply Romulo moisture barrier cream twice a day and PRN with incontinent episodes.     Left medial knee: Cleanse with NS, pat dry. Apply Liquid barrier film to periwound skin (allow to dry). Apply Medihoney gel to base of wound, cover with silicone foam with border. Change daily and PRN if soiled.     Apply Sween 24 Moisturizer to b/l UE and LE daily, avoiding in between the toes.     Continue low air loss bed therapy, continue heel elevation, continue to turn & reposition as per protocol, soft pillow between bony prominences, continue moisture management with barrier creams & single breathable pad, continue measures to decrease friction/shear/pressure. Continue with nutritional support as per dietary/orders.     Plan of care discussed with Primary RN Jose Ramon, patient, and     Please contact Wound/Ostomy Care Service Line if we can be of further assistance (ext [phone]).  Topical recommendations:     Sacrum to b/l buttocks: Cleanse with skin cleanser, pat dry. Apply Romulo moisture barrier cream twice a day and PRN with incontinent episodes.     Left medial knee: Cleanse with NS, pat dry. Apply Liquid barrier film to periwound skin (allow to dry). Apply Medihoney gel to base of wound, cover with silicone foam with border. Change daily and PRN if soiled.     Apply Sween 24 Moisturizer to b/l UE and LE daily, avoiding in between the toes.     Continue low air loss bed therapy, continue heel elevation, continue to turn & reposition as per protocol, soft pillow between bony prominences, continue moisture management with barrier creams & single breathable pad, continue measures to decrease friction/shear/pressure. Continue with nutritional support as per dietary/orders.     Plan of care discussed with Primary RN Jose Ramon, patient, and Bebeto Marquez (DDS).    Please contact Wound/Ostomy Care Service Line if we can be of further assistance (ext [phone]).

## 2024-12-26 NOTE — PROGRESS NOTE ADULT - ATTENDING COMMENTS
#Metastatic small cell lung CA s/p chemo   #Brain mets   -Recent MRI showed left pontine lesion and a new left high medial parietal lobe measuring 0.9x0.9 cm.   -Recent EEG negative for seizures   -c/w Keppra for seizure prophylaxis   -c/w Decadron 4mg daily for vasogenic edema  -no neurosurgical intervention   -Rad Onc for radiation therapy   -outpatient Onc f/up    #Fall   #R radial fracture s/p splint   #Fracture of R 5th and 7th ribs  #L3 compression fracture   -seen by ortho   -pain control   -LSO brace   -PT: rehab

## 2024-12-27 LAB
ALBUMIN SERPL ELPH-MCNC: 3.2 G/DL — LOW (ref 3.3–5)
ALP SERPL-CCNC: 84 U/L — SIGNIFICANT CHANGE UP (ref 40–120)
ALT FLD-CCNC: 72 U/L — HIGH (ref 4–33)
ANION GAP SERPL CALC-SCNC: 13 MMOL/L — SIGNIFICANT CHANGE UP (ref 7–14)
AST SERPL-CCNC: 39 U/L — HIGH (ref 4–32)
BASOPHILS # BLD AUTO: 0.14 K/UL — SIGNIFICANT CHANGE UP (ref 0–0.2)
BASOPHILS NFR BLD AUTO: 0.9 % — SIGNIFICANT CHANGE UP (ref 0–2)
BILIRUB SERPL-MCNC: 0.2 MG/DL — SIGNIFICANT CHANGE UP (ref 0.2–1.2)
BUN SERPL-MCNC: 21 MG/DL — SIGNIFICANT CHANGE UP (ref 7–23)
CALCIUM SERPL-MCNC: 9.3 MG/DL — SIGNIFICANT CHANGE UP (ref 8.4–10.5)
CHLORIDE SERPL-SCNC: 104 MMOL/L — SIGNIFICANT CHANGE UP (ref 98–107)
CO2 SERPL-SCNC: 19 MMOL/L — LOW (ref 22–31)
CREAT SERPL-MCNC: 0.46 MG/DL — LOW (ref 0.5–1.3)
EGFR: 98 ML/MIN/1.73M2 — SIGNIFICANT CHANGE UP
EOSINOPHIL # BLD AUTO: 0.4 K/UL — SIGNIFICANT CHANGE UP (ref 0–0.5)
EOSINOPHIL NFR BLD AUTO: 2.7 % — SIGNIFICANT CHANGE UP (ref 0–6)
GLUCOSE SERPL-MCNC: 85 MG/DL — SIGNIFICANT CHANGE UP (ref 70–99)
HCT VFR BLD CALC: 41.6 % — SIGNIFICANT CHANGE UP (ref 34.5–45)
HGB BLD-MCNC: 12.9 G/DL — SIGNIFICANT CHANGE UP (ref 11.5–15.5)
IANC: 11.06 K/UL — HIGH (ref 1.8–7.4)
IMM GRANULOCYTES NFR BLD AUTO: 2.1 % — HIGH (ref 0–0.9)
LYMPHOCYTES # BLD AUTO: 14.7 % — SIGNIFICANT CHANGE UP (ref 13–44)
LYMPHOCYTES # BLD AUTO: 2.21 K/UL — SIGNIFICANT CHANGE UP (ref 1–3.3)
MAGNESIUM SERPL-MCNC: 2 MG/DL — SIGNIFICANT CHANGE UP (ref 1.6–2.6)
MCHC RBC-ENTMCNC: 25.9 PG — LOW (ref 27–34)
MCHC RBC-ENTMCNC: 31 G/DL — LOW (ref 32–36)
MCV RBC AUTO: 83.4 FL — SIGNIFICANT CHANGE UP (ref 80–100)
MONOCYTES # BLD AUTO: 0.88 K/UL — SIGNIFICANT CHANGE UP (ref 0–0.9)
MONOCYTES NFR BLD AUTO: 5.9 % — SIGNIFICANT CHANGE UP (ref 2–14)
NEUTROPHILS # BLD AUTO: 11.06 K/UL — HIGH (ref 1.8–7.4)
NEUTROPHILS NFR BLD AUTO: 73.7 % — SIGNIFICANT CHANGE UP (ref 43–77)
NRBC # BLD: 0 /100 WBCS — SIGNIFICANT CHANGE UP (ref 0–0)
NRBC # FLD: 0 K/UL — SIGNIFICANT CHANGE UP (ref 0–0)
PHOSPHATE SERPL-MCNC: 3.2 MG/DL — SIGNIFICANT CHANGE UP (ref 2.5–4.5)
PLATELET # BLD AUTO: 404 K/UL — HIGH (ref 150–400)
POTASSIUM SERPL-MCNC: 4.4 MMOL/L — SIGNIFICANT CHANGE UP (ref 3.5–5.3)
POTASSIUM SERPL-SCNC: 4.4 MMOL/L — SIGNIFICANT CHANGE UP (ref 3.5–5.3)
PROT SERPL-MCNC: 5.8 G/DL — LOW (ref 6–8.3)
RBC # BLD: 4.99 M/UL — SIGNIFICANT CHANGE UP (ref 3.8–5.2)
RBC # FLD: 14.1 % — SIGNIFICANT CHANGE UP (ref 10.3–14.5)
SODIUM SERPL-SCNC: 136 MMOL/L — SIGNIFICANT CHANGE UP (ref 135–145)
WBC # BLD: 15.01 K/UL — HIGH (ref 3.8–10.5)
WBC # FLD AUTO: 15.01 K/UL — HIGH (ref 3.8–10.5)

## 2024-12-27 PROCEDURE — 99233 SBSQ HOSP IP/OBS HIGH 50: CPT

## 2024-12-27 PROCEDURE — 77334 RADIATION TREATMENT AID(S): CPT | Mod: 26

## 2024-12-27 PROCEDURE — 77307 TELETHX ISODOSE PLAN CPLX: CPT | Mod: 26

## 2024-12-27 PROCEDURE — 99232 SBSQ HOSP IP/OBS MODERATE 35: CPT | Mod: GC

## 2024-12-27 RX ORDER — DEXAMETHASONE SODIUM PHOSPHATE 4 MG/ML
4 VIAL (ML) INJECTION
Refills: 0 | Status: DISCONTINUED | OUTPATIENT
Start: 2024-12-28 | End: 2025-01-06

## 2024-12-27 RX ORDER — HALOPERIDOL DECANOATE 50 MG/ML
1 INJECTION INTRAMUSCULAR ONCE
Refills: 0 | Status: COMPLETED | OUTPATIENT
Start: 2024-12-27 | End: 2024-12-27

## 2024-12-27 RX ORDER — DEXAMETHASONE SODIUM PHOSPHATE 4 MG/ML
10 VIAL (ML) INJECTION ONCE
Refills: 0 | Status: COMPLETED | OUTPATIENT
Start: 2024-12-27 | End: 2024-12-29

## 2024-12-27 RX ADMIN — ACETAMINOPHEN 650 MILLIGRAM(S): 80 SOLUTION/ DROPS ORAL at 00:43

## 2024-12-27 RX ADMIN — LATANOPROST 1 DROP(S): 50 SOLUTION OPHTHALMIC at 23:06

## 2024-12-27 RX ADMIN — LEVETIRACETAM 500 MILLIGRAM(S): 100 SOLUTION ORAL at 23:05

## 2024-12-27 RX ADMIN — ACETAMINOPHEN 650 MILLIGRAM(S): 80 SOLUTION/ DROPS ORAL at 05:30

## 2024-12-27 RX ADMIN — Medication 4 MILLIGRAM(S): at 05:30

## 2024-12-27 RX ADMIN — LEVETIRACETAM 500 MILLIGRAM(S): 100 SOLUTION ORAL at 05:30

## 2024-12-27 RX ADMIN — ACETAMINOPHEN 650 MILLIGRAM(S): 80 SOLUTION/ DROPS ORAL at 12:45

## 2024-12-27 RX ADMIN — CHLORHEXIDINE GLUCONATE 1 APPLICATION(S): 1.2 RINSE ORAL at 12:44

## 2024-12-27 RX ADMIN — HALOPERIDOL DECANOATE 1 MILLIGRAM(S): 50 INJECTION INTRAMUSCULAR at 23:05

## 2024-12-27 RX ADMIN — Medication 1 DROP(S): at 23:05

## 2024-12-27 NOTE — PROGRESS NOTE ADULT - PROBLEM SELECTOR PLAN 1
Known met to frontal lobe (s/p chemo, RT) transferred from [facility] to [facility] for NSGY evaluation due to left pontine 2.7x2.2x2.2 expansile mostly hypodense mass. Recent MRI demonstrated left pontine lesion and a new left high medial parietal lobe measuring 0.9x0.9 cm.     Started on Keppra 500mg bid.   Decadron 4mg daily for edema.   Nsx: No intervention at this time, recommend to c/w Dex.   Rad-Onc consulted and discussed use of palliative radiation with patient and family for RT to the skull base 30Gy/10fx.     Patient arrived to [facility] for inpatient radiation to laura lesion. Palliative care consult started. Known met to frontal lobe (s/p chemo, RT) transferred from [facility] to [facility] for NSGY evaluation due to left pontine 2.7x2.2x2.2 expansile mostly hypodense mass. Recent MRI demonstrated left pontine lesion and a new left high medial parietal lobe measuring 0.9x0.9 cm.     - Started on Keppra 500mg bid.   - Today 12/27 additional Decadron 10mg ordered, standing Decadron increased to 4mg BID  - Nsx: No intervention at this time, recommend to c/w Dex.   - Rad-Onc consulted and discussed use of palliative radiation with patient and family for RT to the skull base 30Gy/10fx.     Patient arrived to [facility] for inpatient radiation to laura lesion. Palliative care consult started.

## 2024-12-27 NOTE — PROGRESS NOTE ADULT - SUBJECTIVE AND OBJECTIVE BOX
Patient is a 79y old  Female who presents with a chief complaint of Radiation therapy (26 Dec 2024 16:03)      INTERVAL HX:  No acute overnight events. Pt seen and examined at bedside. ROS otherwise negative   Allergies:  penicillins (Unknown)    Medications:  acetaminophen     Tablet .. 650 milliGRAM(s) Oral every 6 hours  chlorhexidine 2% Cloths 1 Application(s) Topical daily  dexAMETHasone  Injectable 4 milliGRAM(s) IV Push daily  latanoprost 0.005% Ophthalmic Solution 1 Drop(s) Both EYES at bedtime  levETIRAcetam 500 milliGRAM(s) Oral two times a day  oxyCODONE    IR 5 milliGRAM(s) Oral every 6 hours PRN  oxyCODONE    IR 10 milliGRAM(s) Oral every 6 hours PRN  polyethylene glycol 3350 17 Gram(s) Oral daily  senna 2 Tablet(s) Oral at bedtime  timolol 0.5% Solution 1 Drop(s) Both EYES daily    Vitals:  T(C): 36.3 (12-27-24 @ 01:07), Max: 36.3 (12-26-24 @ 11:27)  HR: 62 (12-27-24 @ 01:07) (62 - 75)  BP: 147/72 (12-27-24 @ 01:07) (118/56 - 147/72)  RR: 17 (12-27-24 @ 01:07) (17 - 18)  SpO2: 100% (12-27-24 @ 01:07) (97% - 100%)  I/O's:    12-26-24 @ 07:01  -  12-27-24 @ 07:00  --------------------------------------------------------  IN: 0 mL / OUT: 900 mL / NET: -900 mL      Physical Exam:    Labs:                        13.6   16.51 )-----------( 406      ( 26 Dec 2024 08:31 )             43.0     12-26    135  |  104  |  20  ----------------------------<  105[H]  4.6   |  19[L]  |  0.43[L]    Ca    9.1      26 Dec 2024 08:31  Phos  2.9     12-26  Mg     1.90     12-26    TPro  6.7  /  Alb  3.2[L]  /  TBili  0.3  /  DBili  x   /  AST  47[H]  /  ALT  78[H]  /  AlkPhos  79  12-26    PT/INR - ( 25 Dec 2024 07:38 )   PT: 11.1 sec;   INR: 0.96 ratio         PTT - ( 25 Dec 2024 07:38 )  PTT:25.3 sec    Radiology/Procedures: Reviewed.   Patient is a 79y old  Female who presents with a chief complaint of Radiation therapy (26 Dec 2024 16:03)      INTERVAL HX:  No acute overnight events. Pt seen and examined at bedside this morning, no acute medical complaints elicited, states she is tired and would like to go back to sleep.     Allergies:  penicillins (Unknown)    Medications:  acetaminophen     Tablet .. 650 milliGRAM(s) Oral every 6 hours  chlorhexidine 2% Cloths 1 Application(s) Topical daily  dexAMETHasone  Injectable 4 milliGRAM(s) IV Push daily  latanoprost 0.005% Ophthalmic Solution 1 Drop(s) Both EYES at bedtime  levETIRAcetam 500 milliGRAM(s) Oral two times a day  oxyCODONE    IR 5 milliGRAM(s) Oral every 6 hours PRN  oxyCODONE    IR 10 milliGRAM(s) Oral every 6 hours PRN  polyethylene glycol 3350 17 Gram(s) Oral daily  senna 2 Tablet(s) Oral at bedtime  timolol 0.5% Solution 1 Drop(s) Both EYES daily    Vitals:  T(C): 36.3 (12-27-24 @ 01:07), Max: 36.3 (12-26-24 @ 11:27)  HR: 62 (12-27-24 @ 01:07) (62 - 75)  BP: 147/72 (12-27-24 @ 01:07) (118/56 - 147/72)  RR: 17 (12-27-24 @ 01:07) (17 - 18)  SpO2: 100% (12-27-24 @ 01:07) (97% - 100%)  I/O's:    12-26-24 @ 07:01  -  12-27-24 @ 07:00  --------------------------------------------------------  IN: 0 mL / OUT: 900 mL / NET: -900 mL      Physical Exam:  GENERAL: NAD, chronically-ill appearing, resting in bed  HEENT: clear conjunctiva, brusie noted on right eye, bruise noted to the left portion of the zygoma  CV: +S1/S2, RRR, no m/r/g  PULM: CTAB, no rales/rhonchi/wheezing  ABDOMEN: Soft, nondistended, nontender  EXTREMITIES: no peripheral edema  NEURO:  A&Ox3    Labs:                        13.6   16.51 )-----------( 406      ( 26 Dec 2024 08:31 )             43.0     12-26    135  |  104  |  20  ----------------------------<  105[H]  4.6   |  19[L]  |  0.43[L]    Ca    9.1      26 Dec 2024 08:31  Phos  2.9     12-26  Mg     1.90     12-26    TPro  6.7  /  Alb  3.2[L]  /  TBili  0.3  /  DBili  x   /  AST  47[H]  /  ALT  78[H]  /  AlkPhos  79  12-26    PT/INR - ( 25 Dec 2024 07:38 )   PT: 11.1 sec;   INR: 0.96 ratio         PTT - ( 25 Dec 2024 07:38 )  PTT:25.3 sec    Radiology/Procedures: Reviewed.

## 2024-12-27 NOTE — PROGRESS NOTE ADULT - PROBLEM SELECTOR PLAN 2
Extensive stage small cell lung cancer   - Presented with a fall and hip fracture in September 2023 and on further work it was found to have a right upper lobe nodule.   - PET/CT revealed multifocal hepatic metastases, multifocal osteolytic pulmonary and left adrenal metastases, left orbital fossa mass eroding the skull base with concern for intracranial extension.   - Liver biopsy 12/23 was consistent with metastatic small cell carcinoma  - Her MRI was recently completed and revealed multiple rim enhancing lesions in the frontal and parietal lobes.  - Completed C5 carbo/etop/tecentriq. Was planned to transition to maintenace Tecentriq but patient did not follow up. Extensive stage small cell lung cancer     - Presented with a fall and hip fracture in September 2023 and on further work it was found to have a right upper lobe nodule.   - PET/CT revealed multifocal hepatic metastases, multifocal osteolytic pulmonary and left adrenal metastases, left orbital fossa mass eroding the skull base with concern for intracranial extension.   - Liver biopsy 12/23 was consistent with metastatic small cell carcinoma  - Her MRI was recently completed and revealed multiple rim enhancing lesions in the frontal and parietal lobes.  - Completed C5 carbo/etop/tecentriq. Was planned to transition to maintenace Tecentriq but patient did not follow up.

## 2024-12-27 NOTE — PROGRESS NOTE ADULT - ATTENDING COMMENTS
#Metastatic small cell lung CA s/p chemo   #Brain mets w/ vasogenic edema   -Recent MRI showed left pontine lesion and a new left high medial parietal lobe measuring 0.9x0.9 cm.   -Recent EEG negative for seizures   -c/w Keppra for seizure prophylaxis   -spoke w/ neuro-onc, will give Decadron 10mg IV x 1 and increase Decadron to 4mg IV BID   -no neurosurgical intervention   -inpatient radiation therapy as per Rad-Onc   -outpatient Onc f/up    #Fall   #R radial fracture s/p splint   #Fracture of R 5th and 7th ribs  #L3 compression fracture   -seen by ortho   -pain control   -LSO brace   -PT: rehab #Metastatic small cell lung CA s/p chemo   #Brain mets w/ vasogenic edema   -Recent MRI showed left pontine lesion and a new left high medial parietal lobe measuring 0.9x0.9 cm.   -Recent EEG negative for seizures   -c/w Keppra for seizure prophylaxis   -spoke w/ neuro-onc, will give Decadron 10mg IV x 1 and increase Decadron to 4mg IV BID   -no neurosurgical intervention   -inpatient radiation therapy as per Rad-Onc   -outpatient Onc f/up    #Fall   #R radial fracture s/p splint   #Fracture of R 5th and 7th ribs  #L3 compression fracture   -seen by ortho   -pain control   -LSO brace   -PT: rehab    #Leukocytosis  -likely d/t steroids, no other signs of infection   -monitor off antibiotics

## 2024-12-27 NOTE — DISCHARGE NOTE PROVIDER - CARE PROVIDER_API CALL
Brandt Montalvo.  [facility]  [address] [place] [address]  Phone: [phone]  Fax: [phone]  Established Patient  Follow Up Time: 1 week

## 2024-12-27 NOTE — DISCHARGE NOTE PROVIDER - NSDCCPCAREPLAN_GEN_ALL_CORE_FT
PRINCIPAL DISCHARGE DIAGNOSIS  Diagnosis: Metastatic cancer to brain  Assessment and Plan of Treatment: You were found to have metastatic disease to your brain and you were offered radiation      SECONDARY DISCHARGE DIAGNOSES  Diagnosis: Fracture of two ribs of right side  Assessment and Plan of Treatment: You have fractured ribs due to your fall    Diagnosis: Right radial fracture  Assessment and Plan of Treatment: You have a fractured wrist due to your fall    Diagnosis: Small cell lung cancer  Assessment and Plan of Treatment:      PRINCIPAL DISCHARGE DIAGNOSIS  Diagnosis: Metastatic cancer to brain  Assessment and Plan of Treatment: You were found to have metastatic disease to your brain and you were offered radiation but the plan is to focus on comfort care. please follow up with your palliative doctor.      SECONDARY DISCHARGE DIAGNOSES  Diagnosis: Small cell lung cancer  Assessment and Plan of Treatment:     Diagnosis: Fracture of two ribs of right side  Assessment and Plan of Treatment: You have fractured ribs due to your fall    Diagnosis: Right radial fracture  Assessment and Plan of Treatment: You have a fractured wrist due to your fall     PRINCIPAL DISCHARGE DIAGNOSIS  Diagnosis: Metastatic cancer to brain  Assessment and Plan of Treatment: You were found to have metastatic disease to your brain and you were offered radiation but the plan is to focus on comfort care. please follow up with your palliative doctor.

## 2024-12-27 NOTE — DISCHARGE NOTE PROVIDER - NSDCCPTREATMENT_GEN_ALL_CORE_FT
PRINCIPAL PROCEDURE  Procedure: MRI head  Findings and Treatment: A ring-enhancing necrotic mass is noted in the left laura, measuring 3.2 x   2.6 x 2.5 cm. There is mild vasogenic edema surrounding the mass. There   is restricted diffusion in the solid enhancing portion of themass,   consistent with high cellularity. The DTI shows radial dispersion of   traversing white matter fibers in all directions. There is expansion of   the laura and mild mass effect on the fourth ventricle, without   hydrocephalus.  There are at least 3 additional necrotic ring-enhancing lesions in the   left parietal lobe parasagittally and in the left supramarginal gyrus,   measuring between 8-11 mm. There is mild vasogenic edema surrounding the   largest lesion at the left parietal vertex.  The appearance is consistent with metastatic disease. A pontine glioma   with malignant degeneration and hematogenous spread is also a   consideration.  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 This is an anomalous vein that drains normal brain tissue, and   should be considered a normal variant. There is no evidence of an   associated cavernoma.  Moderate generalized cerebral volume loss, with distention of the sulci   and concomitant ex-vacuo ventricular dilatation. Mild nonspecific   T2/FLAIR hyperintensity in the periventricular and subcortical white   matter.  No acute intracranial hemorrhage. No midline shift or herniation. No   acute ischemia. Intracranial flow voids are patent. The cerebellar   tonsils are normally positioned. The dural venous sinuses are patent,   although this examination was not optimized to evaluate the vasculature.  Limited views of the sinuses and mastoids show mild mucosal thickening   without air-fluid levels, likely chronic.  There are degenerative changes with narrowing of the central canal in the   cervical spine, not fully evaluated here.  IMPRESSION:  1.  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        SECONDARY PROCEDURE  Procedure: Complete x-ray of femur  Findings and Treatment:   FINDINGS:  PELVIS:  No acute displaced fracture or dislocation.  The sacroiliac joints and pubic symphysis remain intact. There are   arthritic changes at the pubic symphysis.  Intact pelvic and obturatorrings.  Faint mineral densities overlying the right lower quadrant, potentially   ingested material versus external to the patient.  Mild degenerative changes.  RIGHT:  No acute fracture or dislocation.  No knee or ankle joint effusion.  Spaces are well preserved.  Mild soft tissue swelling about the patellar tendon.  Tarsometatarsal alignment preserved.  There is an acute spiral fracture of the fifth proximal phalanx.  No evidence for a Lisfranc injury.  LEFT:  No acute fracture or dislocation.  Left hip arthroplasty. No evidence of hardware loosening.  Dystrophic soft tissue calcification/ossification seen just lateral to   the joint.  No knee joint effusion. Soft tissue swelling about the medial aspect of   the left knee joint and the patellar tendon.  IMPRESSION:  No acute displaced fracture or dislocation the pelvis.  Acute spiral fracture of the fifth proximal phalanx.  No fracture or dislocation of the left lower extremity to the level of   the knee.  Mild soft tissue swelling about both knees.  --- End of Report ---      Procedure: MRI lumbar spine wo contrast  Findings and Treatment: IMPRESSION:  Very limited diagnostic exam secondary to patient condition. Only coronal   T1 and sagittal T2-weighted sequences were obtained and are severely   motion degraded. There is severe levoscoliosis of thelumbar spine apex   at L2. No high-grade spinal canal stenosis. No definite acute fracture of   the lumbar spine.  --- End of Report ---

## 2024-12-27 NOTE — PROGRESS NOTE ADULT - PROBLEM SELECTOR PLAN 8
DVT: SCDs for now   Diet: Regular with Ensure HP TID  PT: MANPREET  Dispo: -     Consulted: Palliative care, Rad Onc aware of pt's arrival.  Patient to follow up with ortho when discharged with Dr. Madrid to complete MRI imaging. DVT: SCDs for now   Diet: Regular with Ensure HP TID  PT: MANPREET  Dispo: - active    Consulted: Palliative care, Rad Onc aware of pt's arrival.  Patient to follow up with ortho when discharged with Dr. Madrid to complete MRI imaging.

## 2024-12-27 NOTE — PROGRESS NOTE ADULT - SUBJECTIVE AND OBJECTIVE BOX
NEURO-ONCOLOGY    79f with SCLC  she had carbo/etop in spring of 2024 but was lost to follow up  she had SRS to a single right frontal lesion by Dr. Howell in 6/24  presented from home after fall  found to have a large pontine met, and smaller lesions in left frontal lobe, 4 in all   Clinically weak and debilitated but no cranial neuropathies   transferred to [facility] for inpatient RT    INTERVAL HISTORY:  No RT as yet.  Endorses difficulty looking to left.     MEDICATIONS  (STANDING):  acetaminophen     Tablet .. 650 milliGRAM(s) Oral every 6 hours  chlorhexidine 2% Cloths 1 Application(s) Topical daily  dexAMETHasone  Injectable 4 milliGRAM(s) IV Push daily  latanoprost 0.005% Ophthalmic Solution 1 Drop(s) Both EYES at bedtime  levETIRAcetam 500 milliGRAM(s) Oral two times a day  polyethylene glycol 3350 17 Gram(s) Oral daily  senna 2 Tablet(s) Oral at bedtime  timolol 0.5% Solution 1 Drop(s) Both EYES daily        (Exam as noted with exceptions in parentheses)    General: frail woman, no distress    Mental Status:  Awake, alert and attentive. Oriented to person, place and time. Recent and remote memory intact. Normal concentration. Fluent spontaneous speech with intact naming and repetition. Normal fund of knowledge.      Cranial Nerves: II: Full visual fields. III,IV,VI:Pupils round, reactive to light. Full extraocular movements. No nystagmus. V: Normal bilateral bite, facial sensation. VII: No facial weakness. VIII: Hearing intact. IX,X: Palate midline, intact gag. XI:  Sternocleidomastoids normal. XII: Tongue protrudes midline. No dysarthria.  (she has left gaze palsy, unable to track or saccade left with either eye past midline)     Motor:  Normal tone, bulk and power throughout including arms and legs, proximal and distal. No pronator drift. No abnormal movements.   (bilat mild DF weakness)    Sensation: Normal in arms, legs and trunk to pin, proprioception and vibration. Negative Romberg.     Coordination: No dysmetria or dysdiadochokinesis bilaterally. Normal heel-shin testing.     Gait: Normal including heel and toe walking. Normal station.  (not walked for safety)    Reflexes: Normoactive and symmetric throughout. Absent Babinski bilaterally.       NEUROIMAGING: personally reviewed as above

## 2024-12-27 NOTE — PROGRESS NOTE ADULT - PROBLEM SELECTOR PLAN 6
Has been on oxycodone at home  - will c/w Oxycodone 5mg PO q 6 hrs prn and oxycodone 10mg q 6 hr prn for severe pain  - Bowel regimens. Has been on oxycodone at home  - will c/w Oxycodone 5mg PO q 6 hrs prn and oxycodone 10mg q 6 hr prn for severe pain  - f/u palliative care recommendations  - Bowel regimens.

## 2024-12-27 NOTE — DISCHARGE NOTE PROVIDER - ATTENDING DISCHARGE PHYSICAL EXAMINATION:
VITAL SIGNS:  T(C): 36.7 (01-07-25 @ 08:18), Max: 36.7 (01-06-25 @ 22:37)  T(F): 98 (01-07-25 @ 08:18), Max: 98.1 (01-06-25 @ 22:37)  HR: 76 (01-07-25 @ 08:18) (70 - 76)  BP: 140/75 (01-07-25 @ 08:18) (107/61 - 140/75)  BP(mean): --  RR: 18 (01-07-25 @ 08:18) (18 - 18)  SpO2: 96% (01-07-25 @ 08:18) (96% - 99%)  Wt(kg): --    PHYSICAL EXAM:  Constitutional: NAD  Neck: supple; no JVD  Respiratory: CTA B/L; no W/R/R  Cardiac: +S1/S2; RRR; no M/R/G  Gastrointestinal: soft, NT/ND; no rebound or guarding; +BSx4  Extremities: WWP, no edema  Neurologic: AAOx2

## 2024-12-27 NOTE — DISCHARGE NOTE PROVIDER - NSDCMRMEDTOKEN_GEN_ALL_CORE_FT
acetaminophen 325 mg oral tablet: 2 tab(s) orally every 6 hours As needed Mild Pain (1 - 3)  dexAMETHasone 4 mg oral tablet: 1 tab(s) orally once a day  latanoprost 0.005% ophthalmic solution: 1 drop(s) in each eye once a day (at bedtime)  levETIRAcetam 100 mg/mL intravenous solution: 5 milliliter(s) intravenous every 12 hours  LSO brace: Dx L3 compression fracture  oxyCODONE 5 mg oral tablet: 1 tab(s) orally every 6 hours As needed Moderate Pain (4 - 6)  polyethylene glycol 3350 oral powder for reconstitution: 17 gram(s) orally once a day  senna leaf extract oral tablet: 2 tab(s) orally once a day (at bedtime)  timolol hemihydrate 0.5% ophthalmic solution: 1 drop(s) in each affected eye once a day   acetaminophen 325 mg oral tablet: 2 tab(s) orally every 6 hours As needed Mild Pain (1 - 3)  dexAMETHasone 4 mg oral tablet: 1 tab(s) orally once a day  fluticasone 50 mcg/inh nasal spray: 1 spray(s) nasal 2 times a day  latanoprost 0.005% ophthalmic solution: 1 drop(s) in each eye once a day (at bedtime)  levETIRAcetam 500 mg oral tablet: 1 tab(s) orally 2 times a day  mupirocin 2% topical ointment: 1 application in each nostril 2 times a day Last day is 1/11/2025.  oxyCODONE 10 mg oral tablet: 1 tab(s) orally every 6 hours As needed Severe Pain (7 - 10)  oxyCODONE 5 mg oral tablet: 1 tab(s) orally every 6 hours As needed Moderate Pain (4 - 6)  polyethylene glycol 3350 oral powder for reconstitution: 17 gram(s) orally once a day  senna leaf extract oral tablet: 2 tab(s) orally once a day (at bedtime)  timolol hemihydrate 0.5% ophthalmic solution: 1 drop(s) in each affected eye once a day

## 2024-12-27 NOTE — DISCHARGE NOTE PROVIDER - HOSPITAL COURSE
HPI:  Patient is a 79F with h/o small cell lung cancer with mets to the brain with vision problems, liver, bone (not on chemo- last chemo in April), L hip Fx in 2023, lives alone comes to ED as she was found by neighbor down on the floor in the afternoon of 12/20. She said she tripped and fell, but now sure.  She has been c/o pain in her right arm and right leg of the patient appear more contracted than usual over the last couple of weeks. She uses walker at home. Denies SOB, chest pain, fever, but some cough. As per daughter her chemo was stopped in April 2024 given advance disease. (24 Dec 2024 17:08)    Hospital Course:  Presented to [facility] ER after being found on floor w/ right sided weakness/pain in R arm and leg. Imaging showing acute spiral fracture of fifth proximal phlanx, acute right anterior 5th and 7th minimally displaced anterior rib fractures and possibly acute  mild L3 vertebral body compression fracture. MRI brain without contrast redemonstrated the expansile mass lesion centered in the left hemipons with surrounding vasogenic edema and probable hemorrhagic component. Additional metastatic lesions in the high left medial frontal lobe for which she previously had SRS 20 Gy in 1 fraction. MRI brain with contrast showed a ring-enhancing necrotic mass in the left laura, measuring 3.2 x 2.6 x 2.5 cm with mild vasogenic edema with expansion of the lanny and mild mass effect on the fourth ventricle without hydrocephalous. There were also 3 additional necrotic ring-enhancing lesions in the left parietal lobe parasagitally and in the left supramarginal gyrus. Transferred from [facility] to [facility] for inpatient radiation to the skull base, with plan for simulation CT. Evaluated by rad/onc, palliative care, and neurology while inpatient.       The patient is afebrile, hemodynamically stable and medically optimized for discharge to ____ with ____ followup.  On day of discharge, patient is clinically stable with no new exam findings or acute symptoms compared to prior. The patient was seen by the attending physician on the date of discharge and deemed stable and acceptable for discharge. The patient's chronic medical conditions were treated accordingly per the patient's home medication regimen. The patient's medication reconciliation (with changes made to chronic medications), follow up appointments, discharge orders, instructions, and significant lab and diagnostic studies are as noted.    Important Medication Changes and Reason:    Active or Pending Issues Requiring Follow-up:    Advanced Directives:   [ ] Full code  [ ] DNR  [ ] Hospice    Discharge Diagnoses:         HPI:  Patient is a 79F with h/o small cell lung cancer with mets to the brain with vision problems, liver, bone (not on chemo- last chemo in April), L hip Fx in 2023, lives alone comes to ED as she was found by neighbor down on the floor in the afternoon of 12/20. She said she tripped and fell, but now sure.  She has been c/o pain in her right arm and right leg of the patient appear more contracted than usual over the last couple of weeks. She uses walker at home. Denies SOB, chest pain, fever, but some cough. As per daughter her chemo was stopped in April 2024 given advance disease. (24 Dec 2024 17:08)    Hospital Course:  Presented to [facility] ER after being found on floor w/ right sided weakness/pain in R arm and leg. Imaging showing acute spiral fracture of fifth proximal phlanx, acute right anterior 5th and 7th minimally displaced anterior rib fractures and possibly acute  mild L3 vertebral body compression fracture. MRI brain without contrast redemonstrated the expansile mass lesion centered in the left hemipons with surrounding vasogenic edema and probable hemorrhagic component. Additional metastatic lesions in the high left medial frontal lobe for which she previously had SRS 20 Gy in 1 fraction. MRI brain with contrast showed a ring-enhancing necrotic mass in the left laura, measuring 3.2 x 2.6 x 2.5 cm with mild vasogenic edema with expansion of the lanny and mild mass effect on the fourth ventricle without hydrocephalous. There were also 3 additional necrotic ring-enhancing lesions in the left parietal lobe parasagitally and in the left supramarginal gyrus. Transferred from [facility] to [facility] for inpatient radiation to the skull base, with plan for simulation CT. Evaluated by rad/onc, palliative care, and neurology while inpatient. Pt intermittently refused RT, cont to offer for cycle.  Daughter to speak with onc about poss treatment options ubt is aware there is likely no further systemic treatment being offered.  Plan will likely be for rehab with transition to hospice when appropriate        Important Medication Changes and Reason:    Active or Pending Issues Requiring Follow-up:    Advanced Directives:   [ ] Full code  [ X] DNR  [ ] Hospice    Discharge Diagnoses:         HPI:  Patient is a 79F with h/o small cell lung cancer with mets to the brain with vision problems, liver, bone (not on chemo- last chemo in April), L hip Fx in 2023, lives alone comes to ED as she was found by neighbor down on the floor in the afternoon of 12/20. She said she tripped and fell, but not sure.  She has been c/o pain in her right arm and right leg of the patient appear more contracted than usual over the last couple of weeks. She uses walker at home. Denies SOB, chest pain, fever, but some cough. As per daughter her chemo was stopped in April 2024 given advance disease. (24 Dec 2024 17:08)    Hospital Course:  Presented to [facility] ER after being found on floor w/ right sided weakness/pain in R arm and leg. Imaging showing acute spiral fracture of fifth proximal phlanx, acute right anterior 5th and 7th minimally displaced anterior rib fractures and possibly acute  mild L3 vertebral body compression fracture. MRI brain without contrast redemonstrated the expansile mass lesion centered in the left hemipons with surrounding vasogenic edema and probable hemorrhagic component. Additional metastatic lesions in the high left medial frontal lobe for which she previously had SRS 20 Gy in 1 fraction. MRI brain with contrast showed a ring-enhancing necrotic mass in the left laura, measuring 3.2 x 2.6 x 2.5 cm with mild vasogenic edema with expansion of the lanny and mild mass effect on the fourth ventricle without hydrocephalous. There were also 3 additional necrotic ring-enhancing lesions in the left parietal lobe parasagitally and in the left supramarginal gyrus. Transferred from [facility] to [facility] for inpatient radiation to the skull base, with plan for simulation CT. Evaluated by rad/onc, palliative care, and neurology while inpatient. Pt intermittently refused RT, daughter spoke to primary onc, no plans for further systremic treatment. daughter decided to focus on comfort care. Plan is rehab with transition to hospice when appropriate.    Advanced Directives:   [ ] Full code  [ X] DNR  [ ] Hospice    Discharge Diagnoses:  Metastatic lung cancer   Comfort and palliative care         HPI:  Patient is a 79F with h/o small cell lung cancer with mets to the brain with vision problems, liver, bone (not on chemo- last chemo in April), L hip Fx in 2023, lives alone comes to ED as she was found by neighbor down on the floor in the afternoon of 12/20. She said she tripped and fell, but not sure.  She has been c/o pain in her right arm and right leg of the patient appear more contracted than usual over the last couple of weeks. She uses walker at home. Denies SOB, chest pain, fever, but some cough. As per daughter her chemo was stopped in April 2024 given advance disease. (24 Dec 2024 17:08)    Hospital Course:  Presented to [facility] ER after being found on floor w/ right sided weakness/pain in R arm and leg. Imaging showing acute spiral fracture of fifth proximal phlanx, acute right anterior 5th and 7th minimally displaced anterior rib fractures and possibly acute  mild L3 vertebral body compression fracture. MRI brain without contrast redemonstrated the expansile mass lesion centered in the left hemipons with surrounding vasogenic edema and probable hemorrhagic component. Additional metastatic lesions in the high left medial frontal lobe for which she previously had SRS 20 Gy in 1 fraction. MRI brain with contrast showed a ring-enhancing necrotic mass in the left laura, measuring 3.2 x 2.6 x 2.5 cm with mild vasogenic edema with expansion of the lanny and mild mass effect on the fourth ventricle without hydrocephalous. There were also 3 additional necrotic ring-enhancing lesions in the left parietal lobe parasagitally and in the left supramarginal gyrus. Transferred from [facility] to [facility] for inpatient radiation to the skull base, with plan for simulation CT. Evaluated by rad/onc, palliative care, and neurology while inpatient. Pt intermittently refused RT, daughter spoke to primary onc, no plans for further systemic treatment. daughter decided to focus on comfort care with plan for hospice directed care. discussed steroid dosing with neuro-onc, recommends continuing maintenance dexamethasone 4mg once daily for symptomatic relief from vasogenic edema. Plan is rehab with transition to hospice when appropriate.    Advanced Directives:   [ ] Full code  [ X] DNR  [ ] Hospice    Discharge Diagnoses:  Metastatic lung cancer   Comfort and palliative care         HPI:  Patient is a 79F with h/o small cell lung cancer with mets to the brain with vision problems, liver, bone (not on chemo- last chemo in April), L hip Fx in 2023, lives alone comes to ED as she was found by neighbor down on the floor in the afternoon of 12/20. She said she tripped and fell, but not sure.  She has been c/o pain in her right arm and right leg of the patient appear more contracted than usual over the last couple of weeks. She uses walker at home. Denies SOB, chest pain, fever, but some cough. As per daughter her chemo was stopped in April 2024 given advance disease. (24 Dec 2024 17:08)    Hospital Course:  Presented to [facility] ER after being found on floor w/ right sided weakness/pain in R arm and leg. Imaging showing acute spiral fracture of fifth proximal phlanx, acute right anterior 5th and 7th minimally displaced anterior rib fractures and possibly acute  mild L3 vertebral body compression fracture. MRI brain without contrast redemonstrated the expansile mass lesion centered in the left hemipons with surrounding vasogenic edema and probable hemorrhagic component. Additional metastatic lesions in the high left medial frontal lobe for which she previously had SRS 20 Gy in 1 fraction. MRI brain with contrast showed a ring-enhancing necrotic mass in the left laura, measuring 3.2 x 2.6 x 2.5 cm with mild vasogenic edema with expansion of the lanny and mild mass effect on the fourth ventricle without hydrocephalous. There were also 3 additional necrotic ring-enhancing lesions in the left parietal lobe parasagitally and in the left supramarginal gyrus. Transferred from [facility] to [facility] for inpatient radiation to the skull base, with plan for simulation CT. Evaluated by rad/onc, palliative care, and neurology while inpatient. Pt intermittently refused RT, daughter spoke to primary onc, no plans for further systemic treatment. daughter decided to focus on comfort care with plan for hospice directed care. discussed steroid dosing with neuro-onc, recommends continuing maintenance dexamethasone 4mg once daily for symptomatic relief from vasogenic edema. Plan is rehab with transition to hospice when appropriate.    Advanced Directives:   [ ] Full code  [ X] DNR  [ ] Hospice    Discharge Diagnoses:  Metastatic lung cancer   Comfort and palliative care

## 2024-12-27 NOTE — DISCHARGE NOTE PROVIDER - DETAILS OF MALNUTRITION DIAGNOSIS/DIAGNOSES
This patient has been assessed with a concern for Malnutrition and was treated during this hospitalization for the following Nutrition diagnosis/diagnoses:     -  12/25/2024: Underweight (BMI < 19)

## 2024-12-28 LAB
ALBUMIN SERPL ELPH-MCNC: 3.3 G/DL — SIGNIFICANT CHANGE UP (ref 3.3–5)
ALP SERPL-CCNC: 96 U/L — SIGNIFICANT CHANGE UP (ref 40–120)
ALT FLD-CCNC: 73 U/L — HIGH (ref 4–33)
ANION GAP SERPL CALC-SCNC: 13 MMOL/L — SIGNIFICANT CHANGE UP (ref 7–14)
AST SERPL-CCNC: 37 U/L — HIGH (ref 4–32)
BILIRUB SERPL-MCNC: 0.2 MG/DL — SIGNIFICANT CHANGE UP (ref 0.2–1.2)
BUN SERPL-MCNC: 26 MG/DL — HIGH (ref 7–23)
CALCIUM SERPL-MCNC: 9.5 MG/DL — SIGNIFICANT CHANGE UP (ref 8.4–10.5)
CHLORIDE SERPL-SCNC: 105 MMOL/L — SIGNIFICANT CHANGE UP (ref 98–107)
CO2 SERPL-SCNC: 21 MMOL/L — LOW (ref 22–31)
CREAT SERPL-MCNC: 0.42 MG/DL — LOW (ref 0.5–1.3)
EGFR: 100 ML/MIN/1.73M2 — SIGNIFICANT CHANGE UP
GLUCOSE SERPL-MCNC: 99 MG/DL — SIGNIFICANT CHANGE UP (ref 70–99)
HCT VFR BLD CALC: 40.1 % — SIGNIFICANT CHANGE UP (ref 34.5–45)
HGB BLD-MCNC: 13.3 G/DL — SIGNIFICANT CHANGE UP (ref 11.5–15.5)
MAGNESIUM SERPL-MCNC: 2 MG/DL — SIGNIFICANT CHANGE UP (ref 1.6–2.6)
MCHC RBC-ENTMCNC: 26.7 PG — LOW (ref 27–34)
MCHC RBC-ENTMCNC: 33.2 G/DL — SIGNIFICANT CHANGE UP (ref 32–36)
MCV RBC AUTO: 80.4 FL — SIGNIFICANT CHANGE UP (ref 80–100)
NRBC # BLD: 0 /100 WBCS — SIGNIFICANT CHANGE UP (ref 0–0)
NRBC # FLD: 0 K/UL — SIGNIFICANT CHANGE UP (ref 0–0)
PHOSPHATE SERPL-MCNC: 3.5 MG/DL — SIGNIFICANT CHANGE UP (ref 2.5–4.5)
PLATELET # BLD AUTO: 412 K/UL — HIGH (ref 150–400)
POTASSIUM SERPL-MCNC: 4.4 MMOL/L — SIGNIFICANT CHANGE UP (ref 3.5–5.3)
POTASSIUM SERPL-SCNC: 4.4 MMOL/L — SIGNIFICANT CHANGE UP (ref 3.5–5.3)
PROT SERPL-MCNC: 6.5 G/DL — SIGNIFICANT CHANGE UP (ref 6–8.3)
RBC # BLD: 4.99 M/UL — SIGNIFICANT CHANGE UP (ref 3.8–5.2)
RBC # FLD: 13.7 % — SIGNIFICANT CHANGE UP (ref 10.3–14.5)
SODIUM SERPL-SCNC: 139 MMOL/L — SIGNIFICANT CHANGE UP (ref 135–145)
WBC # BLD: 10.48 K/UL — SIGNIFICANT CHANGE UP (ref 3.8–10.5)
WBC # FLD AUTO: 10.48 K/UL — SIGNIFICANT CHANGE UP (ref 3.8–10.5)

## 2024-12-28 PROCEDURE — 99233 SBSQ HOSP IP/OBS HIGH 50: CPT

## 2024-12-28 RX ADMIN — ACETAMINOPHEN 650 MILLIGRAM(S): 80 SOLUTION/ DROPS ORAL at 05:20

## 2024-12-28 RX ADMIN — CHLORHEXIDINE GLUCONATE 1 APPLICATION(S): 1.2 RINSE ORAL at 12:37

## 2024-12-28 RX ADMIN — SENNOSIDES 2 TABLET(S): 8.6 TABLET, FILM COATED ORAL at 22:49

## 2024-12-28 RX ADMIN — Medication 4 MILLIGRAM(S): at 18:50

## 2024-12-28 RX ADMIN — LEVETIRACETAM 500 MILLIGRAM(S): 100 SOLUTION ORAL at 05:20

## 2024-12-28 RX ADMIN — Medication 5 MILLIGRAM(S): at 12:44

## 2024-12-28 RX ADMIN — ACETAMINOPHEN 650 MILLIGRAM(S): 80 SOLUTION/ DROPS ORAL at 13:30

## 2024-12-28 RX ADMIN — ACETAMINOPHEN 650 MILLIGRAM(S): 80 SOLUTION/ DROPS ORAL at 18:50

## 2024-12-28 RX ADMIN — Medication 17 GRAM(S): at 12:39

## 2024-12-28 RX ADMIN — Medication 5 MILLIGRAM(S): at 13:40

## 2024-12-28 RX ADMIN — LATANOPROST 1 DROP(S): 50 SOLUTION OPHTHALMIC at 22:50

## 2024-12-28 RX ADMIN — ACETAMINOPHEN 650 MILLIGRAM(S): 80 SOLUTION/ DROPS ORAL at 12:38

## 2024-12-28 RX ADMIN — ACETAMINOPHEN 650 MILLIGRAM(S): 80 SOLUTION/ DROPS ORAL at 05:50

## 2024-12-28 RX ADMIN — ACETAMINOPHEN 650 MILLIGRAM(S): 80 SOLUTION/ DROPS ORAL at 01:00

## 2024-12-28 RX ADMIN — LEVETIRACETAM 500 MILLIGRAM(S): 100 SOLUTION ORAL at 18:51

## 2024-12-28 RX ADMIN — Medication 1 DROP(S): at 12:47

## 2024-12-28 NOTE — PROGRESS NOTE ADULT - PROBLEM SELECTOR PLAN 1
Known met to frontal lobe (s/p chemo, RT) transferred from [facility] to [facility] for NSGY evaluation due to left pontine 2.7x2.2x2.2 expansile mostly hypodense mass. Recent MRI demonstrated left pontine lesion and a new left high medial parietal lobe measuring 0.9x0.9 cm.     - Started on Keppra 500mg bid.   - Today 12/27 additional Decadron 10mg ordered, standing Decadron increased to 4mg BID  - Nsx: No intervention at this time, recommend to c/w Dex.   - Rad-Onc consulted and discussed use of palliative radiation with patient and family for RT to the skull base 30Gy/10fx.     Patient arrived to [facility] for inpatient radiation to laura lesion. Palliative care consult started.

## 2024-12-28 NOTE — PROGRESS NOTE ADULT - SUBJECTIVE AND OBJECTIVE BOX
O/N Events: patient removing IV lines by herself    Subjective/ROS: Patient seen and examined at bedside.     Denies Fever/Chills, HA, CP, SOB, n/v, changes in bowel/urinary habits.  12pt ROS otherwise negative.    VITALS  Vital Signs Last 24 Hrs  T(C): 36.8 (28 Dec 2024 05:00), Max: 36.8 (28 Dec 2024 05:00)  T(F): 98.3 (28 Dec 2024 05:00), Max: 98.3 (28 Dec 2024 05:00)  HR: 80 (28 Dec 2024 05:00) (78 - 80)  BP: 140/66 (28 Dec 2024 05:00) (100/54 - 140/66)  BP(mean): --  RR: 17 (28 Dec 2024 05:00) (17 - 18)  SpO2: 97% (28 Dec 2024 05:00) (96% - 97%)    Parameters below as of 28 Dec 2024 05:00  Patient On (Oxygen Delivery Method): room air        CAPILLARY BLOOD GLUCOSE          PHYSICAL EXAM  General: NAD  Head: NC/AT; MMM; PERRL; EOMI;  Neck: Supple; no JVD  Respiratory: CTAB; no wheezes/rales/rhonchi  Cardiovascular: Regular rhythm/rate; S1/S2+, no murmurs, rubs gallops   Gastrointestinal: Soft; NTND; bowel sounds normal and present  Extremities: WWP; no edema/cyanosis  Neurological: A&Ox3, CNII-XII grossly intact; no obvious focal deficits    MEDICATIONS  (STANDING):  acetaminophen     Tablet .. 650 milliGRAM(s) Oral every 6 hours  chlorhexidine 2% Cloths 1 Application(s) Topical daily  dexAMETHasone  Injectable 4 milliGRAM(s) IV Push two times a day  dexAMETHasone  IVPB 10 milliGRAM(s) IV Intermittent once  latanoprost 0.005% Ophthalmic Solution 1 Drop(s) Both EYES at bedtime  levETIRAcetam 500 milliGRAM(s) Oral two times a day  polyethylene glycol 3350 17 Gram(s) Oral daily  senna 2 Tablet(s) Oral at bedtime  timolol 0.5% Solution 1 Drop(s) Both EYES daily    MEDICATIONS  (PRN):  oxyCODONE    IR 5 milliGRAM(s) Oral every 6 hours PRN Moderate Pain (4 - 6)  oxyCODONE    IR 10 milliGRAM(s) Oral every 6 hours PRN Severe Pain (7 - 10)      penicillins (Unknown)      LABS                        13.3   10.48 )-----------( 412      ( 28 Dec 2024 05:10 )             40.1     12-28    139  |  105  |  26[H]  ----------------------------<  99  4.4   |  21[L]  |  0.42[L]    Ca    9.5      28 Dec 2024 05:10  Phos  3.5     12-28  Mg     2.00     12-28    TPro  6.5  /  Alb  3.3  /  TBili  0.2  /  DBili  x   /  AST  37[H]  /  ALT  73[H]  /  AlkPhos  96  12-28      Urinalysis Basic - ( 28 Dec 2024 05:10 )    Color: x / Appearance: x / SG: x / pH: x  Gluc: 99 mg/dL / Ketone: x  / Bili: x / Urobili: x   Blood: x / Protein: x / Nitrite: x   Leuk Esterase: x / RBC: x / WBC x   Sq Epi: x / Non Sq Epi: x / Bacteria: x              IMAGING/EKG/ETC

## 2024-12-28 NOTE — PROGRESS NOTE ADULT - PROBLEM SELECTOR PLAN 6
Has been on oxycodone at home  - will c/w Oxycodone 5mg PO q 6 hrs prn and oxycodone 10mg q 6 hr prn for severe pain  - f/u palliative care recommendations  - Bowel regimens.

## 2024-12-28 NOTE — PROGRESS NOTE ADULT - PROBLEM SELECTOR PLAN 8
DVT: SCDs for now   Diet: Regular with Ensure HP TID  PT: MANPREET  Dispo: - active    Consulted: Palliative care, Rad Onc aware of pt's arrival.  Patient to follow up with ortho when discharged with Dr. Madrid to complete MRI imaging.

## 2024-12-29 PROCEDURE — 99231 SBSQ HOSP IP/OBS SF/LOW 25: CPT

## 2024-12-29 RX ADMIN — Medication 4 MILLIGRAM(S): at 18:50

## 2024-12-29 RX ADMIN — ACETAMINOPHEN 650 MILLIGRAM(S): 80 SOLUTION/ DROPS ORAL at 18:50

## 2024-12-29 RX ADMIN — Medication 1 DROP(S): at 12:37

## 2024-12-29 RX ADMIN — Medication 4 MILLIGRAM(S): at 06:14

## 2024-12-29 RX ADMIN — Medication 102 MILLIGRAM(S): at 08:43

## 2024-12-29 RX ADMIN — LEVETIRACETAM 500 MILLIGRAM(S): 100 SOLUTION ORAL at 18:50

## 2024-12-29 RX ADMIN — ACETAMINOPHEN 650 MILLIGRAM(S): 80 SOLUTION/ DROPS ORAL at 01:28

## 2024-12-29 RX ADMIN — CHLORHEXIDINE GLUCONATE 1 APPLICATION(S): 1.2 RINSE ORAL at 12:39

## 2024-12-29 RX ADMIN — SENNOSIDES 2 TABLET(S): 8.6 TABLET, FILM COATED ORAL at 21:20

## 2024-12-29 RX ADMIN — LEVETIRACETAM 500 MILLIGRAM(S): 100 SOLUTION ORAL at 06:09

## 2024-12-29 RX ADMIN — Medication 17 GRAM(S): at 12:37

## 2024-12-29 RX ADMIN — ACETAMINOPHEN 650 MILLIGRAM(S): 80 SOLUTION/ DROPS ORAL at 12:36

## 2024-12-29 RX ADMIN — ACETAMINOPHEN 650 MILLIGRAM(S): 80 SOLUTION/ DROPS ORAL at 06:09

## 2024-12-29 RX ADMIN — LATANOPROST 1 DROP(S): 50 SOLUTION OPHTHALMIC at 21:20

## 2024-12-29 NOTE — PROVIDER CONTACT NOTE (OTHER) - ACTION/TREATMENT ORDERED:
okay for now give PO haldol and sabi in an hour
PT consult ordered. continue to monitor patient. care ongoing.

## 2024-12-29 NOTE — PROGRESS NOTE ADULT - PROBLEM SELECTOR PLAN 1
Known met to frontal lobe (s/p chemo, RT) transferred from [facility] to [facility] for NSGY evaluation due to left pontine 2.7x2.2x2.2 expansile mostly hypodense mass. Recent MRI demonstrated left pontine lesion and a new left high medial parietal lobe measuring 0.9x0.9 cm.     - Started on Keppra 500mg bid.   - additional Decadron 10mg ordered given 12/29, standing Decadron 4mg BID  - Nsx: No intervention at this time, recommend to c/w Dex.   - Rad-Onc consulted and discussed use of palliative radiation with patient and family for RT to the skull base 30Gy/10fx.     Patient arrived to [facility] for inpatient radiation to laura lesion. Palliative care consult started.

## 2024-12-29 NOTE — PROVIDER CONTACT NOTE (OTHER) - ASSESSMENT
Patient A&Ox2-3. Denies chest pain / SOB. Vitals as charted. patient daughter at bedside states that R leg contraction has gotten worse since patient been in hospital. patient still able to move leg and hold leg up for neuro checks.

## 2024-12-29 NOTE — PROVIDER CONTACT NOTE (OTHER) - SITUATION
patient R leg contracted
pt has no IV access she ripped it out and is refusing another one to be placed

## 2024-12-29 NOTE — PROGRESS NOTE ADULT - SUBJECTIVE AND OBJECTIVE BOX
O/N Events:    Subjective/ROS: Patient seen and examined at bedside.     Denies Fever/Chills, HA, CP, SOB, n/v, changes in bowel/urinary habits.  12pt ROS otherwise negative.    VITALS  Vital Signs Last 24 Hrs  T(C): 36.3 (30 Dec 2024 13:47), Max: 36.7 (29 Dec 2024 16:44)  T(F): 97.4 (30 Dec 2024 13:47), Max: 98.1 (30 Dec 2024 01:23)  HR: 69 (30 Dec 2024 13:47) (69 - 83)  BP: 120/69 (30 Dec 2024 13:47) (120/69 - 131/71)  BP(mean): --  RR: 18 (30 Dec 2024 13:47) (18 - 18)  SpO2: 96% (30 Dec 2024 13:47) (96% - 99%)    Parameters below as of 30 Dec 2024 13:47  Patient On (Oxygen Delivery Method): room air        CAPILLARY BLOOD GLUCOSE          PHYSICAL EXAM  General: NAD, unkept, visible vessels across body, frail  Head: NC/AT; MMM; PERRL; EOMI;  Neck: Supple; no JVD  Respiratory: CTAB; no wheezes/rales/rhonchi  Cardiovascular: Regular rhythm/rate; S1/S2+, no murmurs, rubs gallops   Gastrointestinal: Soft; NTND; bowel sounds normal and present  Extremities: WWP; no edema/cyanosis  Neurological: CNII-XII grossly intact; no obvious focal deficits    MEDICATIONS  (STANDING):  acetaminophen     Tablet .. 650 milliGRAM(s) Oral every 6 hours  chlorhexidine 2% Cloths 1 Application(s) Topical daily  dexAMETHasone  Injectable 4 milliGRAM(s) IV Push two times a day  latanoprost 0.005% Ophthalmic Solution 1 Drop(s) Both EYES at bedtime  levETIRAcetam 500 milliGRAM(s) Oral two times a day  polyethylene glycol 3350 17 Gram(s) Oral daily  senna 2 Tablet(s) Oral at bedtime  timolol 0.5% Solution 1 Drop(s) Both EYES daily    MEDICATIONS  (PRN):  oxyCODONE    IR 5 milliGRAM(s) Oral every 6 hours PRN Moderate Pain (4 - 6)  oxyCODONE    IR 10 milliGRAM(s) Oral every 6 hours PRN Severe Pain (7 - 10)      penicillins (Unknown)      LABS                      IMAGING/EKG/ETC

## 2024-12-30 ENCOUNTER — APPOINTMENT (OUTPATIENT)
Dept: NEUROSURGERY | Facility: HOSPITAL | Age: 79
End: 2024-12-30

## 2024-12-30 DIAGNOSIS — Z71.89 OTHER SPECIFIED COUNSELING: ICD-10-CM

## 2024-12-30 PROCEDURE — 99497 ADVNCD CARE PLAN 30 MIN: CPT | Mod: 25

## 2024-12-30 PROCEDURE — 77280 THER RAD SIMULAJ FIELD SMPL: CPT | Mod: 26

## 2024-12-30 PROCEDURE — 99232 SBSQ HOSP IP/OBS MODERATE 35: CPT

## 2024-12-30 PROCEDURE — 99498 ADVNCD CARE PLAN ADDL 30 MIN: CPT | Mod: 25

## 2024-12-30 RX ADMIN — Medication 5 MILLIGRAM(S): at 10:03

## 2024-12-30 RX ADMIN — Medication 4 MILLIGRAM(S): at 06:00

## 2024-12-30 RX ADMIN — CHLORHEXIDINE GLUCONATE 1 APPLICATION(S): 1.2 RINSE ORAL at 12:26

## 2024-12-30 RX ADMIN — ACETAMINOPHEN 650 MILLIGRAM(S): 80 SOLUTION/ DROPS ORAL at 01:01

## 2024-12-30 RX ADMIN — ACETAMINOPHEN 650 MILLIGRAM(S): 80 SOLUTION/ DROPS ORAL at 18:06

## 2024-12-30 RX ADMIN — LATANOPROST 1 DROP(S): 50 SOLUTION OPHTHALMIC at 21:12

## 2024-12-30 RX ADMIN — Medication 5 MILLIGRAM(S): at 11:00

## 2024-12-30 RX ADMIN — Medication 17 GRAM(S): at 12:27

## 2024-12-30 RX ADMIN — Medication 1 DROP(S): at 12:27

## 2024-12-30 RX ADMIN — Medication 4 MILLIGRAM(S): at 18:06

## 2024-12-30 RX ADMIN — ACETAMINOPHEN 650 MILLIGRAM(S): 80 SOLUTION/ DROPS ORAL at 23:26

## 2024-12-30 RX ADMIN — SENNOSIDES 2 TABLET(S): 8.6 TABLET, FILM COATED ORAL at 21:12

## 2024-12-30 RX ADMIN — Medication 10 MILLIGRAM(S): at 15:44

## 2024-12-30 RX ADMIN — ACETAMINOPHEN 650 MILLIGRAM(S): 80 SOLUTION/ DROPS ORAL at 12:27

## 2024-12-30 RX ADMIN — ACETAMINOPHEN 650 MILLIGRAM(S): 80 SOLUTION/ DROPS ORAL at 06:00

## 2024-12-30 RX ADMIN — LEVETIRACETAM 500 MILLIGRAM(S): 100 SOLUTION ORAL at 18:07

## 2024-12-30 RX ADMIN — LEVETIRACETAM 500 MILLIGRAM(S): 100 SOLUTION ORAL at 06:00

## 2024-12-30 NOTE — PROGRESS NOTE ADULT - PROBLEM SELECTOR PLAN 1
Known met to frontal lobe (s/p chemo, RT) transferred from [facility] to [facility] for NSGY evaluation due to left pontine 2.7x2.2x2.2 expansile mostly hypodense mass. Recent MRI demonstrated left pontine lesion and a new left high medial parietal lobe measuring 0.9x0.9 cm.     - Started on Keppra 500mg bid.   - cont Decadron increased to 4mg BID  - Nsx: No intervention at this time, recommend to c/w Dex.   - Rad-Onc consulted and discussed use of palliative radiation with patient and family for RT to the skull base 30Gy/10fxkelsey to start this week    Patient arrived to [facility] for inpatient radiation to laura lesion. Palliative care consult started.

## 2024-12-30 NOTE — PROGRESS NOTE ADULT - PROBLEM SELECTOR PLAN 1
- Diagnosed one year ago 12/2023      - Completed C5 carbo/etop/cosela/atezo. There was plan to transition to maintenance atezo, no follow up since April this year   - Follows with Dr. Boyd at [facility]   - Now with POD with brain mets including Expansile mass lesion centered in the left hemipons measuring   approximately 2.8 x 3.1 x 2.6 cm (AP x TV x CC) with surrounding vasogenic edema. Foci of susceptibility artifact within the described lesion, suggesting a hemorrhagic component. Probable necrotic component   within the mass lesion  - Plan for inpatient RT, 10 fraction

## 2024-12-30 NOTE — GOALS OF CARE CONVERSATION - ADVANCED CARE PLANNING - CONVERSATION DETAILS
Referral to palliative care for complex decision making and symptom management in setting of advanced malignancy. Introduced [facility] team to patient and explained our role in their care. Patient amenable to our participation in their care. Reviewed patient's medical and social history, as well as the events leading to her hospitalization. Pt's daughter shared that her mother has had significant decline in her clinical status since her cancer dx last october. She shared her mother had "some chemo" and 1 RT session with Dr. Howell. She said it has been challenging to get her mother to her appointments because her mother never feels well. She shared that her mother has been more confused and worries that she will not be able to tolerate RT however is hopeful that she will. She is also interested in knowing if she is a candidate for any further DMT. Realistically she recognizes that her mother may not be a candidate or be able to access tx due to poor functional status and may not be interested in further DMT.  Rica said her mother had no reaction when she was initially dx with cancer. She said she didn't want to talk about it. She said her mother was actively drinking and smoking up till 1 month ago.  Dtr said it has been a spiral since she fell last year.     Advanced care planning extensively discussed. Reviewed the risks and benefits of resuscitative measures including cardiopulmonary resuscitation and mechanical ventilation at the end of life in the setting of advanced malignancy/illness. Rica  understands that these interventions may pose more burden than benefit and are amenable to DNR/DNI and completion of MOLST form. MOLST form completed and placed on patient's medical record. Referral to palliative care for complex decision making and symptom management in setting of advanced malignancy. Introduced [facility] team to patient and explained our role in their care. Patient amenable to our participation in their care, deferred GOC to her daughter Rica.     Reviewed patient's medical and social history, as well as the events leading to her hospitalization. Pt's daughter shared that her mother has had significant decline in her clinical status since her cancer dx last october. She shared her mother had "some chemo" and 1 RT session with Dr. Howell. She said it has been challenging to get her mother to her appointments because her mother never feels well. She shared that her mother has been more confused and worries that she will not be able to tolerate RT however is hopeful that she will. She is also interested in knowing if she is a candidate for any further DMT. Realistically she recognizes that her mother may not be a candidate or be able to access tx due to poor functional status and patient may not be interested in further DMT. If patient not a candidate for treatment or refuses to continue treatment, patient would a candidate for hospice. Discussed hospice philosophy with daughter. Patient would need facility placement, cannot go back home.     Rica said her mother had no reaction when she was initially dx with cancer. She said she didn't want to talk about it. She said her mother was actively drinking and smoking up till 1 month ago. Dtr said it has been a spiral since she fell last year.     Advanced care planning extensively discussed. Reviewed the risks and benefits of resuscitative measures including cardiopulmonary resuscitation and mechanical ventilation at the end of life in the setting of advanced malignancy/illness. Rica  understands that these interventions may pose more burden than benefit and are amenable to DNR/DNI and completion of MOLST form. MOLST form completed and placed on patient's medical record.

## 2024-12-30 NOTE — PROGRESS NOTE ADULT - SUBJECTIVE AND OBJECTIVE BOX
O/N Events:    Subjective/ROS: Patient seen and examined at bedside.     Denies Fever/Chills, HA, CP, SOB, n/v, changes in bowel/urinary habits.  12pt ROS otherwise negative.    VITALS  Vital Signs Last 24 Hrs  T(C): 36.6 (30 Dec 2024 06:10), Max: 36.7 (29 Dec 2024 16:44)  T(F): 97.8 (30 Dec 2024 06:10), Max: 98.1 (30 Dec 2024 01:23)  HR: 83 (30 Dec 2024 06:10) (75 - 83)  BP: 131/71 (30 Dec 2024 06:10) (124/61 - 131/71)  BP(mean): --  RR: 18 (30 Dec 2024 06:10) (18 - 18)  SpO2: 99% (30 Dec 2024 06:10) (96% - 99%)    Parameters below as of 30 Dec 2024 06:10  Patient On (Oxygen Delivery Method): room air        CAPILLARY BLOOD GLUCOSE          PHYSICAL EXAM  General: NAD, unkept, frail/thin, easily visible dark veins across body  Head: NC/AT; MMM; PERRL; EOMI;   Neck: Supple; no JVD  Respiratory: CTAB; no wheezes/rales/rhonchi  Cardiovascular: Regular rhythm/rate; S1/S2+, no murmurs, rubs gallops   Gastrointestinal: Soft; NTND; bowel sounds normal and present  Extremities: WWP; no edema/cyanosis  Neurological: CNII-XII grossly intact; no obvious focal deficits    MEDICATIONS  (STANDING):  acetaminophen     Tablet .. 650 milliGRAM(s) Oral every 6 hours  chlorhexidine 2% Cloths 1 Application(s) Topical daily  dexAMETHasone  Injectable 4 milliGRAM(s) IV Push two times a day  latanoprost 0.005% Ophthalmic Solution 1 Drop(s) Both EYES at bedtime  levETIRAcetam 500 milliGRAM(s) Oral two times a day  polyethylene glycol 3350 17 Gram(s) Oral daily  senna 2 Tablet(s) Oral at bedtime  timolol 0.5% Solution 1 Drop(s) Both EYES daily    MEDICATIONS  (PRN):  oxyCODONE    IR 5 milliGRAM(s) Oral every 6 hours PRN Moderate Pain (4 - 6)  oxyCODONE    IR 10 milliGRAM(s) Oral every 6 hours PRN Severe Pain (7 - 10)      penicillins (Unknown)      LABS                      IMAGING/EKG/ETC

## 2024-12-30 NOTE — ED PROVIDER NOTE - NSRECPHYSICIAN_ED_A_ED_FT
Re-Education   99250 Therapeutic Activities   63433 Gait Training     Suggested Professional Referral: [x] No  [] Yes:     Patient Education:  [x] Plans / Goals, Risks / Benefits discussed  [x] Home exercise program  Method of Education: [x] Verbal  [x] Demo  [x] Written  Comprehension of Education:  [x] Verbalizes understanding.  [x] Demonstrates understanding.  [] Needs Review.  [] Demonstrates / verbalizes understanding of HEP / Ed previously given.    Frequency:  1-2 days per week for 4-6 weeks    Patient understands diagnosis/prognosis and consents to treatment, plan and goals: [x] Yes    [] No     Thank you for the opportunity to work with your patient.  If you have questions or comments, please contact me at [phone]; fax: [phone].    Electronically signed by: Heriberto Alexander PT         Please sign Physician's Certification and return to:  [facility] PHYSICAL THERAPY  [address]  [place] [address]  Dept: [phone]  Dept Fax: [phone]  Loc: [phone]    Physician's Certification / Comments     Frequency/Duration 1-2 days per week for 4-6 weeks.   Certification period from 12/30/2024  to 3/25/2025.    I have reviewed the Plan of Care established for skilled therapy services and certify that the services are required and that they will be provided while the patient is under my care.    Physician's Comments/Revisions:               Physician's Printed Name:                                           Physician's Signature:                                                               Date:       Dr. Kirt Duarte

## 2024-12-30 NOTE — PROGRESS NOTE ADULT - PROBLEM SELECTOR PLAN 6
Has been on oxycodone at home  - will c/w Oxycodone 5mg PO q 6 hrs prn and oxycodone 10mg q 6 hr prn for severe pain  - f/u palliative care recommendations  - Bowel regimen

## 2024-12-30 NOTE — PROGRESS NOTE ADULT - TIME BILLING
Time-based billing (NON-critical care).     more than 50% of the visit was spent counseling and / or coordinating care by the attending physician.  The necessity of the time spent during the encounter on this date of service was due to:     review of laboratory data, radiology results, consultants' recommendations, documentation in [place], discussion with patient/ACP and interdisciplinary staff (such as , social workers, etc). Interventions were performed as documented above.
Time-based billing (NON-critical care).     more than 50% of the visit was spent counseling and / or coordinating care by the attending physician.  The necessity of the time spent during the encounter on this date of service was due to:     review of laboratory data, radiology results, consultants' recommendations, documentation in [place], discussion with patient/ACP and interdisciplinary staff (such as , social workers, etc). Interventions were performed as documented above.
Reviewing medical chart and results, symptom assessment and management, counseling patient/decision makers/caregivers, coordination of care, documentation. (separate from Advance Care Planning services)
Time-based billing (NON-critical care).     more than 50% of the visit was spent counseling and / or coordinating care by the attending physician.  The necessity of the time spent during the encounter on this date of service was due to:     review of laboratory data, radiology results, consultants' recommendations, documentation in [place], discussion with patient/ACP and interdisciplinary staff (such as , social workers, etc). Interventions were performed as documented above.
Time spent to prepare to see the patient, obtaining and reviewing history, physical examination, explaining the diagnosis, prognosis and treatment plan with the patient/family/caregiver. I also have spent the time ordering studies and testing, interpreting results, medicine reconciliation, subspecialty consultation and documentation as above

## 2024-12-30 NOTE — PROGRESS NOTE ADULT - SUBJECTIVE AND OBJECTIVE BOX
Indication of Geriatrics and Palliative Medicine Services:  [  ] Complex Medical Decision Making   [  ] Symptom/Pain management     DNR on chart:  DNI  DNI    INTERVAL EVENTS:     -------------------------------------------------------------------------------------------------------    PRESENT SYMPTOMS:     [ ] Unable to self-report      [ ] PAINADS     [ ] RDOS    [X ] No     [ ] Yes     Source if other than patient:  [ ]Family   [ ]Team     PAIN:   If blank, patient unable to specify     [ ]yes [ ]no    1. Location-   2. Radiation-    3. Quality-   4. Timing-   5. Minimal acceptable level/pain goal-   6. Aggravating factors-   7. QOL impact-     SYMPTOMS:   Dyspnea:                           [ ]Mild [ ]Moderate [ ]Severe  Anxiety:                             [ ]Mild [ ]Moderate [ ]Severe  Fatigue:                             [ ]Mild [ ]Moderate [ ]Severe  Nausea/Vomiting:              [ ]Mild [ ]Moderate [ ]Severe  Loss of appetite:                [ ]Mild [ ]Moderate [ ]Severe  Constipation:                     [ ]Mild [ ]Moderate [ ]Severe    Other Symptoms:  [X ]All other review of systems negative     -------------------------------------------------------------------------------------------------------    I STOP: [id number]    Prescription Information      PDI Filter:    PDI	Current Rx	Drug Type	Rx Written	Rx Dispensed	Drug	Quantity	Days Supply	Prescriber Name	Prescriber YUKO #	Payment Method	Dispenser  A	N	O	12/27/2023	12/27/2023	oxycodone hcl (ir) 5 mg tablet	90	15	Greta Quinteros	[id number]	Medicare	[id number]  A	N	B	06/05/2024	06/16/2024	lorazepam 0.5 mg tablet	10	5	StaciaChiquispedroMARY renee, LEAH	[id number]	Medicare	[id number]    -------------------------------------------------------------------------------------------------------    ITEMS UNCHECKED ARE NOT PRESENT    PHYSICAL:  Vital Signs Last 24 Hrs  T(C): 36.3 (30 Dec 2024 13:47), Max: 36.7 (30 Dec 2024 01:23)  T(F): 97.4 (30 Dec 2024 13:47), Max: 98.1 (30 Dec 2024 01:23)  HR: 69 (30 Dec 2024 13:47) (69 - 83)  BP: 120/69 (30 Dec 2024 13:47) (120/69 - 131/71)  BP(mean): --  RR: 18 (30 Dec 2024 13:47) (18 - 18)  SpO2: 96% (30 Dec 2024 13:47) (96% - 99%)    Parameters below as of 30 Dec 2024 13:47  Patient On (Oxygen Delivery Method): room air     I&O's Summary    29 Dec 2024 07:01  -  30 Dec 2024 07:00  --------------------------------------------------------  IN: 550 mL / OUT: 800 mL / NET: -250 mL    GENERAL:  [X ]Cachexia  [X ] Frail  [X ]Awake  [X ]Oriented   [ ]Lethargic  [ ]Unarousable  [X ]Verbal  [ ]Non-Verbal    BEHAVIORAL:   [ ] Anxiety  [ ] Delirium [ ] Agitation [ ] Other    HEENT:   [X ]Normal   [ ]Dry mouth   [ ]ET Tube/Trach  [ ]Oral lesions    PULMONARY:   [X ]Clear              [ ]Tachypnea  [ ]Audible excessive secretions   [ ]Rhonchi        [ ]Right [ ]Left [ ]Bilateral  [ ]Crackles        [ ]Right [ ]Left [ ]Bilateral  [ ]Wheezing     [ ]Right [ ]Left [ ]Bilateral  [ ]Diminished breath sounds [ ]right [ ]left [ ]bilateral    CARDIOVASCULAR:    [X ]Regular [ ]Irregular [ ]Tachy  [ ]Jose A [ ]Murmur [ ]Other    GASTROINTESTINAL:  [X ]Soft  [ ]Distended   [X ]+BS  [ ]Non tender [ ]Tender  [ ]Other [ ]PEG [ ]OGT/ NGT      GENITOURINARY:  [ ]Normal [X ] Incontinent   [ ]Oliguria/Anuria   [ ]Estevez    MUSCULOSKELETAL:   [ ]Normal   [ ]Weakness  [X ]Bed/Wheelchair bound [ ]Edema    NEUROLOGIC:   [X ]No focal deficits  [ ]Cognitive impairment  [ ]Dysphagia [ ]Dysarthria [ ]Paresis [ ]Other     SKIN:   [X ]Normal  [ ]Rash  [ ]Other  [ ]Pressure ulcer(s)       Present on admission [ ]y [ ]n    -------------------------------------------------------------------------------------------------------    LABS:    -------------------------------------------------------------------------------------------------------    CRITICAL CARE:  [ ]Shock Present  [ ]Septic [ ]Cardiogenic [ ]Neurologic [ ]Hypovolemic [ ]Undifferentiated    [ ]Vasopressors [ ]Inotropes    [ ]Respiratory failure present [ ]Acute  [ ]Chronic [ ]Hypoxic  [ ]Hypercarbic [ ]Mixed   [ ]Mechanical Ventilation  [ ]Trach collar   [ ]Non-invasive ventilatory support   [ ]High-Flow   [ ]Oxygen mask/venti     [ ]Other organ failure     -------------------------------------------------------------------------------------------------------    RADIOLOGY & ADDITIONAL STUDIES:     < from: MR Brain Stereotactic w/wo IV Cont (12.23.24 @ 17:11) >  IMPRESSION:    1.  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    < end of copied text >    -------------------------------------------------------------------------------------------------------  MEDICATIONS:     MEDICATIONS  (STANDING):  acetaminophen     Tablet .. 650 milliGRAM(s) Oral every 6 hours  chlorhexidine 2% Cloths 1 Application(s) Topical daily  dexAMETHasone  Injectable 4 milliGRAM(s) IV Push two times a day  latanoprost 0.005% Ophthalmic Solution 1 Drop(s) Both EYES at bedtime  levETIRAcetam 500 milliGRAM(s) Oral two times a day  polyethylene glycol 3350 17 Gram(s) Oral daily  senna 2 Tablet(s) Oral at bedtime  timolol 0.5% Solution 1 Drop(s) Both EYES daily    MEDICATIONS  (PRN):  oxyCODONE    IR 5 milliGRAM(s) Oral every 6 hours PRN Moderate Pain (4 - 6)  oxyCODONE    IR 10 milliGRAM(s) Oral every 6 hours PRN Severe Pain (7 - 10)         Indication of Geriatrics and Palliative Medicine Services:  [X  ] Complex Medical Decision Making   [  ] Symptom/Pain management     DNR on chart:  DNI  DNI    INTERVAL EVENTS: Patient seen this PM, comfortable in no distress. Patient defers GOC discussion to her daughter Rica.     -------------------------------------------------------------------------------------------------------    PRESENT SYMPTOMS:     [ ] Unable to self-report      [ ] PAINADS     [ ] RDOS    [X ] No     [ ] Yes     Source if other than patient:  [ ]Family   [ ]Team     PAIN:   If blank, patient unable to specify     [ ]yes [ ]no    1. Location-   2. Radiation-    3. Quality-   4. Timing-   5. Minimal acceptable level/pain goal-   6. Aggravating factors-   7. QOL impact-     SYMPTOMS:   Dyspnea:                           [ ]Mild [ ]Moderate [ ]Severe  Anxiety:                             [ ]Mild [ ]Moderate [ ]Severe  Fatigue:                             [ ]Mild [ ]Moderate [ ]Severe  Nausea/Vomiting:              [ ]Mild [ ]Moderate [ ]Severe  Loss of appetite:                [ ]Mild [ ]Moderate [ ]Severe  Constipation:                     [ ]Mild [ ]Moderate [ ]Severe    Other Symptoms:  [X ]All other review of systems negative     -------------------------------------------------------------------------------------------------------    I STOP: [id number]    Prescription Information      PDI Filter:    PDI	Current Rx	Drug Type	Rx Written	Rx Dispensed	Drug	Quantity	Days Supply	Prescriber Name	Prescriber YUKO #	Payment Method	Dispenser  A	N	O	12/27/2023	12/27/2023	oxycodone hcl (ir) 5 mg tablet	90	15	Greta Quinteros	[id number]	Medicare	[id number]  A	N	B	06/05/2024	06/16/2024	lorazepam 0.5 mg tablet	10	5	Eitan Palomo A, NP	[id number]	Medicare	[id number]    -------------------------------------------------------------------------------------------------------    ITEMS UNCHECKED ARE NOT PRESENT    PHYSICAL:  Vital Signs Last 24 Hrs  T(C): 36.3 (30 Dec 2024 13:47), Max: 36.7 (30 Dec 2024 01:23)  T(F): 97.4 (30 Dec 2024 13:47), Max: 98.1 (30 Dec 2024 01:23)  HR: 69 (30 Dec 2024 13:47) (69 - 83)  BP: 120/69 (30 Dec 2024 13:47) (120/69 - 131/71)  BP(mean): --  RR: 18 (30 Dec 2024 13:47) (18 - 18)  SpO2: 96% (30 Dec 2024 13:47) (96% - 99%)    Parameters below as of 30 Dec 2024 13:47  Patient On (Oxygen Delivery Method): room air     I&O's Summary    29 Dec 2024 07:01  -  30 Dec 2024 07:00  --------------------------------------------------------  IN: 550 mL / OUT: 800 mL / NET: -250 mL    GENERAL:  [X ]Cachexia  [X ] Frail  [X ]Awake  [X ]Oriented   [ ]Lethargic  [ ]Unarousable  [X ]Verbal  [ ]Non-Verbal    BEHAVIORAL:   [ ] Anxiety  [ ] Delirium [ ] Agitation [ ] Other    HEENT:   [X ]Normal   [ ]Dry mouth   [ ]ET Tube/Trach  [ ]Oral lesions    PULMONARY:   [X ]Clear              [ ]Tachypnea  [ ]Audible excessive secretions   [ ]Rhonchi        [ ]Right [ ]Left [ ]Bilateral  [ ]Crackles        [ ]Right [ ]Left [ ]Bilateral  [ ]Wheezing     [ ]Right [ ]Left [ ]Bilateral  [ ]Diminished breath sounds [ ]right [ ]left [ ]bilateral    CARDIOVASCULAR:    [X ]Regular [ ]Irregular [ ]Tachy  [ ]Jose A [ ]Murmur [ ]Other    GASTROINTESTINAL:  [X ]Soft  [ ]Distended   [X ]+BS  [ ]Non tender [ ]Tender  [ ]Other [ ]PEG [ ]OGT/ NGT      GENITOURINARY:  [ ]Normal [X ] Incontinent   [ ]Oliguria/Anuria   [ ]Estevez    MUSCULOSKELETAL:   [ ]Normal   [ ]Weakness  [X ]Bed/Wheelchair bound [ ]Edema    NEUROLOGIC:   [X ]No focal deficits  [ ]Cognitive impairment  [ ]Dysphagia [ ]Dysarthria [ ]Paresis [ ]Other     SKIN:   [X ]Normal  [ ]Rash  [ ]Other  [ ]Pressure ulcer(s)       Present on admission [ ]y [ ]n    -------------------------------------------------------------------------------------------------------    LABS:    -------------------------------------------------------------------------------------------------------    CRITICAL CARE:  [ ]Shock Present  [ ]Septic [ ]Cardiogenic [ ]Neurologic [ ]Hypovolemic [ ]Undifferentiated    [ ]Vasopressors [ ]Inotropes    [ ]Respiratory failure present [ ]Acute  [ ]Chronic [ ]Hypoxic  [ ]Hypercarbic [ ]Mixed   [ ]Mechanical Ventilation  [ ]Trach collar   [ ]Non-invasive ventilatory support   [ ]High-Flow   [ ]Oxygen mask/venti     [ ]Other organ failure     -------------------------------------------------------------------------------------------------------    RADIOLOGY & ADDITIONAL STUDIES:     < from: MR Brain Stereotactic w/wo IV Cont (12.23.24 @ 17:11) >  IMPRESSION:    1.  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    < end of copied text >    -------------------------------------------------------------------------------------------------------  MEDICATIONS:     MEDICATIONS  (STANDING):  acetaminophen     Tablet .. 650 milliGRAM(s) Oral every 6 hours  chlorhexidine 2% Cloths 1 Application(s) Topical daily  dexAMETHasone  Injectable 4 milliGRAM(s) IV Push two times a day  latanoprost 0.005% Ophthalmic Solution 1 Drop(s) Both EYES at bedtime  levETIRAcetam 500 milliGRAM(s) Oral two times a day  polyethylene glycol 3350 17 Gram(s) Oral daily  senna 2 Tablet(s) Oral at bedtime  timolol 0.5% Solution 1 Drop(s) Both EYES daily    MEDICATIONS  (PRN):  oxyCODONE    IR 5 milliGRAM(s) Oral every 6 hours PRN Moderate Pain (4 - 6)  oxyCODONE    IR 10 milliGRAM(s) Oral every 6 hours PRN Severe Pain (7 - 10)

## 2024-12-30 NOTE — PROGRESS NOTE ADULT - SUBJECTIVE AND OBJECTIVE BOX
[facility] Division of Hospital Medicine  Maribell Higgins MD  Pager [phone]    Patient is a 79y old  Female who presents with a chief complaint of Radiation therapy     SUBJECTIVE / OVERNIGHT EVENTS: pt offered no complaints this AM; denied pain; states she didn't sleep well because there is nothing to do so she slept most of the day yesterday      MEDICATIONS  (STANDING):  acetaminophen     Tablet .. 650 milliGRAM(s) Oral every 6 hours  chlorhexidine 2% Cloths 1 Application(s) Topical daily  dexAMETHasone  Injectable 4 milliGRAM(s) IV Push two times a day  latanoprost 0.005% Ophthalmic Solution 1 Drop(s) Both EYES at bedtime  levETIRAcetam 500 milliGRAM(s) Oral two times a day  polyethylene glycol 3350 17 Gram(s) Oral daily  senna 2 Tablet(s) Oral at bedtime  timolol 0.5% Solution 1 Drop(s) Both EYES daily    MEDICATIONS  (PRN):  oxyCODONE    IR 5 milliGRAM(s) Oral every 6 hours PRN Moderate Pain (4 - 6)  oxyCODONE    IR 10 milliGRAM(s) Oral every 6 hours PRN Severe Pain (7 - 10)        PHYSICAL EXAM:  Vitals  T(F): 97.8 (30 Dec 2024 06:10), Max: 98.1 (30 Dec 2024 01:23)  HR: 83 (30 Dec 2024 06:10) (75 - 89)  BP: 131/71 (30 Dec 2024 06:10) (124/61 - 136/69)  RR: 18 (30 Dec 2024 06:10) (17 - 18)  SpO2: 99% (30 Dec 2024 06:10) (96% - 99%)    Parameters below as of 30 Dec 2024 06:10  Patient On (Oxygen Delivery Method): room air        CONSTITUTIONAL: NAD, appears comfortable  PRRLA; conjunctiva and sclera clear, poor vision  ENMT: Moist oral mucosa; normal dentition  RESPIRATORY: Normal respiratory effort; grossly b/l AE, few rhonchi that clear with cough  CVRegular rate and rhythm; No lower extremity edema  ABDOMEN: Nontender to palpation, normoactive bowel sounds  MUSCULOSKELETAL:  no clubbing or cyanosis of digits; no joint swelling or tenderness to palpation  PSYCH: calm, coop; affect approp  NEUROLOGY: CN 2-12 are intact and symmetric; no gross sensory deficits   SKIN: No rashes; no palpable lesions    LABS:

## 2024-12-30 NOTE — PROGRESS NOTE ADULT - PROBLEM SELECTOR PLAN 2
- 12/30- See separate GOC. Daughter agreed to DNR/DNI. Daughter hopes patient can continue cancer treatment however understands various barriers to this. Patient has not been cooperative, poor functional status with fall and sustained fractures.

## 2024-12-30 NOTE — PROGRESS NOTE ADULT - PROBLEM SELECTOR PLAN 1
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 Recent MRI demonstrated left pontine lesion and a new left high medial parietal lobe measuring 0.9x0.9 cm.     - Started on Keppra 500mg bid.   - 12/27 additional Decadron 10mg ordered, not given on 12/27 so given on 12/29. standing Decadron 4mg BID  - Nsx: No intervention at this time, recommend to c/w Dex.   - Rad-Onc consulted and discussed use of palliative radiation with patient and family for RT to the skull base 30Gy/10fx.     Patient arrived to [facility] for inpatient radiation to laura lesion. Palliative care consult started.

## 2024-12-31 PROCEDURE — 99232 SBSQ HOSP IP/OBS MODERATE 35: CPT

## 2024-12-31 RX ADMIN — Medication 10 MILLIGRAM(S): at 10:18

## 2024-12-31 RX ADMIN — ACETAMINOPHEN 650 MILLIGRAM(S): 80 SOLUTION/ DROPS ORAL at 05:56

## 2024-12-31 RX ADMIN — Medication 10 MILLIGRAM(S): at 11:18

## 2024-12-31 RX ADMIN — CHLORHEXIDINE GLUCONATE 1 APPLICATION(S): 1.2 RINSE ORAL at 12:35

## 2024-12-31 RX ADMIN — LEVETIRACETAM 500 MILLIGRAM(S): 100 SOLUTION ORAL at 17:29

## 2024-12-31 RX ADMIN — ACETAMINOPHEN 650 MILLIGRAM(S): 80 SOLUTION/ DROPS ORAL at 12:34

## 2024-12-31 RX ADMIN — Medication 4 MILLIGRAM(S): at 17:30

## 2024-12-31 RX ADMIN — Medication 4 MILLIGRAM(S): at 05:56

## 2024-12-31 RX ADMIN — Medication 1 DROP(S): at 12:36

## 2024-12-31 RX ADMIN — Medication 17 GRAM(S): at 12:35

## 2024-12-31 RX ADMIN — LEVETIRACETAM 500 MILLIGRAM(S): 100 SOLUTION ORAL at 05:56

## 2024-12-31 RX ADMIN — ACETAMINOPHEN 650 MILLIGRAM(S): 80 SOLUTION/ DROPS ORAL at 17:28

## 2024-12-31 NOTE — PROGRESS NOTE ADULT - PROBLEM SELECTOR PLAN 1
Known met to frontal lobe (s/p chemo, RT) transferred from [facility] to [facility] for NSGY evaluation due to left pontine 2.7x2.2x2.2 expansile mostly hypodense mass. Recent MRI demonstrated left pontine lesion and a new left high medial parietal lobe measuring 0.9x0.9 cm.     - Started on Keppra 500mg bid.   - cont Decadron increased to 4mg BID  - Nsx: No intervention at this time, recommend to c/w Dex.   - Rad-Onc consulted and discussed use of palliative radiation with patient and family for RT to the skull base 30Gy/10fxkelsey to start this week, pt refused first rx

## 2024-12-31 NOTE — PROGRESS NOTE ADULT - PROBLEM SELECTOR PLAN 2
Extensive stage small cell lung cancer     - Presented with a fall and hip fracture in September 2023 and on further work it was found to have a right upper lobe nodule.   - PET/CT revealed multifocal hepatic metastases, multifocal osteolytic pulmonary and left adrenal metastases, left orbital fossa mass eroding the skull base with concern for intracranial extension.   - Liver biopsy 12/23 was consistent with metastatic small cell carcinoma  - Her MRI was recently completed and revealed multiple rim enhancing lesions in the frontal and parietal lobes.  - Completed C5 carbo/etop/tecentriq. Was planned to transition to maintenace Tecentriq but patient did not follow up.  onc email at daughter's rec to understand poss treatment options, pt likely not a candidate for systemic treatment

## 2024-12-31 NOTE — PROGRESS NOTE ADULT - SUBJECTIVE AND OBJECTIVE BOX
[facility] Division of Hospital Medicine  Maribell Higgins MD  Pager [phone]    Patient is a 79y old  Female who presents with a chief complaint of Radiation therapy       SUBJECTIVE / OVERNIGHT EVENTS: pt seen this AM, asked pt about her plans for RT and she states she doesn't remember; when I asked if she is interested in getting RT she again states she doesn't remember; case d/w [facility], onc emailed as daughter interested in understanding her treatment options however she is aware that options are likely limited and would be agreeable to rehab placement with transition to hospice when appropriate; it does not seem that pt is currently a candidate for systemic treatment given her current functional and nutritional status and fraility      MEDICATIONS  (STANDING):  acetaminophen     Tablet .. 650 milliGRAM(s) Oral every 6 hours  chlorhexidine 2% Cloths 1 Application(s) Topical daily  dexAMETHasone  Injectable 4 milliGRAM(s) IV Push two times a day  latanoprost 0.005% Ophthalmic Solution 1 Drop(s) Both EYES at bedtime  levETIRAcetam 500 milliGRAM(s) Oral two times a day  polyethylene glycol 3350 17 Gram(s) Oral daily  senna 2 Tablet(s) Oral at bedtime  timolol 0.5% Solution 1 Drop(s) Both EYES daily    MEDICATIONS  (PRN):  oxyCODONE    IR 5 milliGRAM(s) Oral every 6 hours PRN Moderate Pain (4 - 6)  oxyCODONE    IR 10 milliGRAM(s) Oral every 6 hours PRN Severe Pain (7 - 10)      PHYSICAL EXAM:  Vital Signs Last 24 Hrs  T(F): 97.5 (31 Dec 2024 05:50), Max: 97.9 (30 Dec 2024 21:00)  HR: 64 (31 Dec 2024 05:50) (64 - 69)  BP: 143/75 (31 Dec 2024 05:50) (115/54 - 143/75)  RR: 17 (31 Dec 2024 05:50) (17 - 18)  SpO2: 100% (31 Dec 2024 05:50) (96% - 100%)    Parameters below as of 31 Dec 2024 05:50  Patient On (Oxygen Delivery Method): room air        CONSTITUTIONAL: NAD, thin  EYES: PERRLA; conjunctiva and sclera clear, poor vision  ENMT: Moist oral mucosa; normal dentition  RESPIRATORY: Normal respiratory effort; few rhonchi b/k, some clear with cough  CARDIOVASCULAR: Regular rate and rhythm; No lower extremity edema  ABDOMEN: Nontender to palpation, normoactive bowel sounds  MUSCULOSKELETAL: no clubbing or cyanosis of digits; no joint swelling or tenderness to palpation; pt able to straighten out her b/l LE  PSYCH: calm, coop, confused; affect appropriate  NEUROLOGY: CN 2-12 are intact and symmetric; no gross sensory deficits   SKIN: No rashes; no palpable lesions    LABS:

## 2025-01-01 PROCEDURE — 99232 SBSQ HOSP IP/OBS MODERATE 35: CPT

## 2025-01-01 RX ADMIN — ACETAMINOPHEN 650 MILLIGRAM(S): 80 SOLUTION/ DROPS ORAL at 05:31

## 2025-01-01 RX ADMIN — ACETAMINOPHEN 650 MILLIGRAM(S): 80 SOLUTION/ DROPS ORAL at 11:57

## 2025-01-01 RX ADMIN — Medication 4 MILLIGRAM(S): at 05:32

## 2025-01-01 RX ADMIN — CHLORHEXIDINE GLUCONATE 1 APPLICATION(S): 1.2 RINSE ORAL at 12:00

## 2025-01-01 RX ADMIN — Medication 17 GRAM(S): at 11:57

## 2025-01-01 RX ADMIN — Medication 1 DROP(S): at 12:00

## 2025-01-01 RX ADMIN — LEVETIRACETAM 500 MILLIGRAM(S): 100 SOLUTION ORAL at 05:31

## 2025-01-01 RX ADMIN — ACETAMINOPHEN 650 MILLIGRAM(S): 80 SOLUTION/ DROPS ORAL at 17:00

## 2025-01-01 RX ADMIN — LEVETIRACETAM 500 MILLIGRAM(S): 100 SOLUTION ORAL at 17:00

## 2025-01-01 RX ADMIN — Medication 4 MILLIGRAM(S): at 17:01

## 2025-01-01 NOTE — PROGRESS NOTE ADULT - PROBLEM SELECTOR PLAN 2
Extensive stage small cell lung cancer     - Presented with a fall and hip fracture in September 2023 and on further work it was found to have a right upper lobe nodule.   - PET/CT revealed multifocal hepatic metastases, multifocal osteolytic pulmonary and left adrenal metastases, left orbital fossa mass eroding the skull base with concern for intracranial extension.   - Liver biopsy 12/23 was consistent with metastatic small cell carcinoma  - Her MRI was recently completed and revealed multiple rim enhancing lesions in the frontal and parietal lobes.  - Completed C5 carbo/etop/tecentriq. Was planned to transition to maintenance Tecentriq but patient did not follow up.  primary onc to reach out directly to daughter, however pt not likely to be a candidate for systemic treatment

## 2025-01-01 NOTE — PROGRESS NOTE ADULT - PROBLEM SELECTOR PLAN 1
Known met to frontal lobe (s/p chemo, RT) transferred from [facility] to [facility] for NSGY evaluation due to left pontine 2.7x2.2x2.2 expansile mostly hypodense mass. Recent MRI demonstrated left pontine lesion and a new left high medial parietal lobe measuring 0.9x0.9 cm.     - Started on Keppra 500mg bid.   - cont Decadron increased to 4mg BID  - Nsx: No intervention at this time, recommend to c/w Dex.   - Rad-Onc consulted and discussed use of palliative radiation with patient and family for RT to the skull base 30Gy/10fxkelsey to start this week, pt refused first rx

## 2025-01-01 NOTE — PROGRESS NOTE ADULT - SUBJECTIVE AND OBJECTIVE BOX
[facility] Division of Hospital Medicine  Maribell Higgins MD  Pager [phone]    Patient is a 79y old  Female who presents with a chief complaint of Radiation therapy      SUBJECTIVE / OVERNIGHT EVENTS: pt seen this AM; denies pain but states she feels crummy; cannot detail any further; inquired if she'd feel better to be OOB, she answered no; reports some mild nausea but states she is hungry, breakfast is being delivered; contacted onc for poss treatment options for pt as req by daughter per pall care; onc will have primary onc to reach out to daughter       MEDICATIONS  (STANDING):  acetaminophen     Tablet .. 650 milliGRAM(s) Oral every 6 hours  chlorhexidine 2% Cloths 1 Application(s) Topical daily  dexAMETHasone  Injectable 4 milliGRAM(s) IV Push two times a day  latanoprost 0.005% Ophthalmic Solution 1 Drop(s) Both EYES at bedtime  levETIRAcetam 500 milliGRAM(s) Oral two times a day  polyethylene glycol 3350 17 Gram(s) Oral daily  senna 2 Tablet(s) Oral at bedtime  timolol 0.5% Solution 1 Drop(s) Both EYES daily      PHYSICAL EXAM:  Vital Signs Last 24 Hrs  T(F): 97.6 (01 Jan 2025 05:00), Max: 98.5 (31 Dec 2024 22:04)  HR: 61 (01 Jan 2025 05:00) (60 - 69)  BP: 128/64 (01 Jan 2025 05:00) (110/65 - 128/64)  RR: 18 (01 Jan 2025 05:00) (17 - 18)  SpO2: 96% (01 Jan 2025 05:00) (95% - 98%)    Parameters below as of 01 Jan 2025 05:00  Patient On (Oxygen Delivery Method): room air        CONSTITUTIONAL: NAD, appears comfortable  EYES: PERRLA; conjunctiva and sclera clear, poor vision  ENMT: Moist oral mucosa; normal dentition  RESPIRATORY: Normal respiratory effort; grossly b/l AE  CARDIOVASCULAR: Regular rate and rhythm; No lower extremity edema  ABDOMEN: Nontender to palpation, normoactive bowel sounds  MUSCULOSKELETAL:  no clubbing or cyanosis of digits; no joint swelling or tenderness to palpation  PSYCH: calm, coop; affect flat  NEUROLOGY: moves all ext  SKIN: No rashes; no palpable lesions    LABS:

## 2025-01-02 PROCEDURE — 99232 SBSQ HOSP IP/OBS MODERATE 35: CPT

## 2025-01-02 RX ADMIN — LEVETIRACETAM 500 MILLIGRAM(S): 100 SOLUTION ORAL at 17:12

## 2025-01-02 RX ADMIN — ACETAMINOPHEN 650 MILLIGRAM(S): 80 SOLUTION/ DROPS ORAL at 17:12

## 2025-01-02 RX ADMIN — Medication 17 GRAM(S): at 12:46

## 2025-01-02 RX ADMIN — Medication 4 MILLIGRAM(S): at 17:13

## 2025-01-02 RX ADMIN — LATANOPROST 1 DROP(S): 50 SOLUTION OPHTHALMIC at 22:02

## 2025-01-02 RX ADMIN — CHLORHEXIDINE GLUCONATE 1 APPLICATION(S): 1.2 RINSE ORAL at 12:46

## 2025-01-02 RX ADMIN — Medication 1 DROP(S): at 12:46

## 2025-01-02 RX ADMIN — ACETAMINOPHEN 650 MILLIGRAM(S): 80 SOLUTION/ DROPS ORAL at 12:40

## 2025-01-02 RX ADMIN — Medication 4 MILLIGRAM(S): at 05:38

## 2025-01-02 RX ADMIN — LEVETIRACETAM 500 MILLIGRAM(S): 100 SOLUTION ORAL at 05:38

## 2025-01-02 RX ADMIN — ACETAMINOPHEN 650 MILLIGRAM(S): 80 SOLUTION/ DROPS ORAL at 05:38

## 2025-01-02 RX ADMIN — SENNOSIDES 2 TABLET(S): 8.6 TABLET, FILM COATED ORAL at 22:01

## 2025-01-02 NOTE — PROGRESS NOTE ADULT - PROBLEM SELECTOR PLAN 1
Known met to frontal lobe (s/p chemo, RT) transferred from [facility] to [facility] for NSGY evaluation due to left pontine 2.7x2.2x2.2 expansile mostly hypodense mass. Recent MRI demonstrated left pontine lesion and a new left high medial parietal lobe measuring 0.9x0.9 cm.     - Started on Keppra 500mg bid.   - cont Decadron increased to 4mg BID  - Nsx: No intervention at this time, recommend to c/w Dex.   - Rad-Onc consulted and discussed use of palliative radiation with patient and family for RT to the skull base 30Gy/10fx, kelsey to start this week, pt refused first rx, did receive RT 12/30, will f/u with RT for further plans

## 2025-01-02 NOTE — PROGRESS NOTE ADULT - SUBJECTIVE AND OBJECTIVE BOX
[facility] Division of Hospital Medicine  Maribell Higgins MD  Pager [phone]    Patient is a 79y old  Female who presents with a chief complaint of Radiation therapy       SUBJECTIVE / OVERNIGHT EVENTS: offers no complaints of pain; seen this AM; breakfast on table, pt states she is always hungry when I asked if she was interested in eating her breakfast; staff to assist with feeds as pt with poor vision; spoke with daughter as daughter req to speak with onc; provided Dr Boyd's number to daughter at Dr Boyd's request; d/w daughter that pt not likely candidate for further treatment and likely more approp to go to rehab with transition to hospice; d/w daughter that pt has not received RT due to refusal and has been denying pain complaints; additionally explained to daughter that we cannot force pt to receive RT; daughter states this is sort of how pt has been since completing her first round of chemo that "she did what she had to do" but then made excuses not to go to appointments or would cancel them; daughter will call Dr Boyd      MEDICATIONS  (STANDING):  acetaminophen     Tablet .. 650 milliGRAM(s) Oral every 6 hours  chlorhexidine 2% Cloths 1 Application(s) Topical daily  dexAMETHasone  Injectable 4 milliGRAM(s) IV Push two times a day  latanoprost 0.005% Ophthalmic Solution 1 Drop(s) Both EYES at bedtime  levETIRAcetam 500 milliGRAM(s) Oral two times a day  polyethylene glycol 3350 17 Gram(s) Oral daily  senna 2 Tablet(s) Oral at bedtime  timolol 0.5% Solution 1 Drop(s) Both EYES daily      PHYSICAL EXAM:  Vital Signs Last 24 Hrs  T(F): 98.6 (02 Jan 2025 05:10), Max: 98.6 (02 Jan 2025 05:10)  HR: 72 (02 Jan 2025 05:10) (66 - 75)  BP: 140/64 (02 Jan 2025 05:10) (115/72 - 140/64)  RR: 18 (02 Jan 2025 05:10) (16 - 18)  SpO2: 99% (02 Jan 2025 05:10) (95% - 99%)    Parameters below as of 02 Jan 2025 05:10  Patient On (Oxygen Delivery Method): room air        CONSTITUTIONAL: NAD, appears comfortable, thin  EYES: PERRLA; conjunctiva and sclera clear  ENMT: Moist oral mucosa; normal dentition  RESPIRATORY: Normal respiratory effort; grossly b/l AE  CARDIOVASCULAR: Regular rate and rhythm; No lower extremity edema  ABDOMEN: Nontender to palpation, normoactive bowel sounds  MUSCULOSKELETAL: no clubbing or cyanosis of digits; no joint swelling or tenderness to palpation  PSYCH: calm, coop; affect flat  NEUROLOGY: moves all ext      LABS:

## 2025-01-02 NOTE — PROGRESS NOTE ADULT - PROBLEM SELECTOR PLAN 2
Extensive stage small cell lung cancer     - Presented with a fall and hip fracture in September 2023 and on further work it was found to have a right upper lobe nodule.   - PET/CT revealed multifocal hepatic metastases, multifocal osteolytic pulmonary and left adrenal metastases, left orbital fossa mass eroding the skull base with concern for intracranial extension.   - Liver biopsy 12/23 was consistent with metastatic small cell carcinoma  - Her MRI was recently completed and revealed multiple rim enhancing lesions in the frontal and parietal lobes.  - Completed C5 carbo/etop/tecentriq. Was planned to transition to maintenance Tecentriq but patient did not follow up.  daughter to call primary onc, but daughter aware pt likely not a candidate for further treatment and likely more approp to go to rehab with transition to hospice

## 2025-01-03 PROCEDURE — 99232 SBSQ HOSP IP/OBS MODERATE 35: CPT

## 2025-01-03 RX ADMIN — ACETAMINOPHEN 650 MILLIGRAM(S): 80 SOLUTION/ DROPS ORAL at 05:42

## 2025-01-03 RX ADMIN — Medication 4 MILLIGRAM(S): at 05:42

## 2025-01-03 RX ADMIN — ACETAMINOPHEN 650 MILLIGRAM(S): 80 SOLUTION/ DROPS ORAL at 11:01

## 2025-01-03 RX ADMIN — Medication 1 DROP(S): at 11:02

## 2025-01-03 RX ADMIN — ACETAMINOPHEN 650 MILLIGRAM(S): 80 SOLUTION/ DROPS ORAL at 12:00

## 2025-01-03 RX ADMIN — ACETAMINOPHEN 650 MILLIGRAM(S): 80 SOLUTION/ DROPS ORAL at 17:07

## 2025-01-03 RX ADMIN — ACETAMINOPHEN 650 MILLIGRAM(S): 80 SOLUTION/ DROPS ORAL at 00:33

## 2025-01-03 RX ADMIN — CHLORHEXIDINE GLUCONATE 1 APPLICATION(S): 1.2 RINSE ORAL at 11:02

## 2025-01-03 RX ADMIN — LEVETIRACETAM 500 MILLIGRAM(S): 100 SOLUTION ORAL at 05:42

## 2025-01-03 RX ADMIN — SENNOSIDES 2 TABLET(S): 8.6 TABLET, FILM COATED ORAL at 21:19

## 2025-01-03 RX ADMIN — LATANOPROST 1 DROP(S): 50 SOLUTION OPHTHALMIC at 21:19

## 2025-01-03 RX ADMIN — Medication 4 MILLIGRAM(S): at 17:07

## 2025-01-03 RX ADMIN — LEVETIRACETAM 500 MILLIGRAM(S): 100 SOLUTION ORAL at 17:06

## 2025-01-03 NOTE — PROGRESS NOTE ADULT - PROBLEM SELECTOR PLAN 1
Known met to frontal lobe (s/p chemo, RT) transferred from [facility] to [facility] for NSGY evaluation due to left pontine 2.7x2.2x2.2 expansile mostly hypodense mass. Recent MRI demonstrated left pontine lesion and a new left high medial parietal lobe measuring 0.9x0.9 cm.     - Started on Keppra 500mg bid.   - cont Decadron increased to 4mg BID  - Nsx: No intervention at this time, recommend to c/w Dex.   after d/w primary onc, daughter has decided to forego further RT treatments and direct care toward comfort

## 2025-01-03 NOTE — PROGRESS NOTE ADULT - SUBJECTIVE AND OBJECTIVE BOX
[facility] Division of Hospital Medicine  Maribell Higgins MD  Pager [phone]    Patient is a 79y old  Female who presents with a chief complaint of Radiation therapy     SUBJECTIVE / OVERNIGHT EVENTS: pt seen this AM, c/o R side chest pain; RN informed to offer pt pain meds; spoke with daughter Rica, who did speak with Dr Boyd, how recommended hospice and did not recommend further treatement, including RT; daughter has decided to stop RT as this is a source of discomfort for pt and not offering much benefit      MEDICATIONS  (STANDING):  acetaminophen     Tablet .. 650 milliGRAM(s) Oral every 6 hours  chlorhexidine 2% Cloths 1 Application(s) Topical daily  dexAMETHasone  Injectable 4 milliGRAM(s) IV Push two times a day  latanoprost 0.005% Ophthalmic Solution 1 Drop(s) Both EYES at bedtime  levETIRAcetam 500 milliGRAM(s) Oral two times a day  polyethylene glycol 3350 17 Gram(s) Oral daily  senna 2 Tablet(s) Oral at bedtime  timolol 0.5% Solution 1 Drop(s) Both EYES daily      PHYSICAL EXAM:  Vital Signs Last 24 Hrs  T(F): 97.9 (03 Jan 2025 11:19), Max: 98.1 (02 Jan 2025 12:24)  HR: 70 (03 Jan 2025 11:19) (59 - 74)  BP: 142/69 (03 Jan 2025 05:40) (108/65 - 142/69)  RR: 18 (03 Jan 2025 11:19) (17 - 18)  SpO2: 99% (03 Jan 2025 11:19) (97% - 99%)    Parameters below as of 03 Jan 2025 11:19  Patient On (Oxygen Delivery Method): room air        CONSTITUTIONAL: NAD, thin  EYES: PERRLA; conjunctiva and sclera clear, poor vision  ENMT: Moist oral mucosa; normal dentition  RESPIRATORY: Normal respiratory effort; grossly b/l AE  CARDIOVASCULAR: Regular rate and rhythm; No lower extremity edema  ABDOMEN: Nontender to palpation, normoactive bowel sounds  MUSCULOSKELETAL:  No clubbing or cyanosis of digits; no joint swelling or tenderness to palpation  PSYCH: calm, coop; affect appropriate  NEUROLOGY: CN 2-12 are intact and symmetric; no gross sensory deficits   SKIN: No rashes; no palpable lesions    LABS:

## 2025-01-03 NOTE — PROGRESS NOTE ADULT - PROBLEM SELECTOR PLAN 2
Extensive stage small cell lung cancer     - Presented with a fall and hip fracture in September 2023 and on further work it was found to have a right upper lobe nodule.   - PET/CT revealed multifocal hepatic metastases, multifocal osteolytic pulmonary and left adrenal metastases, left orbital fossa mass eroding the skull base with concern for intracranial extension.   - Liver biopsy 12/23 was consistent with metastatic small cell carcinoma  - Her MRI was recently completed and revealed multiple rim enhancing lesions in the frontal and parietal lobes.  - Completed C5 carbo/etop/tecentriq. Was planned to transition to maintenance Tecentriq but patient did not follow up.  as above

## 2025-01-04 LAB
ALBUMIN SERPL ELPH-MCNC: 3.3 G/DL — SIGNIFICANT CHANGE UP (ref 3.3–5)
ALP SERPL-CCNC: 106 U/L — SIGNIFICANT CHANGE UP (ref 40–120)
ALT FLD-CCNC: 84 U/L — HIGH (ref 4–33)
ANION GAP SERPL CALC-SCNC: 14 MMOL/L — SIGNIFICANT CHANGE UP (ref 7–14)
AST SERPL-CCNC: 33 U/L — HIGH (ref 4–32)
BASOPHILS # BLD AUTO: 0.03 K/UL — SIGNIFICANT CHANGE UP (ref 0–0.2)
BASOPHILS NFR BLD AUTO: 0.2 % — SIGNIFICANT CHANGE UP (ref 0–2)
BILIRUB SERPL-MCNC: 0.4 MG/DL — SIGNIFICANT CHANGE UP (ref 0.2–1.2)
BUN SERPL-MCNC: 21 MG/DL — SIGNIFICANT CHANGE UP (ref 7–23)
CALCIUM SERPL-MCNC: 9 MG/DL — SIGNIFICANT CHANGE UP (ref 8.4–10.5)
CHLORIDE SERPL-SCNC: 105 MMOL/L — SIGNIFICANT CHANGE UP (ref 98–107)
CO2 SERPL-SCNC: 17 MMOL/L — LOW (ref 22–31)
CREAT SERPL-MCNC: 0.39 MG/DL — LOW (ref 0.5–1.3)
EGFR: 102 ML/MIN/1.73M2 — SIGNIFICANT CHANGE UP
EOSINOPHIL # BLD AUTO: 0.07 K/UL — SIGNIFICANT CHANGE UP (ref 0–0.5)
EOSINOPHIL NFR BLD AUTO: 0.5 % — SIGNIFICANT CHANGE UP (ref 0–6)
GLUCOSE SERPL-MCNC: 108 MG/DL — HIGH (ref 70–99)
HCT VFR BLD CALC: 39.9 % — SIGNIFICANT CHANGE UP (ref 34.5–45)
HGB BLD-MCNC: 13.5 G/DL — SIGNIFICANT CHANGE UP (ref 11.5–15.5)
IANC: 11.42 K/UL — HIGH (ref 1.8–7.4)
IMM GRANULOCYTES NFR BLD AUTO: 1.3 % — HIGH (ref 0–0.9)
LYMPHOCYTES # BLD AUTO: 13.8 % — SIGNIFICANT CHANGE UP (ref 13–44)
LYMPHOCYTES # BLD AUTO: 2.03 K/UL — SIGNIFICANT CHANGE UP (ref 1–3.3)
MAGNESIUM SERPL-MCNC: 1.9 MG/DL — SIGNIFICANT CHANGE UP (ref 1.6–2.6)
MCHC RBC-ENTMCNC: 27.2 PG — SIGNIFICANT CHANGE UP (ref 27–34)
MCHC RBC-ENTMCNC: 33.8 G/DL — SIGNIFICANT CHANGE UP (ref 32–36)
MCV RBC AUTO: 80.3 FL — SIGNIFICANT CHANGE UP (ref 80–100)
MONOCYTES # BLD AUTO: 1 K/UL — HIGH (ref 0–0.9)
MONOCYTES NFR BLD AUTO: 6.8 % — SIGNIFICANT CHANGE UP (ref 2–14)
NEUTROPHILS # BLD AUTO: 11.42 K/UL — HIGH (ref 1.8–7.4)
NEUTROPHILS NFR BLD AUTO: 77.4 % — HIGH (ref 43–77)
NRBC # BLD: 0 /100 WBCS — SIGNIFICANT CHANGE UP (ref 0–0)
NRBC # FLD: 0 K/UL — SIGNIFICANT CHANGE UP (ref 0–0)
PHOSPHATE SERPL-MCNC: 2.5 MG/DL — SIGNIFICANT CHANGE UP (ref 2.5–4.5)
PLATELET # BLD AUTO: 348 K/UL — SIGNIFICANT CHANGE UP (ref 150–400)
POTASSIUM SERPL-MCNC: 4.2 MMOL/L — SIGNIFICANT CHANGE UP (ref 3.5–5.3)
POTASSIUM SERPL-SCNC: 4.2 MMOL/L — SIGNIFICANT CHANGE UP (ref 3.5–5.3)
PROT SERPL-MCNC: 6.3 G/DL — SIGNIFICANT CHANGE UP (ref 6–8.3)
RBC # BLD: 4.97 M/UL — SIGNIFICANT CHANGE UP (ref 3.8–5.2)
RBC # FLD: 14.3 % — SIGNIFICANT CHANGE UP (ref 10.3–14.5)
SODIUM SERPL-SCNC: 136 MMOL/L — SIGNIFICANT CHANGE UP (ref 135–145)
WBC # BLD: 14.74 K/UL — HIGH (ref 3.8–10.5)
WBC # FLD AUTO: 14.74 K/UL — HIGH (ref 3.8–10.5)

## 2025-01-04 PROCEDURE — 99232 SBSQ HOSP IP/OBS MODERATE 35: CPT

## 2025-01-04 RX ORDER — OXYCODONE HCL 15 MG
5 TABLET ORAL EVERY 6 HOURS
Refills: 0 | Status: DISCONTINUED | OUTPATIENT
Start: 2025-01-04 | End: 2025-01-07

## 2025-01-04 RX ORDER — OXYCODONE HCL 15 MG
10 TABLET ORAL EVERY 6 HOURS
Refills: 0 | Status: DISCONTINUED | OUTPATIENT
Start: 2025-01-04 | End: 2025-01-07

## 2025-01-04 RX ADMIN — LEVETIRACETAM 500 MILLIGRAM(S): 100 SOLUTION ORAL at 17:57

## 2025-01-04 RX ADMIN — Medication 10 MILLIGRAM(S): at 16:40

## 2025-01-04 RX ADMIN — Medication 1 DROP(S): at 12:51

## 2025-01-04 RX ADMIN — LATANOPROST 1 DROP(S): 50 SOLUTION OPHTHALMIC at 21:59

## 2025-01-04 RX ADMIN — SENNOSIDES 2 TABLET(S): 8.6 TABLET, FILM COATED ORAL at 21:59

## 2025-01-04 RX ADMIN — Medication 10 MILLIGRAM(S): at 17:40

## 2025-01-04 RX ADMIN — Medication 17 GRAM(S): at 12:50

## 2025-01-04 RX ADMIN — ACETAMINOPHEN 650 MILLIGRAM(S): 80 SOLUTION/ DROPS ORAL at 12:50

## 2025-01-04 RX ADMIN — Medication 4 MILLIGRAM(S): at 17:57

## 2025-01-04 RX ADMIN — Medication 4 MILLIGRAM(S): at 05:35

## 2025-01-04 RX ADMIN — LEVETIRACETAM 500 MILLIGRAM(S): 100 SOLUTION ORAL at 05:35

## 2025-01-04 RX ADMIN — ACETAMINOPHEN 650 MILLIGRAM(S): 80 SOLUTION/ DROPS ORAL at 18:50

## 2025-01-04 RX ADMIN — ACETAMINOPHEN 650 MILLIGRAM(S): 80 SOLUTION/ DROPS ORAL at 05:34

## 2025-01-04 RX ADMIN — CHLORHEXIDINE GLUCONATE 1 APPLICATION(S): 1.2 RINSE ORAL at 12:50

## 2025-01-04 RX ADMIN — ACETAMINOPHEN 650 MILLIGRAM(S): 80 SOLUTION/ DROPS ORAL at 17:56

## 2025-01-04 RX ADMIN — ACETAMINOPHEN 650 MILLIGRAM(S): 80 SOLUTION/ DROPS ORAL at 23:49

## 2025-01-04 RX ADMIN — ACETAMINOPHEN 650 MILLIGRAM(S): 80 SOLUTION/ DROPS ORAL at 00:24

## 2025-01-04 NOTE — PROGRESS NOTE ADULT - PROBLEM SELECTOR PLAN 1
Known met to frontal lobe (s/p chemo, RT) transferred from [facility] to [facility] for NSGY evaluation due to left pontine 2.7x2.2x2.2 expansile mostly hypodense mass. Recent MRI demonstrated left pontine lesion and a new left high medial parietal lobe measuring 0.9x0.9 cm.     - Started on Keppra 500mg bid.   - cont Decadron increased to 4mg BID  - Nsx: No intervention at this time, recommend to c/w Dex.   after d/w primary onc, daughter has decided to forego further RT treatments and direct care toward comfort

## 2025-01-04 NOTE — PROGRESS NOTE ADULT - SUBJECTIVE AND OBJECTIVE BOX
[facility] Division of Hospital Medicine  Maribell Higgins MD  Pager [phone]    Patient is a 79y old  Female who presents with a chief complaint of Radiation therapy       SUBJECTIVE / OVERNIGHT EVENTS: pt seen this AM, currently denies pain; looking forward to breakfast      MEDICATIONS  (STANDING):  acetaminophen     Tablet .. 650 milliGRAM(s) Oral every 6 hours  chlorhexidine 2% Cloths 1 Application(s) Topical daily  dexAMETHasone  Injectable 4 milliGRAM(s) IV Push two times a day  latanoprost 0.005% Ophthalmic Solution 1 Drop(s) Both EYES at bedtime  levETIRAcetam 500 milliGRAM(s) Oral two times a day  polyethylene glycol 3350 17 Gram(s) Oral daily  senna 2 Tablet(s) Oral at bedtime  timolol 0.5% Solution 1 Drop(s) Both EYES daily        PHYSICAL EXAM:  Vital Signs Last 24 Hrs  T(F): 97.7 (04 Jan 2025 05:30), Max: 97.9 (03 Jan 2025 11:19)  HR: 59 (04 Jan 2025 05:30) (59 - 70)  BP: 144/63 (04 Jan 2025 05:30) (102/50 - 144/63)  RR: 18 (04 Jan 2025 05:30) (18 - 18)  SpO2: 100% (04 Jan 2025 05:30) (99% - 100%)    Parameters below as of 04 Jan 2025 05:30  Patient On (Oxygen Delivery Method): room air        CONSTITUTIONAL: NAD, appears comfortable  EYES: PERRLA; conjunctiva and sclera clear, poor vision  ENMT: Moist oral mucosa; normal dentition  RESPIRATORY: Normal respiratory effort; grossly b/l AE  CARDIOVASCULAR: No lower extremity edema  ABDOMEN: Nontender to palpation, normoactive bowel sounds  MUSCULOSKELETAL: no clubbing or cyanosis of digits; no joint swelling or tenderness to palpation  PSYCH: calm, coop; affect appropriate  NEUROLOGY: moves all ext  SKIN: No rashes; no palpable lesions    LABS:                        13.5   14.74 )-----------( 348      ( 04 Jan 2025 07:16 )             39.9     01-04    136  |  105  |  21  ----------------------------<  108[H]  4.2   |  17[L]  |  0.39[L]    Ca    9.0      04 Jan 2025 07:16  Phos  2.5     01-04  Mg     1.90     01-04    TPro  6.3  /  Alb  3.3  /  TBili  0.4  /  DBili  x   /  AST  33[H]  /  ALT  84[H]  /  AlkPhos  106  01-04

## 2025-01-04 NOTE — PROGRESS NOTE ADULT - PROBLEM SELECTOR PLAN 8
DVT: SCDs for now   Diet: Regular with Ensure HP TID  PT: MANPREET  Dispo: - active    plan more focused on quality of life and pall approach

## 2025-01-05 LAB
ALBUMIN SERPL ELPH-MCNC: 3.5 G/DL — SIGNIFICANT CHANGE UP (ref 3.3–5)
ALP SERPL-CCNC: 105 U/L — SIGNIFICANT CHANGE UP (ref 40–120)
ALT FLD-CCNC: 83 U/L — HIGH (ref 4–33)
ANION GAP SERPL CALC-SCNC: 13 MMOL/L — SIGNIFICANT CHANGE UP (ref 7–14)
AST SERPL-CCNC: 30 U/L — SIGNIFICANT CHANGE UP (ref 4–32)
BASOPHILS # BLD AUTO: 0.05 K/UL — SIGNIFICANT CHANGE UP (ref 0–0.2)
BASOPHILS NFR BLD AUTO: 0.4 % — SIGNIFICANT CHANGE UP (ref 0–2)
BILIRUB SERPL-MCNC: 0.4 MG/DL — SIGNIFICANT CHANGE UP (ref 0.2–1.2)
BUN SERPL-MCNC: 22 MG/DL — SIGNIFICANT CHANGE UP (ref 7–23)
CALCIUM SERPL-MCNC: 8.9 MG/DL — SIGNIFICANT CHANGE UP (ref 8.4–10.5)
CHLORIDE SERPL-SCNC: 104 MMOL/L — SIGNIFICANT CHANGE UP (ref 98–107)
CO2 SERPL-SCNC: 20 MMOL/L — LOW (ref 22–31)
CREAT SERPL-MCNC: 0.39 MG/DL — LOW (ref 0.5–1.3)
EGFR: 102 ML/MIN/1.73M2 — SIGNIFICANT CHANGE UP
EOSINOPHIL # BLD AUTO: 0.08 K/UL — SIGNIFICANT CHANGE UP (ref 0–0.5)
EOSINOPHIL NFR BLD AUTO: 0.6 % — SIGNIFICANT CHANGE UP (ref 0–6)
GLUCOSE SERPL-MCNC: 95 MG/DL — SIGNIFICANT CHANGE UP (ref 70–99)
HCT VFR BLD CALC: 42.1 % — SIGNIFICANT CHANGE UP (ref 34.5–45)
HGB BLD-MCNC: 13.4 G/DL — SIGNIFICANT CHANGE UP (ref 11.5–15.5)
IANC: 10.3 K/UL — HIGH (ref 1.8–7.4)
IMM GRANULOCYTES NFR BLD AUTO: 0.9 % — SIGNIFICANT CHANGE UP (ref 0–0.9)
LYMPHOCYTES # BLD AUTO: 15.5 % — SIGNIFICANT CHANGE UP (ref 13–44)
LYMPHOCYTES # BLD AUTO: 2.14 K/UL — SIGNIFICANT CHANGE UP (ref 1–3.3)
MAGNESIUM SERPL-MCNC: 2 MG/DL — SIGNIFICANT CHANGE UP (ref 1.6–2.6)
MCHC RBC-ENTMCNC: 26.3 PG — LOW (ref 27–34)
MCHC RBC-ENTMCNC: 31.8 G/DL — LOW (ref 32–36)
MCV RBC AUTO: 82.7 FL — SIGNIFICANT CHANGE UP (ref 80–100)
MONOCYTES # BLD AUTO: 1.14 K/UL — HIGH (ref 0–0.9)
MONOCYTES NFR BLD AUTO: 8.2 % — SIGNIFICANT CHANGE UP (ref 2–14)
NEUTROPHILS # BLD AUTO: 10.3 K/UL — HIGH (ref 1.8–7.4)
NEUTROPHILS NFR BLD AUTO: 74.4 % — SIGNIFICANT CHANGE UP (ref 43–77)
NRBC # BLD: 0 /100 WBCS — SIGNIFICANT CHANGE UP (ref 0–0)
NRBC # FLD: 0 K/UL — SIGNIFICANT CHANGE UP (ref 0–0)
PHOSPHATE SERPL-MCNC: 2.7 MG/DL — SIGNIFICANT CHANGE UP (ref 2.5–4.5)
PLATELET # BLD AUTO: 342 K/UL — SIGNIFICANT CHANGE UP (ref 150–400)
POTASSIUM SERPL-MCNC: 4.3 MMOL/L — SIGNIFICANT CHANGE UP (ref 3.5–5.3)
POTASSIUM SERPL-SCNC: 4.3 MMOL/L — SIGNIFICANT CHANGE UP (ref 3.5–5.3)
PROT SERPL-MCNC: 6.1 G/DL — SIGNIFICANT CHANGE UP (ref 6–8.3)
RBC # BLD: 5.09 M/UL — SIGNIFICANT CHANGE UP (ref 3.8–5.2)
RBC # FLD: 14.6 % — HIGH (ref 10.3–14.5)
SODIUM SERPL-SCNC: 137 MMOL/L — SIGNIFICANT CHANGE UP (ref 135–145)
WBC # BLD: 13.84 K/UL — HIGH (ref 3.8–10.5)
WBC # FLD AUTO: 13.84 K/UL — HIGH (ref 3.8–10.5)

## 2025-01-05 PROCEDURE — 99232 SBSQ HOSP IP/OBS MODERATE 35: CPT

## 2025-01-05 RX ADMIN — LATANOPROST 1 DROP(S): 50 SOLUTION OPHTHALMIC at 23:43

## 2025-01-05 RX ADMIN — LEVETIRACETAM 500 MILLIGRAM(S): 100 SOLUTION ORAL at 05:49

## 2025-01-05 RX ADMIN — Medication 17 GRAM(S): at 11:46

## 2025-01-05 RX ADMIN — Medication 4 MILLIGRAM(S): at 05:50

## 2025-01-05 RX ADMIN — Medication 5 MILLIGRAM(S): at 09:30

## 2025-01-05 RX ADMIN — ACETAMINOPHEN 650 MILLIGRAM(S): 80 SOLUTION/ DROPS ORAL at 18:23

## 2025-01-05 RX ADMIN — Medication 5 MILLIGRAM(S): at 08:30

## 2025-01-05 RX ADMIN — CHLORHEXIDINE GLUCONATE 1 APPLICATION(S): 1.2 RINSE ORAL at 11:46

## 2025-01-05 RX ADMIN — ACETAMINOPHEN 650 MILLIGRAM(S): 80 SOLUTION/ DROPS ORAL at 00:30

## 2025-01-05 RX ADMIN — Medication 5 MILLIGRAM(S): at 14:32

## 2025-01-05 RX ADMIN — ACETAMINOPHEN 650 MILLIGRAM(S): 80 SOLUTION/ DROPS ORAL at 11:46

## 2025-01-05 RX ADMIN — ACETAMINOPHEN 650 MILLIGRAM(S): 80 SOLUTION/ DROPS ORAL at 05:49

## 2025-01-05 RX ADMIN — LEVETIRACETAM 500 MILLIGRAM(S): 100 SOLUTION ORAL at 18:23

## 2025-01-05 RX ADMIN — Medication 1 DROP(S): at 11:46

## 2025-01-05 RX ADMIN — Medication 5 MILLIGRAM(S): at 15:30

## 2025-01-05 RX ADMIN — Medication 4 MILLIGRAM(S): at 18:23

## 2025-01-05 NOTE — PROGRESS NOTE ADULT - NSPROGADDITIONALINFOA_GEN_ALL_CORE
plan for placement early next week to rehab with transition to TLC/hospice as approp
likely plan for rehab with transition to hospice when approp
will clarify RT plans. daughter to speak with primary onc; likely rehab with transition to hospice
plan for dc to rehab with transition to hospice when approp

## 2025-01-05 NOTE — PROGRESS NOTE ADULT - SUBJECTIVE AND OBJECTIVE BOX
[facility] Division of Hospital Medicine  Maribell Higgins MD  Pager [phone]    Patient is a 79y old  Female who presents with a chief complaint of Radiation therapy       SUBJECTIVE / OVERNIGHT EVENTS: pt sleeping on my arrival; wakes easily to voice; reports some pain to R flank, otherwise ok      MEDICATIONS  (STANDING):  acetaminophen     Tablet .. 650 milliGRAM(s) Oral every 6 hours  chlorhexidine 2% Cloths 1 Application(s) Topical daily  dexAMETHasone  Injectable 4 milliGRAM(s) IV Push two times a day  latanoprost 0.005% Ophthalmic Solution 1 Drop(s) Both EYES at bedtime  levETIRAcetam 500 milliGRAM(s) Oral two times a day  polyethylene glycol 3350 17 Gram(s) Oral daily  senna 2 Tablet(s) Oral at bedtime  timolol 0.5% Solution 1 Drop(s) Both EYES daily    MEDICATIONS  (PRN):  oxyCODONE    IR 5 milliGRAM(s) Oral every 6 hours PRN Moderate Pain (4 - 6)  oxyCODONE    IR 10 milliGRAM(s) Oral every 6 hours PRN Severe Pain (7 - 10)    PHYSICAL EXAM:  Vital Signs Last 24 Hrs  T(F): 97.7 (05 Jan 2025 05:45), Max: 98 (04 Jan 2025 22:11)  HR: 61 (05 Jan 2025 05:45) (61 - 68)  BP: 123/61 (05 Jan 2025 05:45) (114/63 - 140/68)  RR: 17 (05 Jan 2025 05:45) (17 - 18)  SpO2: 99% (05 Jan 2025 05:45) (97% - 100%)    Parameters below as of 05 Jan 2025 05:45  Patient On (Oxygen Delivery Method): room air        CONSTITUTIONAL: NAD, thin, appears comfortable  EYES: PERRLA; conjunctiva and sclera clear, poor vision  ENMT: Moist oral mucosa; normal dentition  RESPIRATORY: Normal respiratory effort; grossly b/l AE  CARDIOVASCULAR: Regular rate and rhythm; No lower extremity edema  ABDOMEN: Nontender to palpation, normoactive bowel sounds  MUSCULOSKELETAL:  no clubbing or cyanosis of digits; no joint swelling or tenderness to palpation  PSYCH: calm, coop; affect appropriate  NEUROLOGY: moves all ext  SKIN: No rashes; no palpable lesions    LABS:                        13.4   13.84 )-----------( 342      ( 05 Jan 2025 06:25 )             42.1     01-05    137  |  104  |  22  ----------------------------<  95  4.3   |  20[L]  |  0.39[L]    Ca    8.9      05 Jan 2025 06:25  Phos  2.7     01-05  Mg     2.00     01-05    TPro  6.1  /  Alb  3.5  /  TBili  0.4  /  DBili  x   /  AST  30  /  ALT  83[H]  /  AlkPhos  105  01-05

## 2025-01-05 NOTE — PROGRESS NOTE ADULT - PROBLEM SELECTOR PLAN 1
Known met to frontal lobe (s/p chemo, RT) transferred from [facility] to [facility] for NSGY evaluation due to left pontine 2.7x2.2x2.2 expansile mostly hypodense mass. Recent MRI demonstrated left pontine lesion and a new left high medial parietal lobe measuring 0.9x0.9 cm.     - Started on Keppra 500mg bid.   - cont Decadron increased to 4mg BID  - Nsx: No intervention at this time, recommend to c/w Dex.   after d/w primary onc, daughter has decided to forego further RT treatments and direct care toward comfort

## 2025-01-06 LAB
ALBUMIN SERPL ELPH-MCNC: 3 G/DL — LOW (ref 3.3–5)
ALP SERPL-CCNC: 93 U/L — SIGNIFICANT CHANGE UP (ref 40–120)
ALT FLD-CCNC: 73 U/L — HIGH (ref 4–33)
ANION GAP SERPL CALC-SCNC: 15 MMOL/L — HIGH (ref 7–14)
AST SERPL-CCNC: 39 U/L — HIGH (ref 4–32)
BASOPHILS # BLD AUTO: 0.02 K/UL — SIGNIFICANT CHANGE UP (ref 0–0.2)
BASOPHILS NFR BLD AUTO: 0.2 % — SIGNIFICANT CHANGE UP (ref 0–2)
BILIRUB SERPL-MCNC: 0.4 MG/DL — SIGNIFICANT CHANGE UP (ref 0.2–1.2)
BUN SERPL-MCNC: 21 MG/DL — SIGNIFICANT CHANGE UP (ref 7–23)
CALCIUM SERPL-MCNC: 8.2 MG/DL — LOW (ref 8.4–10.5)
CHLORIDE SERPL-SCNC: 107 MMOL/L — SIGNIFICANT CHANGE UP (ref 98–107)
CO2 SERPL-SCNC: 15 MMOL/L — LOW (ref 22–31)
CREAT SERPL-MCNC: 0.33 MG/DL — LOW (ref 0.5–1.3)
EGFR: 106 ML/MIN/1.73M2 — SIGNIFICANT CHANGE UP
EOSINOPHIL # BLD AUTO: 0.04 K/UL — SIGNIFICANT CHANGE UP (ref 0–0.5)
EOSINOPHIL NFR BLD AUTO: 0.3 % — SIGNIFICANT CHANGE UP (ref 0–6)
GLUCOSE SERPL-MCNC: 84 MG/DL — SIGNIFICANT CHANGE UP (ref 70–99)
HCT VFR BLD CALC: 31.6 % — LOW (ref 34.5–45)
HGB BLD-MCNC: 10.5 G/DL — LOW (ref 11.5–15.5)
IANC: 8.71 K/UL — HIGH (ref 1.8–7.4)
IMM GRANULOCYTES NFR BLD AUTO: 1.4 % — HIGH (ref 0–0.9)
LYMPHOCYTES # BLD AUTO: 1.48 K/UL — SIGNIFICANT CHANGE UP (ref 1–3.3)
LYMPHOCYTES # BLD AUTO: 12.9 % — LOW (ref 13–44)
MAGNESIUM SERPL-MCNC: 1.9 MG/DL — SIGNIFICANT CHANGE UP (ref 1.6–2.6)
MCHC RBC-ENTMCNC: 27.1 PG — SIGNIFICANT CHANGE UP (ref 27–34)
MCHC RBC-ENTMCNC: 33.2 G/DL — SIGNIFICANT CHANGE UP (ref 32–36)
MCV RBC AUTO: 81.7 FL — SIGNIFICANT CHANGE UP (ref 80–100)
MONOCYTES # BLD AUTO: 1.06 K/UL — HIGH (ref 0–0.9)
MONOCYTES NFR BLD AUTO: 9.2 % — SIGNIFICANT CHANGE UP (ref 2–14)
MRSA PCR RESULT.: SIGNIFICANT CHANGE UP
NEUTROPHILS # BLD AUTO: 8.71 K/UL — HIGH (ref 1.8–7.4)
NEUTROPHILS NFR BLD AUTO: 76 % — SIGNIFICANT CHANGE UP (ref 43–77)
NRBC # BLD: 0 /100 WBCS — SIGNIFICANT CHANGE UP (ref 0–0)
NRBC # FLD: 0 K/UL — SIGNIFICANT CHANGE UP (ref 0–0)
PHOSPHATE SERPL-MCNC: 2.3 MG/DL — LOW (ref 2.5–4.5)
PLATELET # BLD AUTO: 261 K/UL — SIGNIFICANT CHANGE UP (ref 150–400)
POTASSIUM SERPL-MCNC: 5.2 MMOL/L — SIGNIFICANT CHANGE UP (ref 3.5–5.3)
POTASSIUM SERPL-SCNC: 5.2 MMOL/L — SIGNIFICANT CHANGE UP (ref 3.5–5.3)
PROT SERPL-MCNC: 5.7 G/DL — LOW (ref 6–8.3)
RBC # BLD: 3.87 M/UL — SIGNIFICANT CHANGE UP (ref 3.8–5.2)
RBC # FLD: 14.5 % — SIGNIFICANT CHANGE UP (ref 10.3–14.5)
S AUREUS DNA NOSE QL NAA+PROBE: DETECTED
SODIUM SERPL-SCNC: 137 MMOL/L — SIGNIFICANT CHANGE UP (ref 135–145)
WBC # BLD: 11.47 K/UL — HIGH (ref 3.8–10.5)
WBC # FLD AUTO: 11.47 K/UL — HIGH (ref 3.8–10.5)

## 2025-01-06 PROCEDURE — 99232 SBSQ HOSP IP/OBS MODERATE 35: CPT

## 2025-01-06 RX ORDER — FLUTICASONE PROPIONATE 50 UG/1
1 SPRAY, METERED NASAL
Refills: 0 | Status: DISCONTINUED | OUTPATIENT
Start: 2025-01-06 | End: 2025-01-07

## 2025-01-06 RX ORDER — MUPIROCIN 2 %
1 OINTMENT (GRAM) TOPICAL
Refills: 0 | Status: DISCONTINUED | OUTPATIENT
Start: 2025-01-06 | End: 2025-01-07

## 2025-01-06 RX ORDER — DEXAMETHASONE SODIUM PHOSPHATE 4 MG/ML
4 VIAL (ML) INJECTION
Refills: 0 | Status: DISCONTINUED | OUTPATIENT
Start: 2025-01-06 | End: 2025-01-07

## 2025-01-06 RX ADMIN — LATANOPROST 1 DROP(S): 50 SOLUTION OPHTHALMIC at 23:59

## 2025-01-06 RX ADMIN — Medication 4 MILLIGRAM(S): at 17:38

## 2025-01-06 RX ADMIN — FLUTICASONE PROPIONATE 1 SPRAY(S): 50 SPRAY, METERED NASAL at 17:33

## 2025-01-06 RX ADMIN — Medication 4 MILLIGRAM(S): at 05:11

## 2025-01-06 RX ADMIN — CHLORHEXIDINE GLUCONATE 1 APPLICATION(S): 1.2 RINSE ORAL at 13:16

## 2025-01-06 RX ADMIN — LEVETIRACETAM 500 MILLIGRAM(S): 100 SOLUTION ORAL at 17:33

## 2025-01-06 RX ADMIN — ACETAMINOPHEN 650 MILLIGRAM(S): 80 SOLUTION/ DROPS ORAL at 23:59

## 2025-01-06 RX ADMIN — ACETAMINOPHEN 650 MILLIGRAM(S): 80 SOLUTION/ DROPS ORAL at 05:11

## 2025-01-06 RX ADMIN — LEVETIRACETAM 500 MILLIGRAM(S): 100 SOLUTION ORAL at 05:12

## 2025-01-06 RX ADMIN — Medication 1 DROP(S): at 17:38

## 2025-01-06 RX ADMIN — ACETAMINOPHEN 650 MILLIGRAM(S): 80 SOLUTION/ DROPS ORAL at 17:33

## 2025-01-06 RX ADMIN — ACETAMINOPHEN 650 MILLIGRAM(S): 80 SOLUTION/ DROPS ORAL at 18:33

## 2025-01-06 NOTE — PROGRESS NOTE ADULT - PROBLEM SELECTOR PROBLEM 4
Right radial fracture

## 2025-01-06 NOTE — PROGRESS NOTE ADULT - PROBLEM SELECTOR PLAN 1
Known met to frontal lobe (s/p chemo, RT) transferred from [facility] to [facility] for NSGY evaluation due to left pontine 2.7x2.2x2.2 expansile mostly hypodense mass. Recent MRI demonstrated left pontine lesion and a new left high medial parietal lobe measuring 0.9x0.9 cm.     - Started on Keppra 500mg bid.   - cont Decadron increased to 4mg BID  - Nsx: No intervention at this time, recommend to c/w Dex.   after d/w primary onc, daughter has decided to forego further RT treatments and direct care toward comfort  -planned for MANPREET with transition to hospice care

## 2025-01-06 NOTE — PROGRESS NOTE ADULT - ASSESSMENT
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 She said she tripped and fell, but now sure.  She has been c/o pain in her right arm and right leg of the patient appear more contracted than usual over the last couple of weeks. She uses walker at home. Denies SOB, chest pain, fever, but some cough. As per daughter her chemo was stopped in April 2024 given advance disease.   
SCLC with new brain mets including large but relatively asymptomatic pontine lesion  appears to be developing left gaze palsy certainly related to lesion    Would dose dex 10 extra today  then 4 bid    Will need outpatient RT f/u after dc  empiric keppra 500 bid for now   
Patient is a 79F with advanced small cell lung cancer with mets to the brain with vision problems, liver, bone (not on chemo- last chemo in April), L hip Fx 2023 lives alone a/w unwitnessed fall , imaging showing acute spiral fracture of fifth proximal phalanx acute right anterior 5th and 7th minimally displaced anterior rib fractures and possibly acute  mild L3 vertebral body compression fracture. MRI reveals new laura mass likely metastatic, showed concern for blood products in the left laura and vasogenic edema. Rad onc to plan CT this week. 
Patient is a 79F with advanced small cell lung cancer with mets to the brain with vision problems, liver, bone (not on chemo- last chemo in April), L hip Fx 2023 lives alone a/w unwitnessed fall , imaging showing acute spiral fracture of fifth proximal phalanx acute right anterior 5th and 7th minimally displaced anterior rib fractures and possibly acute  mild L3 vertebral body compression fracture. MRI reveals new laura mass likely metastatic, showed concern for blood products in the left laura and vasogenic edema. Rad onc to plan CT this week. Daughter now no longer interested in pursuing RT after discussion with primary onc as will offer benefit and potentially cause more discomfort being moved back and forth to treatments
Patient is a 79F with advanced small cell lung cancer with mets to the brain with vision problems, liver, bone (not on chemo- last chemo in April), L hip Fx 2023 lives alone a/w unwitnessed fall , imaging showing acute spiral fracture of fifth proximal phalanx acute right anterior 5th and 7th minimally displaced anterior rib fractures and possibly acute  mild L3 vertebral body compression fracture. MRI reveals new laura mass likely metastatic, showed concern for blood products in the left laura and vasogenic edema. Rad onc to plan CT this week. 

## 2025-01-06 NOTE — PROGRESS NOTE ADULT - PROBLEM SELECTOR PROBLEM 2
Small cell lung cancer
Counseling regarding advance directives and goals of care
Small cell lung cancer

## 2025-01-06 NOTE — PROGRESS NOTE ADULT - PROBLEM SELECTOR PROBLEM 5
Fall at home

## 2025-01-06 NOTE — PROGRESS NOTE ADULT - PROBLEM SELECTOR PROBLEM 6
Chronic pain of multiple sites

## 2025-01-06 NOTE — PROGRESS NOTE ADULT - SUBJECTIVE AND OBJECTIVE BOX
Patient is a 79y old  Female who presents with a chief complaint of Radiation therapy     SUBJECTIVE / OVERNIGHT EVENTS: NAEO. VSS. denies having any pain. appears comfortable.     MEDICATIONS  (STANDING):  acetaminophen     Tablet .. 650 milliGRAM(s) Oral every 6 hours  chlorhexidine 2% Cloths 1 Application(s) Topical daily  dexAMETHasone     Tablet 4 milliGRAM(s) Oral two times a day  fluticasone propionate 50 MICROgram(s)/spray Nasal Spray 1 Spray(s) Both Nostrils two times a day  latanoprost 0.005% Ophthalmic Solution 1 Drop(s) Both EYES at bedtime  levETIRAcetam 500 milliGRAM(s) Oral two times a day  mupirocin 2% Nasal 1 Application(s) Both Nostrils two times a day  polyethylene glycol 3350 17 Gram(s) Oral daily  senna 2 Tablet(s) Oral at bedtime  timolol 0.5% Solution 1 Drop(s) Both EYES daily    MEDICATIONS  (PRN):  oxyCODONE    IR 5 milliGRAM(s) Oral every 6 hours PRN Moderate Pain (4 - 6)  oxyCODONE    IR 10 milliGRAM(s) Oral every 6 hours PRN Severe Pain (7 - 10)    PHYSICAL EXAM:  Vital Signs Last 24 Hrs  T(C): 36.6 (06 Jan 2025 12:16), Max: 36.6 (05 Jan 2025 17:40)  T(F): 97.9 (06 Jan 2025 12:16), Max: 97.9 (05 Jan 2025 17:40)  HR: 70 (06 Jan 2025 12:16) (60 - 70)  BP: 107/61 (06 Jan 2025 12:16) (106/84 - 121/60)  BP(mean): --  RR: 18 (06 Jan 2025 12:16) (17 - 18)  SpO2: 99% (06 Jan 2025 12:16) (95% - 100%)    Parameters below as of 06 Jan 2025 12:16  Patient On (Oxygen Delivery Method): room air      CONSTITUTIONAL: NAD, thin, appears comfortable  EYES: PERRLA; conjunctiva and sclera clear, poor vision  ENMT: Moist oral mucosa; normal dentition  RESPIRATORY: Normal respiratory effort; grossly b/l AE  CARDIOVASCULAR: Regular rate and rhythm; No lower extremity edema  ABDOMEN: Nontender to palpation, normoactive bowel sounds  NEUROLOGY: moves all ext      LABS:                        10.5   11.47 )-----------( 261      ( 06 Jan 2025 05:56 )             31.6     01-06    137  |  107  |  21  ----------------------------<  84  5.2   |  15[L]  |  0.33[L]    Ca    8.2[L]      06 Jan 2025 05:56  Phos  2.3     01-06  Mg     1.90     01-06    TPro  5.7[L]  /  Alb  3.0[L]  /  TBili  0.4  /  DBili  x   /  AST  39[H]  /  ALT  73[H]  /  AlkPhos  93  01-06      Urinalysis Basic - ( 06 Jan 2025 05:56 )    Color: x / Appearance: x / SG: x / pH: x  Gluc: 84 mg/dL / Ketone: x  / Bili: x / Urobili: x   Blood: x / Protein: x / Nitrite: x   Leuk Esterase: x / RBC: x / WBC x   Sq Epi: x / Non Sq Epi: x / Bacteria: x      CAPILLARY BLOOD GLUCOSE          RADIOLOGY & ADDITIONAL TESTS: Reviewed.

## 2025-01-06 NOTE — CHART NOTE - NSCHARTNOTEFT_GEN_A_CORE
Rad Onc received email notification after discussion with Ms. Spencer's dtr (Rica,),  no further RT is to be offered.    She is s/p 4/10 fractions partial brain RT.           email is below from 1/4/25:   Hello,  Please be [place] [id number] no longer to receive RT.  Daughter has made this decision after discussion with primary onc.  Thanks!    Maribell Higgins MD Rad Onc received email notification after discussion with Ms. Spencer's dtr (Rica,) no further RT is to be offered.    She is s/p 4/10 fractions partial brain RT.           email is below from 1/4/25:   Hello,  Please be advised [place] [id number] no longer to receive RT.  Daughter has made this decision after discussion with primary onc.  Thanks!    Maribell Higgins MD

## 2025-01-06 NOTE — PROGRESS NOTE ADULT - PROVIDER SPECIALTY LIST ADULT
Hospitalist
Internal Medicine
Neurology
Internal Medicine
Hospitalist
Internal Medicine
Hospitalist
Internal Medicine
Internal Medicine
Palliative Care
Hospitalist
Internal Medicine

## 2025-01-06 NOTE — PROGRESS NOTE ADULT - PROBLEM SELECTOR PLAN 7
SCD for now.

## 2025-01-06 NOTE — PROGRESS NOTE ADULT - REASON FOR ADMISSION
Radiation therapy

## 2025-01-06 NOTE — PROGRESS NOTE ADULT - PROBLEM SELECTOR PROBLEM 1
Metastatic cancer to brain
Small cell lung cancer
Metastatic cancer to brain

## 2025-01-06 NOTE — PROGRESS NOTE ADULT - PROBLEM SELECTOR PROBLEM 7
Encounter for deep vein thrombosis prophylaxis

## 2025-01-06 NOTE — PROGRESS NOTE ADULT - PROBLEM SELECTOR PLAN 3
For GOC  Plan for inpatient PT then rehab. Daughter made aware if patient not improving after rehab, can consider transitioning to LTC with hospice. Discussed with SW/CM.     Palliative signing off
X-ray and  T chest show R 5th and 7th rib Fx, likely from fall  - Also has L3 compression  - Limited MRI shows severe levoscoliosis of the lumbar spine apex at L2. No high-grade spinal canal stenosis. No definite acute fracture of the lumbar spine.  - Ortho measured and dispensed a LSO with rigid anterior posterior and lateral panels to stabilize and control the lumbar spine, reduce pain and ROM and safely protect the fracture site.   - analgesics, incentive spirometer, PT.

## 2025-01-07 ENCOUNTER — TRANSCRIPTION ENCOUNTER (OUTPATIENT)
Age: 80
End: 2025-01-07

## 2025-01-07 VITALS
TEMPERATURE: 99 F | HEART RATE: 69 BPM | DIASTOLIC BLOOD PRESSURE: 63 MMHG | OXYGEN SATURATION: 100 % | RESPIRATION RATE: 18 BRPM | SYSTOLIC BLOOD PRESSURE: 116 MMHG

## 2025-01-07 LAB
ALBUMIN SERPL ELPH-MCNC: 3.3 G/DL — SIGNIFICANT CHANGE UP (ref 3.3–5)
ALP SERPL-CCNC: 101 U/L — SIGNIFICANT CHANGE UP (ref 40–120)
ALT FLD-CCNC: 81 U/L — HIGH (ref 4–33)
ANION GAP SERPL CALC-SCNC: 14 MMOL/L — SIGNIFICANT CHANGE UP (ref 7–14)
AST SERPL-CCNC: 31 U/L — SIGNIFICANT CHANGE UP (ref 4–32)
BASOPHILS # BLD AUTO: 0.02 K/UL — SIGNIFICANT CHANGE UP (ref 0–0.2)
BASOPHILS NFR BLD AUTO: 0.2 % — SIGNIFICANT CHANGE UP (ref 0–2)
BILIRUB SERPL-MCNC: 0.4 MG/DL — SIGNIFICANT CHANGE UP (ref 0.2–1.2)
BUN SERPL-MCNC: 26 MG/DL — HIGH (ref 7–23)
CALCIUM SERPL-MCNC: 9 MG/DL — SIGNIFICANT CHANGE UP (ref 8.4–10.5)
CHLORIDE SERPL-SCNC: 106 MMOL/L — SIGNIFICANT CHANGE UP (ref 98–107)
CO2 SERPL-SCNC: 18 MMOL/L — LOW (ref 22–31)
CREAT SERPL-MCNC: 0.37 MG/DL — LOW (ref 0.5–1.3)
EGFR: 103 ML/MIN/1.73M2 — SIGNIFICANT CHANGE UP
EOSINOPHIL # BLD AUTO: 0.05 K/UL — SIGNIFICANT CHANGE UP (ref 0–0.5)
EOSINOPHIL NFR BLD AUTO: 0.5 % — SIGNIFICANT CHANGE UP (ref 0–6)
GLUCOSE SERPL-MCNC: 100 MG/DL — HIGH (ref 70–99)
HCT VFR BLD CALC: 40.1 % — SIGNIFICANT CHANGE UP (ref 34.5–45)
HGB BLD-MCNC: 13.2 G/DL — SIGNIFICANT CHANGE UP (ref 11.5–15.5)
IANC: 7.84 K/UL — HIGH (ref 1.8–7.4)
IMM GRANULOCYTES NFR BLD AUTO: 1.6 % — HIGH (ref 0–0.9)
LYMPHOCYTES # BLD AUTO: 1.96 K/UL — SIGNIFICANT CHANGE UP (ref 1–3.3)
LYMPHOCYTES # BLD AUTO: 17.9 % — SIGNIFICANT CHANGE UP (ref 13–44)
MAGNESIUM SERPL-MCNC: 2.1 MG/DL — SIGNIFICANT CHANGE UP (ref 1.6–2.6)
MCHC RBC-ENTMCNC: 26.8 PG — LOW (ref 27–34)
MCHC RBC-ENTMCNC: 32.9 G/DL — SIGNIFICANT CHANGE UP (ref 32–36)
MCV RBC AUTO: 81.3 FL — SIGNIFICANT CHANGE UP (ref 80–100)
MONOCYTES # BLD AUTO: 0.91 K/UL — HIGH (ref 0–0.9)
MONOCYTES NFR BLD AUTO: 8.3 % — SIGNIFICANT CHANGE UP (ref 2–14)
NEUTROPHILS # BLD AUTO: 7.84 K/UL — HIGH (ref 1.8–7.4)
NEUTROPHILS NFR BLD AUTO: 71.5 % — SIGNIFICANT CHANGE UP (ref 43–77)
NRBC # BLD: 0 /100 WBCS — SIGNIFICANT CHANGE UP (ref 0–0)
NRBC # FLD: 0 K/UL — SIGNIFICANT CHANGE UP (ref 0–0)
PHOSPHATE SERPL-MCNC: 2.2 MG/DL — LOW (ref 2.5–4.5)
PLATELET # BLD AUTO: 316 K/UL — SIGNIFICANT CHANGE UP (ref 150–400)
POTASSIUM SERPL-MCNC: 4 MMOL/L — SIGNIFICANT CHANGE UP (ref 3.5–5.3)
POTASSIUM SERPL-SCNC: 4 MMOL/L — SIGNIFICANT CHANGE UP (ref 3.5–5.3)
PROT SERPL-MCNC: 6.5 G/DL — SIGNIFICANT CHANGE UP (ref 6–8.3)
RBC # BLD: 4.93 M/UL — SIGNIFICANT CHANGE UP (ref 3.8–5.2)
RBC # FLD: 14.7 % — HIGH (ref 10.3–14.5)
SODIUM SERPL-SCNC: 138 MMOL/L — SIGNIFICANT CHANGE UP (ref 135–145)
WBC # BLD: 10.95 K/UL — HIGH (ref 3.8–10.5)
WBC # FLD AUTO: 10.95 K/UL — HIGH (ref 3.8–10.5)

## 2025-01-07 RX ORDER — SOD PHOS DI, MONO/K PHOS MONO 250 MG
1 TABLET ORAL ONCE
Refills: 0 | Status: COMPLETED | OUTPATIENT
Start: 2025-01-07 | End: 2025-01-07

## 2025-01-07 RX ORDER — OXYCODONE HCL 15 MG
1 TABLET ORAL
Qty: 0 | Refills: 0 | DISCHARGE
Start: 2025-01-07

## 2025-01-07 RX ORDER — MUPIROCIN 2 %
1 OINTMENT (GRAM) TOPICAL
Qty: 1 | Refills: 0
Start: 2025-01-07 | End: 2025-01-10

## 2025-01-07 RX ORDER — LEVETIRACETAM 100 MG/ML
1 SOLUTION ORAL
Qty: 0 | Refills: 0 | DISCHARGE
Start: 2025-01-07

## 2025-01-07 RX ORDER — FLUTICASONE PROPIONATE 50 UG/1
1 SPRAY, METERED NASAL
Qty: 0 | Refills: 0 | DISCHARGE
Start: 2025-01-07

## 2025-01-07 RX ADMIN — Medication 1 DROP(S): at 11:42

## 2025-01-07 RX ADMIN — Medication 1 APPLICATION(S): at 08:03

## 2025-01-07 RX ADMIN — Medication 17 GRAM(S): at 11:40

## 2025-01-07 RX ADMIN — LEVETIRACETAM 500 MILLIGRAM(S): 100 SOLUTION ORAL at 07:59

## 2025-01-07 RX ADMIN — FLUTICASONE PROPIONATE 1 SPRAY(S): 50 SPRAY, METERED NASAL at 07:59

## 2025-01-07 RX ADMIN — CHLORHEXIDINE GLUCONATE 1 APPLICATION(S): 1.2 RINSE ORAL at 11:44

## 2025-01-07 RX ADMIN — ACETAMINOPHEN 650 MILLIGRAM(S): 80 SOLUTION/ DROPS ORAL at 01:00

## 2025-01-07 RX ADMIN — ACETAMINOPHEN 650 MILLIGRAM(S): 80 SOLUTION/ DROPS ORAL at 11:41

## 2025-01-07 RX ADMIN — Medication 1 PACKET(S): at 11:40

## 2025-01-07 RX ADMIN — Medication 4 MILLIGRAM(S): at 08:02

## 2025-01-07 RX ADMIN — ACETAMINOPHEN 650 MILLIGRAM(S): 80 SOLUTION/ DROPS ORAL at 07:59

## 2025-01-07 NOTE — DISCHARGE NOTE NURSING/CASE MANAGEMENT/SOCIAL WORK - FINANCIAL ASSISTANCE
[facility] provides services at a reduced cost to those who are determined to be eligible through [facility]’s financial assistance program. Information regarding [facility]’s financial assistance program can be found by going to [email or web address] or by calling [phone].

## 2025-01-07 NOTE — CHART NOTE - NSCHARTNOTEFT_GEN_A_CORE
No cancer treatment, no oral cancer medication and no oncology follow ups needed during rehab stay. Patient and family are aware and are in agreement.

## 2025-01-07 NOTE — DISCHARGE NOTE NURSING/CASE MANAGEMENT/SOCIAL WORK - NSDCPEFALRISK_GEN_ALL_CORE
For information on Fall & Injury Prevention, visit: [email or web address] OR  [email or web address] OR  https://www.cdc.gov/steadi/patient.html

## 2025-01-07 NOTE — DISCHARGE NOTE NURSING/CASE MANAGEMENT/SOCIAL WORK - PATIENT PORTAL LINK FT
You can access the FollowMyHealth Patient Portal offered by [facility] by registering at the following website: [email or web address]. By joining [employer]’s FollowMyHealth portal, you will also be able to view your health information using other applications (apps) compatible with our system.

## 2025-01-23 ENCOUNTER — OUTPATIENT (OUTPATIENT)
Dept: OUTPATIENT SERVICES | Facility: HOSPITAL | Age: 80
LOS: 1 days | Discharge: HOME | End: 2025-01-23

## 2025-01-23 DIAGNOSIS — Z98.890 OTHER SPECIFIED POSTPROCEDURAL STATES: Chronic | ICD-10-CM

## 2025-01-23 DIAGNOSIS — C34.90 MALIGNANT NEOPLASM OF UNSPECIFIED PART OF UNSPECIFIED BRONCHUS OR LUNG: ICD-10-CM

## 2025-01-24 ENCOUNTER — NON-APPOINTMENT (OUTPATIENT)
Age: 80
End: 2025-01-24

## 2025-01-24 ENCOUNTER — APPOINTMENT (OUTPATIENT)
Dept: HEMATOLOGY ONCOLOGY | Facility: CLINIC | Age: 80
End: 2025-01-24
Payer: MEDICARE

## 2025-01-24 DIAGNOSIS — C34.90 MALIGNANT NEOPLASM OF UNSPECIFIED PART OF UNSPECIFIED BRONCHUS OR LUNG: ICD-10-CM

## 2025-01-24 DIAGNOSIS — C79.31 SECONDARY MALIGNANT NEOPLASM OF BRAIN: ICD-10-CM

## 2025-01-24 PROCEDURE — G2211 COMPLEX E/M VISIT ADD ON: CPT | Mod: 2W

## 2025-01-24 PROCEDURE — 99214 OFFICE O/P EST MOD 30 MIN: CPT | Mod: 2W

## 2025-03-26 NOTE — BH CONSULTATION LIAISON ASSESSMENT NOTE - NS ED BHA REVIEW OF ED CHART AVAILABLE LABS REVIEWED
Pharmacy faxed in a request for prior authorization on:    Medication: liraglutide - weight management (SAXENDA)    Dosage: 18 MG/3ML pen-injector  Quantity requested:    Pharmacy for prescription has been selected.    Prior authorization request has been initiated and sent for completion.   
Yes

## 2025-04-04 NOTE — DIETITIAN INITIAL EVALUATION ADULT - NUTRITION DIAGNOSITC TERMINOLOGY #1
Pt requested med refill today   Orders:    OLANZapine (ZyPREXA) 10 mg tablet; Take 1 tablet (10 mg) by mouth once daily at bedtime.     Increased Nutrient Needs...

## (undated) DEVICE — FRA-ESU BOVIE FORCE TRIAD T6D04777DX: Type: DURABLE MEDICAL EQUIPMENT